# Patient Record
Sex: FEMALE | Race: WHITE | Employment: FULL TIME | URBAN - METROPOLITAN AREA
[De-identification: names, ages, dates, MRNs, and addresses within clinical notes are randomized per-mention and may not be internally consistent; named-entity substitution may affect disease eponyms.]

---

## 2017-02-20 ENCOUNTER — HOSPITAL ENCOUNTER (OUTPATIENT)
Dept: WOMENS IMAGING | Age: 52
Discharge: HOME | End: 2017-02-20
Payer: COMMERCIAL

## 2017-02-20 ENCOUNTER — TELEPHONE (OUTPATIENT)
Dept: PHARMACY | Facility: CLINIC | Age: 52
End: 2017-02-20

## 2017-02-20 DIAGNOSIS — Z12.39 SCREENING FOR BREAST CANCER: ICD-10-CM

## 2017-02-20 PROCEDURE — 77067 SCR MAMMO BI INCL CAD: CPT | Performed by: RADIOLOGY

## 2017-02-20 PROCEDURE — 77063 BREAST TOMOSYNTHESIS BI: CPT | Performed by: RADIOLOGY

## 2017-02-20 PROCEDURE — G0202 SCR MAMMO BI INCL CAD: HCPCS

## 2017-03-13 ENCOUNTER — OFFICE VISIT (OUTPATIENT)
Dept: ORTHOPEDIC SURGERY | Age: 52
End: 2017-03-13
Payer: COMMERCIAL

## 2017-03-13 VITALS
BODY MASS INDEX: 39.34 KG/M2 | HEIGHT: 63 IN | WEIGHT: 222 LBS | HEART RATE: 79 BPM | DIASTOLIC BLOOD PRESSURE: 83 MMHG | SYSTOLIC BLOOD PRESSURE: 142 MMHG

## 2017-03-13 DIAGNOSIS — M25.561 RIGHT KNEE PAIN, UNSPECIFIED CHRONICITY: Primary | ICD-10-CM

## 2017-03-13 PROCEDURE — 99203 OFFICE O/P NEW LOW 30 MIN: CPT | Performed by: ORTHOPAEDIC SURGERY

## 2017-03-20 ENCOUNTER — HOSPITAL ENCOUNTER (OUTPATIENT)
Dept: PREADMISSION TESTING | Age: 52
Discharge: HOME | End: 2017-03-20
Payer: COMMERCIAL

## 2017-03-20 VITALS
SYSTOLIC BLOOD PRESSURE: 124 MMHG | RESPIRATION RATE: 16 BRPM | TEMPERATURE: 96.6 F | DIASTOLIC BLOOD PRESSURE: 81 MMHG | BODY MASS INDEX: 38.62 KG/M2 | HEIGHT: 63 IN | OXYGEN SATURATION: 97 % | HEART RATE: 70 BPM | WEIGHT: 218 LBS

## 2017-03-20 LAB
ABSOLUTE EOS #: 0.2 K/UL (ref 0–0.4)
ABSOLUTE LYMPH #: 3 K/UL (ref 1–4.8)
ABSOLUTE MONO #: 0.8 K/UL (ref 0.1–1.3)
ANION GAP SERPL CALCULATED.3IONS-SCNC: 15 MMOL/L (ref 9–17)
BASOPHILS # BLD: 1 % (ref 0–2)
BASOPHILS ABSOLUTE: 0.1 K/UL (ref 0–0.2)
BUN BLDV-MCNC: 18 MG/DL (ref 6–20)
BUN/CREAT BLD: ABNORMAL (ref 9–20)
CALCIUM SERPL-MCNC: 9.2 MG/DL (ref 8.6–10.4)
CHLORIDE BLD-SCNC: 98 MMOL/L (ref 98–107)
CO2: 25 MMOL/L (ref 20–31)
CREAT SERPL-MCNC: 1.1 MG/DL (ref 0.5–0.9)
DIFFERENTIAL TYPE: ABNORMAL
EOSINOPHILS RELATIVE PERCENT: 1 % (ref 0–4)
GFR AFRICAN AMERICAN: >60 ML/MIN
GFR NON-AFRICAN AMERICAN: 52 ML/MIN
GFR SERPL CREATININE-BSD FRML MDRD: ABNORMAL ML/MIN/{1.73_M2}
GFR SERPL CREATININE-BSD FRML MDRD: ABNORMAL ML/MIN/{1.73_M2}
GLUCOSE BLD-MCNC: 106 MG/DL (ref 70–99)
HCT VFR BLD CALC: 41.9 % (ref 36–46)
HEMOGLOBIN: 13.8 G/DL (ref 12–16)
LYMPHOCYTES # BLD: 25 % (ref 24–44)
MCH RBC QN AUTO: 28 PG (ref 26–34)
MCHC RBC AUTO-ENTMCNC: 32.9 G/DL (ref 31–37)
MCV RBC AUTO: 85.1 FL (ref 80–100)
MONOCYTES # BLD: 6 % (ref 1–7)
PDW BLD-RTO: 14.6 % (ref 11.5–14.9)
PLATELET # BLD: 412 K/UL (ref 150–450)
PLATELET ESTIMATE: ABNORMAL
PMV BLD AUTO: 9 FL (ref 6–12)
POTASSIUM SERPL-SCNC: 5.2 MMOL/L (ref 3.7–5.3)
RBC # BLD: 4.93 M/UL (ref 4–5.2)
RBC # BLD: ABNORMAL 10*6/UL
SEG NEUTROPHILS: 67 % (ref 36–66)
SEGMENTED NEUTROPHILS ABSOLUTE COUNT: 8.2 K/UL (ref 1.3–9.1)
SODIUM BLD-SCNC: 138 MMOL/L (ref 135–144)
WBC # BLD: 12.2 K/UL (ref 3.5–11)
WBC # BLD: ABNORMAL 10*3/UL

## 2017-03-20 PROCEDURE — 80048 BASIC METABOLIC PNL TOTAL CA: CPT

## 2017-03-20 PROCEDURE — 36415 COLL VENOUS BLD VENIPUNCTURE: CPT

## 2017-03-20 PROCEDURE — 85025 COMPLETE CBC W/AUTO DIFF WBC: CPT

## 2017-03-20 PROCEDURE — 93005 ELECTROCARDIOGRAM TRACING: CPT

## 2017-03-20 ASSESSMENT — PAIN DESCRIPTION - LOCATION: LOCATION: KNEE

## 2017-03-20 ASSESSMENT — PAIN DESCRIPTION - ORIENTATION: ORIENTATION: RIGHT

## 2017-03-20 ASSESSMENT — PAIN SCALES - GENERAL: PAINLEVEL_OUTOF10: 4

## 2017-03-20 ASSESSMENT — PAIN DESCRIPTION - PAIN TYPE: TYPE: CHRONIC PAIN

## 2017-04-03 ENCOUNTER — ANESTHESIA EVENT (OUTPATIENT)
Dept: OPERATING ROOM | Age: 52
End: 2017-04-03
Payer: COMMERCIAL

## 2017-04-03 ENCOUNTER — HOSPITAL ENCOUNTER (OUTPATIENT)
Age: 52
Setting detail: OUTPATIENT SURGERY
Discharge: HOME | End: 2017-04-03
Attending: ORTHOPAEDIC SURGERY | Admitting: ORTHOPAEDIC SURGERY
Payer: COMMERCIAL

## 2017-04-03 ENCOUNTER — ANESTHESIA (OUTPATIENT)
Dept: OPERATING ROOM | Age: 52
End: 2017-04-03
Payer: COMMERCIAL

## 2017-04-03 VITALS
HEART RATE: 70 BPM | HEIGHT: 63 IN | SYSTOLIC BLOOD PRESSURE: 110 MMHG | TEMPERATURE: 97.9 F | DIASTOLIC BLOOD PRESSURE: 64 MMHG | RESPIRATION RATE: 16 BRPM | OXYGEN SATURATION: 96 % | WEIGHT: 218 LBS | BODY MASS INDEX: 38.62 KG/M2

## 2017-04-03 VITALS — OXYGEN SATURATION: 99 % | DIASTOLIC BLOOD PRESSURE: 72 MMHG | TEMPERATURE: 95 F | SYSTOLIC BLOOD PRESSURE: 118 MMHG

## 2017-04-03 DIAGNOSIS — M23.41 LOOSE BODY IN KNEE, RIGHT: ICD-10-CM

## 2017-04-03 LAB
-: NORMAL
GLUCOSE BLD-MCNC: 140 MG/DL (ref 65–105)
HCG, PREGNANCY URINE (POC): NEGATIVE

## 2017-04-03 PROCEDURE — 88311 DECALCIFY TISSUE: CPT

## 2017-04-03 PROCEDURE — 3700000001 HC ADD 15 MINUTES (ANESTHESIA): Performed by: ORTHOPAEDIC SURGERY

## 2017-04-03 PROCEDURE — 7100000000 HC PACU RECOVERY - FIRST 15 MIN: Performed by: ORTHOPAEDIC SURGERY

## 2017-04-03 PROCEDURE — 2580000003 HC RX 258: Performed by: ORTHOPAEDIC SURGERY

## 2017-04-03 PROCEDURE — 88304 TISSUE EXAM BY PATHOLOGIST: CPT

## 2017-04-03 PROCEDURE — 6360000002 HC RX W HCPCS: Performed by: ANESTHESIOLOGY

## 2017-04-03 PROCEDURE — 3600000003 HC SURGERY LEVEL 3 BASE: Performed by: ORTHOPAEDIC SURGERY

## 2017-04-03 PROCEDURE — 7100000031 HC ASPR PHASE II RECOVERY - ADDTL 15 MIN: Performed by: ORTHOPAEDIC SURGERY

## 2017-04-03 PROCEDURE — 6370000000 HC RX 637 (ALT 250 FOR IP): Performed by: ANESTHESIOLOGY

## 2017-04-03 PROCEDURE — 3700000000 HC ANESTHESIA ATTENDED CARE: Performed by: ORTHOPAEDIC SURGERY

## 2017-04-03 PROCEDURE — 82947 ASSAY GLUCOSE BLOOD QUANT: CPT

## 2017-04-03 PROCEDURE — 2720000010 HC SURG SUPPLY STERILE: Performed by: ORTHOPAEDIC SURGERY

## 2017-04-03 PROCEDURE — 6370000000 HC RX 637 (ALT 250 FOR IP): Performed by: ORTHOPAEDIC SURGERY

## 2017-04-03 PROCEDURE — 7100000030 HC ASPR PHASE II RECOVERY - FIRST 15 MIN: Performed by: ORTHOPAEDIC SURGERY

## 2017-04-03 PROCEDURE — 2500000003 HC RX 250 WO HCPCS: Performed by: ANESTHESIOLOGY

## 2017-04-03 PROCEDURE — 6360000002 HC RX W HCPCS: Performed by: ORTHOPAEDIC SURGERY

## 2017-04-03 PROCEDURE — 84703 CHORIONIC GONADOTROPIN ASSAY: CPT

## 2017-04-03 PROCEDURE — 3600000013 HC SURGERY LEVEL 3 ADDTL 15MIN: Performed by: ORTHOPAEDIC SURGERY

## 2017-04-03 PROCEDURE — 7100000001 HC PACU RECOVERY - ADDTL 15 MIN: Performed by: ORTHOPAEDIC SURGERY

## 2017-04-03 RX ORDER — ONDANSETRON 2 MG/ML
4 INJECTION INTRAMUSCULAR; INTRAVENOUS
Status: DISCONTINUED | OUTPATIENT
Start: 2017-04-03 | End: 2017-04-03 | Stop reason: HOSPADM

## 2017-04-03 RX ORDER — SODIUM CHLORIDE 9 MG/ML
INJECTION, SOLUTION INTRAVENOUS CONTINUOUS
Status: DISCONTINUED | OUTPATIENT
Start: 2017-04-03 | End: 2017-04-03 | Stop reason: HOSPADM

## 2017-04-03 RX ORDER — PROPOFOL 10 MG/ML
INJECTION, EMULSION INTRAVENOUS PRN
Status: DISCONTINUED | OUTPATIENT
Start: 2017-04-03 | End: 2017-04-03 | Stop reason: SDUPTHER

## 2017-04-03 RX ORDER — FENTANYL CITRATE 50 UG/ML
25 INJECTION, SOLUTION INTRAMUSCULAR; INTRAVENOUS EVERY 5 MIN PRN
Status: DISCONTINUED | OUTPATIENT
Start: 2017-04-03 | End: 2017-04-03 | Stop reason: HOSPADM

## 2017-04-03 RX ORDER — LABETALOL HYDROCHLORIDE 5 MG/ML
5 INJECTION, SOLUTION INTRAVENOUS EVERY 10 MIN PRN
Status: DISCONTINUED | OUTPATIENT
Start: 2017-04-03 | End: 2017-04-03 | Stop reason: HOSPADM

## 2017-04-03 RX ORDER — HYDROCODONE BITARTRATE AND ACETAMINOPHEN 5; 325 MG/1; MG/1
1 TABLET ORAL PRN
Status: COMPLETED | OUTPATIENT
Start: 2017-04-03 | End: 2017-04-03

## 2017-04-03 RX ORDER — DIPHENHYDRAMINE HYDROCHLORIDE 50 MG/ML
12.5 INJECTION INTRAMUSCULAR; INTRAVENOUS
Status: DISCONTINUED | OUTPATIENT
Start: 2017-04-03 | End: 2017-04-03 | Stop reason: HOSPADM

## 2017-04-03 RX ORDER — HYDROCODONE BITARTRATE AND ACETAMINOPHEN 5; 325 MG/1; MG/1
2 TABLET ORAL PRN
Qty: 30 TABLET | Refills: 0 | Status: SHIPPED | OUTPATIENT
Start: 2017-04-03 | End: 2017-04-10

## 2017-04-03 RX ORDER — METOCLOPRAMIDE HYDROCHLORIDE 5 MG/ML
10 INJECTION INTRAMUSCULAR; INTRAVENOUS
Status: DISCONTINUED | OUTPATIENT
Start: 2017-04-03 | End: 2017-04-03 | Stop reason: HOSPADM

## 2017-04-03 RX ORDER — ROPIVACAINE HYDROCHLORIDE 5 MG/ML
INJECTION, SOLUTION EPIDURAL; INFILTRATION; PERINEURAL PRN
Status: DISCONTINUED | OUTPATIENT
Start: 2017-04-03 | End: 2017-04-03 | Stop reason: HOSPADM

## 2017-04-03 RX ORDER — ONDANSETRON 2 MG/ML
INJECTION INTRAMUSCULAR; INTRAVENOUS PRN
Status: DISCONTINUED | OUTPATIENT
Start: 2017-04-03 | End: 2017-04-03 | Stop reason: SDUPTHER

## 2017-04-03 RX ORDER — HYDRALAZINE HYDROCHLORIDE 20 MG/ML
5 INJECTION INTRAMUSCULAR; INTRAVENOUS EVERY 10 MIN PRN
Status: DISCONTINUED | OUTPATIENT
Start: 2017-04-03 | End: 2017-04-03 | Stop reason: HOSPADM

## 2017-04-03 RX ORDER — SCOLOPAMINE TRANSDERMAL SYSTEM 1 MG/1
1 PATCH, EXTENDED RELEASE TRANSDERMAL
Status: DISCONTINUED | OUTPATIENT
Start: 2017-04-03 | End: 2017-04-03 | Stop reason: HOSPADM

## 2017-04-03 RX ORDER — FENTANYL CITRATE 50 UG/ML
50 INJECTION, SOLUTION INTRAMUSCULAR; INTRAVENOUS EVERY 5 MIN PRN
Status: DISCONTINUED | OUTPATIENT
Start: 2017-04-03 | End: 2017-04-03 | Stop reason: HOSPADM

## 2017-04-03 RX ORDER — MIDAZOLAM HYDROCHLORIDE 1 MG/ML
INJECTION INTRAMUSCULAR; INTRAVENOUS PRN
Status: DISCONTINUED | OUTPATIENT
Start: 2017-04-03 | End: 2017-04-03 | Stop reason: SDUPTHER

## 2017-04-03 RX ORDER — DEXAMETHASONE SODIUM PHOSPHATE 4 MG/ML
INJECTION, SOLUTION INTRA-ARTICULAR; INTRALESIONAL; INTRAMUSCULAR; INTRAVENOUS; SOFT TISSUE PRN
Status: DISCONTINUED | OUTPATIENT
Start: 2017-04-03 | End: 2017-04-03 | Stop reason: SDUPTHER

## 2017-04-03 RX ORDER — KETOROLAC TROMETHAMINE 30 MG/ML
INJECTION, SOLUTION INTRAMUSCULAR; INTRAVENOUS PRN
Status: DISCONTINUED | OUTPATIENT
Start: 2017-04-03 | End: 2017-04-03 | Stop reason: SDUPTHER

## 2017-04-03 RX ORDER — MORPHINE SULFATE 2 MG/ML
1 INJECTION, SOLUTION INTRAMUSCULAR; INTRAVENOUS EVERY 5 MIN PRN
Status: DISCONTINUED | OUTPATIENT
Start: 2017-04-03 | End: 2017-04-03 | Stop reason: HOSPADM

## 2017-04-03 RX ORDER — HYDROCODONE BITARTRATE AND ACETAMINOPHEN 5; 325 MG/1; MG/1
2 TABLET ORAL PRN
Status: COMPLETED | OUTPATIENT
Start: 2017-04-03 | End: 2017-04-03

## 2017-04-03 RX ORDER — LIDOCAINE HYDROCHLORIDE 10 MG/ML
INJECTION, SOLUTION INFILTRATION; PERINEURAL PRN
Status: DISCONTINUED | OUTPATIENT
Start: 2017-04-03 | End: 2017-04-03 | Stop reason: SDUPTHER

## 2017-04-03 RX ORDER — MEPERIDINE HYDROCHLORIDE 25 MG/ML
12.5 INJECTION INTRAMUSCULAR; INTRAVENOUS; SUBCUTANEOUS EVERY 5 MIN PRN
Status: DISCONTINUED | OUTPATIENT
Start: 2017-04-03 | End: 2017-04-03 | Stop reason: HOSPADM

## 2017-04-03 RX ADMIN — LIDOCAINE HYDROCHLORIDE 5 ML: 10 INJECTION, SOLUTION INFILTRATION; PERINEURAL at 11:04

## 2017-04-03 RX ADMIN — FENTANYL CITRATE 50 MCG: 50 INJECTION, SOLUTION INTRAMUSCULAR; INTRAVENOUS at 11:16

## 2017-04-03 RX ADMIN — SODIUM CHLORIDE: 9 INJECTION, SOLUTION INTRAVENOUS at 09:32

## 2017-04-03 RX ADMIN — FENTANYL CITRATE 50 MCG: 50 INJECTION, SOLUTION INTRAMUSCULAR; INTRAVENOUS at 11:04

## 2017-04-03 RX ADMIN — MIDAZOLAM HYDROCHLORIDE 2 MG: 1 INJECTION, SOLUTION INTRAMUSCULAR; INTRAVENOUS at 11:04

## 2017-04-03 RX ADMIN — KETOROLAC TROMETHAMINE 30 MG: 30 INJECTION, SOLUTION INTRAMUSCULAR; INTRAVENOUS at 11:26

## 2017-04-03 RX ADMIN — PROPOFOL 200 MG: 10 INJECTION, EMULSION INTRAVENOUS at 11:04

## 2017-04-03 RX ADMIN — HYDROCODONE BITARTRATE AND ACETAMINOPHEN 2 TABLET: 5; 325 TABLET ORAL at 12:22

## 2017-04-03 RX ADMIN — ONDANSETRON 4 MG: 2 INJECTION, SOLUTION INTRAMUSCULAR; INTRAVENOUS at 11:19

## 2017-04-03 RX ADMIN — DEXAMETHASONE SODIUM PHOSPHATE 4 MG: 4 INJECTION, SOLUTION INTRAMUSCULAR; INTRAVENOUS at 11:15

## 2017-04-03 ASSESSMENT — PAIN SCALES - GENERAL
PAINLEVEL_OUTOF10: 0
PAINLEVEL_OUTOF10: 3
PAINLEVEL_OUTOF10: 4
PAINLEVEL_OUTOF10: 4
PAINLEVEL_OUTOF10: 3
PAINLEVEL_OUTOF10: 4

## 2017-04-03 ASSESSMENT — PAIN DESCRIPTION - ORIENTATION
ORIENTATION: RIGHT

## 2017-04-03 ASSESSMENT — PAIN DESCRIPTION - DESCRIPTORS
DESCRIPTORS: ACHING

## 2017-04-03 ASSESSMENT — PAIN DESCRIPTION - PAIN TYPE
TYPE: SURGICAL PAIN

## 2017-04-03 ASSESSMENT — PAIN DESCRIPTION - LOCATION
LOCATION: KNEE

## 2017-04-04 LAB
EKG ATRIAL RATE: 72 BPM
EKG P AXIS: 59 DEGREES
EKG P-R INTERVAL: 118 MS
EKG Q-T INTERVAL: 406 MS
EKG QRS DURATION: 78 MS
EKG QTC CALCULATION (BAZETT): 444 MS
EKG R AXIS: -7 DEGREES
EKG T AXIS: 40 DEGREES
EKG VENTRICULAR RATE: 72 BPM

## 2017-04-05 LAB — SURGICAL PATHOLOGY REPORT: NORMAL

## 2017-04-10 ENCOUNTER — OFFICE VISIT (OUTPATIENT)
Dept: ORTHOPEDIC SURGERY | Age: 52
End: 2017-04-10

## 2017-04-10 DIAGNOSIS — Z98.890 H/O ARTHROSCOPY OF RIGHT KNEE: Primary | ICD-10-CM

## 2017-04-10 DIAGNOSIS — Z48.02 VISIT FOR SUTURE REMOVAL: ICD-10-CM

## 2017-04-10 PROCEDURE — 99024 POSTOP FOLLOW-UP VISIT: CPT | Performed by: ORTHOPAEDIC SURGERY

## 2017-05-19 ENCOUNTER — EMPLOYEE WELLNESS (OUTPATIENT)
Dept: OTHER | Age: 52
End: 2017-05-19

## 2017-05-19 LAB
AVERAGE GLUCOSE: NORMAL
CHOLESTEROL/HDL RATIO: 3.1
CHOLESTEROL: 147 MG/DL
GLUCOSE BLD-MCNC: 134 MG/DL (ref 70–99)
HBA1C MFR BLD: 6.4 %
HDLC SERPL-MCNC: 47 MG/DL
LDL CHOLESTEROL: 70 MG/DL (ref 0–130)
PATIENT FASTING?: YES
TRIGL SERPL-MCNC: 151 MG/DL
VLDLC SERPL CALC-MCNC: ABNORMAL MG/DL (ref 1–30)

## 2017-05-22 LAB
ESTIMATED AVERAGE GLUCOSE: 209 MG/DL
HBA1C MFR BLD: 8.9 % (ref 4–6)

## 2017-08-17 ENCOUNTER — HOSPITAL ENCOUNTER (OUTPATIENT)
Age: 52
Discharge: HOME | End: 2017-08-17
Payer: COMMERCIAL

## 2017-08-17 ENCOUNTER — HOSPITAL ENCOUNTER (OUTPATIENT)
Dept: GENERAL RADIOLOGY | Age: 52
Discharge: HOME | End: 2017-08-17
Payer: COMMERCIAL

## 2017-08-17 DIAGNOSIS — M54.12 BRACHIAL NEURITIS OR RADICULITIS NOS: ICD-10-CM

## 2017-08-17 LAB
ESTIMATED AVERAGE GLUCOSE: 235 MG/DL
HBA1C MFR BLD: 9.8 % (ref 4–6)

## 2017-08-17 PROCEDURE — 36415 COLL VENOUS BLD VENIPUNCTURE: CPT

## 2017-08-17 PROCEDURE — 72040 X-RAY EXAM NECK SPINE 2-3 VW: CPT

## 2017-08-17 PROCEDURE — 83036 HEMOGLOBIN GLYCOSYLATED A1C: CPT

## 2017-09-07 ENCOUNTER — HOSPITAL ENCOUNTER (OUTPATIENT)
Age: 52
Discharge: HOME | End: 2017-09-07
Payer: COMMERCIAL

## 2017-09-07 ENCOUNTER — HOSPITAL ENCOUNTER (OUTPATIENT)
Dept: GENERAL RADIOLOGY | Age: 52
Discharge: HOME | End: 2017-09-07
Payer: COMMERCIAL

## 2017-09-07 DIAGNOSIS — M79.672 LEFT FOOT PAIN: ICD-10-CM

## 2017-09-07 PROCEDURE — 73630 X-RAY EXAM OF FOOT: CPT

## 2017-12-04 ENCOUNTER — TELEPHONE (OUTPATIENT)
Dept: PHARMACY | Facility: CLINIC | Age: 52
End: 2017-12-04

## 2017-12-04 ENCOUNTER — SCHEDULED TELEPHONE ENCOUNTER (OUTPATIENT)
Dept: PHARMACY | Facility: CLINIC | Age: 52
End: 2017-12-04

## 2017-12-04 NOTE — LETTER
[address]  [address], Cathryn Claros [address]  Phone: [phone], option 7  Fax: [phone] [address]           12/04/17     Dear Edilma Lobo,    The [address] [place] [address] Team has attempted to contact you for your scheduled Diabetes Management telephone appointment but was unable to reach you. One of the requirements to participate in the [facility] Diabetes Management Program is to complete this appointment. We would like to work with you and your doctor to:  - Review your medications, including over-the-counter and herbal medications  - Answer questions about your medications and how to get the most benefit from them  - Identify potential drug interactions or side effects and help fix them  - Identify preferred medications that are equally effective, but available at a lower cost to you  - Help you reach the necessary requirements to remain enrolled in the Diabetes Management Program offered by [facility]    Please call [phone] and select option #7 to reschedule this appointment to take advantage of this service.       Sincerely,     [address]  Phone: [phone], option 7

## 2017-12-04 NOTE — TELEPHONE ENCOUNTER
[facility]) Employee Diabetes Program    Two attempts made to reach patient by telephone for pharmacist appointment. Left voice message for patient to return clinician's phone call to [phone]. Will prepare letter and mail to patient.     Amanda Nevarez PharmD, [facility] Specialist  O: [phone]  C: [address] [place], [place] [phone], Option 7    =======================================================    For Pharmacy Admin Tracking Only  PHSO: Yes  Time Spent (min): 15

## 2017-12-04 NOTE — ADDENDUM NOTE
Encounter addended by: Rustam Flood MA on: 12/4/2017  5:15 PM<BR>    Actions taken: Letter status changed

## 2017-12-20 ENCOUNTER — ENROLLMENT (OUTPATIENT)
Dept: PHARMACY | Facility: CLINIC | Age: 52
End: 2017-12-20

## 2017-12-20 ENCOUNTER — SCHEDULED TELEPHONE ENCOUNTER (OUTPATIENT)
Dept: PHARMACY | Facility: CLINIC | Age: 52
End: 2017-12-20

## 2017-12-20 ENCOUNTER — CLINICAL DOCUMENTATION (OUTPATIENT)
Dept: PHARMACY | Facility: CLINIC | Age: 52
End: 2017-12-20

## 2017-12-20 NOTE — LETTER
[facility]   [address] [facility] [address]           12/20/17     Dear Dori Mcallister! You have completed the requirements to participate in the [facility]) Diabetes Management program for 2018. What you will receive? You will receive beginning Jan. 1 up to $600 in waived copays ($300 if entering the program on July 1) for specific medications and pharmacy-related supplies purchased through the [facility]. A list of these items is available on the [employer] Diabetes Management page (Employee Quick Links > Human Resources > Benefits and Well Being > Diabetes Management) and on [employer] under the Diabetes Management tile. In addition, clinical support will be available if your A1c becomes greater than 8 percent. To reduce your health risks, our care coordinators and diabetes educators will assist you in better controlling your condition. What you will need to do? To maintain your benefit throughout the year and be eligible to receive the benefit next year, you must make sure you complete all the following ongoing requirements:  ? Requirements to be completed throughout year:  ? Take diabetes medication as prescribed as well as cholesterol (Statin) or high blood pressure (ACE/ARB), if needed. o 70% adherence is required of diabetes medications  o Provide documentation if cholesterol and/or high blood pressure medications are not needed. ? Lipid panel (once yearly)  ? Urine albumin (once yearly)  ? Pneumonia Vaccination (as indicated)  ? Requirements to be completed between Jan. 1 and June 30:  ? Visit with your physician (First yearly visit)  ? Meet with a [address] [place] [address] pharmacist in person at a hospital location or by phone (This visit may be conducted prior to the start of the requirements period.)  ? First A1c  ?  Requirements to be completed between July 1 and Dec. 31

## 2017-12-20 NOTE — PROGRESS NOTES
Pharmacy Pop Care Documentation:      The application for Ivy Pierson for enrollment into the diabetes management program has been reviewed and accepted on 12/20/17.     [address] Pop Care Documentation:     AVS received for required office visit on: 8/17/17    Urine Albumin Override: Done: 12/27/16

## 2017-12-27 RX ORDER — METFORMIN HYDROCHLORIDE 500 MG/1
1000 TABLET, EXTENDED RELEASE ORAL 2 TIMES DAILY WITH MEALS
COMMUNITY

## 2017-12-27 RX ORDER — GLIMEPIRIDE 4 MG/1
TABLET ORAL
COMMUNITY

## 2018-02-26 ENCOUNTER — HOSPITAL ENCOUNTER (OUTPATIENT)
Dept: MAMMOGRAPHY | Age: 53
Discharge: HOME | End: 2018-02-28
Payer: COMMERCIAL

## 2018-02-26 DIAGNOSIS — Z12.39 BREAST CANCER SCREENING: ICD-10-CM

## 2018-02-26 PROCEDURE — 77067 SCR MAMMO BI INCL CAD: CPT

## 2018-03-20 VITALS — BODY MASS INDEX: 37.38 KG/M2 | WEIGHT: 211 LBS

## 2018-03-24 ENCOUNTER — HOSPITAL ENCOUNTER (OUTPATIENT)
Age: 53
Discharge: HOME | End: 2018-03-24
Payer: COMMERCIAL

## 2018-03-24 LAB
CORTISOL COLLECTION INFO: NORMAL
CORTISOL: 11.2 UG/DL (ref 2.7–18.4)
ESTRADIOL LEVEL: 26 PG/ML (ref 27–314)
FOLLICLE STIMULATING HORMONE: 53.7 U/L (ref 1.7–21.5)
PROGESTERONE LEVEL: <0.05 NG/ML
T3 FREE: 2.96 PG/ML (ref 2.02–4.43)
THYROXINE, FREE: 1.3 NG/DL (ref 0.93–1.7)
TSH SERPL DL<=0.05 MIU/L-ACNC: 2.78 MIU/L (ref 0.3–5)

## 2018-03-24 PROCEDURE — 82677 ASSAY OF ESTRIOL: CPT

## 2018-03-24 PROCEDURE — 82533 TOTAL CORTISOL: CPT

## 2018-03-24 PROCEDURE — 84481 FREE ASSAY (FT-3): CPT

## 2018-03-24 PROCEDURE — 84144 ASSAY OF PROGESTERONE: CPT

## 2018-03-24 PROCEDURE — 84439 ASSAY OF FREE THYROXINE: CPT

## 2018-03-24 PROCEDURE — 36415 COLL VENOUS BLD VENIPUNCTURE: CPT

## 2018-03-24 PROCEDURE — 83001 ASSAY OF GONADOTROPIN (FSH): CPT

## 2018-03-24 PROCEDURE — 84443 ASSAY THYROID STIM HORMONE: CPT

## 2018-03-24 PROCEDURE — 82670 ASSAY OF TOTAL ESTRADIOL: CPT

## 2018-03-28 LAB — ESTRIOL: 0.02 NG/ML

## 2018-05-18 ENCOUNTER — EMPLOYEE WELLNESS (OUTPATIENT)
Dept: OTHER | Age: 53
End: 2018-05-18

## 2018-05-18 LAB
CHOLESTEROL/HDL RATIO: 2.6
CHOLESTEROL: 147 MG/DL
ESTIMATED AVERAGE GLUCOSE: 263 MG/DL
GLUCOSE BLD-MCNC: 183 MG/DL (ref 70–99)
HBA1C MFR BLD: 10.8 % (ref 4–6)
HDLC SERPL-MCNC: 56 MG/DL
LDL CHOLESTEROL: 76 MG/DL (ref 0–130)
PATIENT FASTING?: YES
TRIGL SERPL-MCNC: 76 MG/DL
VLDLC SERPL CALC-MCNC: ABNORMAL MG/DL (ref 1–30)

## 2018-05-24 ENCOUNTER — TELEPHONE (OUTPATIENT)
Dept: PHARMACY | Facility: CLINIC | Age: 53
End: 2018-05-24

## 2018-05-29 VITALS — WEIGHT: 210 LBS | BODY MASS INDEX: 37.2 KG/M2

## 2018-06-20 ENCOUNTER — TELEPHONE (OUTPATIENT)
Dept: PHARMACY | Facility: CLINIC | Age: 53
End: 2018-06-20

## 2018-06-25 ENCOUNTER — CLINICAL DOCUMENTATION (OUTPATIENT)
Dept: PHARMACY | Facility: CLINIC | Age: 53
End: 2018-06-25

## 2018-08-22 ENCOUNTER — CARE COORDINATION (OUTPATIENT)
Dept: CARE COORDINATION | Age: 53
End: 2018-08-22

## 2018-09-24 ENCOUNTER — CARE COORDINATION (OUTPATIENT)
Dept: CARE COORDINATION | Age: 53
End: 2018-09-24

## 2018-09-24 ENCOUNTER — CLINICAL DOCUMENTATION (OUTPATIENT)
Dept: PHARMACY | Facility: CLINIC | Age: 53
End: 2018-09-24

## 2018-09-24 NOTE — CARE COORDINATION
2018    CHOLHDLRATIO 3.1 2017       Lab Results   Component Value Date    WBC 12.2 (H) 2017    HGB 13.8 2017    HCT 41.9 2017    MCV 85.1 2017     2017       Lab Results   Component Value Date    CREATININE 1.10 (H) 2017    BUN 18 2017     2017    K 5.2 2017    CL 98 2017    CO2 25 2017         Anthropometric Measurements:    Height: None on file  Weight: 210 lbs. ()  BMI: N/A  IBW: N/A  %IBW: N/A  AdBW: N/A      Food and Nutrition History:    Diet Recall    Breakfas  Consumed: eggs with sausage, frozen breakfast sandwich, special K with skim milk and fruit    AM Snack  Consumed: none    Lunch  Consumed: none    PM Snack  Consumed: none    Dinner  Time: 8-9 pm  Consumed: protein with veggies, sometimes will have potatoes/pasta    Bedtime Snack  Consumed: none    Beverages: Water    Frequency of meals away from home: N/A    Exercise: None, was doing crossfit classes but stopped after knee surgery    Blood Sugar Checks: 3-4x daily, fastin-125 mg/dL    Summary of Appointment: Spoke to patient over phone regarding employee diabetes program nutrition assessment. Explained role of RD position with the employee diabetes program and the purpose of today's call. RD asked open ended questions to assess patient's goals/outcome of call. Personal goals of patient that were discussed are to lower A1C. Patient stated that she has had difficulty lowering her A1C since being diagnosed. She lost over 100 lbs. On her own and numbers still increased. Patient is on 5 DM medications and states that MD is constantly changing to see what will work. Updated med change in July and patient states that she has seen some improvement in numbers. Discussed patient's current intake and eating habits (listed above in food recall). Patient works long hours at work (12 hour shifts) and states that when she works she does not have time to consume lunch. current nutrition patterns. Provided education regarding value of adherence to controlled CHO intake. Discussed ways to establish application of Plate Method meal planning efforts and discussed concepts of alternatives and choices regarding implementation of consistent CHO diet. Explored ideas for small, incremental goals to initiate behavior change. Goal(s) Patient will apply education to define small goals that will help to implement consistent CHO nutrition therapy. Nutrition Monitoring and Evaluation    Indicator/Goal Criteria   #1 Meal Times #1  Consume three meals daily   #2 Exercise #2  Increase HR 3x weekly for 30 minutes       Follow Up: Will do f/u call in one month. Patient is seeing MD in November for updated labs.       Leroy Randolph RDN, LD  Registered Dietitian  [phone]

## 2018-10-18 ENCOUNTER — PATIENT MESSAGE (OUTPATIENT)
Dept: PHARMACY | Facility: CLINIC | Age: 53
End: 2018-10-18

## 2018-10-23 ENCOUNTER — CARE COORDINATION (OUTPATIENT)
Dept: CARE COORDINATION | Age: 53
End: 2018-10-23

## 2018-10-23 NOTE — CARE COORDINATION
RD outreach regarding employee beneficiary diabetes program. This is the follow-up outreach in order for patient to meet requirements for the year with A1C >8%. LM and request for call back to discuss DM management follow-up. RD left contact information. Will wait for patient return call.

## 2018-11-01 ENCOUNTER — HOSPITAL ENCOUNTER (OUTPATIENT)
Age: 53
Discharge: HOME | End: 2018-11-01
Payer: COMMERCIAL

## 2018-11-01 LAB
ABSOLUTE EOS #: 0.1 K/UL (ref 0–0.4)
ABSOLUTE IMMATURE GRANULOCYTE: NORMAL K/UL (ref 0–0.3)
ABSOLUTE LYMPH #: 2.3 K/UL (ref 1–4.8)
ABSOLUTE MONO #: 0.4 K/UL (ref 0.1–1.3)
BASOPHILS # BLD: 1 % (ref 0–2)
BASOPHILS ABSOLUTE: 0.1 K/UL (ref 0–0.2)
BILIRUBIN URINE: NEGATIVE
CHOLESTEROL/HDL RATIO: 2.4
CHOLESTEROL: 139 MG/DL
COLOR: YELLOW
COMMENT UA: ABNORMAL
CREATININE URINE: 64.9 MG/DL (ref 28–217)
DIFFERENTIAL TYPE: NORMAL
EOSINOPHILS RELATIVE PERCENT: 2 % (ref 0–4)
ESTIMATED AVERAGE GLUCOSE: 260 MG/DL
ESTRADIOL LEVEL: 33 PG/ML (ref 27–314)
FOLLICLE STIMULATING HORMONE: 41.8 U/L (ref 1.7–21.5)
GLUCOSE URINE: ABNORMAL
HBA1C MFR BLD: 10.7 % (ref 4–6)
HCT VFR BLD CALC: 42.2 % (ref 36–46)
HDLC SERPL-MCNC: 59 MG/DL
HEMOGLOBIN: 13.8 G/DL (ref 12–16)
IMMATURE GRANULOCYTES: NORMAL %
KETONES, URINE: NEGATIVE
LDL CHOLESTEROL: 65 MG/DL (ref 0–130)
LEUKOCYTE ESTERASE, URINE: NEGATIVE
LYMPHOCYTES # BLD: 32 % (ref 24–44)
MCH RBC QN AUTO: 28.6 PG (ref 26–34)
MCHC RBC AUTO-ENTMCNC: 32.7 G/DL (ref 31–37)
MCV RBC AUTO: 87.5 FL (ref 80–100)
MICROALBUMIN/CREAT 24H UR: <12 MG/L
MICROALBUMIN/CREAT UR-RTO: NORMAL MCG/MG CREAT
MONOCYTES # BLD: 5 % (ref 1–7)
NITRITE, URINE: NEGATIVE
NRBC AUTOMATED: NORMAL PER 100 WBC
PDW BLD-RTO: 13.8 % (ref 11.5–14.9)
PH UA: 5.5 (ref 5–8)
PLATELET # BLD: 351 K/UL (ref 150–450)
PLATELET ESTIMATE: NORMAL
PMV BLD AUTO: 8.4 FL (ref 6–12)
PROGESTERONE LEVEL: 0.67 NG/ML
PROTEIN UA: NEGATIVE
RBC # BLD: 4.83 M/UL (ref 4–5.2)
RBC # BLD: NORMAL 10*6/UL
SEG NEUTROPHILS: 60 % (ref 36–66)
SEGMENTED NEUTROPHILS ABSOLUTE COUNT: 4.4 K/UL (ref 1.3–9.1)
SPECIFIC GRAVITY UA: 1.04 (ref 1–1.03)
TRIGL SERPL-MCNC: 75 MG/DL
TSH SERPL DL<=0.05 MIU/L-ACNC: 2.37 MIU/L (ref 0.3–5)
TURBIDITY: CLEAR
URINE HGB: NEGATIVE
UROBILINOGEN, URINE: NORMAL
VLDLC SERPL CALC-MCNC: NORMAL MG/DL (ref 1–30)
WBC # BLD: 7.4 K/UL (ref 3.5–11)
WBC # BLD: NORMAL 10*3/UL

## 2018-11-01 PROCEDURE — 36415 COLL VENOUS BLD VENIPUNCTURE: CPT

## 2018-11-01 PROCEDURE — 84443 ASSAY THYROID STIM HORMONE: CPT

## 2018-11-01 PROCEDURE — 80061 LIPID PANEL: CPT

## 2018-11-01 PROCEDURE — 82043 UR ALBUMIN QUANTITATIVE: CPT

## 2018-11-01 PROCEDURE — 81003 URINALYSIS AUTO W/O SCOPE: CPT

## 2018-11-01 PROCEDURE — 85025 COMPLETE CBC W/AUTO DIFF WBC: CPT

## 2018-11-01 PROCEDURE — 84144 ASSAY OF PROGESTERONE: CPT

## 2018-11-01 PROCEDURE — 82670 ASSAY OF TOTAL ESTRADIOL: CPT

## 2018-11-01 PROCEDURE — 82677 ASSAY OF ESTRIOL: CPT

## 2018-11-01 PROCEDURE — 83001 ASSAY OF GONADOTROPIN (FSH): CPT

## 2018-11-01 PROCEDURE — 82570 ASSAY OF URINE CREATININE: CPT

## 2018-11-01 PROCEDURE — 83036 HEMOGLOBIN GLYCOSYLATED A1C: CPT

## 2018-11-03 LAB — ESTRIOL: 0.05 NG/ML

## 2018-11-16 ENCOUNTER — TELEPHONE (OUTPATIENT)
Dept: PHARMACY | Facility: CLINIC | Age: 53
End: 2018-11-16

## 2018-12-10 ENCOUNTER — TELEPHONE (OUTPATIENT)
Dept: PHARMACY | Facility: CLINIC | Age: 53
End: 2018-12-10

## 2018-12-11 ENCOUNTER — CARE COORDINATION (OUTPATIENT)
Dept: CARE COORDINATION | Age: 53
End: 2018-12-11

## 2018-12-26 ENCOUNTER — CARE COORDINATION (OUTPATIENT)
Dept: CARE COORDINATION | Age: 53
End: 2018-12-26

## 2018-12-26 ENCOUNTER — TELEPHONE (OUTPATIENT)
Dept: PHARMACY | Facility: CLINIC | Age: 53
End: 2018-12-26

## 2019-04-04 ENCOUNTER — PATIENT MESSAGE (OUTPATIENT)
Dept: PHARMACY | Facility: CLINIC | Age: 54
End: 2019-04-04

## 2019-05-29 ENCOUNTER — EMPLOYEE WELLNESS (OUTPATIENT)
Dept: OTHER | Age: 54
End: 2019-05-29

## 2019-05-29 LAB
CHOLESTEROL/HDL RATIO: 2.4
CHOLESTEROL: 114 MG/DL
GLUCOSE BLD-MCNC: 70 MG/DL (ref 70–99)
HDLC SERPL-MCNC: 48 MG/DL
LDL CHOLESTEROL: 53 MG/DL (ref 0–130)
PATIENT FASTING?: YES
TRIGL SERPL-MCNC: 67 MG/DL
VLDLC SERPL CALC-MCNC: NORMAL MG/DL (ref 1–30)

## 2019-05-30 LAB
ESTIMATED AVERAGE GLUCOSE: 189 MG/DL
HBA1C MFR BLD: 8.2 % (ref 4–6)

## 2019-06-10 VITALS — WEIGHT: 215 LBS | BODY MASS INDEX: 38.09 KG/M2

## 2019-06-17 ENCOUNTER — CLINICAL DOCUMENTATION (OUTPATIENT)
Dept: PHARMACY | Facility: CLINIC | Age: 54
End: 2019-06-17

## 2019-06-17 NOTE — PROGRESS NOTES
Pharmacy Pop Care Documentation:     AVS received for required office visit on: 3/08/19: Dr. Mavis Crooks

## 2019-06-24 ENCOUNTER — CARE COORDINATION (OUTPATIENT)
Dept: CARE COORDINATION | Age: 54
End: 2019-06-24

## 2019-07-22 ENCOUNTER — CARE COORDINATION (OUTPATIENT)
Dept: CARE COORDINATION | Age: 54
End: 2019-07-22

## 2019-07-22 NOTE — CARE COORDINATION
Contacted Narendra Zepeda for follow up outreach to complete requirements for employee diabetes nutrition education. Left voicemail providing call back number. No additional attempts scheduled to reach pt at this time. Will continue to be available to assist with nutrition education as appropriate.          Yousuf Jamison MS, [address] [place] [address],   [phone]

## 2019-07-30 ENCOUNTER — TELEPHONE (OUTPATIENT)
Dept: PHARMACY | Facility: CLINIC | Age: 54
End: 2019-07-30

## 2019-09-04 ENCOUNTER — HOSPITAL ENCOUNTER (EMERGENCY)
Age: 54
Discharge: HOME | End: 2019-09-05
Attending: EMERGENCY MEDICINE
Payer: COMMERCIAL

## 2019-09-04 ENCOUNTER — APPOINTMENT (OUTPATIENT)
Dept: GENERAL RADIOLOGY | Age: 54
End: 2019-09-04
Payer: COMMERCIAL

## 2019-09-04 VITALS
RESPIRATION RATE: 16 BRPM | HEART RATE: 94 BPM | TEMPERATURE: 97.9 F | OXYGEN SATURATION: 98 % | BODY MASS INDEX: 38.09 KG/M2 | WEIGHT: 215 LBS | SYSTOLIC BLOOD PRESSURE: 143 MMHG | DIASTOLIC BLOOD PRESSURE: 61 MMHG | HEIGHT: 63 IN

## 2019-09-04 DIAGNOSIS — M25.512 ACUTE PAIN OF LEFT SHOULDER: Primary | ICD-10-CM

## 2019-09-04 PROCEDURE — 6370000000 HC RX 637 (ALT 250 FOR IP): Performed by: EMERGENCY MEDICINE

## 2019-09-04 PROCEDURE — 99283 EMERGENCY DEPT VISIT LOW MDM: CPT

## 2019-09-04 PROCEDURE — 73030 X-RAY EXAM OF SHOULDER: CPT

## 2019-09-04 RX ORDER — HYDROCODONE BITARTRATE AND ACETAMINOPHEN 5; 325 MG/1; MG/1
1 TABLET ORAL ONCE
Status: COMPLETED | OUTPATIENT
Start: 2019-09-04 | End: 2019-09-04

## 2019-09-04 RX ADMIN — HYDROCODONE BITARTRATE AND ACETAMINOPHEN 1 TABLET: 5; 325 TABLET ORAL at 23:39

## 2019-09-04 ASSESSMENT — PAIN SCALES - GENERAL
PAINLEVEL_OUTOF10: 7
PAINLEVEL_OUTOF10: 7

## 2019-09-05 PROCEDURE — 6370000000 HC RX 637 (ALT 250 FOR IP): Performed by: EMERGENCY MEDICINE

## 2019-09-05 RX ORDER — NAPROXEN 500 MG/1
500 TABLET ORAL 2 TIMES DAILY WITH MEALS
Qty: 20 TABLET | Refills: 0 | Status: SHIPPED | OUTPATIENT
Start: 2019-09-05 | End: 2021-01-20

## 2019-09-05 RX ORDER — IBUPROFEN 600 MG/1
600 TABLET ORAL ONCE
Status: COMPLETED | OUTPATIENT
Start: 2019-09-05 | End: 2019-09-05

## 2019-09-05 RX ADMIN — IBUPROFEN 600 MG: 600 TABLET ORAL at 00:34

## 2019-09-05 ASSESSMENT — PAIN SCALES - GENERAL
PAINLEVEL_OUTOF10: 7
PAINLEVEL_OUTOF10: 7

## 2019-09-05 NOTE — ED PROVIDER NOTES
injury, tendon injury, fracture, infected joint, compartment syndrome, dislocation, open fracture,      -  #Impression/Plan:  - Clinically patient's presentation is most consistent with rotator cuff strain. Will get x-rays at patient's request.  If unremarkable will discharge home to follow-up with orthopedic surgery  -   ##Diagnosis:  -Left shoulder pain  -  -----------------------  -----------------------  Fidencio Walter MD, [facility]  Emergency Medicine Attending  Questions? Please contact my cell phone anytime.   [phone]  *This charting supersedes any ED resident or staff charting and was written using speech recognition software    PASTMEDICAL HISTORY     Past Medical History:   Diagnosis Date    Anxiety     Arthritis     Diabetes ([facility] [address]     Fibromyalgia     Hernia     abdominal, RT    Kidney stones 2009    stent at that time, has had multiple stones    PONV (postoperative nausea and vomiting)     EXTREME, WANTS SCOPE PATCH     SURGICAL HISTORY       Past Surgical History:   Procedure Laterality Date    ACHILLES TENDON SURGERY Left 2001    BACK SURGERY      L4 L5 S1, 1998    FRACTURE SURGERY Right 08/04/2015    ccrp ring finger    KNEE ARTHROSCOPY Left 1993    KNEE ARTHROSCOPY Right 04/03/2017    OVARY REMOVAL Right 2003    [place] KNEE SCOPE,DIAGNOSTIC Right 4/3/2017    KNEE ARTHROSCOPY performed by Juaquin Savage MD at [address] [place] [address]       Previous Medications    CANAGLIFLOZIN (INVOKANA) 300 MG TABS TABLET    Take 300 mg by mouth every morning (before breakfast)    GLIMEPIRIDE (AMARYL) 4 MG TABLET    Take 4mg by mouth in the morning and take 2mg by mouth in the afternoon (with meals)    IBUPROFEN (ADVIL;MOTRIN) 800 MG TABLET    Take 1 tablet by mouth every 8 hours as needed for Pain    INSULIN GLARGINE (LANTUS SOLOSTAR) 100 UNIT/ML INJECTION PEN    Inject under the skin 38 units in the morning and 38 units in the evening    INSULIN PEN NEEDLE 32G X 4 MM MISC    1 each by Does not apply route 2 times daily    INSULIN REGULAR HUMAN (AFREZZA) 4 & 8 & 12 UNITS POWD    Inhale into the lungs as directed by provider. Currently using per sliding scale  Less than 151: none   151-200: 8 units  201-300: 12 units  Over 300: 16 units    LISINOPRIL (PRINIVIL;ZESTRIL) 5 MG TABLET    Take 5 mg by mouth daily    METFORMIN (GLUCOPHAGE-XR) 500 MG EXTENDED RELEASE TABLET    Take 1,000 mg by mouth 2 times daily (with meals)    MULTIPLE VITAMINS-MINERALS (THERAPEUTIC MULTIVITAMIN-MINERALS) TABLET    Take 1 tablet by mouth daily     ALLERGIES     has No Known Allergies. FAMILY HISTORY     She indicated that her mother is . She indicated that her father is alive. SOCIAL HISTORY       Social History     Tobacco Use    Smoking status: Never Smoker    Smokeless tobacco: Never Used   Substance Use Topics    Alcohol use: No    Drug use: No     PHYSICAL EXAM     INITIAL VITALS: BP (!) 143/61   Pulse 94   Temp 97.9 °F (36.6 °C) (Oral)   Resp 16   Ht 5' 3\" (1.6 m)   Wt 215 lb (97.5 kg)   LMP 2017   SpO2 98%   BMI 38.09 kg/m²    Physical Exam    MEDICAL DECISION MAKING:            Labs Reviewed - No data to display  EMERGENCY DEPARTMENTCOURSE:         Vitals:    Vitals:    19 2316   BP: (!) 143/61   Pulse: 94   Resp: 16   Temp: 97.9 °F (36.6 °C)   TempSrc: Oral   SpO2: 98%   Weight: 215 lb (97.5 kg)   Height: 5' 3\" (1.6 m)       The patient was given the following medications while in the emergency department:  Orders Placed This Encounter   Medications    HYDROcodone-acetaminophen (NORCO) 5-325 MG per tablet 1 tablet    naproxen (NAPROSYN) 500 MG tablet     Sig: Take 1 tablet by mouth 2 times daily (with meals)     Dispense:  20 tablet     Refill:  0    ibuprofen (ADVIL;MOTRIN) tablet 600 mg     CONSULTS:  None    FINAL IMPRESSION      1.  Acute pain of left shoulder          DISPOSITION/PLAN   DISPOSITION Decision To Discharge 2019 12:14:33 AM      PATIENT

## 2019-09-26 NOTE — TELEPHONE ENCOUNTER
Pt saw doctor for DM on 9/20/19 - please drop code to allow patient to receive credit for this visit. A1c and UACR have not resulted yet. Patient has filled all medications recently (Glimepiride, Invokana, Lantus, Metformin, Lisinopril). Will follow up as appropriate.      Thank you,    Rosa Le, PharmD  [address] [place] [address] Pharmacist  [phone] or [phone] (Option 7)

## 2019-09-30 ENCOUNTER — CLINICAL DOCUMENTATION (OUTPATIENT)
Dept: PHARMACY | Facility: CLINIC | Age: 54
End: 2019-09-30

## 2019-10-14 ENCOUNTER — PATIENT MESSAGE (OUTPATIENT)
Dept: PHARMACY | Facility: CLINIC | Age: 54
End: 2019-10-14

## 2019-11-05 ENCOUNTER — TELEPHONE (OUTPATIENT)
Dept: PHARMACY | Facility: CLINIC | Age: 54
End: 2019-11-05

## 2019-11-17 ENCOUNTER — NURSE TRIAGE (OUTPATIENT)
Dept: OTHER | Facility: CLINIC | Age: 54
End: 2019-11-17

## 2019-11-21 ENCOUNTER — HOSPITAL ENCOUNTER (OUTPATIENT)
Dept: MAMMOGRAPHY | Age: 54
Discharge: HOME | End: 2019-11-23
Payer: COMMERCIAL

## 2019-11-21 DIAGNOSIS — Z12.31 BREAST CANCER SCREENING BY MAMMOGRAM: ICD-10-CM

## 2019-11-21 PROCEDURE — 77063 BREAST TOMOSYNTHESIS BI: CPT

## 2019-11-26 ENCOUNTER — TELEPHONE (OUTPATIENT)
Dept: PHARMACY | Facility: CLINIC | Age: 54
End: 2019-11-26

## 2019-12-02 ENCOUNTER — CARE COORDINATION (OUTPATIENT)
Dept: CARE COORDINATION | Age: 54
End: 2019-12-02

## 2019-12-04 ENCOUNTER — HOSPITAL ENCOUNTER (OUTPATIENT)
Age: 54
Discharge: HOME | End: 2019-12-04
Payer: COMMERCIAL

## 2019-12-04 LAB
CREATININE URINE: 77 MG/DL (ref 28–217)
ESTIMATED AVERAGE GLUCOSE: 252 MG/DL
HBA1C MFR BLD: 10.4 % (ref 4–6)
MICROALBUMIN/CREAT 24H UR: <12 MG/L
MICROALBUMIN/CREAT UR-RTO: NORMAL MCG/MG CREAT

## 2019-12-04 PROCEDURE — 82570 ASSAY OF URINE CREATININE: CPT

## 2019-12-04 PROCEDURE — 82043 UR ALBUMIN QUANTITATIVE: CPT

## 2019-12-04 PROCEDURE — 83036 HEMOGLOBIN GLYCOSYLATED A1C: CPT

## 2019-12-04 PROCEDURE — 36415 COLL VENOUS BLD VENIPUNCTURE: CPT

## 2019-12-17 ENCOUNTER — CARE COORDINATION (OUTPATIENT)
Dept: CARE COORDINATION | Age: 54
End: 2019-12-17

## 2019-12-22 ENCOUNTER — APPOINTMENT (OUTPATIENT)
Dept: GENERAL RADIOLOGY | Age: 54
End: 2019-12-22
Payer: COMMERCIAL

## 2019-12-22 ENCOUNTER — APPOINTMENT (OUTPATIENT)
Dept: CT IMAGING | Age: 54
End: 2019-12-22
Payer: COMMERCIAL

## 2019-12-22 ENCOUNTER — NURSE TRIAGE (OUTPATIENT)
Dept: OTHER | Facility: CLINIC | Age: 54
End: 2019-12-22

## 2019-12-22 ENCOUNTER — HOSPITAL ENCOUNTER (EMERGENCY)
Age: 54
Discharge: HOME | End: 2019-12-22
Attending: EMERGENCY MEDICINE
Payer: COMMERCIAL

## 2019-12-22 VITALS
SYSTOLIC BLOOD PRESSURE: 157 MMHG | HEART RATE: 78 BPM | DIASTOLIC BLOOD PRESSURE: 76 MMHG | OXYGEN SATURATION: 96 % | BODY MASS INDEX: 35.79 KG/M2 | WEIGHT: 202 LBS | HEIGHT: 63 IN | RESPIRATION RATE: 18 BRPM | TEMPERATURE: 98.5 F

## 2019-12-22 DIAGNOSIS — S83.91XA SPRAIN OF RIGHT KNEE, UNSPECIFIED LIGAMENT, INITIAL ENCOUNTER: ICD-10-CM

## 2019-12-22 DIAGNOSIS — S16.1XXA ACUTE STRAIN OF NECK MUSCLE, INITIAL ENCOUNTER: ICD-10-CM
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
## 2019-12-22 PROCEDURE — 73562 X-RAY EXAM OF KNEE 3: CPT

## 2019-12-22 PROCEDURE — 99284 EMERGENCY DEPT VISIT MOD MDM: CPT

## 2019-12-22 PROCEDURE — 6370000000 HC RX 637 (ALT 250 FOR IP): Performed by: EMERGENCY MEDICINE

## 2019-12-22 PROCEDURE — 72100 X-RAY EXAM L-S SPINE 2/3 VWS: CPT

## 2019-12-22 PROCEDURE — 70450 CT HEAD/BRAIN W/O DYE: CPT

## 2019-12-22 RX ORDER — CYCLOBENZAPRINE HCL 5 MG
5 TABLET ORAL 3 TIMES DAILY PRN
Qty: 20 TABLET | Refills: 0 | Status: SHIPPED | OUTPATIENT
Start: 2019-12-22 | End: 2022-01-16

## 2019-12-22 RX ORDER — HYDROCODONE BITARTRATE AND ACETAMINOPHEN 5; 325 MG/1; MG/1
1 TABLET ORAL EVERY 6 HOURS PRN
Qty: 10 TABLET | Refills: 0 | Status: SHIPPED | OUTPATIENT
Start: 2019-12-22 | End: 2019-12-25

## 2019-12-22 RX ORDER — CYCLOBENZAPRINE HCL 10 MG
10 TABLET ORAL ONCE
Status: COMPLETED | OUTPATIENT
Start: 2019-12-22 | End: 2019-12-22

## 2019-12-22 RX ORDER — HYDROCODONE BITARTRATE AND ACETAMINOPHEN 5; 325 MG/1; MG/1
2 TABLET ORAL ONCE
Status: COMPLETED | OUTPATIENT
Start: 2019-12-22 | End: 2019-12-22

## 2019-12-22 RX ORDER — IBUPROFEN 600 MG/1
600 TABLET ORAL ONCE
Status: COMPLETED | OUTPATIENT
Start: 2019-12-22 | End: 2019-12-22

## 2019-12-22 RX ORDER — IBUPROFEN 800 MG/1
800 TABLET ORAL EVERY 8 HOURS PRN
Qty: 30 TABLET | Refills: 0 | Status: SHIPPED | OUTPATIENT
Start: 2019-12-22 | End: 2020-04-28 | Stop reason: SDUPTHER

## 2019-12-22 RX ADMIN — IBUPROFEN 600 MG: 600 TABLET, FILM COATED ORAL at 19:20

## 2019-12-22 RX ADMIN — HYDROCODONE BITARTRATE AND ACETAMINOPHEN 2 TABLET: 5; 325 TABLET ORAL at 19:20

## 2019-12-22 RX ADMIN — CYCLOBENZAPRINE HYDROCHLORIDE 10 MG: 10 TABLET, FILM COATED ORAL at 19:20

## 2019-12-22 ASSESSMENT — PAIN DESCRIPTION - PAIN TYPE: TYPE: ACUTE PAIN

## 2019-12-22 ASSESSMENT — PAIN SCALES - GENERAL
PAINLEVEL_OUTOF10: 3
PAINLEVEL_OUTOF10: 4
PAINLEVEL_OUTOF10: 3

## 2019-12-22 ASSESSMENT — PAIN DESCRIPTION - DESCRIPTORS: DESCRIPTORS: ACHING

## 2019-12-22 ASSESSMENT — PAIN DESCRIPTION - LOCATION: LOCATION: HEAD

## 2019-12-30 ENCOUNTER — CARE COORDINATION (OUTPATIENT)
Dept: CARE COORDINATION | Age: 54
End: 2019-12-30

## 2019-12-30 NOTE — CARE COORDINATION
Contacted Zamzam Sanches regarding Employee Diabetes program follow up nutrition discussion. Left a voicemail providing reason for call and RD contact information requesting call back. Will continue to be available to assist as able/appropriate with participant return call.          Damon Kelley MS, [address] [place] [address],   [phone]

## 2020-01-02 ENCOUNTER — CARE COORDINATION (OUTPATIENT)
Dept: CARE COORDINATION | Age: 55
End: 2020-01-02

## 2020-01-20 ENCOUNTER — TELEPHONE (OUTPATIENT)
Dept: PHARMACY | Facility: CLINIC | Age: 55
End: 2020-01-20

## 2020-01-20 NOTE — LETTER
Penny Issa   [address] [place] [address]           01/20/20     Dear Greyson Valentino! You have completed the 2019 requirements for the [address] [place] [address] Jimmy will automatically be re-enrolled into the [facility]) Diabetes Management Program for 2020. One of the requirements to participate in the [address] [place] [address] Diabetes Management Program is to complete a Clinical Pharmacist Telephone appointment yearly. We would like to work with you and your doctor to:  - Review your medications, including over-the-counter and herbal medications  - Answer questions about your medications and how to get the most benefit from them  - Identify potential drug interactions or side effects and help fix them  - Identify preferred medications that are equally effective, but available at a lower cost to you  - Help you reach the necessary requirements to remain enrolled in the Diabetes Management Program offered by [address] [place] [address]     Please call [phone] and select option #7 to schedule this appointment to take advantage of this service. Telephone appointments are available Monday thru Friday from 7:30 AM till 5:30 PM.     This is a courtesy reminder. If you have this appointment already scheduled for your 2020 enrollment in the program, please disregard this message. If you have not scheduled this appointment yet, please contact us at the above number to schedule.      Sincerely,      [address] [place] [address]  Phone: [phone], option 7

## 2020-04-10 ENCOUNTER — TELEPHONE (OUTPATIENT)
Dept: PHARMACY | Facility: CLINIC | Age: 55
End: 2020-04-10

## 2020-04-10 NOTE — TELEPHONE ENCOUNTER
Called patient to schedule pharmacist appointment to discuss medications for Diabetes Management Program.     No answer. Left VM on home/cell TAD: Please call back at [phone] Option #7 to retrieve the above message. Sent [employer]. Tamar Serrano CPhT.   Pharmacy [facility] [address]  Department, toll free: [phone], option 7

## 2020-04-23 ENCOUNTER — TELEPHONE (OUTPATIENT)
Dept: PHARMACY | Facility: CLINIC | Age: 55
End: 2020-04-23

## 2020-04-28 ENCOUNTER — SCHEDULED TELEPHONE ENCOUNTER (OUTPATIENT)
Dept: PHARMACY | Facility: CLINIC | Age: 55
End: 2020-04-28

## 2020-04-28 RX ORDER — ERTUGLIFLOZIN 15 MG/1
1 TABLET, FILM COATED ORAL DAILY
COMMUNITY

## 2020-06-26 ENCOUNTER — TELEPHONE (OUTPATIENT)
Dept: PHARMACY | Facility: CLINIC | Age: 55
End: 2020-06-26

## 2020-06-26 NOTE — TELEPHONE ENCOUNTER
[address] [facility]  =============================================  Prashanth Delatorre is a 48 y.o. female enrolled in the [facility] Diabetes Program.      Identified care gap(s): A1c > 9%    DM Program Prescriptions:  Medication Sig    Ertugliflozin L-PyroglutamicAc (STEGLATRO) 15 MG TABS Take 1 tablet by mouth daily    Insulin Pen Needle 32G X 4 MM MISC 1 each by Does not apply route 2 times daily    Insulin Regular Human (AFREZZA) 4 & 8 & 12 units POWD Inhale into the lungs as directed by provider. Currently using per sliding scale  Less than 151: none   151-200: 8 units  201-300: 12 units  Over 300: 16 units  **not covered by DM program**  - pt has previously declined change to Humalog    glimepiride (AMARYL) 4 MG tablet Take 4mg by mouth in the morning and take 2mg by mouth in the afternoon (with meals)    insulin glargine (LANTUS SOLOSTAR) 100 UNIT/ML injection pen Inject under the skin 38 units in the morning and 38 units in the evening    metFORMIN (GLUCOPHAGE-XR) 500 MG extended release tablet Take 1,000 mg by mouth 2 times daily (with meals)    lisinopril (PRINIVIL;ZESTRIL) 5 MG tablet Take 5 mg by mouth daily     Allergies:  No Known Allergies     Labs:  Lab Results   Component Value Date    LABA1C 10.4 (H) 12/04/2019    LABA1C 8.2 (H) 05/29/2019    LABA1C 10.7 (H) 11/01/2018     CrCl cannot be calculated (Patient's most recent lab result is older than the maximum 120 days allowed. ). eGFR: unable to locate in chart/outside records    Care Team:   - Physician (PCP/Endocrinologist): Declan Jernigan MD  (Last OV: 1/17/20; Next OV: appears 8/14/20)    Diabetes Care:  - Glycemic Goal: <7.0%. Is not at blood glucose goal. Current regimen metformin ER 1000 mg (500 mg x 2) BID, glimepiride 4 mg AM + 2 mg PM, Steglatro 15 mg daily, Lantus 38 units AM +28 units PM, Afrezza per sliding scale (less than 151 none, 151-200 8 units, 201-300 12 units, over 300 16 units.  Followed by

## 2020-06-29 ENCOUNTER — CLINICAL DOCUMENTATION (OUTPATIENT)
Dept: PHARMACY | Facility: CLINIC | Age: 55
End: 2020-06-29

## 2020-06-30 ENCOUNTER — HOSPITAL ENCOUNTER (OUTPATIENT)
Age: 55
Discharge: HOME | End: 2020-07-02
Payer: COMMERCIAL

## 2020-06-30 ENCOUNTER — HOSPITAL ENCOUNTER (OUTPATIENT)
Age: 55
Discharge: HOME | End: 2020-06-30
Payer: COMMERCIAL

## 2020-06-30 ENCOUNTER — HOSPITAL ENCOUNTER (OUTPATIENT)
Dept: GENERAL RADIOLOGY | Age: 55
Discharge: HOME | End: 2020-07-02
Payer: COMMERCIAL

## 2020-06-30 LAB
ESTIMATED AVERAGE GLUCOSE: 243 MG/DL
HBA1C MFR BLD: 10.1 % (ref 4–6)

## 2020-06-30 PROCEDURE — 83036 HEMOGLOBIN GLYCOSYLATED A1C: CPT

## 2020-06-30 PROCEDURE — 73562 X-RAY EXAM OF KNEE 3: CPT

## 2020-06-30 PROCEDURE — 36415 COLL VENOUS BLD VENIPUNCTURE: CPT

## 2020-06-30 NOTE — TELEPHONE ENCOUNTER
Another attempt made to reach patient. No answer, LM. Will also send [employer] message.     For Pharmacy Admin Tracking Only    PHSO: Yes  Time Spent (min): 1940 Charbel Daly PharmBRUCE  [address]

## 2020-07-30 ENCOUNTER — HOSPITAL ENCOUNTER (OUTPATIENT)
Age: 55
Discharge: HOME | End: 2020-07-30
Payer: COMMERCIAL

## 2020-07-30 PROCEDURE — U0003 INFECTIOUS AGENT DETECTION BY NUCLEIC ACID (DNA OR RNA); SEVERE ACUTE RESPIRATORY SYNDROME CORONAVIRUS 2 (SARS-COV-2) (CORONAVIRUS DISEASE [COVID-19]), AMPLIFIED PROBE TECHNIQUE, MAKING USE OF HIGH THROUGHPUT TECHNOLOGIES AS DESCRIBED BY CMS-2020-01-R: HCPCS

## 2020-08-02 LAB — SARS-COV-2, NAA: NOT DETECTED

## 2020-08-14 ENCOUNTER — HOSPITAL ENCOUNTER (OUTPATIENT)
Age: 55
Discharge: HOME | End: 2020-08-14
Payer: COMMERCIAL

## 2020-08-14 LAB
-: ABNORMAL
ABSOLUTE EOS #: 0.13 K/UL (ref 0–0.44)
ABSOLUTE IMMATURE GRANULOCYTE: <0.03 K/UL (ref 0–0.3)
ABSOLUTE LYMPH #: 1.97 K/UL (ref 1.1–3.7)
ABSOLUTE MONO #: 0.57 K/UL (ref 0.1–1.2)
ALBUMIN SERPL-MCNC: 4.3 G/DL (ref 3.5–5.2)
ALBUMIN/GLOBULIN RATIO: 1.5 (ref 1–2.5)
ALP BLD-CCNC: 73 U/L (ref 35–104)
ALT SERPL-CCNC: 13 U/L (ref 5–33)
AMORPHOUS: ABNORMAL
ANION GAP SERPL CALCULATED.3IONS-SCNC: 15 MMOL/L (ref 9–17)
AST SERPL-CCNC: 13 U/L
BACTERIA: ABNORMAL
BASOPHILS # BLD: 1 % (ref 0–2)
BASOPHILS ABSOLUTE: 0.04 K/UL (ref 0–0.2)
BILIRUB SERPL-MCNC: 0.4 MG/DL (ref 0.3–1.2)
BILIRUBIN URINE: NEGATIVE
BUN BLDV-MCNC: 17 MG/DL (ref 6–20)
BUN/CREAT BLD: ABNORMAL (ref 9–20)
CALCIUM SERPL-MCNC: 9.8 MG/DL (ref 8.6–10.4)
CASTS UA: ABNORMAL /LPF (ref 0–2)
CHLORIDE BLD-SCNC: 104 MMOL/L (ref 98–107)
CO2: 24 MMOL/L (ref 20–31)
COLOR: YELLOW
COMMENT UA: ABNORMAL
CREAT SERPL-MCNC: 0.6 MG/DL (ref 0.5–0.9)
CRYSTALS, UA: ABNORMAL /HPF
DIFFERENTIAL TYPE: ABNORMAL
EOSINOPHILS RELATIVE PERCENT: 2 % (ref 1–4)
EPITHELIAL CELLS UA: ABNORMAL /HPF (ref 0–5)
GFR AFRICAN AMERICAN: >60 ML/MIN
GFR NON-AFRICAN AMERICAN: >60 ML/MIN
GFR SERPL CREATININE-BSD FRML MDRD: ABNORMAL ML/MIN/{1.73_M2}
GFR SERPL CREATININE-BSD FRML MDRD: ABNORMAL ML/MIN/{1.73_M2}
GLUCOSE BLD-MCNC: 110 MG/DL (ref 70–99)
GLUCOSE URINE: ABNORMAL
HCT VFR BLD CALC: 46.6 % (ref 36.3–47.1)
HEMOGLOBIN: 14.2 G/DL (ref 11.9–15.1)
IMMATURE GRANULOCYTES: 0 %
KETONES, URINE: NEGATIVE
LEUKOCYTE ESTERASE, URINE: ABNORMAL
LYMPHOCYTES # BLD: 22 % (ref 24–43)
MCH RBC QN AUTO: 28.4 PG (ref 25.2–33.5)
MCHC RBC AUTO-ENTMCNC: 30.5 G/DL (ref 28.4–34.8)
MCV RBC AUTO: 93.2 FL (ref 82.6–102.9)
MONOCYTES # BLD: 6 % (ref 3–12)
MUCUS: ABNORMAL
NITRITE, URINE: NEGATIVE
NRBC AUTOMATED: 0 PER 100 WBC
OTHER OBSERVATIONS UA: ABNORMAL
PDW BLD-RTO: 14.2 % (ref 11.8–14.4)
PH UA: 5 (ref 5–8)
PLATELET # BLD: 392 K/UL (ref 138–453)
PLATELET ESTIMATE: ABNORMAL
PMV BLD AUTO: 11 FL (ref 8.1–13.5)
POTASSIUM SERPL-SCNC: 5.2 MMOL/L (ref 3.7–5.3)
PROTEIN UA: NEGATIVE
RBC # BLD: 5 M/UL (ref 3.95–5.11)
RBC # BLD: ABNORMAL 10*6/UL
RBC UA: ABNORMAL /HPF (ref 0–2)
RENAL EPITHELIAL, UA: ABNORMAL /HPF
SEDIMENTATION RATE, ERYTHROCYTE: 16 MM (ref 0–30)
SEG NEUTROPHILS: 69 % (ref 36–65)
SEGMENTED NEUTROPHILS ABSOLUTE COUNT: 6.14 K/UL (ref 1.5–8.1)
SODIUM BLD-SCNC: 143 MMOL/L (ref 135–144)
SPECIFIC GRAVITY UA: 1.05 (ref 1–1.03)
THYROXINE, FREE: 1.19 NG/DL (ref 0.93–1.7)
TOTAL PROTEIN: 7.1 G/DL (ref 6.4–8.3)
TRICHOMONAS: ABNORMAL
TSH SERPL DL<=0.05 MIU/L-ACNC: 2.77 MIU/L (ref 0.3–5)
TURBIDITY: CLEAR
URINE HGB: NEGATIVE
UROBILINOGEN, URINE: NORMAL
WBC # BLD: 8.9 K/UL (ref 3.5–11.3)
WBC # BLD: ABNORMAL 10*3/UL
WBC UA: ABNORMAL /HPF (ref 0–5)
YEAST: ABNORMAL

## 2020-08-14 PROCEDURE — 85652 RBC SED RATE AUTOMATED: CPT

## 2020-08-14 PROCEDURE — 84439 ASSAY OF FREE THYROXINE: CPT

## 2020-08-14 PROCEDURE — 80053 COMPREHEN METABOLIC PANEL: CPT

## 2020-08-14 PROCEDURE — 36415 COLL VENOUS BLD VENIPUNCTURE: CPT

## 2020-08-14 PROCEDURE — 84443 ASSAY THYROID STIM HORMONE: CPT

## 2020-08-14 PROCEDURE — 81001 URINALYSIS AUTO W/SCOPE: CPT

## 2020-08-14 PROCEDURE — 85025 COMPLETE CBC W/AUTO DIFF WBC: CPT

## 2020-10-06 ENCOUNTER — CLINICAL DOCUMENTATION (OUTPATIENT)
Dept: PHARMACY | Facility: CLINIC | Age: 55
End: 2020-10-06

## 2020-10-14 ENCOUNTER — CARE COORDINATION (OUTPATIENT)
Dept: OTHER | Facility: CLINIC | Age: 55
End: 2020-10-14

## 2020-10-14 NOTE — CARE COORDINATION
Ambulatory Care Coordination Note  10/14/2020  CM Risk Score: 1  Charlson 10 Year Mortality Risk Score: 4%     ACC: Venessa Stanford, RN    Summary Note:  [address] ([facility]) attempted to contact the patient by telephone to discuss [facility] program, assess needs, and complete enrollment if appropriate. Left HIPPA compliant message providing introduction to self and requested a call back. Will continue to outreach to patient. Will send [employer] message in the meantime. Prior to Admission medications    Medication Sig Start Date End Date Taking? Authorizing Provider   Ertugliflozin L-PyroglutamicAc (STEGLATRO) 15 MG TABS Take 1 tablet by mouth daily    Historical Provider, MD   Calcium Carb-Cholecalciferol (CALCIUM-VITAMIN D) 500-200 MG-UNIT per tablet Take 1 tablet by mouth 2 times daily (with meals)    Historical Provider, MD   cyclobenzaprine (FLEXERIL) 5 MG tablet Take 1 tablet by mouth 3 times daily as needed for Muscle spasms 12/22/19   Timmy Saucedo,    naproxen (NAPROSYN) 500 MG tablet Take 1 tablet by mouth 2 times daily (with meals) 9/5/19   Miri Alcocer MD   Insulin Pen Needle 32G X 4 MM MISC 1 each by Does not apply route 2 times daily    Historical Provider, MD   Insulin Regular Human (AFREZZA) 4 & 8 & 12 units POWD Inhale into the lungs as directed by provider.  Currently using per sliding scale  Less than 151: none   151-200: 8 units  201-300: 12 units  Over 300: 16 units    Historical Provider, MD   glimepiride (AMARYL) 4 MG tablet Take 4mg by mouth in the morning and take 2mg by mouth in the afternoon (with meals)    Historical Provider, MD   insulin glargine (LANTUS SOLOSTAR) 100 UNIT/ML injection pen Inject under the skin 38 units in the morning and 38 units in the evening    Historical Provider, MD   metFORMIN (GLUCOPHAGE-XR) 500 MG extended release tablet Take 1,000 mg by mouth 2 times daily (with meals)    Historical Provider, MD   lisinopril (PRINIVIL;ZESTRIL) 5 MG tablet Take 5 mg by mouth daily    Historical Provider, MD   Multiple Vitamins-Minerals (THERAPEUTIC MULTIVITAMIN-MINERALS) tablet Take 1 tablet by mouth daily    Historical Provider, MD   ibuprofen (ADVIL;MOTRIN) 800 MG tablet Take 1 tablet by mouth every 8 hours as needed for Pain 7/30/15   Emily Pimentel MD       No future appointments.

## 2020-10-23 ENCOUNTER — CARE COORDINATION (OUTPATIENT)
Dept: OTHER | Facility: CLINIC | Age: 55
End: 2020-10-23

## 2020-10-23 NOTE — CARE COORDINATION
Ambulatory Care Coordination Note  10/23/2020  CM Risk Score: 1  Charlson 10 Year Mortality Risk Score: 4%     ACC: Nereida Monahan, RN    Summary Note: [facility] ([facility]) attempted to contact the patient again by telephone to discuss [facility] program, assess needs, and complete enrollment if appropriate. HIPAA compliant message left requesting a return phone call at patient's convenience. Will send [employer] message in the meantime. Will continue to outreach patient. Prior to Admission medications    Medication Sig Start Date End Date Taking? Authorizing Provider   Ertugliflozin L-PyroglutamicAc (STEGLATRO) 15 MG TABS Take 1 tablet by mouth daily    Historical Provider, MD   Calcium Carb-Cholecalciferol (CALCIUM-VITAMIN D) 500-200 MG-UNIT per tablet Take 1 tablet by mouth 2 times daily (with meals)    Historical Provider, MD   cyclobenzaprine (FLEXERIL) 5 MG tablet Take 1 tablet by mouth 3 times daily as needed for Muscle spasms 12/22/19   Neda Saucedo,    naproxen (NAPROSYN) 500 MG tablet Take 1 tablet by mouth 2 times daily (with meals) 9/5/19   Tabitha Villagran MD   Insulin Pen Needle 32G X 4 MM MISC 1 each by Does not apply route 2 times daily    Historical Provider, MD   Insulin Regular Human (AFREZZA) 4 & 8 & 12 units POWD Inhale into the lungs as directed by provider.  Currently using per sliding scale  Less than 151: none   151-200: 8 units  201-300: 12 units  Over 300: 16 units    Historical Provider, MD   glimepiride (AMARYL) 4 MG tablet Take 4mg by mouth in the morning and take 2mg by mouth in the afternoon (with meals)    Historical Provider, MD   insulin glargine (LANTUS SOLOSTAR) 100 UNIT/ML injection pen Inject under the skin 38 units in the morning and 38 units in the evening    Historical Provider, MD   metFORMIN (GLUCOPHAGE-XR) 500 MG extended release tablet Take 1,000 mg by mouth 2 times daily (with meals)    Historical Provider, MD   lisinopril (PRINIVIL;ZESTRIL) 5 MG tablet Take 5 mg by mouth daily    Historical Provider, MD   Multiple Vitamins-Minerals (THERAPEUTIC MULTIVITAMIN-MINERALS) tablet Take 1 tablet by mouth daily    Historical Provider, MD   ibuprofen (ADVIL;MOTRIN) 800 MG tablet Take 1 tablet by mouth every 8 hours as needed for Pain 7/30/15   Antonia Rader MD       No future appointments.

## 2020-11-11 ENCOUNTER — CARE COORDINATION (OUTPATIENT)
Dept: OTHER | Facility: CLINIC | Age: 55
End: 2020-11-11

## 2020-11-11 NOTE — CARE COORDINATION
Ambulatory Care Coordination Note  11/11/2020  CM Risk Score: 1  Charlson 10 Year Mortality Risk Score: 4%     ACC: Boaz Claire, RN    Summary Note: [facility] ([facility]) attempted to contact the patient again by telephone for introduction to Associate Care Management and complete enrollment. Left HIPPA compliant message providing introduction to self and requested a call back. .Patient has not responded to several attempts to contact. [facility] will send unable to contact [facility] message/ letter and sign off if no response. Prior to Admission medications    Medication Sig Start Date End Date Taking? Authorizing Provider   Ertugliflozin L-PyroglutamicAc (STEGLATRO) 15 MG TABS Take 1 tablet by mouth daily    Historical Provider, MD   Calcium Carb-Cholecalciferol (CALCIUM-VITAMIN D) 500-200 MG-UNIT per tablet Take 1 tablet by mouth 2 times daily (with meals)    Historical Provider, MD   cyclobenzaprine (FLEXERIL) 5 MG tablet Take 1 tablet by mouth 3 times daily as needed for Muscle spasms 12/22/19   Kristyn Saucedo DO   naproxen (NAPROSYN) 500 MG tablet Take 1 tablet by mouth 2 times daily (with meals) 9/5/19   Ivy Delacruz MD   Insulin Pen Needle 32G X 4 MM MISC 1 each by Does not apply route 2 times daily    Historical Provider, MD   Insulin Regular Human (AFREZZA) 4 & 8 & 12 units POWD Inhale into the lungs as directed by provider.  Currently using per sliding scale  Less than 151: none   151-200: 8 units  201-300: 12 units  Over 300: 16 units    Historical Provider, MD   glimepiride (AMARYL) 4 MG tablet Take 4mg by mouth in the morning and take 2mg by mouth in the afternoon (with meals)    Historical Provider, MD   insulin glargine (LANTUS SOLOSTAR) 100 UNIT/ML injection pen Inject under the skin 38 units in the morning and 38 units in the evening    Historical Provider, MD   metFORMIN (GLUCOPHAGE-XR) 500 MG extended release tablet Take 1,000 mg by mouth 2 times daily (with meals) Historical Provider, MD   lisinopril (PRINIVIL;ZESTRIL) 5 MG tablet Take 5 mg by mouth daily    Historical Provider, MD   Multiple Vitamins-Minerals (THERAPEUTIC MULTIVITAMIN-MINERALS) tablet Take 1 tablet by mouth daily    Historical Provider, MD   ibuprofen (ADVIL;MOTRIN) 800 MG tablet Take 1 tablet by mouth every 8 hours as needed for Pain 7/30/15   Yovana Valdivia MD       No future appointments.

## 2020-11-16 ENCOUNTER — TELEPHONE (OUTPATIENT)
Dept: PHARMACY | Facility: CLINIC | Age: 55
End: 2020-11-16

## 2020-11-16 NOTE — TELEPHONE ENCOUNTER
[facility] Employee Diabetes Program    Surendra Esteves is a 48 y.o. female enrolled in the [facility] Diabetes Program. The goal of this voluntary program is to help employees and covered dependents reach their health maintenance goals in regards to their diabetes diagnosis. According to our records, patient is missing the following requirement(s) that must be completed by December 31, 2020 to avoid discharge from the program:  [id number] with [facility] or Diabetes Educator (if A1c > 8%)    Patients with an A1c of 8% or higher are required to meet in person or via phone with an [address] or Diabetes Educator. ACC attempted final outreach on 11/11/20    London Willoughby RN  Associate Care Manager  Phone [phone]  Michael Cross. [email or web address]      Also note, requirement if A1c is greater than 8%:  - Engage with a [facility] Diabetes Educator at one of our hospital locations or an [facility] by phone two (2) or more times. - Patient may also contact [email or web address] to schedule a time for a phone call. - If you are unable to complete two education sessions with an Ambulatory Care Coordinator in person or over the phone, you may also complete the \"Living Well with Type 2 Diabetes\" online educational program offered through the [employer] abhinav. Please visit your Be Well Within account for more details about downloading the abhinav on your smartphone and starting the program.    To enroll in Living Well with Type 2 Diabetes:  1. Download the new mobile abhinav, Search \"Aduro\" in your Abhinav store to get started. 2. Search for \"Living Well\" in the Discover tab. 3. Follow all additional instructions within the path to continue. Plan:  Attempt made to reach patient by telephone to review above.  Left voice message for patient to return clinician's phone call to [phone], option 7.     Dickie Opitz, ÁngelaD  Ambulatory Care Clinical Pharmacist  [facility] ([facility]  Phone: [phone] option-7

## 2020-11-16 NOTE — PROGRESS NOTES
Pharmacy Pop Care Documentation:     AVS received for required office visit on: 8/14/20: Kriss Conti MD - per [facility] None

## 2020-11-17 ENCOUNTER — CARE COORDINATION (OUTPATIENT)
Dept: OTHER | Facility: CLINIC | Age: 55
End: 2020-11-17

## 2020-11-17 SDOH — ECONOMIC STABILITY: TRANSPORTATION INSECURITY
IN THE PAST 12 MONTHS, HAS LACK OF TRANSPORTATION KEPT YOU FROM MEETINGS, WORK, OR FROM GETTING THINGS NEEDED FOR DAILY LIVING?: NO

## 2020-11-17 SDOH — HEALTH STABILITY: PHYSICAL HEALTH: ON AVERAGE, HOW MANY MINUTES DO YOU ENGAGE IN EXERCISE AT THIS LEVEL?: 20 MIN

## 2020-11-17 SDOH — ECONOMIC STABILITY: TRANSPORTATION INSECURITY
IN THE PAST 12 MONTHS, HAS THE LACK OF TRANSPORTATION KEPT YOU FROM MEDICAL APPOINTMENTS OR FROM GETTING MEDICATIONS?: NO

## 2020-11-17 SDOH — ECONOMIC STABILITY: INCOME INSECURITY: HOW HARD IS IT FOR YOU TO PAY FOR THE VERY BASICS LIKE FOOD, HOUSING, MEDICAL CARE, AND HEATING?: SOMEWHAT HARD

## 2020-11-17 SDOH — ECONOMIC STABILITY: FOOD INSECURITY: WITHIN THE PAST 12 MONTHS, THE FOOD YOU BOUGHT JUST DIDN'T LAST AND YOU DIDN'T HAVE MONEY TO GET MORE.: NEVER TRUE

## 2020-11-17 SDOH — ECONOMIC STABILITY: FOOD INSECURITY: WITHIN THE PAST 12 MONTHS, YOU WORRIED THAT YOUR FOOD WOULD RUN OUT BEFORE YOU GOT MONEY TO BUY MORE.: NEVER TRUE

## 2020-11-17 SDOH — HEALTH STABILITY: MENTAL HEALTH
STRESS IS WHEN SOMEONE FEELS TENSE, NERVOUS, ANXIOUS, OR CAN'T SLEEP AT NIGHT BECAUSE THEIR MIND IS TROUBLED. HOW STRESSED ARE YOU?: TO SOME EXTENT

## 2020-11-17 SDOH — HEALTH STABILITY: PHYSICAL HEALTH: ON AVERAGE, HOW MANY DAYS PER WEEK DO YOU ENGAGE IN MODERATE TO STRENUOUS EXERCISE (LIKE A BRISK WALK)?: 2 DAYS

## 2020-11-17 SDOH — SOCIAL STABILITY: SOCIAL NETWORK: ARE YOU MARRIED, WIDOWED, DIVORCED, SEPARATED, NEVER MARRIED, OR LIVING WITH A PARTNER?: DIVORCED

## 2020-11-17 NOTE — CARE COORDINATION
Ambulatory Care Coordination Note  2020  CM Risk Score: 1  Charlson 10 Year Mortality Risk Score: 4%     ACC: Nereida Monahan, RN    Summary Note: Associate Care Manager [facility]) contacted the patient by telephone to complete assessment and enrollment in the Associate Care Management Program. Verified name and  with patient as identifiers. Provided introduction to self and explanation of the ACM role. DM II-  Pt is at work at time of the call so she is not able to discuss some education at length. Pt's PCP is not a [facility] provider. She pays OOP to see him. She states she just saw him in September and got the order for her labs. She thinks she has had 2 A1c for the year, but she will look through her info at home to verify. Pt admits her BG is not at goal and having a hard time finding the right medication combination to be effective. Pt has high deductible plan and has to pay OOP for almost everything right now. Discussed for her to contact HR to discuss a change of election for next year after reviewing the plans offered at HR service now. Pt states she is following the DM diet most of the time. She describes the meals she is preparing and packing and she is able to describe a DM diet and carb limits. She has lost around 100# in the past 2 years. She states she was doing well on the Jardiance, but then it wasn't covered this year and she couldn't afford it. Discussed options for Pharmaceutical coupons also. She states her BG seemed to worsen the year her father  unexpectedly. She has had a lot of stress in her life in the past year also which she feels has contributed to her elevated BG. She is feeling overwhelmed at times. Discussed at length motivation and gaol setting to realistic goals. She is appreciative of this and will follow through with getting her A1c if needed, review plans and contact HR if needed, and agreeable to follow up.        Interventions:    Counseling provided at today's visit on: Diabetes Zone Sheet   Self monitoring compliance: SBGM  Diet compliance: Diabetic  Provider follow up appointment compliance  Health Maintenance reviewed: DM Eye exam needed  Utilization of appropriate level of care: Right Care, Right Place, Right Time  Increasing physical activity     Medication reconciliation was performed with patient, who verbalizes understanding of administration of home medications. Advised obtaining a 90-day supply of all daily and as-needed medications. Education provided on:  Disease Process, Symptoms of Hypo/Hyperglycemia and how to treat and Blood Glucose and A1c goal ranges  Meal Planning and Diabetic Diet- ADA guidelines  Referal to HR to discuss Health Plan options/ possible changes for 2021    Advance Care Planning:   Does patient have an Advance Directive:  not on file. Plan:  -Continue outreaches to provide telephonic support, education and resources as needed. Discuss / follow up on: Goal progress and Reinforce/ provide Diabetes Zone sheet education Self monitoring compliance Medication compliance Provider follow up appointment compliance Routine/ diagnostic testing compliance Engagement with referred services: HR    Patient verbalized understanding and is agreeable. Ambulatory Care Coordination Assessment    Care Coordination Protocol  Program Enrollment:  Rising Risk  Referral from Primary Care Provider:  No  Week 1 - Initial Assessment     Do you have all of your prescriptions and are they filled?:  Yes  Barriers to medication adherence:  Complexity of regimen, Other  Other barriers to medication adherence:  Financial  Are you able to afford your medications?:  With Assistance  Medication Assistance Program:  Other  Other Assistance Program:  [facility] DM Management Program  How often do you have trouble taking your medications the way you have been told to take them?:  I always take them as prescribed.      Do you have Home O2 Therapy?:  No Ability to seek help/take action for Emergent Urgent situations i.e. fire, crime, inclement weather or health crisis. :  Independent  Ability to ambulate to restroom:  Independent  Ability handle personal hygeine needs (bathing/dressing/grooming): Independent  Ability to manage Medications: Independent  Ability to prepare Food Preparation:  Independent  Ability to maintain home (clean home, laundry): Independent  Ability to drive and/or has transportation:  Independent  Ability to do shopping:  Independent  Ability to manage finances: Independent  Is patient able to live independently?:  Yes     Current Housing:  Private Residence                 Thinking about your patient's physical health needs, are there any symptoms or problems (risk indicators) you are unsure about that require further investigation?:  No identified areas of uncertainly or problems already being investigated   Are the patients physical health problems impacting on their mental well-being?:  Mild impact on mental well-being e.g. \"\"feeling fed-up\"\", \"\"reduced enjoyment\"\"   Are there any problems with your patients lifestyle behaviors (alcohol, drugs, diet, exercise) that are impacting on physical or mental well-being?:  No identified areas of concern   Do you have any other concerns about your patients mental well-being?  How would you rate their severity and impact on the patient?:  No identified areas of concern   How would you rate their home environment in terms of safety and stability (including domestic violence, insecure housing, neighbor harassment)?:  Consistently safe, supportive, stable, no identified problems   How do daily activities impact on the patient's well-being? (include current or anticipated unemployment, work, caregiving, access to transportation or other):  No identified problems or perceived positive benefits   How would you rate their social network (family, work, friends)?:  Adequate participation with social networks   How would you rate their financial resources (including ability to afford all required medical care)?:  Financially secure, some resource challenges   How wells does the patient now understand their health and well-being (symptoms, signs or risk factors) and what they need to do to manage their health?:  Reasonable to good understanding and already engages in managing health or is willing to undertake better management   How well do you think your patient can engage in healthcare discussions? (Barriers include language, deafness, aphasia, alcohol or drug problems, learning difficulties, concentration):  Clear and open communication, no identified barriers   Do other services need to be involved to help this patient?:  Other care/services in place but not sufficient   Are current services involved with this patient well-coordinated? (Include coordination with other services you are now recommendation):  Required care/services in place and adequately coordinated   Suggested Interventions and Community Resources  Diabetes Education:  Completed (Comment: 11/17/20: pt has completed in the past)    Other Services or Interventions:  HR service now/ health plan enrollment   Medication Assistance Program:  Not Started   Pharmacist:  Completed   Registered Dietician:  Declined   Zone Management Tools:  Completed         Set up/Review an Education Plan, Set up/Review Goals              Prior to Admission medications    Medication Sig Start Date End Date Taking?  Authorizing Provider   Ertugliflozin L-PyroglutamicAc (STEGLATRO) 15 MG TABS Take 1 tablet by mouth daily    Historical Provider, MD   Calcium Carb-Cholecalciferol (CALCIUM-VITAMIN D) 500-200 MG-UNIT per tablet Take 1 tablet by mouth 2 times daily (with meals)    Historical Provider, MD   cyclobenzaprine (FLEXERIL) 5 MG tablet Take 1 tablet by mouth 3 times daily as needed for Muscle spasms 12/22/19   Feng Saucedo,    naproxen (NAPROSYN) 500 MG tablet Take 1 tablet by mouth 2 times daily (with meals) 9/5/19   Shahram Napoles MD   Insulin Pen Needle 32G X 4 MM MISC 1 each by Does not apply route 2 times daily    Historical Provider, MD   Insulin Regular Human (AFREZZA) 4 & 8 & 12 units POWD Inhale into the lungs as directed by provider. Currently using per sliding scale  Less than 151: none   151-200: 8 units  201-300: 12 units  Over 300: 16 units    Historical Provider, MD   glimepiride (AMARYL) 4 MG tablet Take 4mg by mouth in the morning and take 2mg by mouth in the afternoon (with meals)    Historical Provider, MD   insulin glargine (LANTUS SOLOSTAR) 100 UNIT/ML injection pen Inject under the skin 38 units in the morning and 38 units in the evening    Historical Provider, MD   metFORMIN (GLUCOPHAGE-XR) 500 MG extended release tablet Take 1,000 mg by mouth 2 times daily (with meals)    Historical Provider, MD   lisinopril (PRINIVIL;ZESTRIL) 5 MG tablet Take 5 mg by mouth daily    Historical Provider, MD   Multiple Vitamins-Minerals (THERAPEUTIC MULTIVITAMIN-MINERALS) tablet Take 1 tablet by mouth daily    Historical Provider, MD   ibuprofen (ADVIL;MOTRIN) 800 MG tablet Take 1 tablet by mouth every 8 hours as needed for Pain 7/30/15   Juanpablo Corral MD       No future appointments. and   Diabetes Assessment    Medic Alert ID:  No  Meal Planning:  Calorie counting, Carb counting   How often do you test your blood sugar?:  Daily, Meals   Do you have barriers with adherence to non-pharmacologic self-management interventions?  (Nutrition/Exercise/Self-Monitoring):  Yes   Have you ever had to go to the ED for symptoms of low blood sugar?:  No       Increase or Decrease trend in Blood Sugars   Do you have hyperglycemia symptoms?:  Yes (Comment: Fatigue)   Frequency of Episodes:  1 Per:  Day   Last Blood Sugar Value:  87   Blood Sugar Monitoring Regimen:  Morning Fasting, 2 Hours Post Meal   Blood Sugar Trends:  Fluctuating

## 2020-11-17 NOTE — TELEPHONE ENCOUNTER
CLINICAL PHARMACY CONSULT: MED RECONCILIATION/REVIEW ADDENDUM    For Pharmacy Admin Tracking Only    PHSO: Yes  Recommended intervention potential cost savings: 1  Time Spent (min): [address] [facility], PharmD  [address]

## 2020-12-17 ENCOUNTER — HOSPITAL ENCOUNTER (OUTPATIENT)
Dept: MAMMOGRAPHY | Age: 55
Discharge: HOME | End: 2020-12-19
Payer: COMMERCIAL

## 2020-12-17 ENCOUNTER — CARE COORDINATION (OUTPATIENT)
Dept: OTHER | Facility: CLINIC | Age: 55
End: 2020-12-17

## 2020-12-17 PROCEDURE — 77063 BREAST TOMOSYNTHESIS BI: CPT

## 2020-12-17 NOTE — CARE COORDINATION
Ambulatory Care Coordination Note  12/17/2020  CM Risk Score: 1  Charlson 10 Year Mortality Risk Score: 4%     ACC: Darell Correa RN    Summary Note: ACM contacted patient by telephone to follow up on progress, reinforce previous education/ provide patient education, and discuss any new issues or concerns. HIPAA compliant message left requesting a return phone call at patients convenience to discuss. Will continue to follow. Care Coordination Interventions    Program Enrollment: Rising Risk  Referral from Primary Care Provider: No  Suggested Interventions and Community Resources  Diabetes Education: Completed (Comment: 11/17/20: pt has completed in the past)  Medication Assistance Program: Not Started  Pharmacist: Completed (Comment: 11/17/20: Completed with DM Managment enrollment)  Registered Dietician: Declined  Zone Management Tools: Completed (Comment: DM II)  Other Services or Interventions: HR service now/ health plan enrollment           Prior to Admission medications    Medication Sig Start Date End Date Taking? Authorizing Provider   Ertugliflozin L-PyroglutamicAc (STEGLATRO) 15 MG TABS Take 1 tablet by mouth daily    Historical Provider, MD   Calcium Carb-Cholecalciferol (CALCIUM-VITAMIN D) 500-200 MG-UNIT per tablet Take 1 tablet by mouth 2 times daily (with meals)    Historical Provider, MD   cyclobenzaprine (FLEXERIL) 5 MG tablet Take 1 tablet by mouth 3 times daily as needed for Muscle spasms 12/22/19   Michael Saucedo DO   naproxen (NAPROSYN) 500 MG tablet Take 1 tablet by mouth 2 times daily (with meals) 9/5/19   Ananya Suresh MD   Insulin Pen Needle 32G X 4 MM MISC 1 each by Does not apply route 2 times daily    Historical Provider, MD   Insulin Regular Human (AFREZZA) 4 & 8 & 12 units POWD Inhale into the lungs as directed by provider.  Currently using per sliding scale  Less than 151: none   151-200: 8 units  201-300: 12 units  Over 300: 16 units    Historical Provider, MD

## 2021-01-13 ENCOUNTER — TELEPHONE (OUTPATIENT)
Dept: PHARMACY | Facility: CLINIC | Age: 56
End: 2021-01-13

## 2021-01-13 NOTE — TELEPHONE ENCOUNTER
Called patient to schedule pharmacist appointment to discuss medications for Diabetes Management Program.     No answer. Left VM on home/cell TAD: Please call back at [phone] Option #7 to retrieve the above message. [employer] Tamar Serrano CPhT.   Pharmacy [address]  Department, toll free: [phone], option 7

## 2021-01-18 NOTE — TELEPHONE ENCOUNTER
Called patient to schedule yearly pharmacist appointment to discuss medications for Diabetes Management Program.     Spoke to patient and appointment scheduled for 1/20/21 at 10:00am.      Katerina Menendez, [address]   Department, toll free: [phone], option 7

## 2021-01-20 ENCOUNTER — SCHEDULED TELEPHONE ENCOUNTER (OUTPATIENT)
Dept: PHARMACY | Facility: CLINIC | Age: 56
End: 2021-01-20

## 2021-01-20 VITALS — DIASTOLIC BLOOD PRESSURE: 82 MMHG | SYSTOLIC BLOOD PRESSURE: 136 MMHG

## 2021-01-20 NOTE — TELEPHONE ENCOUNTER
[facility] CLINICAL PHARMACY REVIEW - BE WELL WITH DIABETES  =================================================================  Nasra Hall is a 55 y.o. female enrolled in the [facility] [employer] Diabetes Program. Patient provided Letty Wall with verbal consent to remain in the program for this year. Medications:  Medication Sig    Ertugliflozin L-PyroglutamicAc (STEGLATRO) 15 MG TABS Take 1 tablet by mouth daily    Calcium Carb-Cholecalciferol (CALCIUM-VITAMIN D) 500-200 MG-UNIT per tablet Take 1 tablet by mouth 2 times daily (with meals)    glimepiride (AMARYL) 4 MG tablet  · Covered by DM program Take 4mg by mouth in the morning and take 2mg by mouth in the afternoon (with meals)    insulin glargine (LANTUS SOLOSTAR) 100 UNIT/ML injection pen  · Covered by DM program Inject under the skin 48 units in the morning and 32 units in the evening    metFORMIN (GLUCOPHAGE-XR) 500 MG extended release tablet  · Covered by DM program Take 1,000 mg by mouth 2 times daily (with meals)    lisinopril (PRINIVIL;ZESTRIL) 5 MG tablet  · Covered by DM program Take 5 mg by mouth daily    Multiple Vitamins-Minerals (THERAPEUTIC MULTIVITAMIN-MINERALS) tablet Take 1 tablet by mouth daily    ibuprofen (ADVIL;MOTRIN) 800 MG tablet Take 1 tablet by mouth every 8 hours as needed for Pain    cyclobenzaprine (FLEXERIL) 5 MG tablet Take 1 tablet by mouth 3 times daily as needed for Muscle spasms     Current Pharmacy: [id number]  Current testing supplies/frequency: currently using Prodigy glucometer at home. Tests about 4 times per day. Patient received a Dexcom CGM in 2020, but states that she never started it. States that she spoke with someone and the total cost of Dexcom was calculated and she could not afford it and never started it. Writer informed patient that Dexcom supplies are covered by the program, up to $600.  She could possibly go over the $600 program benefit if she switched to Dexcom CGM at which point, she would need to pay remaining co-pays out of pocket. Patient states that she will discuss with her PCP at her next OV in March. Pen needles/syringes: generic pen needles. Allergies:  No Known Allergies     Vitals/Labs:  BP Readings from Last 3 Encounters:   11/13/20 136/82   12/22/19 (!) 157/76   09/04/19 (!) 143/61     Lab Results   Component Value Date    LABMICR CANNOT BE CALCULATED 12/04/2019     Lab Results   Component Value Date    LABA1C 10.1 (H) 06/30/2020    LABA1C 10.4 (H) 12/04/2019    LABA1C 8.2 (H) 05/29/2019     Lab Results   Component Value Date    CHOL 114 05/29/2019    TRIG 67 05/29/2019    HDL 48 05/29/2019    LDLCHOLESTEROL 53 05/29/2019     ALT   Date Value Ref Range Status   08/14/2020 13 5 - 33 U/L Final     AST   Date Value Ref Range Status   08/14/2020 13 <32 U/L Final     The ASCVD Risk score (Dotty Ibrahim, et al., 2013) failed to calculate for the following reasons: The valid total cholesterol range is 130 to 320 mg/dL     Lab Results   Component Value Date    CREATININE 0.60 08/14/2020     CrCl cannot be calculated (Patient's most recent lab result is older than the maximum 120 days allowed. ). Immunizations:  Immunization History   Administered Date(s) Administered    DTaP 01/08/2013    Influenza Vaccine, unspecified formulation 01/08/2013    Influenza Virus Vaccine 11/27/2018, 11/05/2019    Pneumococcal Polysaccharide ([id number]) 12/13/2018      Social History:  Social History     Tobacco Use    Smoking status: Never Smoker    Smokeless tobacco: Never Used   Substance Use Topics    Alcohol use: No     ASSESSMENT:  Initial Program Requirements (Y indicates has completed for the year, N indicates needs to be completed by 7/1/2021): No - Office Visit with provider for DM (1st)  No - A1c (1st)     Ongoing Program Requirements (Y indicates has completed for the year, N indicates needs to be completed by 12/31/2021):   No - Office Visit with provider for DM (2nd)  No - ACC/diabetes educator visit (if A1c over 8%) - patient was active with LEXIS [facility] RN in 2020  No - A1c (2nd)  No - Lipid panel  No - Urine microalbumin  Yes - Pneumococcal vaccination: [id number]  No - Influenza vaccination for Fall 2021  Yes - Medication adherence over 70% - n/a; prescribed insulin   No - On statin or contraindication(s) Not identified  Yes - On ACEi/ARB - lisinopril     Formulary Medication Review:  Non-formulary or medications with cost-effective alternatives: Steglatro - not covered by DM program formulary, but co-pay is $0.00 with coupon on file at the pharmacy. Current medications eligible for copay waiver, up to $600, through [employer]:  - Glimepiride, Lantus, lisinopril and metformin  - Prodigy and Dexcom G6     Diabetes Care:   - Glycemic Goal: <7.0%. Is not at blood glucose goal but patient has not been taking Afrezza (inhaled insulin) d/t not covered by insurance. Patient states that she has a severe fear of needles and is not willing to try Humalog injections in addition to Lantus BID injections. Kinsey Scale has been denied by insurance per patient (received a letter last week). Patient plans to follow-up with her PCP regarding next steps. Patient does not test her SMBG as often as she should because she does not like to prick her fingers. Reviewed Dexcom in great detail - unsure who she spoke with last year, but Dexcom is covered by DM program. Advised patient that since the supplies are expensive, she will likely run out of the $600 benefit before the end of the year, but its an option to consider.   - Duplicate MOA: Sulfonylurea and insulins. Advised patient that if she starts rapid acting insulin, we typically stop glimepiride therapy - no added benefit and increased risk of hypoglycemia. - Reduce Pill [place]: could combine Steglatro and metformin to Segluromet 7.5/1000 mg BID.   - Appropriateness of Insulin Therapy: basal insulin appears appropriate. Patient could benefit from rapid acting insulin, but appears resistant to more injections per day. Could consider Toujeo (U-300) in the future if Lantus dose exceeds 80 units.    - Therapy Optimization: consider GLP1 agonist. Discussed with patient - states that she was previously on Victoza and tolerated well. Unsure why it was stopped. Discussed Trulicity injection (once weekly) since she has a fear of needles. Discussed Rybelsus (oral GLP1) option as well. Both are covered by the program. Patient agreeable to starting GLP1 therapy again.   - De-escalation of Therapy: not indicated at this time  - Adherent to ACEi/ARB: appears adherent per claims review    Type 2 DM under poor control as evidenced by most recent A1c in June 2020 = 10.1%. Patient has not had an A1c drawn since then (d/t cost). Patient not currently taking Afrezza d/t being denied by insurance. Not willing to start Humalog injections. Patient states that she tolerated Victoza in the past - will send recommendation to PCP to consider Trulicity or Rybelsus (GLP1). - Current symptoms/problems include none  - Any episodes of hypoglycemia? no    Other Considerations:  - Blood Pressure Goal: BP less than 140/90 mmHg due to history of DM: Is at blood pressure goal.   - Lipids: Patient is not currently prescribed any-intensity statin therapy. Patient did not have a lipid panel drawn in 2020 (last lipid panel on 5/29/19, LDL = 53). Patient will need lipid panel collected this year to determine if statin therapy is indicated. - Smoking status: Never smoked    PLAN:  - Consideration(s) for provider:   · Consider adding Trulicity or Rybelsus (GLP1 agonist) therapy. Will send recommendation via fax to PCP. · Patient due for lipid panel to assess if statin therapy is indicated. LDL historically < 70, but last lipid panel collected in 2019. Will send recommendation to PCP. · Consider stopping glimepiride.  Likely not getting any added A1c reduction benefit and can cause hypoglycemia with insulin therapy. · Could consider combining Steglatro and metformin to [place] [id number], [address] [place] [address]. Will send recommendation to PCP.  - DM program gaps identified:   · Initial requirements: Office Visit with provider for DM (1st) and A1c (1st)   · Ongoing requirements: Office visit with provider for DM (2nd), ACC/diabetes educator visit (if A1c over 8%), A1c (2nd), On statin or contraindication(s) Not identified, Lipid panel, Urine microalbumin and Influenza vaccination for [id number]   - Education to patient: Addressed diet and exercise and Reminder A1c and lipid panel can be completed for free at [facility] screenings   - Follow up: PCP for identified gaps or as scheduled below  - Upcoming appointments:   No future appointments. Hannah Cerda, PharmD, [facility]  Direct: [phone]  Department, toll free [phone], option 7            For Pharmacy Admin Tracking Only    PHSO: Yes  Total # of Interventions Recommended: 6  - Discontinued Medication #: 1 Discontinue Reason(s): Unnecessary Medication  - New Order #: 4 New Medication Order Reason(s): Needs Additional Medication Therapy, Cost Conversion, Patient Preference  - Updated Order #: 1 Updated Order Reason(s):  Other  Recommended intervention potential cost savings: 1  Total Interventions Accepted: 0  Time Spent (min): 60

## 2021-01-20 NOTE — LETTER
Dr. Saleem Franklin MD  Fax: [phone]       Bonifacio Gowers  1965       Your patient is currently enrolled in the Be Well with Diabetes program. The goal of this voluntary program is to help [facility] employees and covered dependents reach their health maintenance goals in regards to their diabetes diagnosis. After your patient's recent visit with a [facility] Clinical Pharmacist, the below were identified as opportunities to assist with their diabetes management:      1. Patient's Afrezza (inhaled insulin) was denied by her insurance and she has not been taking it. Humalog is the covered rapid-acting insulin. Patient states she has a fear of needles and refuses to take Humalog therapy. Consider adding GLP-1 agonist such as Trulicity once weekly or Rybelsus (oral GLP1) to patient's therapy. Patient states she tolerated Victoza in the past and is willing to try again. These medications are covered for $0.00 co-pay with the program. See below for dosing recommendations:   · Trulicity 1.60 mg once weekly for 4 weeks, then increase to 1.5 mg once weekly if tolerating. · Rybelsus 3 mg once daily for 4 weeks, then increase to 7 mg daily. May increase to 14 mg daily after 4 weeks on the 7 mg dose if needed to achieve glycemic goals. 2. Patient overdue for lipid panel to assess if statin therapy is indicated. Recommend checking lipid panel and if LDL > 70, consider initiating moderate-intensity statin therapy. 3. Consider stopping glimepiride since likely not providing any added benefit. Increased risk of hypoglycemia with insulin therapy. 4. Could combine Steglatro and metformin therapy to Segluromet 7.5/1000 mg BID to reduce pill burden. Discussed Dexcom CGM in great detail. Dexcom testing supplies are covered by the program (up to $600 total benefit for medications and supplies for the calendar year). Patient plans to discuss this with you at her next office visit.        Please contact our team with any questions.      Thank you,  [facility]  Telephone [phone] Option #7   Fax [phone]

## 2021-01-26 ENCOUNTER — CARE COORDINATION (OUTPATIENT)
Dept: OTHER | Facility: CLINIC | Age: 56
End: 2021-01-26

## 2021-01-26 NOTE — CARE COORDINATION
1,000 mg by mouth 2 times daily (with meals)    Historical Provider, MD   lisinopril (PRINIVIL;ZESTRIL) 5 MG tablet Take 5 mg by mouth daily    Historical Provider, MD   Multiple Vitamins-Minerals (THERAPEUTIC MULTIVITAMIN-MINERALS) tablet Take 1 tablet by mouth daily    Historical Provider, MD   ibuprofen (ADVIL;MOTRIN) 800 MG tablet Take 1 tablet by mouth every 8 hours as needed for Pain 7/30/15   Annia Roberts MD       No future appointments.

## 2021-02-03 ENCOUNTER — CARE COORDINATION (OUTPATIENT)
Dept: OTHER | Facility: CLINIC | Age: 56
End: 2021-02-03

## 2021-02-03 NOTE — CARE COORDINATION
(GLUCOPHAGE-XR) 500 MG extended release tablet Take 1,000 mg by mouth 2 times daily (with meals)    Historical Provider, MD   lisinopril (PRINIVIL;ZESTRIL) 5 MG tablet Take 5 mg by mouth daily    Historical Provider, MD   Multiple Vitamins-Minerals (THERAPEUTIC MULTIVITAMIN-MINERALS) tablet Take 1 tablet by mouth daily    Historical Provider, MD   ibuprofen (ADVIL;MOTRIN) 800 MG tablet Take 1 tablet by mouth every 8 hours as needed for Pain 7/30/15   Lina Jackson MD       No future appointments.

## 2021-03-02 ENCOUNTER — CARE COORDINATION (OUTPATIENT)
Dept: OTHER | Facility: CLINIC | Age: 56
End: 2021-03-02

## 2021-03-02 RX ORDER — TRAMADOL HYDROCHLORIDE 50 MG/1
50 TABLET ORAL EVERY 8 HOURS PRN
COMMUNITY
Start: 2020-07-14 | End: 2022-01-16

## 2021-03-02 RX ORDER — ESCITALOPRAM OXALATE 20 MG/1
20 TABLET ORAL DAILY
COMMUNITY
Start: 2020-03-09

## 2021-03-02 NOTE — CARE COORDINATION
Ambulatory Care Coordination Note  3/2/2021  CM Risk Score: 1  Charlson 10 Year Mortality Risk Score: 4%     ACC: Fanny Sales, RN    Summary Note: ACM contacted patient by telephone to follow up on progress, reinforce previous education/ provide patient education, and discuss any new issues or concerns. HIPAA compliant message left requesting a return phone call at patients convenience to discuss. Will continue to follow. Care Coordination Interventions    Program Enrollment: Rising Risk  Referral from Primary Care Provider: No  Suggested Interventions and Community Resources  Diabetes Education: Completed (Comment: 11/17/20: pt has completed in the past)  Medication Assistance Program: Declined  Pharmacist: Completed (Comment: 1/26/21:  Completed with enrollment )  Registered Dietician: Declined  Zone Management Tools: Completed (Comment: DM II)  Other Services or Interventions: HR service now/ health plan enrollment         Goals Addressed    None         Prior to Admission medications    Medication Sig Start Date End Date Taking? Authorizing Provider   traMADol (ULTRAM) 50 MG tablet Take 50 mg by mouth every 8 hours as needed.  7/14/20  Yes Historical Provider, MD   escitalopram (LEXAPRO) 20 MG tablet Take 20 mg by mouth daily 3/9/20  Yes Historical Provider, MD   Ertugliflozin L-PyroglutamicAc (STEGLATRO) 15 MG TABS Take 1 tablet by mouth daily    Historical Provider, MD   Calcium Carb-Cholecalciferol (CALCIUM-VITAMIN D) 500-200 MG-UNIT per tablet Take 1 tablet by mouth 2 times daily (with meals)    Historical Provider, MD   cyclobenzaprine (FLEXERIL) 5 MG tablet Take 1 tablet by mouth 3 times daily as needed for Muscle spasms 12/22/19   Jayy Saucedo,    Insulin Pen Needle 32G X 4 MM MISC 1 each by Does not apply route 2 times daily    Historical Provider, MD   glimepiride (AMARYL) 4 MG tablet Take 4mg by mouth in the morning and take 2mg by mouth in the afternoon (with meals)    Historical Provider, MD   insulin glargine (LANTUS SOLOSTAR) 100 UNIT/ML injection pen Inject under the skin 48 units in the morning and 32 units in the evening    Historical Provider, MD   metFORMIN (GLUCOPHAGE-XR) 500 MG extended release tablet Take 1,000 mg by mouth 2 times daily (with meals)    Historical Provider, MD   lisinopril (PRINIVIL;ZESTRIL) 5 MG tablet Take 5 mg by mouth daily    Historical Provider, MD   Multiple Vitamins-Minerals (THERAPEUTIC MULTIVITAMIN-MINERALS) tablet Take 1 tablet by mouth daily    Historical Provider, MD   ibuprofen (ADVIL;MOTRIN) 800 MG tablet Take 1 tablet by mouth every 8 hours as needed for Pain 7/30/15   Allyn Henry MD       No future appointments. and   Diabetes Assessment    Medic Alert ID: No  Meal Planning: Calorie counting, Carb counting   How often do you test your blood sugar?: Daily, Meals   Do you have barriers with adherence to non-pharmacologic self-management interventions?  (Nutrition/Exercise/Self-Monitoring): Yes   Have you ever had to go to the ED for symptoms of low blood sugar?: No

## 2021-03-05 ENCOUNTER — CARE COORDINATION (OUTPATIENT)
Dept: OTHER | Facility: CLINIC | Age: 56
End: 2021-03-05

## 2021-03-05 NOTE — CARE COORDINATION
Ambulatory Care Coordination Note  3/5/2021  CM Risk Score: 1  Charlson 10 Year Mortality Risk Score: 4%     ACC: Marcela Mcallister RN    Summary Note: ACM contacted patient by telephone to follow up on progress, reinforce previous education/ provide patient education, and discuss any new issues or concerns. HIPAA compliant message left requesting a return phone call at patients convenience to discuss. ACM will sign off as pt has not responded to several attempts to contact. Will send [employer] message I the meantime. Care Coordination Interventions    Program Enrollment: Rising Risk  Referral from Primary Care Provider: No  Suggested Interventions and Community Resources  Diabetes Education: Completed (Comment: 11/17/20: pt has completed in the past)  Medication Assistance Program: Declined  Pharmacist: Completed (Comment: 1/26/21:  Completed with enrollment )  Registered Dietician: Declined  Zone Management Tools: Completed (Comment: DM II)  Other Services or Interventions: HR service now/ health plan enrollment           Prior to Admission medications    Medication Sig Start Date End Date Taking? Authorizing Provider   traMADol (ULTRAM) 50 MG tablet Take 50 mg by mouth every 8 hours as needed.  7/14/20   Historical Provider, MD   escitalopram (LEXAPRO) 20 MG tablet Take 20 mg by mouth daily 3/9/20   Historical Provider, MD   Ertugliflozin L-PyroglutamicAc (STEGLATRO) 15 MG TABS Take 1 tablet by mouth daily    Historical Provider, MD   Calcium Carb-Cholecalciferol (CALCIUM-VITAMIN D) 500-200 MG-UNIT per tablet Take 1 tablet by mouth 2 times daily (with meals)    Historical Provider, MD   cyclobenzaprine (FLEXERIL) 5 MG tablet Take 1 tablet by mouth 3 times daily as needed for Muscle spasms 12/22/19   Jayy Saucedo,    Insulin Pen Needle 32G X 4 MM MISC 1 each by Does not apply route 2 times daily    Historical Provider, MD   glimepiride (AMARYL) 4 MG tablet Take 4mg by mouth in the morning and take 2mg by mouth in the afternoon (with meals)    Historical Provider, MD   insulin glargine (LANTUS SOLOSTAR) 100 UNIT/ML injection pen Inject under the skin 48 units in the morning and 32 units in the evening    Historical Provider, MD   metFORMIN (GLUCOPHAGE-XR) 500 MG extended release tablet Take 1,000 mg by mouth 2 times daily (with meals)    Historical Provider, MD   lisinopril (PRINIVIL;ZESTRIL) 5 MG tablet Take 5 mg by mouth daily    Historical Provider, MD   Multiple Vitamins-Minerals (THERAPEUTIC MULTIVITAMIN-MINERALS) tablet Take 1 tablet by mouth daily    Historical Provider, MD   ibuprofen (ADVIL;MOTRIN) 800 MG tablet Take 1 tablet by mouth every 8 hours as needed for Pain 7/30/15   Lelo Guaman MD       No future appointments.

## 2021-05-04 ENCOUNTER — OFFICE VISIT (OUTPATIENT)
Dept: ORTHOPEDIC SURGERY | Age: 56
End: 2021-05-04
Payer: COMMERCIAL

## 2021-05-04 ENCOUNTER — NURSE TRIAGE (OUTPATIENT)
Dept: OTHER | Facility: CLINIC | Age: 56
End: 2021-05-04

## 2021-05-04 VITALS — HEIGHT: 63 IN | WEIGHT: 202 LBS | BODY MASS INDEX: 35.79 KG/M2

## 2021-05-04 DIAGNOSIS — M25.562 ACUTE PAIN OF LEFT KNEE: Primary | ICD-10-CM

## 2021-05-04 DIAGNOSIS — S83.242A OTHER TEAR OF MEDIAL MENISCUS OF LEFT KNEE AS CURRENT INJURY, INITIAL ENCOUNTER: ICD-10-CM

## 2021-05-04 PROCEDURE — 99204 OFFICE O/P NEW MOD 45 MIN: CPT | Performed by: PHYSICIAN ASSISTANT

## 2021-05-04 RX ORDER — METHYLPREDNISOLONE 4 MG/1
4 TABLET ORAL SEE ADMIN INSTRUCTIONS
Qty: 1 KIT | Refills: 0 | Status: SHIPPED | OUTPATIENT
Start: 2021-05-04 | End: 2021-05-10

## 2021-05-04 NOTE — PROGRESS NOTES
[address] [facility], 20 [facility] Rony Johnson, [address] [facility], [address] [facility]           Dept Phone: [phone]           Dept Fax:  [phone] [address] [facility]           Freda Castro          Dept Phone: [phone]           Dept Fax:  [phone]      Chief Compliant:  Chief Complaint   Patient presents with    New Patient     Left knee         History of Present Illness: This is a 55 y.o. female who presents to the clinic today for evaluation of 3-month history of left knee pain. Patient cannot recall any mechanism of injury. She denies any twisting, fall or trauma to the knee prior to onset of pain. Pain is mostly to the medial aspect of the knee is aggravated by any attempted deep bending as well as initially getting up from a chair. She states pain does seem to improve as she ambulates longer distances to certain extent. She states over the last 24 to 48 hours her pain however has significantly increased somewhat so patient had to call off work which she states is very rare for her. Patient has taken ibuprofen and Tylenol with minimal relief of pain. Patient works at the [address] [place] [address] at [employer] was not able to perform her daily duties the due to the pain so she called off today. Patient with history of left knee arthroscopy and what sounds like a partial medial meniscectomy in 1993. Madelyn Silveira Past History:    Current Outpatient Medications:     traMADol (ULTRAM) 50 MG tablet, Take 50 mg by mouth every 8 hours as needed. , Disp: , Rfl:     escitalopram (LEXAPRO) 20 MG tablet, Take 20 mg by mouth daily, Disp: , Rfl:     Ertugliflozin L-PyroglutamicAc (STEGLATRO) 15 MG TABS, Take 1 tablet by mouth daily, Disp: , Rfl:     Calcium Carb-Cholecalciferol (CALCIUM-VITAMIN D) 500-200 MG-UNIT per tablet, Take 1 tablet by mouth 2 times daily (with meals), Disp: , Rfl:     cyclobenzaprine (FLEXERIL) 5 MG tablet, Take 1 tablet by mouth 3 times daily as needed for Muscle spasms, Disp: 20 tablet, Rfl: 0    Insulin Pen Needle 32G X 4 MM MISC, 1 each by Does not apply route 2 times daily, Disp: , Rfl:     glimepiride (AMARYL) 4 MG tablet, Take 4mg by mouth in the morning and take 2mg by mouth in the afternoon (with meals), Disp: , Rfl:     insulin glargine (LANTUS SOLOSTAR) 100 UNIT/ML injection pen, Inject under the skin 48 units in the morning and 32 units in the evening, Disp: , Rfl:     metFORMIN (GLUCOPHAGE-XR) 500 MG extended release tablet, Take 1,000 mg by mouth 2 times daily (with meals), Disp: , Rfl:     lisinopril (PRINIVIL;ZESTRIL) 5 MG tablet, Take 5 mg by mouth daily, Disp: , Rfl:     Multiple Vitamins-Minerals (THERAPEUTIC MULTIVITAMIN-MINERALS) tablet, Take 1 tablet by mouth daily, Disp: , Rfl:     ibuprofen (ADVIL;MOTRIN) 800 MG tablet, Take 1 tablet by mouth every 8 hours as needed for Pain, Disp: 30 tablet, Rfl: 0  No Known Allergies  Social History     Socioeconomic History    Marital status:      Spouse name: Not on file    Number of children: Not on file    Years of education: Not on file    Highest education level: Not on file   Occupational History    Not on file   Social Needs    Financial resource strain: Somewhat hard    Food insecurity     Worry: Never true     Inability: Never true    Transportation needs     Medical: No     Non-medical: No   Tobacco Use    Smoking status: Never Smoker    Smokeless tobacco: Never Used   Substance and Sexual Activity    Alcohol use: No    Drug use: No    Sexual activity: Not on file   Lifestyle    Physical activity     Days per week: 2 days     Minutes per session: 20 min    Stress:  To some extent   Relationships    Social connections     Talks on phone: Not on file     Gets together: Not on file     Attends Judaism service: Not on file     Active member of club or organization: Not on file     Attends meetings of clubs or organizations: Not on file     Relationship status:     Intimate partner violence     Fear of current or ex partner: Not on file     Emotionally abused: Not on file     Physically abused: Not on file     Forced sexual activity: Not on file   Other Topics Concern    Not on file   Social History Narrative    Not on file     Past Medical History:   Diagnosis Date    Anxiety     Arthritis     Diabetes ([address]     Fibromyalgia     Hernia     abdominal, RT    Kidney stones 2009    stent at that time, has had multiple stones    PONV (postoperative nausea and vomiting)     EXTREME, WANTS SCOPE PATCH     Past Surgical History:   Procedure Laterality Date    ACHILLES TENDON SURGERY Left 2001    BACK SURGERY      L4 L5 S1, 1998    FRACTURE SURGERY Right 08/04/2015    ccrp ring finger    KNEE ARTHROSCOPY Left 1993    KNEE ARTHROSCOPY Right 04/03/2017    OVARY REMOVAL Right 2003    [place] KNEE SCOPE,DIAGNOSTIC Right 4/3/2017    KNEE ARTHROSCOPY performed by Carrie De Anda MD at [address] [place] [address] History   Problem Relation Age of Onset    Stroke Mother     COPD Mother     Heart Disease Mother         Review of Systems   Constitutional: Negative for fever, chills, sweats. Eyes: Negative for changes in vision, or pain. HENT: Negative for ear ache, epistaxis, or sore throat. Respiratory/Cardio: Negative for Chest pain, palpitations, SOB, or cough. Gastrointestinal: Negative for abdominal pain, N/V/D. Genitourinary: Negative for dysuria, frequency, urgency, or hematuria. Neurological: Negative for headache, numbness, or weakness. Integumentary: Negative for rash, itching, laceration, or abrasion. Musculoskeletal: Positive for New Patient (Left knee )       Physical Exam:  Constitutional: Patient is oriented to person, place, and time. Patient appears well-developed and well nourished.    HENT: Negative otherwise noted  Head: Normocephalic and Atraumatic  Nose: Normal  Eyes: Conjunctivae and EOM are normal  Neck: Normal range of motion Neck supple. Respiratory/Cardio: Effort normal. No respiratory distress. Musculoskeletal:    Left Knee:     Skin: warm and dry, no rash or erythema  Vasculature: 2+ pedal pulses bilaterally  Neuro: Sensation grossly intact to light touch diffusely  Alignment: Normal  Tenderness: Severe tenderness to the medial joint line no tenderness to the lateral joint line, posterior anterior knee. Effusion: small    ROM: (Degrees)       A P       Extension  7 5       Flexion   105 110       Crepitation  Yes       Muscle strength:         Flexion   5      Extension  5      SLR   5        Extensor lag   y          Special testing:  y    Pain with deep knee flexion     y    Patellar grind       n    Patellar apprehension      n    Patellar glide         n    Lachman       n    Anterior drawer      n    Pivot shift       n    Posterior drawer      n    Dial test       n    Posterolateral drawer      n    Posterior Sag       n    MCL        n    LCL          y    Medial joint line tenderness     n    Lateral joint line tenderness     y    McMurrey's           Neurological: Patient is alert and oriented to person, place, and time. Normal strenght. No sensory deficit. Skin: Skin is warm and dry  Psychiatric: Behavior is normal. Thought content normal.  Nursing note and vitals reviewed. Labs and Imaging:     XR taken today:  No results found. X-rays taken in clinic today and preliminarily reviewed by me:  AP bilateral knees standing lateral view and sunrise view of the left knee demonstrates left knee with mild tricompartmental degenerative changes most significant at the patellofemoral compartment. Small effusion present. No evidence of acute fracture or other acute osseous abnormality. Right knee with mild bicompartmental degenerative changes on AP view without fracture.

## 2021-05-04 NOTE — TELEPHONE ENCOUNTER
Reason for Disposition   SEVERE pain (e.g., excruciating, unable to walk)    Answer Assessment - Initial Assessment Questions  1. LOCATION and RADIATION: \"Where is the pain located? \"       Left knee, inner aspect. 2. QUALITY: \"What does the pain feel like? \"  (e.g., sharp, dull, aching, burning)      Aching when sitting, when bearing weight sharp. 3. SEVERITY: \"How bad is the pain? \" \"What does it keep you from doing? \"   (Scale 1-10; or mild, moderate, severe)    -  MILD (1-3): doesn't interfere with normal activities     -  MODERATE (4-7): interferes with normal activities (e.g., work or school) or awakens from sleep, limping     -  SEVERE (8-10): excruciating pain, unable to do any normal activities, unable to walk      Weight bearing 8/10, non-weight bearing 4/10    4. ONSET: \"When did the pain start? \" \"Does it come and go, or is it there all the time? \"      Pain has been going on for a few months but over the last 24 hours it has gotten worse- pain now constant, used to be intermittent    5. RECURRENT: \"Have you had this pain before? \" If so, ask: \"When, and what happened then? \"      Yes    6. SETTING: \"Has there been any recent work, exercise or other activity that involved that part of the body? \"       Denies    7. AGGRAVATING FACTORS: \"What makes the knee pain worse? \" (e.g., walking, climbing stairs, running)      Bending knee makes it worse- climbing stairs, bearing weight    8. ASSOCIATED SYMPTOMS: \"Is there any swelling or redness of the knee? \"     Swelling right side of knee- swelling about size of palm of hand. Knee was more swollen last night    9. OTHER SYMPTOMS: \"Do you have any other symptoms? \" (e.g., chest pain, difficulty breathing, fever, calf pain)      Denies    10. PREGNANCY: \"Is there any chance you are pregnant? \" \"When was your last menstrual period? \"        n/a    Protocols used: KNEE PAIN-ADULT-OH    Brief description of triage: See above assessment    Triage indicates for patient to Go to PCP office today    Care advice provided, patient verbalizes understanding; denies any other questions or concerns; instructed to call back for any new or worsening symptoms. Attention Provider: Thank you for allowing me to participate in the care of your patient. The patient was connected to triage in response to symptoms provided. Please do not respond through this encounter as the response is not directed to a shared pool.

## 2021-05-04 NOTE — LETTER
[facility]  [address] [facility] [address]  Phone: [phone]  Fax: [id number]        May 4, 2021     Patient: Frank Mckeon   YOB: 1965   Date of Visit: 5/4/2021       To Whom It May Concern: It is my medical opinion that Frank Mckeon should remain out of work until Friday May 7th, 2021 . If you have any questions or concerns, please don't hesitate to call.     Sincerely,        VINOD Daugherty

## 2021-05-12 ENCOUNTER — HOSPITAL ENCOUNTER (OUTPATIENT)
Dept: PHYSICAL THERAPY | Age: 56
Setting detail: THERAPIES SERIES
Discharge: HOME | End: 2021-05-12
Payer: COMMERCIAL

## 2021-05-12 PROCEDURE — 97162 PT EVAL MOD COMPLEX 30 MIN: CPT

## 2021-05-12 PROCEDURE — 97110 THERAPEUTIC EXERCISES: CPT

## 2021-05-12 NOTE — CONSULTS
[facility]   Outpatient Physical Therapy  Physical Therapy Lower Extremity Evaluation    Date:  2021  Patient: Joey Hunt  : 1965  MRN: [id number]  Physician: Colin Herrera PA-C Insurance: [employer]. Visits BMN. Medical Diagnosis:   M25.562 (ICD-10-CM) - Acute pain of left knee   S83.242A (ICD-10-CM) - Other tear of medial meniscus of left knee as current injury, initial encounter     Rehab Codes: M25.562  Onset Date:     Next 's appt: 6/3/21    Subjective:   CC: L knee pain, stiffness and instability, especially upon rising from a seated position  HPI: Pt reports ~1 year hx of L knee pain with insidious onset and no known mechanism of injury, but that it recently got very bad about 3 months ago. Has as a hx of a L knee scope and partial medial menisectomy in  but states that she made a full recovery and did not have any major issues until recently. States that pain is localized primarily to the medial aspect of her L knee and is aggravated by deep bending, pivoting, extended standing/walking, or getting up after a period of prolonged sitting. Has also been experiencing a very deep ache in the back of her L knee. Had very good relief with a Medrol Dose pack but states that relief was short-lived and has recently started getting painful again. Recent x-rays showed substantial OA in her L knee, and has not had an MRI. Was recently referred to PT for conservative mgmt. PMHx: [] Unremarkable [x] Diabetes [] HTN  [] Pacemaker   [] MI/Heart Problems [] Cancer [x] Arthritis  [x] Other: L knee arthroscopy and partial medial menisectomy in .  R knee arthroscopy via Dr. Rick Dutta in 2017, fibromyalgia              [x] Refer to full medical chart  In EPIC     Comorbidities:   [] Obesity [] Dialysis  [] Other:   [] Asthma/COPD [] Dementia [] Other:   [] Stroke [] Sleep apnea [] Other:   [] Vascular disease [] Rheumatic disease [] Other:     Preferred Language:   [x] 3 Trada Independent  [] Deficit [] Independent  [x] Deficit Difficulty   Bending/Reaching [x] Independent  [] Deficit [] Independent  [x] Deficit Minimal ability to bend/squat   Stair climbing [x] Independent  [] Deficit [] Independent  [x] Deficit Step-to negotiation   Pivoting [x] Independent  [] Deficit [] Independent  [x] Deficit Pain   Squatting [x] Independent  [] Deficit [] Independent  [x] Deficit Minimal ability   Other [] Independent  [] Deficit [] Independent  [] Deficit      Patient Goals/Rehab Expectations: Reduce pain and improve mobility      Objective:      Observations: L knee currently kinesiotaped which pt states was done by her chiropractor. Mild edema around L knee. Pt states that she usually wears a [employer] brace at work.     Lumbar Clearing:  [x] Not tested            [] No Deficit              [] Deficit:      Sensation:  [x] No Deficit         [] Deficit:     Fall risk:  [x] No            [] Yes:          Range of Motion  Left Range of Motion  Right Strength  Left Strength  Right   Hip Flexion   3+/5 4+/5   Hip Abduction   3+/5 4+/5   Hip Adduction   4/5 5/5   Hip Extension   3+/5 4+/5   Hip ER       Hip IR       Knee Flexion 100/110  Pain 125 3+/5 4+/5   Knee Extension 0 0 4/5 5/5   Dorsiflexion   5/5 5/5   Plantar flexion   4+/5 4+/5   Inversion       Eversion           KNEE SPECIAL TESTS Left Right   Anterior Drawer + []         - [x]           NT []  + []         - []           NT []    Posterior Drawer + []         - [x]           NT []  + []         - []           NT []    Lachman's Drawer + []         - [x]           NT []  + []         - []           NT []    Varus Stress (0 deg) + []         - [x]           NT []  + []         - []           NT []    Varus Stress (20 deg) + []         - [x]           NT []  + []         - []           NT []    Valgus Stress (0 deg) + []         - [x]           NT []  + []         - []           NT []    Valgus Stress (20 deg) + []         - [x] NT []  + []         - []           NT []    Posterior Sag Sign + []         - [x]           NT []  + []         - []           NT []    Betty's + [x]         - []           NT []  + []         - []           NT []    Bounce Home Test + []         - [x]           NT []  + []         - []           NT []    Joint Line Tenderness + [x]         - []           NT []  + []         - []           NT []    Thessaly Test + []         - []           NT [x]  + []         - []           NT []    Jamie's Sign + []         - []           NT [x]  + []         - []           NT []    Other:  + []         - []           NT []  + []         - []           NT []        Joint Mobility: Patellar mobility WFL but painful    Palpation: Extremely TTP at L medial knee joint line. Hypertonic, sore and achy to palpation at L medial quads, medial hamstrings, and medial gastroc head    Gait: Independent [x]           Modified Independent []            Not independent []  Comments: Independent without an AD but moderately antalgic with reduced time during L stance phase    Assessment:  Problems:    [x] ? Pain:  [x] ? ROM:  [x] ? Strength:  [x] ? Function:  [] Other:    Pt is a 54 y/o F referred to PT with chronic hx of L medial knee pain with no known mechanism of injury. Pt presents today with significant pain, notable limitations in L knee flexion, and pain inhibition with all resistive MMT on her L LE. Gait is moderately antalgic and pt reports notable instability especially with rising from a chair after prolonged sitting and with any kind of pivoting motions. Severe tenderness to palpation at L medial joint line with positive Betty's, suggestive of possible medial meniscus involvement. Skilled PT intervention is required to reduce pain, improve AROM and functional strength/stabilization to restore functional tolerances with ADLs and work activities at her PLOF. STG: (to be met in 6 treatments)  1.  Pt will self report worst pain no Phosphate  mAmin  15186   [x] Therapeutic Activity  64790 [x] Vasopneumatic cold with compression  33076    [x] Gait Training   53249 [] Ultrasound   [id number]   [x] Neuromuscular Re-education  38172 [] Electrical Stimulation Unattended  18930   [x] Manual Therapy  99831 [] Electrical Stimulation Attended  44624   [x] Instruction in HEP  [] Lumbar/Cervical Traction  [id number]   [] Aquatic Therapy   81922 [] Cold/hotpack    [] Massage   70755      [] Dry Needling, 1 or 2 muscles  38590   [] Biofeedback, first 15 minutes   25487  [] Biofeedback, additional 15 minutes   72735 [] Dry Needling, 3 or more muscles  84696       Frequency: 1-2 x/week for 12 visits    Todays Treatment:  Modalities:   Precautions: None  Exercises:  Exercise Reps/ Time Weight/ Level Comments   Heel slides 10 reps  3'' hold at max flexion, 3'' quad set   Bridges 10 reps  Partial range/pain free   SLRs 10 reps  L LE only   Dynamic HS stretch (90-90) 2'' hold x 15           Other:    Specific Instructions for next treatment: Emphasis on restoring full functional knee flexion ROM, followed by progressive stabilization, strengthening and neuro re-ed as tolerated    Treatment Charges: Mins Units   [x] Evaluation       []  Low       [x]  Moderate       []  High 37 1   []  Modalities     [x]  Ther Exercise 8 1   []  Manual Therapy     []  Ther Activities     []  Aquatics     []  Neuromuscular     []  Gait Training     []  Dry Needling           1-2 muscles     []  Dry Needling           3 or more muscles     [] Vasocompression     []  Other       TOTAL TREATMENT TIME: 45    Time in: 5:20pm    Time Out: 6:05pm    Electronically signed by: Varsha Marks PT        Physician Signature:________________________________Date:__________________  By signing above or cosigning this note, I have reviewed this plan of care and certify a need for medically necessary rehabilitation services.      *PLEASE SIGN ABOVE AND FAX BACK ALL PAGES*

## 2021-05-20 ENCOUNTER — HOSPITAL ENCOUNTER (OUTPATIENT)
Dept: PHYSICAL THERAPY | Age: 56
Setting detail: THERAPIES SERIES
Discharge: HOME | End: 2021-05-20
Payer: COMMERCIAL

## 2021-05-20 PROCEDURE — 97016 VASOPNEUMATIC DEVICE THERAPY: CPT

## 2021-05-20 PROCEDURE — 97110 THERAPEUTIC EXERCISES: CPT

## 2021-05-20 NOTE — FLOWSHEET NOTE
[facility] Outpatient Physical Therapy              [address]              Phone: [phone]              Fax: [phone]      Physical Therapy Daily Treatment Note    Date:  2021  Patient Name:  Kemi Olivarez    :  1965  MRN: [id number]   Physician: Shira Tinoco PA-C          Insurance: [employer]. Visits BMN. Medical Diagnosis:   M25.562 (ICD-10-CM) - Acute pain of left knee   S83.242A (ICD-10-CM) - Other tear of medial meniscus of left knee as current injury, initial encounter   Rehab Codes: M25.562  Onset Date:   Next Dr's appt: 6/3/21  Visit# / total visits:      Cancels/No Shows: 0/0    Subjective:    Pain:  [x] Yes  [] No Location: L knee Pain Rating: (0-10 scale) 4-5/10  Pain altered Tx:  [x] No  [] Yes  Action:  Comments: Pt reports that she feels a little bit better since her initial PT evaluation. She reports that she has been completing her HEP. She notes that she is more successful if she completes them prior to work due to lower pain levels. Objective:  Modalities: vasocompression x15 minutes on min pressure and 34 degrees  Precautions: None  Exercises:  Exercise Reps/ Time Weight/ Level Comments   Heel slides 10x2   3'' hold at max flexion, 3'' quad set   Bridges 10x2   Partial range/pain free   SLRs 10 reps   L LE only   Supine hip abd 20x Red    Dynamic HS stretch (90-90) 2'' hold x 15             Clamshells 10x2     Sidelying hip abd  10x2             LAQ 10x3\"     Other:    Specific Instructions for next treatment: Emphasis on restoring full functional knee flexion ROM, followed by progressive stabilization, strengthening and neuro re-ed as tolerated       Treatment Charges: Mins Units   []  Modalities     [x]  Ther Exercise 30 2   []  Manual Therapy     []  Ther Activities     []  Aquatics     [x]  Vasocompression 15 1   []  Other     Total Treatment time 45 3       Assessment: [x] Progressing toward goals.  Initiated

## 2021-05-25 ENCOUNTER — HOSPITAL ENCOUNTER (OUTPATIENT)
Dept: PHYSICAL THERAPY | Age: 56
Setting detail: THERAPIES SERIES
Discharge: HOME | End: 2021-05-25
Payer: COMMERCIAL

## 2021-05-25 PROCEDURE — 97110 THERAPEUTIC EXERCISES: CPT

## 2021-05-25 PROCEDURE — 97016 VASOPNEUMATIC DEVICE THERAPY: CPT

## 2021-05-25 NOTE — FLOWSHEET NOTE
[facility]              [address]              Phone: [phone]              Fax: [phone]      Physical Therapy Daily Treatment Note    Date:  2021  Patient Name:  Eliu Quezada    :  1965  MRN: [id number]   Physician: Greg Tolentino PA-C            Insurance: Medical [place]. Visits [facility]. Medical Diagnosis:   M25.562 (ICD-10-CM) - Acute pain of left knee   S83.242A (ICD-10-CM) - Other tear of medial meniscus of left knee as current injury, initial encounter   Rehab Codes: M25.562  Onset Date:   Next Dr's appt: 6/3/21  Visit# / total visits: 3/12     Cancels/No Shows: 0/0    Subjective:    Pain:  [x] Yes  [] No Location: L knee Pain Rating: (0-10 scale) 4-5/10  Pain altered Tx:  [x] No  [] Yes  Action:  Comments: : Pt states that she has good days and bad days. Still has a lot of medial knee instability rising from a chair after an extended period of sitting, but has also been having more discomfort at the back of her L knee with difficulty straightening it out all the way. Denies any issues after her previous visit.      Objective:  Modalities: vasocompression x10 minutes on min pressure and 34 degrees  Precautions: None  Exercises:  Exercise Reps/ Time Weight/ Level Comments Completed today= x   Heel slides 20 reps With red swiss ball 3'' hold at max flexion, 3'' quad set x   Bridges 10 reps x2    x   SLRs 10 reps   L LE only    Supine hip abd 20x Red     Dynamic HS stretch (90-90) 2'' hold x 20   Towel behind knee x          Clamshells 15 reps x 3   x   Sidelying hip abd  10x2               LAQ 10x3\"             Total gym squats 10 reps x 2  Pain free range x   Standing foot taps to 8'' step 30 reps x 2  Emphasizing L LE WB tolerance and stance stability x   Standing heel raises/toe raises 15 reps x 2   x   Other:    Specific Instructions for next treatment: Emphasis on restoring full functional knee flexion ROM, followed by progressive stabilization, strengthening and neuro re-ed as tolerated       Treatment Charges: Mins Units   []  Modalities     [x]  Ther Exercise 41 3   []  Manual Therapy     []  Ther Activities     []  Aquatics     [x]  Vasocompression 10 1   []  Other     Total Treatment time 51 4       Assessment: [x] Progressing toward goals. [] No change. [] Other:  [x] Skilled PT intervention is required to reduce pain, improve AROM and functional strength/stabilization to restore functional tolerances with ADLs and work activities at her PLOF.    5/25: Continues to demonstrate L medial joint line pain as well as posterior knee discomfort. Posterior tightness alleviated some with hamstring stretching but continued to be present/achy through all therapeutic interventions. Trialed more CKC stability exercises with decent tolerance, although hesitant and slightly unstable with full L LE WB. Will continue to assess and progress pt as appropriate. Vasocompression provided post-session for pain control. STG: (to be met in 6 treatments)  1. Pt will self report worst pain no greater than 3/10 in order to better tolerate ADLs/work activities with minimal dysfunction  2. Pt will improve AROM in L knee from 0-125 deg in order to demonstrate ability to functionally bend/squat without restriction at her prior level  3. Pt will demonstrate ability to ambulate with symmetrical stance time and no antalgia for efficient walking with everyday work activities. LTG: (to be met in 12 treatments)  1. Pt will demonstrate improved L LE strength to 4+/5 or greater in order to demonstrate improved stability/strength necessary for unrestricted ADLs/work activities  2. Pt will decrease KOOS to 15% impaired or less in order to demonstrate improved functional tolerances at PLOF with minimal restriction/dysfunction  3.  Pt will demonstrate independence with a long term HEP for continued progress/maintenance after completion of

## 2021-05-27 ENCOUNTER — HOSPITAL ENCOUNTER (OUTPATIENT)
Dept: PHYSICAL THERAPY | Age: 56
Setting detail: THERAPIES SERIES
Discharge: HOME | End: 2021-05-27
Payer: COMMERCIAL

## 2021-05-27 PROCEDURE — 97016 VASOPNEUMATIC DEVICE THERAPY: CPT

## 2021-05-27 PROCEDURE — 97110 THERAPEUTIC EXERCISES: CPT

## 2021-05-27 NOTE — FLOWSHEET NOTE
509 [facility]              [address]              Phone: [phone]              Fax: [phone]      Physical Therapy Daily Treatment Note    Date:  2021  Patient Name:  Eunice Escobedo    :  1965  MRN: [id number]   Physician: Narinder Choudhary PA-C            Insurance: Medical [place]. Visits BMN. Medical Diagnosis:   M25.562 (ICD-10-CM) - Acute pain of left knee   S83.242A (ICD-10-CM) - Other tear of medial meniscus of left knee as current injury, initial encounter   Rehab Codes: M25.562  Onset Date:   Next Dr's appt: 6/3/21  Visit# / total visits:      Cancels/No Shows: 0/0    Subjective:    Pain:  [x] Yes  [] No Location: L knee Pain Rating: (0-10 scale) 5/10  Pain altered Tx:  [x] No  [] Yes  Action:  Comments: : Pt reports increased pain today and states that she's had several work days in a row which has seemed to have taken a toll on her knee. Feels a constant pressure at the back of her knee and that it seems to want to catch a lot more at the inside of the joint. Pt reports more stiffness after her work day yesterday and a significant amount of swelling. Follows up with Narinder Choudhary on  prior to her next PT appt.     Objective:  Modalities: vasocompression x15 minutes on min pressure and 34 degrees  Precautions: None  Exercises:  Exercise Reps/ Time Weight/ Level Comments Completed today= x   Heel slides 20 reps With red swiss ball 3'' hold at max flexion, 3'' quad set x   Bridges 10 reps x2    x   SLRs 10 reps   L LE only    Supine hip abd 20x Red     Dynamic HS stretch (90-90) 2'' hold x 20   Towel behind knee x   SAQs 3'' hold 15 reps x 2  Red bolster under L knee x          Clamshells 10 reps x 3  Towel roll between thighs to avoid valgus stress on L knee x   Sidelying hip abd  10x2               LAQ 10x3\"             Total gym squats 10 reps x 2  Pain free range    Standing foot taps to 8'' step 30 reps x 2 Emphasizing L LE WB tolerance and stance stability    Standing heel raises/toe raises 15 reps x 2      Other:    Specific Instructions for next treatment: Emphasis on restoring full functional knee flexion ROM, followed by progressive stabilization, strengthening and neuro re-ed as tolerated       Treatment Charges: Mins Units   []  Modalities     [x]  Ther Exercise 27 2   []  Manual Therapy     []  Ther Activities     []  Aquatics     [x]  Vasocompression 15 1   []  Other     Total Treatment time 42 3       Assessment: [x] Progressing toward goals. [] No change. [] Other:  [x] Skilled PT intervention is required to reduce pain, improve AROM and functional strength/stabilization to restore functional tolerances with ADLs and work activities at her PL.    5/27: Elevated pain levels today with high levels of irritability compared to previous visit. Not appropriate for CKC activities today and emphasized gentle mat level stabilization as tolerated. Difficulty noted in R sidelying today likely due to valgus stress on L medial knee with improved tolerance after positioning a towel between the knees. Signs/sx seem to be consistent with L medial meniscus injury and pt follows up with S[facility] in 2 weeks. Vasocompression performed today for pain modulation at end of session and pt requests to hold on making any additional appts beyond 6/9 until seen by ortho clinic. STG: (to be met in 6 treatments)  1. Pt will self report worst pain no greater than 3/10 in order to better tolerate ADLs/work activities with minimal dysfunction  2. Pt will improve AROM in L knee from 0-125 deg in order to demonstrate ability to functionally bend/squat without restriction at her prior level  3. Pt will demonstrate ability to ambulate with symmetrical stance time and no antalgia for efficient walking with everyday work activities. LTG: (to be met in 12 treatments)  1.  Pt will demonstrate improved L LE strength to 4+/5 or greater in order to demonstrate improved stability/strength necessary for unrestricted ADLs/work activities  2. Pt will decrease KOOS to 15% impaired or less in order to demonstrate improved functional tolerances at PLOF with minimal restriction/dysfunction  3. Pt will demonstrate independence with a long term HEP for continued progress/maintenance after completion of PT    Pt. Education:  [x] Yes  [] No  [x] Reviewed Prior HEP/Ed  Method of Education: [x] Verbal  [x] Demo  [] Written  (simplifyMD Access Code: [email or web address])  Comprehension of Education:  [x] Verbalizes understanding. [x] Demonstrates understanding. [] Needs review. [x] Demonstrates/verbalizes HEP/Ed previously given. Plan: [x] Continue current frequency toward long and short term goals.         Time In: 3:03pm            Time Out: 3:45pm    Electronically signed by:  Leonarda Francisco, PT

## 2021-06-09 ENCOUNTER — OFFICE VISIT (OUTPATIENT)
Dept: ORTHOPEDIC SURGERY | Age: 56
End: 2021-06-09
Payer: COMMERCIAL

## 2021-06-09 ENCOUNTER — HOSPITAL ENCOUNTER (OUTPATIENT)
Dept: PHYSICAL THERAPY | Age: 56
Setting detail: THERAPIES SERIES
Discharge: HOME | End: 2021-06-09
Payer: COMMERCIAL

## 2021-06-09 VITALS — BODY MASS INDEX: 35.79 KG/M2 | WEIGHT: 202 LBS | HEIGHT: 63 IN

## 2021-06-09 DIAGNOSIS — M23.92 INTERNAL DERANGEMENT OF LEFT KNEE: Primary | ICD-10-CM

## 2021-06-09 DIAGNOSIS — M25.562 ACUTE PAIN OF LEFT KNEE: ICD-10-CM

## 2021-06-09 PROCEDURE — 99214 OFFICE O/P EST MOD 30 MIN: CPT | Performed by: PHYSICIAN ASSISTANT

## 2021-06-09 RX ORDER — DICLOFENAC SODIUM 75 MG/1
75 TABLET, DELAYED RELEASE ORAL 2 TIMES DAILY WITH MEALS
Qty: 28 TABLET | Refills: 0 | Status: SHIPPED | OUTPATIENT
Start: 2021-06-09 | End: 2021-06-21 | Stop reason: SDUPTHER

## 2021-06-09 NOTE — PROGRESS NOTES
[address] [facility], [address], [address] [place] [address], [address] [facility]           Dept Phone: [phone]           Dept Fax:  [phone] [address] [facility] [place]          Dept Phone: [phone]           Dept Fax:  [phone]      Chief Compliant:  Chief Complaint   Patient presents with    Knee Pain     left knee        History of Present Illness:  Lucina Murry returns today. This is a 55 y.o. female who presents to the clinic today for follow up of left knee pain. Patient is read by me on 5/4/2021 started on Medrol Dosepak and referred to physical therapy at that time. There was a question of possible medial meniscus tear at that time however we opted to proceed conservatively. Patient notes that she felt \"like I was 26\" while taking the prednisone her knee had very little pain she is able to do a lot of her activities without any significant problems however after completion the pain slowly returned and is back to the point it was prior to the medication. Patient has completed 6 visits of PT with virtually no improvement of pain in fact she does believe it might be worse. Pain continues to be most severe over the medial aspect of the knee. Rates pain approximately 4-5 over 10 at rest however by the end of her shift at work he can get to a 9/10. Review of Systems   Constitutional: Negative for fever, chills, sweats, recent illness, or recent injury. Neurological: Negative for headaches, numbness, or weakness. Integumentary: Negative for rash, itching, ecchymosis, abrasions, or laceration. Musculoskeletal: Positive for Knee Pain (left knee)       Physical Exam:  Constitutional: Patient is oriented to person, place, and time. Patient appears well-developed and well nourished.    Musculoskeletal:    Left Knee:     Skin: warm and dry, no rash or erythema  Vasculature: 2+ pedal pulses bilaterally  Neuro: Sensation grossly intact to light touch diffusely  Alignment: Normal  Tenderness: Severe tenderness to the medial joint line no tenderness to the lateral joint line, posterior anterior knee. Effusion: Moderate    ROM: (Degrees)       A P       Extension  -5        Flexion   75 80       Crepitation  Yes       Muscle strength:         Flexion   5      Extension  5      SLR   5        Extensor lag   y          Special testing:  y    Pain with deep knee flexion     y    Patellar grind       n    Patellar apprehension      n    Patellar glide         n    Lachman       n    Anterior drawer      n    Pivot shift       n    Posterior drawer      n    Dial test       n    Posterolateral drawer      n    Posterior Sag       n    MCL        n    LCL          y    Medial joint line tenderness     n    Lateral joint line tenderness     y    McMurrey's           Neurological: Patient is alert and oriented to person, place, and time. Normal strenght. No sensory deficit. Skin: Skin is warm and dry  Psychiatric: Behavior is normal. Thought content normal.  Nursing note and vitals reviewed. Labs and Imaging:     XR taken today:  No results found. Assessment and Plan:  1. Internal derangement of left knee    2. Acute pain of left knee              PLAN:  Georgina Horn is a 55 y.o. old female who presents to the clinic today for evaluation of left knee pain concerning for possible medial meniscus tear of left  knee. Patient is educated on all treatment options including both nonoperative and operative intervention. At this time I believe patient will benefit from further diagnostic evaluation with MRI of the left  knee to evaluate for possible medial meniscus tear. Recommend starting patient on administrative anti-inflammatory daily. Diclofenac 75 mg twice daily to be taken with meals as electronically sent to patient pharmacy.   Recommend avoiding all other NSAIDs while taking this medication. Discussed possibility of intra-articular injection and aspiration today however patient chose to hold off on this at this time. Follow up after MRI is completed            Please note that this chart was generated using voice recognition Dragon dictation software. Although every effort was made to ensure the accuracy of this automated transcription, some errors in transcription may have occurred.

## 2021-06-09 NOTE — DISCHARGE SUMMARY
[x] [facility]) @ [facility]  [address] [address] [address]  Phone [phone]  Fax [phone]    Physical Therapy Discharge Note    Date: 2021      Patient: Esthela Clark  : 1965  MRN: [id number]    Physician: Brooke Coe PA-C     Diagnosis:   A61.212 (ICD-10-CM) - Acute pain of left knee   S83.242A (ICD-10-CM) - Other tear of medial meniscus of left knee as current injury, initial encounter      Onset Date:     Rehab Diagnosis: L knee pain and instability  ICD-10 Codes: M54.5, R53.1  Total visits attended:   Cancels/No shows:   Date of initial visit: 21                   [] Patient recovered from conditions. Treatment goals were met. [] Patient received maximum benefit. No further therapy indicated at this time. [] Patient demonstrated improvement from condition with  ** Of  ** Short term goals met. []Patient demonstrated improvement from condition with **   Of **  Long term goals met. [] Patient to continue exercise/home instructions independently. [] Therapy interrupted due to:    [] Patient has 2 or more no shows/cancels, is discontinued per our policy. [] Patient has completed prescribed number of treatment sessions. [x] Other: Pt followed up with orthopedic SONYA today who is ordering an MRI to determine additional medical mgmt needs. Pt requests discharge from PT at this time      Pain level at evaluation was      7 /10 and at discharge was      Unknown /10 (last visit cancelled)    It Is My Understanding That The:  [] Patient returned to work. [] Patient demonstrated improved level of function. [] Patient returned to previous functional level. [] Patient's current functional status is unknown due to no-shows  [x] Other: See above    Recommendations/Comments: If you have any questions or concerns regarding this patient's care, please contact us.    Thank you for your

## 2021-06-17 ENCOUNTER — HOSPITAL ENCOUNTER (OUTPATIENT)
Dept: MRI IMAGING | Facility: CLINIC | Age: 56
Discharge: HOME | End: 2021-06-19
Payer: COMMERCIAL

## 2021-06-17 DIAGNOSIS — M23.92 INTERNAL DERANGEMENT OF LEFT KNEE: ICD-10-CM

## 2021-06-17 PROCEDURE — 73721 MRI JNT OF LWR EXTRE W/O DYE: CPT

## 2021-06-21 ENCOUNTER — TELEPHONE (OUTPATIENT)
Dept: PHARMACY | Facility: CLINIC | Age: 56
End: 2021-06-21

## 2021-06-21 ENCOUNTER — CLINICAL DOCUMENTATION (OUTPATIENT)
Dept: PHARMACY | Facility: CLINIC | Age: 56
End: 2021-06-21

## 2021-06-21 DIAGNOSIS — M25.562 ACUTE PAIN OF LEFT KNEE: ICD-10-CM

## 2021-06-21 DIAGNOSIS — M23.92 INTERNAL DERANGEMENT OF LEFT KNEE: ICD-10-CM

## 2021-06-21 RX ORDER — DICLOFENAC SODIUM 75 MG/1
75 TABLET, DELAYED RELEASE ORAL 2 TIMES DAILY WITH MEALS
Qty: 28 TABLET | Refills: 0 | Status: SHIPPED | OUTPATIENT
Start: 2021-06-21 | End: 2022-01-16

## 2021-06-21 NOTE — TELEPHONE ENCOUNTER
Pharmacy Pop Care Documentation:   Called patient with reminder for requirements for Diabetes Management Program.     According to our records, patient is missing the following requirement(s) that must be completed by July 1st 2021:     · 1st 2021 A1C        Patient not available at the time of call. Left message on mobile [employer] with the above information. [employer] message sent.       Miky Martinez, [address]   Department, toll free: [phone], option 7

## 2021-06-21 NOTE — PROGRESS NOTES
Pharmacy Pop Care Documentation:     AVS received for required office visit on: 6/17/21: Dr. Tonya Choudhary per Care Everywhere

## 2021-06-21 NOTE — TELEPHONE ENCOUNTER
Patient is requesting medication refill be sent to [facility]. She states she is seeing a little improvement with this medication. Please advise if any concerns. Thank you.

## 2021-06-23 ENCOUNTER — TELEPHONE (OUTPATIENT)
Dept: ORTHOPEDIC SURGERY | Age: 56
End: 2021-06-23

## 2021-06-23 NOTE — TELEPHONE ENCOUNTER
MRI of left knee:    [facility], [address] Ortho Clinical Support Pool    Moderate effusion and evidence of Baker's cyst.  No evidence of meniscal or ligamentous tearing.  Moderate tricompartmental degenerative changes.  Recommend aspiration and injection if patient continues to be painful. Called patient and gave her the results from [facility]. She has an appointment scheduled with Dr. Marga Butts for July.

## 2021-06-28 ENCOUNTER — HOSPITAL ENCOUNTER (OUTPATIENT)
Age: 56
Discharge: HOME | End: 2021-06-28
Payer: COMMERCIAL

## 2021-06-28 LAB
ESTIMATED AVERAGE GLUCOSE: 266 MG/DL
HBA1C MFR BLD: 10.9 % (ref 4–6)

## 2021-06-28 PROCEDURE — 83036 HEMOGLOBIN GLYCOSYLATED A1C: CPT

## 2021-06-28 PROCEDURE — 36415 COLL VENOUS BLD VENIPUNCTURE: CPT

## 2021-07-07 ENCOUNTER — TELEPHONE (OUTPATIENT)
Dept: ORTHOPEDIC SURGERY | Age: 56
End: 2021-07-07

## 2021-07-07 ENCOUNTER — OFFICE VISIT (OUTPATIENT)
Dept: ORTHOPEDIC SURGERY | Age: 56
End: 2021-07-07
Payer: COMMERCIAL

## 2021-07-07 DIAGNOSIS — M25.562 ACUTE PAIN OF LEFT KNEE: ICD-10-CM

## 2021-07-07 DIAGNOSIS — M25.561 RIGHT KNEE PAIN, UNSPECIFIED CHRONICITY: Primary | ICD-10-CM

## 2021-07-07 PROCEDURE — 99213 OFFICE O/P EST LOW 20 MIN: CPT | Performed by: ORTHOPAEDIC SURGERY

## 2021-07-07 PROCEDURE — 20610 DRAIN/INJ JOINT/BURSA W/O US: CPT | Performed by: ORTHOPAEDIC SURGERY

## 2021-07-07 RX ORDER — BUPIVACAINE HYDROCHLORIDE 5 MG/ML
2 INJECTION, SOLUTION PERINEURAL ONCE
Status: COMPLETED | OUTPATIENT
Start: 2021-07-07 | End: 2021-07-07

## 2021-07-07 RX ORDER — TRIAMCINOLONE ACETONIDE 40 MG/ML
40 INJECTION, SUSPENSION INTRA-ARTICULAR; INTRAMUSCULAR ONCE
Status: COMPLETED | OUTPATIENT
Start: 2021-07-07 | End: 2021-07-07

## 2021-07-07 RX ORDER — LIDOCAINE HYDROCHLORIDE 10 MG/ML
2 INJECTION, SOLUTION INFILTRATION; PERINEURAL ONCE
Status: COMPLETED | OUTPATIENT
Start: 2021-07-07 | End: 2021-07-07

## 2021-07-07 RX ORDER — METHYLPREDNISOLONE 4 MG/1
TABLET ORAL
Qty: 1 KIT | Refills: 0 | Status: SHIPPED | OUTPATIENT
Start: 2021-07-07 | End: 2021-07-13

## 2021-07-07 RX ADMIN — BUPIVACAINE HYDROCHLORIDE 10 MG: 5 INJECTION, SOLUTION PERINEURAL at 09:20

## 2021-07-07 RX ADMIN — LIDOCAINE HYDROCHLORIDE 2 ML: 10 INJECTION, SOLUTION INFILTRATION; PERINEURAL at 09:21

## 2021-07-07 RX ADMIN — TRIAMCINOLONE ACETONIDE 40 MG: 40 INJECTION, SUSPENSION INTRA-ARTICULAR; INTRAMUSCULAR at 09:22

## 2021-07-07 NOTE — TELEPHONE ENCOUNTER
Pt called stated she seen Dr Kiran Rodriguez this morning and went to [facility] to get her mediation that was going to be place from Dr Kiran Rodriguez and it was not there, pt would like to know how soon it could be sent over to be filled as she is in a lot of pain- please reach out to pt at [phone],  thank you

## 2021-07-07 NOTE — PROGRESS NOTES
Robert Damon M.D.            [address] [facility] [address] [facility],[address], [address] [facility]           Dept Phone: [phone]           Dept Fax:  [address]           Freda Castro          Dept Phone: [phone]           Dept Fax:  [phone]      Chief Compliant:  Chief Complaint   Patient presents with    Pain     Lt knee MRI        History of Present Illness: This is a 55 y.o. female who presents to the clinic today for evaluation / follow up of knee pain. Please see prior dictations from Audra Mondragon. Patient has a recent MRI which showed that she had a ruptured Baker's cyst.  There is no evidence for meniscal damage however. Patient be given a Medrol Dosepak at that time by Audra Mondragon and she did very well with this. She is now hurting everywhere having come off this. She has a history of fibromyalgia. She is on multiple medications for this. She states the left knee is a little bit better but she still has some swelling and some pain posteriorly is not really get a sharp stabbing pain per se. Review of Systems   Constitutional: Negative for fever, chills, sweats. Eyes: Negative for changes in vision, or pain. HENT: Negative for ear ache, epistaxis, or sore throat. Respiratory/Cardio: Negative for Chest pain, palpitations, SOB, or cough. Gastrointestinal: Negative for abdominal pain, N/V/D. Genitourinary: Negative for dysuria, frequency, urgency, or hematuria. Neurological: Negative for headache, numbness, or weakness. Integumentary: Negative for rash, itching, laceration, or abrasion. Musculoskeletal: Positive for Pain (Lt knee MRI)       Physical Exam:  Constitutional: Patient is oriented to person, place, and time. Patient appears well-developed and well nourished.    HENT: Negative otherwise noted  Head: Normocephalic and Atraumatic  Nose: Normal  Eyes: Conjunctivae and EOM are normal  Neck: Normal range of motion Neck supple. Respiratory/Cardio: Effort normal. No respiratory distress. Musculoskeletal: Physical examination left knee notes a mild effusion. Her Betty's is negative motion is 5-125 degrees. Collaterals are good Betty's is relatively unremarkable little bit of posterior gastroc pain but nothing too severe compared to apparently which she had in the past.  Neurological: Patient is alert and oriented to person, place, and time. Normal strenght. No sensory deficit. Skin: Skin is warm and dry  Psychiatric: Behavior is normal. Thought content normal.  Nursing note and vitals reviewed. Labs and Imaging:     XR taken today:  No results found. Orders Placed This Encounter   Procedures    20610 - DRAIN/INJECT LARGE JOINT BURSA       Assessment and Plan:  1. Right knee pain, unspecified chronicity    2. Acute pain of left knee        Administrations This Visit     bupivacaine (MARCAINE) 0.5 % injection 10 mg     Admin Date  07/07/2021  09:20 Action  Given Dose  10 mg Route  Intra-articular Site  Knee Right Administered By  Madeleine Guy LPN    Ordering Provider: Chavez Chappell MD    NDC: 6742-6820-88    Lot#: [id number]    : HOSPIRA    Patient Supplied?: No          lidocaine 1 % injection 2 mL     Admin Date  07/07/2021  09:21 Action  Given Dose  2 mL Route  Intra-articular Site  Knee Right Administered By  Madeleine Guy LPN    Ordering Provider: Chavez Chappell MD    NDC: 0866-1817-66    Lot#: [id number]    : HIKMA    Patient Supplied?: No          triamcinolone acetonide (KENALOG-40) injection 40 mg     Admin Date  07/07/2021  09:22 Action  Given Dose  40 mg Route  Intra-articular Site  Knee Right Administered By  Madeleine Guy LPN    Ordering Provider: Chavez Chappell MD    ND: 2450-5577-81    Lot#: [id number]    : B[employer] (PRIMARY CARE)    Patient Supplied?: No This is a 55 y.o. female who presents to the clinic today for evaluation / follow up of ruptured Baker's cyst left knee without evidence for meniscal pathology. Past History:    Current Outpatient Medications:     diclofenac (VOLTAREN) 75 MG EC tablet, Take 1 tablet by mouth 2 times daily (with meals) for 14 days, Disp: 28 tablet, Rfl: 0    traMADol (ULTRAM) 50 MG tablet, Take 50 mg by mouth every 8 hours as needed. , Disp: , Rfl:     escitalopram (LEXAPRO) 20 MG tablet, Take 20 mg by mouth daily, Disp: , Rfl:     Ertugliflozin L-PyroglutamicAc (STEGLATRO) 15 MG TABS, Take 1 tablet by mouth daily, Disp: , Rfl:     Calcium Carb-Cholecalciferol (CALCIUM-VITAMIN D) 500-200 MG-UNIT per tablet, Take 1 tablet by mouth 2 times daily (with meals), Disp: , Rfl:     cyclobenzaprine (FLEXERIL) 5 MG tablet, Take 1 tablet by mouth 3 times daily as needed for Muscle spasms, Disp: 20 tablet, Rfl: 0    Insulin Pen Needle 32G X 4 MM MISC, 1 each by Does not apply route 2 times daily, Disp: , Rfl:     glimepiride (AMARYL) 4 MG tablet, Take 4mg by mouth in the morning and take 2mg by mouth in the afternoon (with meals), Disp: , Rfl:     insulin glargine (LANTUS SOLOSTAR) 100 UNIT/ML injection pen, Inject under the skin 48 units in the morning and 32 units in the evening, Disp: , Rfl:     metFORMIN (GLUCOPHAGE-XR) 500 MG extended release tablet, Take 1,000 mg by mouth 2 times daily (with meals), Disp: , Rfl:     lisinopril (PRINIVIL;ZESTRIL) 5 MG tablet, Take 5 mg by mouth daily, Disp: , Rfl:     Multiple Vitamins-Minerals (THERAPEUTIC MULTIVITAMIN-MINERALS) tablet, Take 1 tablet by mouth daily, Disp: , Rfl:     ibuprofen (ADVIL;MOTRIN) 800 MG tablet, Take 1 tablet by mouth every 8 hours as needed for Pain, Disp: 30 tablet, Rfl: 0  No Known Allergies  Social History     Socioeconomic History    Marital status:      Spouse name: Not on file    Number of children: Not on file    Years of education: Not on file    Highest education level: Not on file   Occupational History    Not on file   Tobacco Use    Smoking status: Never Smoker    Smokeless tobacco: Never Used   Vaping Use    Vaping Use: Never used   Substance and Sexual Activity    Alcohol use: No    Drug use: No    Sexual activity: Not on file   Other Topics Concern    Not on file   Social History Narrative    Not on file     Social Determinants of Health     Financial Resource Strain: Medium Risk    Difficulty of Paying Living Expenses: Somewhat hard   Food Insecurity: No Food Insecurity    Worried About Running Out of Food in the Last Year: Never true    Heladio of Food in the Last Year: Never true   Transportation Needs: No Transportation Needs    Lack of Transportation (Medical): No    Lack of Transportation (Non-Medical): No   Physical Activity: Insufficiently Active    Days of Exercise per Week: 2 days    Minutes of Exercise per Session: 20 min   Stress: Stress Concern Present    Feeling of Stress :  To some extent   Social Connections: Unknown    Frequency of Communication with Friends and Family: Not on file    Frequency of Social Gatherings with Friends and Family: Not on file    Attends Caodaism Services: Not on file    Active Member of Clubs or Organizations: Not on file    Attends Club or Organization Meetings: Not on file    Marital Status:    Intimate Partner Violence:     Fear of Current or Ex-Partner:     Emotionally Abused:     Physically Abused:     Sexually Abused:      Past Medical History:   Diagnosis Date    Anxiety     Arthritis     Diabetes ([facility] [address]     Fibromyalgia     Hernia     abdominal, RT    Kidney stones 2009    stent at that time, has had multiple stones    PONV (postoperative nausea and vomiting)     EXTREME, WANTS SCOPE PATCH     Past Surgical History:   Procedure Laterality Date    ACHILLES TENDON SURGERY Left 2001    BACK SURGERY      L4 L5 S1, 1998    FRACTURE SURGERY Right 08/04/2015    ccrp ring finger    KNEE ARTHROSCOPY Left 1993    KNEE ARTHROSCOPY Right 04/03/2017    OVARY REMOVAL Right 2003    VT KNEE SCOPE,DIAGNOSTIC Right 4/3/2017    KNEE ARTHROSCOPY performed by John Roy MD at [place] [address]       Family History   Problem Relation Age of Onset    Stroke Mother     COPD Mother     Heart Disease Mother    Plan  An informed verbal consent for the procedure was obtained and risks including, but not limited to: allergy to medications, injection, bleeding, stiffness of joint, recurrence of symptoms, loss of function, swelling, drainage, irrigation, need for surgery and pseudo-septic inflammation, were explained to the patient. Also, discussed was the potential for further injections, irrigation and debridement and surgery. Alternate means of treatment have also been discussed with the patient. Administrations This Visit     bupivacaine (MARCAINE) 0.5 % injection 10 mg     Admin Date  07/07/2021  09:20 Action  Given Dose  10 mg Route  Intra-articular Site  Knee Right Administered By  Melissa Zheng LPN    Ordering Provider: John Roy MD    NDC: 9784-1580-09    Lot#: [id number]    : Rowan Phipps    Patient Supplied?: No          lidocaine 1 % injection 2 mL     Admin Date  07/07/2021  09:21 Action  Given Dose  2 mL Route  Intra-articular Site  Knee Right Administered By  Melissa Zheng LPN    Ordering Provider: John Roy MD    NDC: 8909-5687-80    Lot#: [id number]    : FABI    Patient Supplied?: No          triamcinolone acetonide (KENALOG-40) injection 40 mg     Admin Date  07/07/2021  09:22 Action  Given Dose  40 mg Route  Intra-articular Site  Knee Right Administered By  Melissa Zheng LPN    Ordering Provider: John Roy MD    NDC: 2509-1292-71    Lot#: [id number]    : B-M S[facility] (PRIMARY CARE)    Patient Supplied?: No            Under sterile conditions the patient's left knee was

## 2021-07-15 ENCOUNTER — HOSPITAL ENCOUNTER (OUTPATIENT)
Age: 56
Discharge: HOME | End: 2021-07-15
Payer: COMMERCIAL

## 2021-07-15 LAB
ANION GAP SERPL CALCULATED.3IONS-SCNC: 12 MMOL/L (ref 9–17)
BUN BLDV-MCNC: 12 MG/DL (ref 6–20)
BUN/CREAT BLD: ABNORMAL (ref 9–20)
C-REACTIVE PROTEIN: 49.4 MG/L (ref 0–5)
CALCIUM SERPL-MCNC: 9.1 MG/DL (ref 8.6–10.4)
CHLORIDE BLD-SCNC: 104 MMOL/L (ref 98–107)
CO2: 21 MMOL/L (ref 20–31)
CREAT SERPL-MCNC: 0.53 MG/DL (ref 0.5–0.9)
GFR AFRICAN AMERICAN: >60 ML/MIN
GFR NON-AFRICAN AMERICAN: >60 ML/MIN
GFR SERPL CREATININE-BSD FRML MDRD: ABNORMAL ML/MIN/{1.73_M2}
GFR SERPL CREATININE-BSD FRML MDRD: ABNORMAL ML/MIN/{1.73_M2}
GLUCOSE BLD-MCNC: 137 MG/DL (ref 70–99)
POTASSIUM SERPL-SCNC: 4.4 MMOL/L (ref 3.7–5.3)
RHEUMATOID FACTOR: <10 IU/ML
SEDIMENTATION RATE, ERYTHROCYTE: 36 MM (ref 0–30)
SODIUM BLD-SCNC: 137 MMOL/L (ref 135–144)
URIC ACID: 2.4 MG/DL (ref 2.4–5.7)

## 2021-07-15 PROCEDURE — 84550 ASSAY OF BLOOD/URIC ACID: CPT

## 2021-07-15 PROCEDURE — 80048 BASIC METABOLIC PNL TOTAL CA: CPT

## 2021-07-15 PROCEDURE — 86225 DNA ANTIBODY NATIVE: CPT

## 2021-07-15 PROCEDURE — 85652 RBC SED RATE AUTOMATED: CPT

## 2021-07-15 PROCEDURE — 36415 COLL VENOUS BLD VENIPUNCTURE: CPT

## 2021-07-15 PROCEDURE — 86140 C-REACTIVE PROTEIN: CPT

## 2021-07-15 PROCEDURE — 86431 RHEUMATOID FACTOR QUANT: CPT

## 2021-07-15 PROCEDURE — 86038 ANTINUCLEAR ANTIBODIES: CPT

## 2021-07-16 LAB
ANTI DNA DOUBLE STRANDED: 6.6 IU/ML
ANTI-NUCLEAR ANTIBODY (ANA): NEGATIVE
ENA ANTIBODIES SCREEN: 0.1 U/ML

## 2021-09-09 ENCOUNTER — TELEPHONE (OUTPATIENT)
Dept: PHARMACY | Facility: CLINIC | Age: 56
End: 2021-09-09

## 2021-09-09 NOTE — TELEPHONE ENCOUNTER
POPULATION HEALTH CLINICAL PHARMACY REVIEW - BE WELL WITH DIABETES: HIGH A1C  =============================================  Phil Justice is a 55 y.o. female enrolled in the [facility] Be Well with Diabetes program.    Identified care gap(s): A1c > 9%    DM Program Prescriptions:  Current Outpatient Medications   Medication Sig Dispense Refill    diclofenac (VOLTAREN) 75 MG EC tablet Take 1 tablet by mouth 2 times daily (with meals) for 14 days 28 tablet 0    traMADol (ULTRAM) 50 MG tablet Take 50 mg by mouth every 8 hours as needed.  escitalopram (LEXAPRO) 20 MG tablet Take 20 mg by mouth daily      Ertugliflozin L-PyroglutamicAc (STEGLATRO) 15 MG TABS Take 1 tablet by mouth daily      Calcium Carb-Cholecalciferol (CALCIUM-VITAMIN D) 500-200 MG-UNIT per tablet Take 1 tablet by mouth 2 times daily (with meals)      cyclobenzaprine (FLEXERIL) 5 MG tablet Take 1 tablet by mouth 3 times daily as needed for Muscle spasms 20 tablet 0    Insulin Pen Needle 32G X 4 MM MISC 1 each by Does not apply route 2 times daily      glimepiride (AMARYL) 4 MG tablet Take 4mg by mouth in the morning and take 2mg by mouth in the afternoon (with meals)      insulin glargine (LANTUS SOLOSTAR) 100 UNIT/ML injection pen Inject under the skin 48 units in the morning and 32 units in the evening      metFORMIN (GLUCOPHAGE-XR) 500 MG extended release tablet Take 1,000 mg by mouth 2 times daily (with meals)      lisinopril (PRINIVIL;ZESTRIL) 5 MG tablet Take 5 mg by mouth daily      Multiple Vitamins-Minerals (THERAPEUTIC MULTIVITAMIN-MINERALS) tablet Take 1 tablet by mouth daily      ibuprofen (ADVIL;MOTRIN) 800 MG tablet Take 1 tablet by mouth every 8 hours as needed for Pain 30 tablet 0     No current facility-administered medications for this visit.         Allergies:  No Known Allergies     Labs:  Lab Results   Component Value Date    LABA1C 10.9 (H) 06/28/2021    LABA1C 10.1 (H) 06/30/2020    LABA1C 10. 4 (H) 12/04/2019     Estimated Creatinine Clearance: 129 mL/min (based on SCr of 0.53 mg/dL). Care Team:   - Physician Outside provider- see Rudy Doty  - ACC/RD: Has worked with one intermittently in the past, but not currently active  - Upcoming appointments:   No future appointments. Refill History:   Per Rupali, refills look to be on time or just slightly late. Overall looks fine    Diabetes Care:  - Glycemic Goal: <7.0%. Is not at blood glucose goal and has not been <10% for at least 2-3 years. - It looks like her provider added Humalog u200. This medication is 105$ for a 90ds. Based on the dose she would be fine and the copay would only be 10$ for a 3 month supply  - Per previous notes, pt was upset that her Billy Nilwood is not covered, and was switched to Humalog instead. - Missing Requirements for 2021- 2nd A1c, Microalbumin, Lipid Panel, DM education      Plan:  Attempted to reach patient to discuss above and see how her DM management has been going. Will attempt once more.      Charlie Earl, PharmD, [facility]  Department, toll free: [phone]

## 2021-09-09 NOTE — LETTER
[address] [place] [address] REFILL REQUEST  Prescriber:  MD Anali Gore [address] / [place] [id number]  Phone: [phone]       Fax: [phone]     Patient:  Adan Sultana    1965  [address] [facility]  [phone] (home)      Rationale:   Patient was recently prescribed Humalog U200. Please consider changing to Humalog U100 with the same instructions. This will save her about 40$ per month. Patient would like the following 90 day prescription(s):    Medication: Humalog kwikpen U100               Sig: inject up to 20 units TID as directed by sliding scale. Max of 60 units per day         #: 45ml    R: 1       Prescriber Response:    Prescription(s) approved (please [place] one):  YES  NO    Other response: ________________________________________      ______________________________________   __________________  Authorized By       Date     Pharmacy: [address] [facility]                          Phone: [phone]    Briana Guzmán, [address] [facility]  Fax: [phone]    THE [facility] [place], [address] [place] [address]      The information transmitted is intended only for the person or entity to which it is addressed and may contain confidential and/or privileged material. Any review, retransmission, dissemination or other use of, or taking of any action in reliance upon, this information by persons or entities other than the intended recipient is prohibited. This document contains information covered under the Privacy Act, 5 (a), and/or the [employer] and Accountability Act ([address]) and its various implementing regulations and must be protected in accordance with those provisions. If you received this in error, please contact the sender and delete the material from any computer.        Prescription Request  Prescriber:  MD Anali Gore [address] / [place] [address]  Phone: [phone]       Fax: [phone]     Patient:  Adan Sultana 1965  [address] [facility] [address]  [phone] (home)      Rationale:   Patient was interested in starting Yuki 2 to assist with her diabetes care. She can get free of charge. Please send order to pharmacy listed below if you agree. Patient would like the following 90 day prescription(s):    Medication: Freestyle Joe 2 [place]   Sig: Use to check blood sugar, change sensor every 14 days   #: 1  R: 0  Medication: Freestyle Joe 2 Sensors   Sig: Use to check blood sugar, change sensor every 14 days   #: 6  R: 3       Prescriber Response:    Prescription(s) approved (please [place] one):  YES  NO    Other response: ________________________________________    ______________________________________   __________________  Authorized By       Date     Pharmacy: [address] [facility]                          Phone: [phone]    Briana Guzmán, [address] [facility]  Fax: [phone]    [address]   The information transmitted is intended only for the person or entity to which it is addressed and may contain confidential and/or privileged material. Any review, retransmission, dissemination or other use of, or taking of any action in reliance upon, this information by persons or entities other than the intended recipient is prohibited. This document contains information covered under the Privacy Act, 5 (a), and/or the [employer] and Accountability Act ([address]) and its various implementing regulations and must be protected in accordance with those provisions. If you received this in error, please contact the sender and delete the material from any computer.

## 2021-09-10 ENCOUNTER — CLINICAL DOCUMENTATION (OUTPATIENT)
Dept: PHARMACY | Facility: CLINIC | Age: 56
End: 2021-09-10

## 2021-09-10 NOTE — PROGRESS NOTES
Pharmacy Pop Care Documentation:     AVS received for required office visit on: 7/25/21: Consuelo Wilson per Care Everywhere

## 2021-09-15 LAB
CHOLESTEROL/HDL RATIO: 2.5
CHOLESTEROL: 146 MG/DL
ESTIMATED AVERAGE GLUCOSE: 237 MG/DL
GLUCOSE BLD-MCNC: 105 MG/DL (ref 70–99)
HBA1C MFR BLD: 9.9 % (ref 4–6)
HDLC SERPL-MCNC: 58 MG/DL
LDL CHOLESTEROL: 71 MG/DL (ref 0–130)
PATIENT FASTING?: YES
TRIGL SERPL-MCNC: 87 MG/DL
VLDLC SERPL CALC-MCNC: ABNORMAL MG/DL (ref 1–30)

## 2021-09-27 NOTE — TELEPHONE ENCOUNTER
POPULATION HEALTH CLINICAL PHARMACY REVIEW - BE WELL WITH DIABETES: HIGH A1C  =============================================  Lacey Barnett is a 55 y.o. female enrolled in the [facility] [place] Be Well with Diabetes program.    Identified care gap(s): A1c > 9%    DM Program Prescriptions:  Current Outpatient Medications   Medication Sig Dispense Refill    diclofenac (VOLTAREN) 75 MG EC tablet Take 1 tablet by mouth 2 times daily (with meals) for 14 days 28 tablet 0    traMADol (ULTRAM) 50 MG tablet Take 50 mg by mouth every 8 hours as needed.  escitalopram (LEXAPRO) 20 MG tablet Take 20 mg by mouth daily      Ertugliflozin L-PyroglutamicAc (STEGLATRO) 15 MG TABS Take 1 tablet by mouth daily      Calcium Carb-Cholecalciferol (CALCIUM-VITAMIN D) 500-200 MG-UNIT per tablet Take 1 tablet by mouth 2 times daily (with meals)      cyclobenzaprine (FLEXERIL) 5 MG tablet Take 1 tablet by mouth 3 times daily as needed for Muscle spasms 20 tablet 0    Insulin Pen Needle 32G X 4 MM MISC 1 each by Does not apply route 2 times daily      glimepiride (AMARYL) 4 MG tablet Take 4mg by mouth in the morning and take 2mg by mouth in the afternoon (with meals)      insulin glargine (LANTUS SOLOSTAR) 100 UNIT/ML injection pen Inject under the skin 48 units in the morning and 32 units in the evening      metFORMIN (GLUCOPHAGE-XR) 500 MG extended release tablet Take 1,000 mg by mouth 2 times daily (with meals)      lisinopril (PRINIVIL;ZESTRIL) 5 MG tablet Take 5 mg by mouth daily      Multiple Vitamins-Minerals (THERAPEUTIC MULTIVITAMIN-MINERALS) tablet Take 1 tablet by mouth daily      ibuprofen (ADVIL;MOTRIN) 800 MG tablet Take 1 tablet by mouth every 8 hours as needed for Pain 30 tablet 0     No current facility-administered medications for this visit.         Allergies:  No Known Allergies     Labs:  Lab Results   Component Value Date    LABA1C 9.9 (H) 09/15/2021    LABA1C 10.9 (H) 06/28/2021    LABA1C 10.1 (H) 06/30/2020     Estimated Creatinine Clearance: 129 mL/min (based on SCr of 0.53 mg/dL). Refill History:   Per Rupali, refills look to be on time or just slightly late. Overall looks fine     Diabetes Care:  - Glycemic Goal: <7.0%. Is not at blood glucose goal and has not been <10% for at least 2-3 years. - It looks like her provider added Humalog u200. This medication is 105$ for a 90ds. Based on the dose she would be fine and the copay would only be 10$ for a 3 month supply  - Per previous notes, pt was upset that her Gregory Half is not covered, and was switched to Humalog instead. - Missing Requirements for 2021- Microalbumin, DM education     Plan:  Attempted to reach patient, unsuccessful. LVM asking for return call to [phone] option 3 and sent Hinge message. Will sign off for now     RUMA Gonzalez, PharmD, [address] [facility]  Department, toll free: [phone]    For Pharmacy Admin Tracking Only     Time Spent (min): 15

## 2021-09-27 NOTE — TELEPHONE ENCOUNTER
[facility] CLINICAL PHARMACY REVIEW - BE WELL WITH DIABETES  =============================================  Judith Terrell is a 55 y.o. female enrolled in the [facility] Well with Diabetes program.  Patient returned call and discussed the following:    · Patient would like to switch her prescription to Humalog u100 due to the copay. She just recently got the U200 filled. She admitted to being off for some time, but realized she needed to get back to taking it. She said she will call her doctor about it and have sent to [facility]. I will also send in a fax. · During conversation, patient admitted to having a severe needle phobia which has hindered her treatment in the past. She says she has gotten over it and has been able to inject insulin and check her blood sugars regularly. We discussed CGM and she decided that she would really like to try freestyle joe. I will send request to her pcp and also request a PA  · Alba Leggett is not an option unless a PA is obtained. · She reports that she is making progress, but still has a way to go. She is hopeful that the CGM will allow her to make better decisions with her sliding scale    Plan:  - Will fax prescription requests for Joe 2 to pcp. - Will fax request for humalog u100 to pcp  - Will followup with provider and patient as necessary    RUMA Fair, PharmD, [address] [facility], toll free: [phone]

## 2021-10-01 NOTE — TELEPHONE ENCOUNTER
Spoke with MA at office. Fax not received. She put request in the system manually for me. Will check for prescriptions on Monday. Requested Joe 2 and also a new prescription for humalog u100. Called patient to update and had to leave a message. Let her know the status and that I would followup Monday.     Liang Garcia, PharmD, [facility] Department, toll free: [phone]

## 2021-10-07 NOTE — TELEPHONE ENCOUNTER
Call and lvm with update for patient. Joe 2 reader and sensor filled at [facility]. They also received and filled a prescription for Humalog U100. She just got u200. Informed her that she can put in the fridge and store until exp on box. The 90ds was 10$ and she has plenty of money remaining on her DM program card to cover. Will sign off.     Pita Troy, PharmD, [address] [facility], toll free: [phone]    For Pharmacy Admin Tracking Only     CPA in place:  No   Recommendation Provided To: Provider: 2 via Note to Provider   Intervention Detail: Dose Adjustment: 1, reason: Therapy Optimization and New Rx: 1, reason: Needs Additional Therapy   Gap Closed?: Yes    Intervention Accepted By: Provider: 2   Time Spent (min): 45

## 2021-12-21 ENCOUNTER — HOSPITAL ENCOUNTER (OUTPATIENT)
Dept: MAMMOGRAPHY | Age: 56
Discharge: HOME | End: 2021-12-23
Payer: COMMERCIAL

## 2021-12-21 ENCOUNTER — HOSPITAL ENCOUNTER (OUTPATIENT)
Age: 56
Discharge: HOME | End: 2021-12-21
Payer: COMMERCIAL

## 2021-12-21 DIAGNOSIS — Z12.31 VISIT FOR SCREENING MAMMOGRAM: ICD-10-CM

## 2021-12-21 LAB
CREATININE URINE: 29.1 MG/DL (ref 28–217)
MICROALBUMIN/CREAT 24H UR: <12 MG/L
MICROALBUMIN/CREAT UR-RTO: NORMAL MCG/MG CREAT

## 2021-12-21 PROCEDURE — 82043 UR ALBUMIN QUANTITATIVE: CPT

## 2021-12-21 PROCEDURE — 77063 BREAST TOMOSYNTHESIS BI: CPT

## 2021-12-21 PROCEDURE — 82570 ASSAY OF URINE CREATININE: CPT

## 2022-01-04 ENCOUNTER — TELEPHONE (OUTPATIENT)
Dept: PHARMACY | Facility: CLINIC | Age: 57
End: 2022-01-04

## 2022-01-04 NOTE — TELEPHONE ENCOUNTER
2022 Annual Pharmacist Visit    Called patient to schedule 2022 yearly pharmacist appointment to discuss medications for Diabetes Management Program.    No answer. Left VM on 1/4/22: Please call back at [phone] Option #3.      Jade Dominguez, [address] [employer]   Department, toll free: [phone] Option #3

## 2022-01-11 NOTE — TELEPHONE ENCOUNTER
Incoming call received from the patient. Scheduled PharmD appointment on 1/20/22 at 9:30 am. Will sign off.      Geraldo Mishra PharmD, [employer] // Department, toll free [phone], option [phone] [address] [place] [address] in place:  No   Recommendation Provided To: Patient/Caregiver: 1 via Telephone   Intervention Detail: Scheduled Appointment   Gap Closed?: Yes    Intervention Accepted By: Patient/Caregiver: 1   Time Spent (min): 10

## 2022-01-11 NOTE — TELEPHONE ENCOUNTER
Second attempt made to contact patient to schedule 2022 yearly pharmacist appointment to discuss medications for Diabetes Management Program.      No answer. Left VM on 1/11/2: Please call back at [phone] Option #3. Vigoda message sent to patient.     Cinthia Bernheim, [address]   Department, toll free: [phone] Option #3

## 2022-01-16 ENCOUNTER — APPOINTMENT (OUTPATIENT)
Dept: GENERAL RADIOLOGY | Age: 57
DRG: 638 | End: 2022-01-16
Payer: COMMERCIAL

## 2022-01-16 ENCOUNTER — HOSPITAL ENCOUNTER (INPATIENT)
Age: 57
LOS: 3 days | Discharge: HOME | DRG: 638 | End: 2022-01-19
Attending: EMERGENCY MEDICINE | Admitting: FAMILY MEDICINE
Payer: COMMERCIAL

## 2022-01-16 DIAGNOSIS — E08.10 DIABETIC KETOACIDOSIS WITHOUT COMA ASSOCIATED WITH DIABETES MELLITUS DUE TO UNDERLYING CONDITION (HCC): Primary | ICD-10-CM

## 2022-01-16 PROBLEM — E87.29 HIGH ANION GAP METABOLIC ACIDOSIS: Status: ACTIVE | Noted: 2022-01-16

## 2022-01-16 PROBLEM — E11.10 DKA, TYPE 2, NOT AT GOAL (HCC): Status: ACTIVE | Noted: 2022-01-16

## 2022-01-16 PROBLEM — E11.10 DIABETIC KETOACIDOSIS WITHOUT COMA ASSOCIATED WITH TYPE 2 DIABETES MELLITUS (HCC): Status: ACTIVE | Noted: 2022-01-16

## 2022-01-16 PROBLEM — N17.9 AKI (ACUTE KIDNEY INJURY) (HCC): Status: ACTIVE | Noted: 2022-01-16

## 2022-01-16 LAB
ABSOLUTE EOS #: 0 K/UL (ref 0–0.4)
ABSOLUTE IMMATURE GRANULOCYTE: ABNORMAL K/UL (ref 0–0.3)
ABSOLUTE LYMPH #: 0.49 K/UL (ref 1–4.8)
ABSOLUTE MONO #: 1.97 K/UL (ref 0.1–0.8)
ALBUMIN SERPL-MCNC: 4.7 G/DL (ref 3.5–5.2)
ALBUMIN/GLOBULIN RATIO: 1.4 (ref 1–2.5)
ALP BLD-CCNC: 119 U/L (ref 35–104)
ALT SERPL-CCNC: 21 U/L (ref 5–33)
ANION GAP SERPL CALCULATED.3IONS-SCNC: 23 MMOL/L (ref 9–17)
ANION GAP SERPL CALCULATED.3IONS-SCNC: ABNORMAL MMOL/L (ref 9–17)
AST SERPL-CCNC: 22 U/L
BASOPHILS # BLD: 0 % (ref 0–2)
BASOPHILS ABSOLUTE: 0 K/UL (ref 0–0.2)
BETA-HYDROXYBUTYRATE: 9.7 MMOL/L (ref 0.02–0.27)
BILIRUB SERPL-MCNC: 0.33 MG/DL (ref 0.3–1.2)
BILIRUBIN DIRECT: 0.11 MG/DL
BILIRUBIN, INDIRECT: 0.22 MG/DL (ref 0–1)
BUN BLDV-MCNC: 29 MG/DL (ref 6–20)
BUN BLDV-MCNC: 36 MG/DL (ref 6–20)
BUN/CREAT BLD: ABNORMAL (ref 9–20)
BUN/CREAT BLD: ABNORMAL (ref 9–20)
CALCIUM SERPL-MCNC: 8.2 MG/DL (ref 8.6–10.4)
CALCIUM SERPL-MCNC: 8.8 MG/DL (ref 8.6–10.4)
CHLORIDE BLD-SCNC: 101 MMOL/L (ref 98–107)
CHLORIDE BLD-SCNC: 89 MMOL/L (ref 98–107)
CO2: 6 MMOL/L (ref 20–31)
CO2: <6 MMOL/L (ref 20–31)
CREAT SERPL-MCNC: 0.94 MG/DL (ref 0.5–0.9)
CREAT SERPL-MCNC: 1.55 MG/DL (ref 0.5–0.9)
D-DIMER QUANTITATIVE: 0.52 MG/L FEU
DIFFERENTIAL TYPE: ABNORMAL
EOSINOPHILS RELATIVE PERCENT: 0 % (ref 1–4)
GFR AFRICAN AMERICAN: 42 ML/MIN
GFR AFRICAN AMERICAN: >60 ML/MIN
GFR NON-AFRICAN AMERICAN: 35 ML/MIN
GFR NON-AFRICAN AMERICAN: >60 ML/MIN
GFR SERPL CREATININE-BSD FRML MDRD: ABNORMAL ML/MIN/{1.73_M2}
GLOBULIN: ABNORMAL G/DL (ref 1.5–3.8)
GLUCOSE BLD-MCNC: 117 MG/DL (ref 70–99)
GLUCOSE BLD-MCNC: 122 MG/DL (ref 65–105)
GLUCOSE BLD-MCNC: 127 MG/DL (ref 65–105)
GLUCOSE BLD-MCNC: 176 MG/DL (ref 65–105)
GLUCOSE BLD-MCNC: 179 MG/DL (ref 65–105)
GLUCOSE BLD-MCNC: 256 MG/DL (ref 65–105)
GLUCOSE BLD-MCNC: 344 MG/DL (ref 65–105)
GLUCOSE BLD-MCNC: 356 MG/DL (ref 70–99)
HCT VFR BLD CALC: 48.1 % (ref 36–46)
HEMOGLOBIN: 15.3 G/DL (ref 12–16)
IMMATURE GRANULOCYTES: ABNORMAL %
LIPASE: 46 U/L (ref 13–60)
LYMPHOCYTES # BLD: 2 % (ref 24–44)
MAGNESIUM: 2.1 MG/DL (ref 1.6–2.6)
MAGNESIUM: 2.6 MG/DL (ref 1.6–2.6)
MCH RBC QN AUTO: 28.6 PG (ref 26–34)
MCHC RBC AUTO-ENTMCNC: 31.8 G/DL (ref 31–37)
MCV RBC AUTO: 90.1 FL (ref 80–100)
METAMYELOCYTES ABSOLUTE COUNT: 0.25 K/UL
METAMYELOCYTES: 1 %
MONOCYTES # BLD: 8 % (ref 1–7)
MORPHOLOGY: ABNORMAL
NRBC AUTOMATED: ABNORMAL PER 100 WBC
PDW BLD-RTO: 14.3 % (ref 12.5–15.4)
PHOSPHORUS: 2.2 MG/DL (ref 2.6–4.5)
PLATELET # BLD: 611 K/UL (ref 140–450)
PLATELET ESTIMATE: ABNORMAL
PMV BLD AUTO: 8.6 FL (ref 6–12)
POTASSIUM SERPL-SCNC: 4.7 MMOL/L (ref 3.7–5.3)
POTASSIUM SERPL-SCNC: 5.2 MMOL/L (ref 3.7–5.3)
RBC # BLD: 5.34 M/UL (ref 4–5.2)
RBC # BLD: ABNORMAL 10*6/UL
SARS-COV-2, RAPID: NOT DETECTED
SEG NEUTROPHILS: 89 % (ref 36–66)
SEGMENTED NEUTROPHILS ABSOLUTE COUNT: 21.89 K/UL (ref 1.8–7.7)
SODIUM BLD-SCNC: 125 MMOL/L (ref 135–144)
SODIUM BLD-SCNC: 130 MMOL/L (ref 135–144)
SPECIMEN DESCRIPTION: NORMAL
TOTAL PROTEIN: 8.1 G/DL (ref 6.4–8.3)
TROPONIN INTERP: ABNORMAL
TROPONIN T: ABNORMAL NG/ML
TROPONIN, HIGH SENSITIVITY: 22 NG/L (ref 0–14)
WBC # BLD: 24.6 K/UL (ref 3.5–11)
WBC # BLD: ABNORMAL 10*3/UL

## 2022-01-16 PROCEDURE — 83036 HEMOGLOBIN GLYCOSYLATED A1C: CPT

## 2022-01-16 PROCEDURE — 80048 BASIC METABOLIC PNL TOTAL CA: CPT

## 2022-01-16 PROCEDURE — 2580000003 HC RX 258: Performed by: EMERGENCY MEDICINE

## 2022-01-16 PROCEDURE — 99283 EMERGENCY DEPT VISIT LOW MDM: CPT

## 2022-01-16 PROCEDURE — 83690 ASSAY OF LIPASE: CPT

## 2022-01-16 PROCEDURE — 82947 ASSAY GLUCOSE BLOOD QUANT: CPT

## 2022-01-16 PROCEDURE — 6370000000 HC RX 637 (ALT 250 FOR IP): Performed by: EMERGENCY MEDICINE

## 2022-01-16 PROCEDURE — 80076 HEPATIC FUNCTION PANEL: CPT

## 2022-01-16 PROCEDURE — 93005 ELECTROCARDIOGRAM TRACING: CPT | Performed by: EMERGENCY MEDICINE

## 2022-01-16 PROCEDURE — 96374 THER/PROPH/DIAG INJ IV PUSH: CPT

## 2022-01-16 PROCEDURE — 85025 COMPLETE CBC W/AUTO DIFF WBC: CPT

## 2022-01-16 PROCEDURE — 2000000000 HC ICU R&B

## 2022-01-16 PROCEDURE — 6360000002 HC RX W HCPCS: Performed by: FAMILY MEDICINE

## 2022-01-16 PROCEDURE — 84100 ASSAY OF PHOSPHORUS: CPT

## 2022-01-16 PROCEDURE — 83735 ASSAY OF MAGNESIUM: CPT

## 2022-01-16 PROCEDURE — 36415 COLL VENOUS BLD VENIPUNCTURE: CPT

## 2022-01-16 PROCEDURE — 85379 FIBRIN DEGRADATION QUANT: CPT

## 2022-01-16 PROCEDURE — 84484 ASSAY OF TROPONIN QUANT: CPT

## 2022-01-16 PROCEDURE — 2580000003 HC RX 258: Performed by: FAMILY MEDICINE

## 2022-01-16 PROCEDURE — 71045 X-RAY EXAM CHEST 1 VIEW: CPT

## 2022-01-16 PROCEDURE — 6370000000 HC RX 637 (ALT 250 FOR IP): Performed by: FAMILY MEDICINE

## 2022-01-16 PROCEDURE — 82010 KETONE BODYS QUAN: CPT

## 2022-01-16 PROCEDURE — 87635 SARS-COV-2 COVID-19 AMP PRB: CPT

## 2022-01-16 PROCEDURE — 6360000002 HC RX W HCPCS: Performed by: NURSE PRACTITIONER

## 2022-01-16 RX ORDER — DEXTROSE AND SODIUM CHLORIDE 5; .45 G/100ML; G/100ML
INJECTION, SOLUTION INTRAVENOUS CONTINUOUS PRN
Status: DISCONTINUED | OUTPATIENT
Start: 2022-01-16 | End: 2022-01-19 | Stop reason: HOSPADM

## 2022-01-16 RX ORDER — POTASSIUM CHLORIDE 7.45 MG/ML
10 INJECTION INTRAVENOUS PRN
Status: DISCONTINUED | OUTPATIENT
Start: 2022-01-16 | End: 2022-01-19

## 2022-01-16 RX ORDER — PREDNISONE 10 MG/1
TABLET ORAL
COMMUNITY
Start: 2021-11-10

## 2022-01-16 RX ORDER — DEXTROSE MONOHYDRATE 25 G/50ML
12.5 INJECTION, SOLUTION INTRAVENOUS PRN
Status: DISCONTINUED | OUTPATIENT
Start: 2022-01-16 | End: 2022-01-19 | Stop reason: HOSPADM

## 2022-01-16 RX ORDER — DEXTROSE MONOHYDRATE 50 MG/ML
100 INJECTION, SOLUTION INTRAVENOUS PRN
Status: DISCONTINUED | OUTPATIENT
Start: 2022-01-16 | End: 2022-01-19 | Stop reason: HOSPADM

## 2022-01-16 RX ORDER — MAGNESIUM SULFATE 1 G/100ML
1000 INJECTION INTRAVENOUS PRN
Status: DISCONTINUED | OUTPATIENT
Start: 2022-01-16 | End: 2022-01-19 | Stop reason: HOSPADM

## 2022-01-16 RX ORDER — INSULIN LISPRO 100 [IU]/ML
INJECTION, SOLUTION INTRAVENOUS; SUBCUTANEOUS
COMMUNITY
Start: 2021-10-06 | End: 2022-01-16

## 2022-01-16 RX ORDER — NICOTINE POLACRILEX 4 MG
15 LOZENGE BUCCAL PRN
Status: DISCONTINUED | OUTPATIENT
Start: 2022-01-16 | End: 2022-01-19 | Stop reason: HOSPADM

## 2022-01-16 RX ORDER — 0.9 % SODIUM CHLORIDE 0.9 %
1000 INTRAVENOUS SOLUTION INTRAVENOUS ONCE
Status: COMPLETED | OUTPATIENT
Start: 2022-01-16 | End: 2022-01-16

## 2022-01-16 RX ORDER — SODIUM CHLORIDE 9 MG/ML
INJECTION, SOLUTION INTRAVENOUS CONTINUOUS
Status: DISCONTINUED | OUTPATIENT
Start: 2022-01-16 | End: 2022-01-19

## 2022-01-16 RX ORDER — ONDANSETRON 2 MG/ML
4 INJECTION INTRAMUSCULAR; INTRAVENOUS EVERY 6 HOURS PRN
Status: DISCONTINUED | OUTPATIENT
Start: 2022-01-16 | End: 2022-01-19 | Stop reason: HOSPADM

## 2022-01-16 RX ORDER — GABAPENTIN 300 MG/1
300 CAPSULE ORAL 3 TIMES DAILY
COMMUNITY
End: 2022-01-16

## 2022-01-16 RX ORDER — INSULIN LISPRO 200 [IU]/ML
INJECTION, SOLUTION SUBCUTANEOUS
COMMUNITY
Start: 2021-10-01 | End: 2022-01-16

## 2022-01-16 RX ORDER — 0.9 % SODIUM CHLORIDE 0.9 %
15 INTRAVENOUS SOLUTION INTRAVENOUS ONCE
Status: COMPLETED | OUTPATIENT
Start: 2022-01-16 | End: 2022-01-16

## 2022-01-16 RX ORDER — POLYETHYLENE GLYCOL 3350 17 G/17G
17 POWDER, FOR SOLUTION ORAL DAILY PRN
Status: DISCONTINUED | OUTPATIENT
Start: 2022-01-16 | End: 2022-01-19 | Stop reason: HOSPADM

## 2022-01-16 RX ADMIN — DEXTROSE AND SODIUM CHLORIDE: 5; 450 INJECTION, SOLUTION INTRAVENOUS at 20:27

## 2022-01-16 RX ADMIN — ENOXAPARIN SODIUM 40 MG: 100 INJECTION SUBCUTANEOUS at 21:41

## 2022-01-16 RX ADMIN — SODIUM CHLORIDE 1000 ML: 9 INJECTION, SOLUTION INTRAVENOUS at 17:00

## 2022-01-16 RX ADMIN — SODIUM CHLORIDE 1307 ML: 9 INJECTION, SOLUTION INTRAVENOUS at 20:40

## 2022-01-16 RX ADMIN — SODIUM CHLORIDE 3.57 UNITS/HR: 900 INJECTION INTRAVENOUS at 20:28

## 2022-01-16 RX ADMIN — SODIUM CHLORIDE 1000 ML: 9 INJECTION, SOLUTION INTRAVENOUS at 20:32

## 2022-01-16 RX ADMIN — ONDANSETRON 4 MG: 2 INJECTION INTRAMUSCULAR; INTRAVENOUS at 21:42

## 2022-01-16 RX ADMIN — SODIUM CHLORIDE 0.1 UNITS/KG/HR: 9 INJECTION, SOLUTION INTRAVENOUS at 18:10

## 2022-01-16 ASSESSMENT — ENCOUNTER SYMPTOMS
VOMITING: 1
BACK PAIN: 1
NAUSEA: 1
SHORTNESS OF BREATH: 1

## 2022-01-16 ASSESSMENT — PAIN SCALES - GENERAL
PAINLEVEL_OUTOF10: 0
PAINLEVEL_OUTOF10: 5

## 2022-01-16 NOTE — ED PROVIDER NOTES
[id number] [facility] ED  [address] [facility] [address]  Phone: [phone]  Fax: [phone]        Pt Name: Georgiana Long  MRN: [id number]  [place] 1965  Date of evaluation: 1/16/22      CHIEF COMPLAINT     Chief Complaint   Patient presents with    Hyperglycemia     blood sugar at bedside 344, blurred vision, \"heavy breathing\"     Abdominal Pain     left side, started this morning     Emesis     started this morning with abdominal pain          HISTORY OF PRESENT ILLNESS  (Location/Symptom, Timing/Onset, Context/Setting, Quality, Duration, Modifying Factors, Severity.)    Georgiana Long is a 65 y.o. female who presents with shortness of breath. Patient states that her mother recently passed away she is an insulin-dependent diabetic blood sugars have been in the 300s the patient states that she has not been eating or drinking as well since her mother passed away 2 days ago and with this she has developed labored breathing left-sided back pain nausea vomiting some blurry vision the patient denies any overt chest pain no overt abdominal pain no fever no chills nothing she does makes her symptoms better or worse      REVIEW OF SYSTEMS    (2-9 systems for level 4, 10 or more for level 5)     Review of Systems   Eyes: Positive for visual disturbance. Respiratory: Positive for shortness of breath. Gastrointestinal: Positive for nausea and vomiting. Musculoskeletal: Positive for back pain. All other systems reviewed and are negative. PAST MEDICAL HISTORY    has a past medical history of Anxiety, Arthritis, Diabetes ([address] Fibromyalgia, Hernia, Kidney stones, and PONV (postoperative nausea and vomiting). SURGICAL HISTORY      has a past surgical history that includes Ovary removal (Right, 2003); Achilles tendon surgery (Left, 2001);  Tonsillectomy; Knee arthroscopy (Left, 1993); fracture surgery (Right, 08/04/2015); back surgery; Knee arthroscopy (Right, 2017); and pr knee scope,diagnostic (Right, 4/3/2017). CURRENTMEDICATIONS       Previous Medications    CALCIUM CARB-CHOLECALCIFEROL (CALCIUM-VITAMIN D) 500-200 MG-UNIT PER TABLET    Take 1 tablet by mouth 2 times daily (with meals)    ERTUGLIFLOZIN L-PYROGLUTAMICAC (STEGLATRO) 15 MG TABS    Take 1 tablet by mouth daily    ESCITALOPRAM (LEXAPRO) 20 MG TABLET    Take 20 mg by mouth daily    GLIMEPIRIDE (AMARYL) 4 MG TABLET    Take 4mg by mouth in the morning and take 2mg by mouth in the afternoon (with meals)    INSULIN GLARGINE (LANTUS SOLOSTAR) 100 UNIT/ML INJECTION PEN    Inject under the skin 48 units in the morning and 32 units in the evening    INSULIN PEN NEEDLE 32G X 4 MM MISC    1 each by Does not apply route 2 times daily    LISINOPRIL (PRINIVIL;ZESTRIL) 5 MG TABLET    Take 5 mg by mouth daily    METFORMIN (GLUCOPHAGE-XR) 500 MG EXTENDED RELEASE TABLET    Take 1,000 mg by mouth 2 times daily (with meals)    MULTIPLE VITAMINS-MINERALS (THERAPEUTIC MULTIVITAMIN-MINERALS) TABLET    Take 1 tablet by mouth daily    PREDNISONE (DELTASONE) 10 MG TABLET    TAKE 2 TABLETS BY MOUTH ONCE A DAY IN THE MORNING FOR 3 DAYS THEN TAKE 1 TABLET BY MOUTH ONE TIME A DAY IN THE MORNING THEREAFTER       ALLERGIES     has No Known Allergies. FAMILY HISTORY     She indicated that her mother is . She indicated that her father is alive. family history includes COPD in her mother; Heart Disease in her mother; Stroke in her mother. SOCIAL HISTORY      reports that she has never smoked. She has never used smokeless tobacco. She reports that she does not drink alcohol and does not use drugs. PHYSICAL EXAM    (up to 7 for level 4, 8 or more for level 5)   INITIAL VITALS:  height is 5' 3\" (1.6 m) and weight is 87.1 kg (192 lb). Her oral temperature is 98.2 °F (36.8 °C). Her blood pressure is 143/81 (abnormal) and her pulse is 113. Her respiration is 16 and oxygen saturation is 100%.       Physical Exam  Vitals and nursing note reviewed. Constitutional:       Comments: Moderate distress secondary to HPI   HENT:      Head: Normocephalic and atraumatic. Eyes:      Conjunctiva/sclera: Conjunctivae normal.   Cardiovascular:      Rate and Rhythm: Regular rhythm. Tachycardia present. Pulmonary:      Effort: Pulmonary effort is normal. No respiratory distress. Breath sounds: Normal breath sounds. No stridor. No wheezing, rhonchi or rales. Abdominal:      General: Bowel sounds are normal. There is no distension. Palpations: Abdomen is soft. There is no mass. Tenderness: There is no abdominal tenderness. There is no right CVA tenderness, left CVA tenderness, guarding or rebound. Musculoskeletal:         General: No swelling or tenderness. Normal range of motion. Cervical back: Normal range of motion and neck supple. No rigidity or tenderness. Comments: No calf swelling or tenderness appreciated patient reports some mid left lateral back pain   Lymphadenopathy:      Cervical: No cervical adenopathy. Skin:     General: Skin is warm and dry. Findings: No rash. Neurological:      General: No focal deficit present. Mental Status: She is alert. DIFFERENTIAL DIAGNOSIS/ MDM:     We will establish an IV I did do a fingerstick blood sugar that showed the patient's blood sugar to be in the 300s so we will give the patient IV fluids I will check labs EKG imaging UA    DIAGNOSTIC RESULTS     EKG: All EKG's are interpreted by the Emergency Department Physician who either signs or Co-signs this chart in the absence of a cardiologist.      Interpreted by Jesus Smith MD     Rhythm: Sinus tachycardia  Rate: 113  Axis: -5  Ectopy: none  Conduction: normal  ST Segments: no acute change  T Waves: no acute change  Q Waves: none    Clinical Impression: Sinus tachycardia with no acute changes/normal EKG. No acute infarction/ischemia noted.       RADIOLOGY:        Interpretation per the Radiologist below, if available at the time of this note:    XR CHEST PORTABLE (Final result)  Result time 01/16/22 18:10:13  Final result by Lita Apley, MD (01/16/22 18:10:13)                Impression:    No acute cardiopulmonary abnormality. Narrative:    EXAMINATION:   ONE XRAY VIEW OF THE CHEST     1/16/2022 5:44 pm     COMPARISON:   None. HISTORY:   ORDERING SYSTEM PROVIDED HISTORY: dka   TECHNOLOGIST PROVIDED HISTORY:   dka   Reason for Exam: emesis, high blood sugar     FINDINGS:   The lungs are clear.  The cardiac and mediastinal contours are normal.  There   is no pleural effusion or pneumothorax. No acute osseous abnormality is   identified.                    LABS:  Results for orders placed or performed during the hospital encounter of 01/16/22   CBC Auto Differential   Result Value Ref Range    WBC 24.6 (H) 3.5 - 11.0 k/uL    RBC 5.34 (H) 4.0 - 5.2 m/uL    Hemoglobin 15.3 12.0 - 16.0 g/dL    Hematocrit 48.1 (H) 36 - 46 %    MCV 90.1 80 - 100 fL    MCH 28.6 26 - 34 pg    MCHC 31.8 31 - 37 g/dL    RDW 14.3 12.5 - 15.4 %    Platelets 692 (H) 634 - 450 k/uL    MPV 8.6 6.0 - 12.0 fL    NRBC Automated NOT REPORTED per 100 WBC    Differential Type NOT REPORTED     Seg Neutrophils PENDING %    Lymphocytes PENDING %    Monocytes PENDING %    Eosinophils % PENDING %    Basophils PENDING %    Immature Granulocytes NOT REPORTED 0 %    Segs Absolute PENDING k/uL    Absolute Lymph # PENDING k/uL    Absolute Mono # PENDING k/uL    Absolute Eos # PENDING k/uL    Basophils Absolute PENDING 0.0 - 0.2 k/uL    Absolute Immature Granulocyte NOT REPORTED 0.00 - 0.30 k/uL    WBC Morphology NOT REPORTED     RBC Morphology NOT REPORTED     Platelet Estimate NOT REPORTED    Basic Metabolic Panel   Result Value Ref Range    Glucose 356 (H) 70 - 99 mg/dL    BUN 36 (H) 6 - 20 mg/dL    CREATININE 1.55 (H) 0.50 - 0.90 mg/dL    Bun/Cre Ratio NOT REPORTED 9 - 20    Calcium 8.8 8.6 - 10.4 mg/dL    Sodium 125 (L) 135 - 144 mmol/L    Potassium 5.2 3.7 - 5.3 mmol/L    Chloride 89 (L) 98 - 107 mmol/L    CO2 <6 (LL) 20 - 31 mmol/L    Anion Gap  9 - 17 mmol/L     Unable to calculate anion gap due to CO2 less than 6. GFR Non- 35 (L) >60 mL/min    GFR  42 (L) >60 mL/min    GFR Comment          GFR Staging NOT REPORTED    Hepatic Function Panel   Result Value Ref Range    Albumin 4.7 3.5 - 5.2 g/dL    Alkaline Phosphatase 119 (H) 35 - 104 U/L    ALT 21 5 - 33 U/L    AST 22 <32 U/L    Total Bilirubin 0.33 0.3 - 1.2 mg/dL    Bilirubin, Direct 0.11 <0.31 mg/dL    Bilirubin, Indirect 0.22 0.00 - 1.00 mg/dL    Total Protein 8.1 6.4 - 8.3 g/dL    Globulin NOT REPORTED 1.5 - 3.8 g/dL    Albumin/Globulin Ratio 1.4 1.0 - 2.5   Lipase   Result Value Ref Range    Lipase 46 13 - 60 U/L   Troponin   Result Value Ref Range    Troponin, High Sensitivity 22 (H) 0 - 14 ng/L    Troponin T NOT REPORTED <0.03 ng/mL    Troponin Interp NOT REPORTED    D-Dimer, Quantitative   Result Value Ref Range    D-Dimer, Quant 0.52 mg/L FEU   Magnesium   Result Value Ref Range    Magnesium 2.6 1.6 - 2.6 mg/dL   POC Glucose Fingerstick   Result Value Ref Range    POC Glucose 344 (H) 65 - 105 mg/dL       Patient is noted to be in diabetic ketoacidosis age-related D-dimer is within normal limits    EMERGENCY DEPARTMENT COURSE:   Vitals:    Vitals:    01/16/22 1630   BP: (!) 143/81   Pulse: 113   Resp: 16   Temp: 98.2 °F (36.8 °C)   TempSrc: Oral   SpO2: 100%   Weight: 87.1 kg (192 lb)   Height: 5' 3\" (1.6 m)     -------------------------  BP: (!) 143/81, Temp: 98.2 °F (36.8 °C), Pulse: 113, Resp: 16      RE-EVALUATION:  Patient presents with diabetic ketoacidosis is given IV fluids started on insulin drip as her potassium is satisfactory at 5.2 a venous blood gas was obtained and the pH is greater than 7 at this point I do feel the patient is stable to be admitted to the floor patient is agreeable to this admission  Critical Care:  The high probability of sudden, clinically significant deterioration in the patient's condition required the highest level of my preparedness to intervene urgently. ? The services I provided to this patient were to treat and/or prevent clinically significant deterioration. Services included the following: chart data review, reviewing nursing notes and/or old charts, documentation time, consultant collaboration regarding findings and treatment options, medication orders and management, direct patient care, vital sign assessments and ordering, interpreting and reviewing diagnostic studies/lab tests. ?  Aggregate critical care time includes only time during which I was engaged in work directly related to the patient's care, as described above, whether at the bedside or elsewhere in the Emergency Department. It did not include time spent performing other reported procedures or the services of residents, students, nurses, nurse practitioners or physician assistants. ?  Critical Care: 40 minutes    CONSULTS:  My hospitalist was kind enough to admit the patient to his service    PROCEDURES:  None    FINAL IMPRESSION      1. Diabetic ketoacidosis without coma associated with diabetes mellitus due to underlying condition [facility])          DISPOSITION/PLAN   DISPOSITION Decision To Admit 01/16/2022 05:37:17 PM      CONDITION ON DISPOSITION:   Stable    PATIENT REFERRED TO:  No follow-up provider specified. DISCHARGE MEDICATIONS:  New Prescriptions    No medications on file       (Please note that portions of this note were completed with a voicerecognition program.  Efforts were made to edit the dictations but occasionally words are mis-transcribed.)    Anatoly Best MD,, MD, F.A.C.E.P.   Attending Emergency Medicine Physician       Anatoly Best MD  01/19/22 0145

## 2022-01-17 LAB
ACTION: NORMAL
ALLEN TEST: ABNORMAL
ALLEN TEST: ABNORMAL
ANION GAP SERPL CALCULATED.3IONS-SCNC: 17 MMOL/L (ref 9–17)
ANION GAP SERPL CALCULATED.3IONS-SCNC: 17 MMOL/L (ref 9–17)
ANION GAP SERPL CALCULATED.3IONS-SCNC: 20 MMOL/L (ref 9–17)
ANION GAP SERPL CALCULATED.3IONS-SCNC: 22 MMOL/L (ref 9–17)
ANION GAP SERPL CALCULATED.3IONS-SCNC: 22 MMOL/L (ref 9–17)
BUN BLDV-MCNC: 11 MG/DL (ref 6–20)
BUN BLDV-MCNC: 13 MG/DL (ref 6–20)
BUN BLDV-MCNC: 15 MG/DL (ref 6–20)
BUN BLDV-MCNC: 19 MG/DL (ref 6–20)
BUN BLDV-MCNC: 24 MG/DL (ref 6–20)
BUN/CREAT BLD: ABNORMAL (ref 9–20)
CALCIUM SERPL-MCNC: 8.3 MG/DL (ref 8.6–10.4)
CALCIUM SERPL-MCNC: 8.4 MG/DL (ref 8.6–10.4)
CALCIUM SERPL-MCNC: 8.5 MG/DL (ref 8.6–10.4)
CALCIUM SERPL-MCNC: 8.5 MG/DL (ref 8.6–10.4)
CALCIUM SERPL-MCNC: 8.6 MG/DL (ref 8.6–10.4)
CHLORIDE BLD-SCNC: 102 MMOL/L (ref 98–107)
CHLORIDE BLD-SCNC: 103 MMOL/L (ref 98–107)
CHLORIDE BLD-SCNC: 104 MMOL/L (ref 98–107)
CHLORIDE BLD-SCNC: 104 MMOL/L (ref 98–107)
CHLORIDE BLD-SCNC: 106 MMOL/L (ref 98–107)
CO2: 13 MMOL/L (ref 20–31)
CO2: 6 MMOL/L (ref 20–31)
CO2: 8 MMOL/L (ref 20–31)
CO2: 9 MMOL/L (ref 20–31)
CO2: 9 MMOL/L (ref 20–31)
CREAT SERPL-MCNC: 0.76 MG/DL (ref 0.5–0.9)
CREAT SERPL-MCNC: 0.76 MG/DL (ref 0.5–0.9)
CREAT SERPL-MCNC: 0.78 MG/DL (ref 0.5–0.9)
CREAT SERPL-MCNC: 0.8 MG/DL (ref 0.5–0.9)
CREAT SERPL-MCNC: 0.85 MG/DL (ref 0.5–0.9)
DATE AND TIME: NORMAL
ESTIMATED AVERAGE GLUCOSE: 246 MG/DL
FIO2: ABNORMAL
FIO2: ABNORMAL
GFR AFRICAN AMERICAN: >60 ML/MIN
GFR NON-AFRICAN AMERICAN: >60 ML/MIN
GFR SERPL CREATININE-BSD FRML MDRD: ABNORMAL ML/MIN/{1.73_M2}
GLUCOSE BLD-MCNC: 138 MG/DL (ref 65–105)
GLUCOSE BLD-MCNC: 140 MG/DL (ref 65–105)
GLUCOSE BLD-MCNC: 143 MG/DL (ref 65–105)
GLUCOSE BLD-MCNC: 144 MG/DL (ref 65–105)
GLUCOSE BLD-MCNC: 147 MG/DL (ref 65–105)
GLUCOSE BLD-MCNC: 148 MG/DL (ref 65–105)
GLUCOSE BLD-MCNC: 150 MG/DL (ref 70–99)
GLUCOSE BLD-MCNC: 156 MG/DL (ref 65–105)
GLUCOSE BLD-MCNC: 159 MG/DL (ref 65–105)
GLUCOSE BLD-MCNC: 160 MG/DL (ref 65–105)
GLUCOSE BLD-MCNC: 160 MG/DL (ref 65–105)
GLUCOSE BLD-MCNC: 163 MG/DL (ref 70–99)
GLUCOSE BLD-MCNC: 168 MG/DL (ref 65–105)
GLUCOSE BLD-MCNC: 174 MG/DL (ref 65–105)
GLUCOSE BLD-MCNC: 175 MG/DL (ref 65–105)
GLUCOSE BLD-MCNC: 176 MG/DL (ref 65–105)
GLUCOSE BLD-MCNC: 184 MG/DL (ref 65–105)
GLUCOSE BLD-MCNC: 185 MG/DL (ref 65–105)
GLUCOSE BLD-MCNC: 186 MG/DL (ref 70–99)
GLUCOSE BLD-MCNC: 187 MG/DL (ref 65–105)
GLUCOSE BLD-MCNC: 191 MG/DL (ref 65–105)
GLUCOSE BLD-MCNC: 192 MG/DL (ref 65–105)
GLUCOSE BLD-MCNC: 194 MG/DL (ref 65–105)
GLUCOSE BLD-MCNC: 203 MG/DL (ref 70–99)
GLUCOSE BLD-MCNC: 206 MG/DL (ref 65–105)
GLUCOSE BLD-MCNC: 217 MG/DL (ref 70–99)
GLUCOSE BLD-MCNC: 225 MG/DL (ref 65–105)
HBA1C MFR BLD: 10.2 % (ref 4–6)
HCO3 VENOUS: 4.8 MMOL/L (ref 22–29)
HCT VFR BLD CALC: 43 % (ref 36–46)
HEMOGLOBIN: 14.1 G/DL (ref 12–16)
LACTIC ACID: 0.7 MMOL/L (ref 0.5–2.2)
MAGNESIUM: 2.1 MG/DL (ref 1.6–2.6)
MAGNESIUM: 2.2 MG/DL (ref 1.6–2.6)
MAGNESIUM: 2.2 MG/DL (ref 1.6–2.6)
MCH RBC QN AUTO: 28.5 PG (ref 26–34)
MCHC RBC AUTO-ENTMCNC: 32.9 G/DL (ref 31–37)
MCV RBC AUTO: 86.9 FL (ref 80–100)
MODE: ABNORMAL
MODE: ABNORMAL
NEGATIVE BASE EXCESS, ART: 17 (ref 0–2)
NEGATIVE BASE EXCESS, VEN: 22 (ref 0–2)
NOTIFY: NORMAL
NRBC AUTOMATED: ABNORMAL PER 100 WBC
O2 DEVICE/FLOW/%: ABNORMAL
O2 DEVICE/FLOW/%: ABNORMAL
O2 SAT, VEN: 53 % (ref 60–85)
PATIENT TEMP: ABNORMAL
PATIENT TEMP: ABNORMAL
PCO2, VEN: 15.1 MM HG (ref 41–51)
PDW BLD-RTO: 14.5 % (ref 12.5–15.4)
PH VENOUS: 7.11 (ref 7.32–7.43)
PHOSPHORUS: 1.6 MG/DL (ref 2.6–4.5)
PHOSPHORUS: 3 MG/DL (ref 2.6–4.5)
PHOSPHORUS: 3.1 MG/DL (ref 2.6–4.5)
PHOSPHORUS: 4.4 MG/DL (ref 2.6–4.5)
PHOSPHORUS: 4.6 MG/DL (ref 2.6–4.5)
PLATELET # BLD: 370 K/UL (ref 140–450)
PMV BLD AUTO: 8.2 FL (ref 6–12)
PO2, VEN: 36.4 MM HG (ref 30–50)
POC HCO3: 8.2 MMOL/L (ref 21–28)
POC O2 SATURATION: 94 % (ref 94–98)
POC PCO2 TEMP: ABNORMAL MM HG
POC PCO2 TEMP: ABNORMAL MM HG
POC PCO2: 19.5 MM HG (ref 35–48)
POC PH TEMP: ABNORMAL
POC PH TEMP: ABNORMAL
POC PH: 7.23 (ref 7.35–7.45)
POC PO2 TEMP: ABNORMAL MM HG
POC PO2 TEMP: ABNORMAL MM HG
POC PO2: 81 MM HG (ref 83–108)
POSITIVE BASE EXCESS, ART: ABNORMAL (ref 0–3)
POSITIVE BASE EXCESS, VEN: ABNORMAL (ref 0–3)
POTASSIUM SERPL-SCNC: 3.6 MMOL/L (ref 3.7–5.3)
POTASSIUM SERPL-SCNC: 3.8 MMOL/L (ref 3.7–5.3)
POTASSIUM SERPL-SCNC: 3.8 MMOL/L (ref 3.7–5.3)
POTASSIUM SERPL-SCNC: 4.2 MMOL/L (ref 3.7–5.3)
POTASSIUM SERPL-SCNC: 4.9 MMOL/L (ref 3.7–5.3)
RBC # BLD: 4.95 M/UL (ref 4–5.2)
READ BACK: YES
SAMPLE SITE: ABNORMAL
SAMPLE SITE: ABNORMAL
SODIUM BLD-SCNC: 130 MMOL/L (ref 135–144)
SODIUM BLD-SCNC: 130 MMOL/L (ref 135–144)
SODIUM BLD-SCNC: 133 MMOL/L (ref 135–144)
SODIUM BLD-SCNC: 133 MMOL/L (ref 135–144)
SODIUM BLD-SCNC: 136 MMOL/L (ref 135–144)
TCO2 (CALC), ART: ABNORMAL MMOL/L (ref 22–29)
TOTAL CO2, VENOUS: ABNORMAL MMOL/L (ref 23–30)
WBC # BLD: 13.9 K/UL (ref 3.5–11)

## 2022-01-17 PROCEDURE — 2500000003 HC RX 250 WO HCPCS: Performed by: FAMILY MEDICINE

## 2022-01-17 PROCEDURE — 82803 BLOOD GASES ANY COMBINATION: CPT

## 2022-01-17 PROCEDURE — 6360000002 HC RX W HCPCS: Performed by: NURSE PRACTITIONER

## 2022-01-17 PROCEDURE — 36415 COLL VENOUS BLD VENIPUNCTURE: CPT

## 2022-01-17 PROCEDURE — 84100 ASSAY OF PHOSPHORUS: CPT

## 2022-01-17 PROCEDURE — 82947 ASSAY GLUCOSE BLOOD QUANT: CPT

## 2022-01-17 PROCEDURE — 83605 ASSAY OF LACTIC ACID: CPT

## 2022-01-17 PROCEDURE — 99223 1ST HOSP IP/OBS HIGH 75: CPT | Performed by: FAMILY MEDICINE

## 2022-01-17 PROCEDURE — 85027 COMPLETE CBC AUTOMATED: CPT

## 2022-01-17 PROCEDURE — 83735 ASSAY OF MAGNESIUM: CPT

## 2022-01-17 PROCEDURE — 2580000003 HC RX 258: Performed by: FAMILY MEDICINE

## 2022-01-17 PROCEDURE — 80048 BASIC METABOLIC PNL TOTAL CA: CPT

## 2022-01-17 PROCEDURE — 2000000000 HC ICU R&B

## 2022-01-17 PROCEDURE — 36600 WITHDRAWAL OF ARTERIAL BLOOD: CPT

## 2022-01-17 PROCEDURE — 6370000000 HC RX 637 (ALT 250 FOR IP): Performed by: FAMILY MEDICINE

## 2022-01-17 PROCEDURE — 6360000002 HC RX W HCPCS: Performed by: FAMILY MEDICINE

## 2022-01-17 RX ORDER — 0.9 % SODIUM CHLORIDE 0.9 %
1000 INTRAVENOUS SOLUTION INTRAVENOUS ONCE
Status: COMPLETED | OUTPATIENT
Start: 2022-01-17 | End: 2022-01-17

## 2022-01-17 RX ORDER — PROMETHAZINE HYDROCHLORIDE 25 MG/ML
12.5 INJECTION, SOLUTION INTRAMUSCULAR; INTRAVENOUS EVERY 6 HOURS PRN
Status: DISCONTINUED | OUTPATIENT
Start: 2022-01-17 | End: 2022-01-19 | Stop reason: HOSPADM

## 2022-01-17 RX ORDER — ACETAMINOPHEN 325 MG/1
650 TABLET ORAL EVERY 4 HOURS PRN
Status: DISCONTINUED | OUTPATIENT
Start: 2022-01-17 | End: 2022-01-19 | Stop reason: HOSPADM

## 2022-01-17 RX ADMIN — PROMETHAZINE HYDROCHLORIDE 12.5 MG: 25 INJECTION INTRAMUSCULAR; INTRAVENOUS at 16:15

## 2022-01-17 RX ADMIN — ENOXAPARIN SODIUM 40 MG: 100 INJECTION SUBCUTANEOUS at 10:07

## 2022-01-17 RX ADMIN — POTASSIUM CHLORIDE 10 MEQ: 7.45 INJECTION INTRAVENOUS at 13:25

## 2022-01-17 RX ADMIN — ACETAMINOPHEN 650 MG: 325 TABLET ORAL at 16:15

## 2022-01-17 RX ADMIN — DEXTROSE AND SODIUM CHLORIDE: 5; 450 INJECTION, SOLUTION INTRAVENOUS at 06:48

## 2022-01-17 RX ADMIN — POTASSIUM CHLORIDE 10 MEQ: 7.45 INJECTION INTRAVENOUS at 15:57

## 2022-01-17 RX ADMIN — SODIUM CHLORIDE 1000 ML: 9 INJECTION, SOLUTION INTRAVENOUS at 10:06

## 2022-01-17 RX ADMIN — ONDANSETRON 4 MG: 2 INJECTION INTRAMUSCULAR; INTRAVENOUS at 03:19

## 2022-01-17 RX ADMIN — SODIUM PHOSPHATE, MONOBASIC, MONOHYDRATE AND SODIUM PHOSPHATE, DIBASIC, ANHYDROUS 15 MMOL: 276; 142 INJECTION, SOLUTION INTRAVENOUS at 00:45

## 2022-01-17 RX ADMIN — POTASSIUM CHLORIDE 20 MEQ: 7.45 INJECTION INTRAVENOUS at 01:02

## 2022-01-17 RX ADMIN — ONDANSETRON 4 MG: 2 INJECTION INTRAMUSCULAR; INTRAVENOUS at 15:57

## 2022-01-17 ASSESSMENT — ENCOUNTER SYMPTOMS
BLOOD IN STOOL: 0
VOMITING: 0
COUGH: 0
ABDOMINAL PAIN: 0
SHORTNESS OF BREATH: 1
RHINORRHEA: 0
CHEST TIGHTNESS: 0
DIARRHEA: 0
NAUSEA: 0
CONSTIPATION: 0
WHEEZING: 0

## 2022-01-17 ASSESSMENT — PAIN SCALES - GENERAL
PAINLEVEL_OUTOF10: 0
PAINLEVEL_OUTOF10: 8
PAINLEVEL_OUTOF10: 0
PAINLEVEL_OUTOF10: 0
PAINLEVEL_OUTOF10: 8
PAINLEVEL_OUTOF10: 0

## 2022-01-17 NOTE — H&P
[facility]  Office: [address], DO, Keith Delaware, DO, Freddie Brow, DO, Ernestina Harada Blood, DO, Governor Mac MD, Niurka Huffman MD, Bg Casper MD, Kirsten Desai MD, Shelby Kunz MD, Judd Anderson MD, Solo Du MD, Tayler Bhatti, DO, Aylin Roberts, DO, Araseli Sutton MD,  Paul Smallwood DO, Ananda Begum MD, Tova Kline MD, Betsy Gonzales MD, Zaira Bell MD, Caleb Maloney MD, Marianna Douglas MD, Yeyo García MD, Marie De Anda, [facility], [facility], CNP, Lily Seo, CNP, Dick Breen, CNS, Sid Buckner, CNP, Syed Solitraio, CNP, Lynn Saavedra, CNP, Dave Barber, CNP, Rohit Ortega, CNP, Cecilia Sheridan PA-C, Rakesh Marrufo, [facility], Monica Talbert, [facility], Ousmane Ray, CNP, Regina Chahal, CNP, Jomar Walter, CNP, Shona Moreland, CNP, Ruben Wiggins, CNP, Kirstie Cheung 22      HISTORY AND PHYSICAL EXAMINATION            Date:   1/17/2022  Patient name:  Yenifer Holcomb  Date of admission:  1/16/2022  4:34 PM  MRN:   [id number]  Account:  [de-identified]  YOB: 1965  PCP:    Adilson Ugalde MD  Room:   437/004-  Code Status:    Full Code    Chief Complaint:     Chief Complaint   Patient presents with    Hyperglycemia     blood sugar at bedside 344, blurred vision, \"heavy breathing\"     Abdominal Pain     left side, started this morning     Emesis     started this morning with abdominal pain        History Obtained From:     patient, electronic medical record    History of Present Illness: The patient is a 65 y.o. Non- / non  female who presents with Hyperglycemia (blood sugar at bedside 344, blurred vision, \"heavy breathing\" ), Abdominal Pain (left side, started this morning ), and Emesis (started this morning with abdominal pain )   and she is admitted to the hospital for the management of  Diabetic ketoacidosis without coma associated with type 2 diabetes mellitus ([facility]   Emergency room with generalized fatigue, difficulty breathing, nausea, vomiting. She has known history of type 2 diabetes mellitus on metformin, Amaryl, Lantus at home. Patient reported that she was not eating much for last week after sudden loss of her mother. Patient was feeling depressed. She denied any fever, cough, expectoration. Patient was found to be breathing heavy on presentation with heart rate of 98. She was noted to have hyperglycemia with blood sugar of 344. Lab evaluation showed serum sodium 130, BUN 29, creatinine 0.94, bicarb < 6, leukocytosis 24.6, beta hydroxybutyrate 9.7. Patient was admitted for diabetic ketoacidosis. Past Medical History:     Past Medical History:   Diagnosis Date    Anxiety     Arthritis     Diabetes ([facility]     Fibromyalgia     Hernia     abdominal, RT    Kidney stones 2009    stent at that time, has had multiple stones    PONV (postoperative nausea and vomiting)     EXTREME, WANTS SCOPE PATCH        Past Surgical History:     Past Surgical History:   Procedure Laterality Date    ACHILLES TENDON SURGERY Left 2001    BACK SURGERY      L4 L5 S1, 1998    FRACTURE SURGERY Right 08/04/2015    ccrp ring finger    KNEE ARTHROSCOPY Left 1993    KNEE ARTHROSCOPY Right 04/03/2017    OVARY REMOVAL Right 2003    [place] KNEE SCOPE,DIAGNOSTIC Right 4/3/2017    KNEE ARTHROSCOPY performed by Ivan Shone, MD at [place] [address]          Medications Prior to Admission:     Prior to Admission medications    Medication Sig Start Date End Date Taking?  Authorizing Provider   predniSONE (DELTASONE) 10 MG tablet TAKE 2 TABLETS BY MOUTH ONCE A DAY IN THE MORNING FOR 3 DAYS THEN TAKE 1 TABLET BY MOUTH ONE TIME A DAY IN THE MORNING THEREAFTER 11/10/21  Yes Historical Provider, MD   escitalopram (LEXAPRO) 20 MG tablet Take 20 mg by mouth daily 3/9/20  Yes Historical Provider, MD   Ertugliflozin L-PyroglutamicAc (STEGLATRO) 15 MG TABS Take 1 tablet by mouth daily   Yes Historical Provider, MD   Calcium Carb-Cholecalciferol (CALCIUM-VITAMIN D) 500-200 MG-UNIT per tablet Take 1 tablet by mouth 2 times daily (with meals)   Yes Historical Provider, MD   Insulin Pen Needle 32G X 4 MM MISC 1 each by Does not apply route 2 times daily   Yes Historical Provider, MD   glimepiride (AMARYL) 4 MG tablet Take 4mg by mouth in the morning and take 2mg by mouth in the afternoon (with meals)   Yes Historical Provider, MD   insulin glargine (LANTUS SOLOSTAR) 100 UNIT/ML injection pen Inject under the skin 48 units in the morning and 32 units in the evening   Yes Historical Provider, MD   metFORMIN (GLUCOPHAGE-XR) 500 MG extended release tablet Take 1,000 mg by mouth 2 times daily (with meals)   Yes Historical Provider, MD   lisinopril (PRINIVIL;ZESTRIL) 5 MG tablet Take 5 mg by mouth daily   Yes Historical Provider, MD   Multiple Vitamins-Minerals (THERAPEUTIC MULTIVITAMIN-MINERALS) tablet Take 1 tablet by mouth daily   Yes Historical Provider, MD        Allergies:     Patient has no known allergies. Social History:     Tobacco:    reports that she has never smoked. She has never used smokeless tobacco.  Alcohol:      reports no history of alcohol use. Drug Use:  reports no history of drug use. Family History:     Family History   Problem Relation Age of Onset    Stroke Mother     COPD Mother     Heart Disease Mother        Review of Systems:     Positive and Negative as described in HPI. Review of Systems   Constitutional: Positive for activity change, appetite change and fatigue. Negative for fever and unexpected weight change. HENT: Negative for congestion, rhinorrhea and sneezing. Eyes: Negative for visual disturbance. Respiratory: Positive for shortness of breath. Negative for cough, chest tightness and wheezing. Cardiovascular: Negative for chest pain and palpitations. Gastrointestinal: Negative for abdominal pain, blood in stool, constipation, diarrhea, nausea and vomiting. Genitourinary: Negative for dysuria, enuresis, frequency and hematuria. Musculoskeletal: Negative for arthralgias and myalgias. Skin: Negative for rash. Neurological: Negative for dizziness, weakness, light-headedness and headaches. Hematological: Does not bruise/bleed easily. Psychiatric/Behavioral: Negative for dysphoric mood and sleep disturbance. Physical Exam:   /66   Pulse 100   Temp 97.7 °F (36.5 °C) (Oral)   Resp 19   Ht 5' 2.99\" (1.6 m)   Wt 192 lb 7.4 oz (87.3 kg)   LMP 2017   SpO2 97%   BMI 34.10 kg/m²   Temp (24hrs), Av °F (36.7 °C), Min:97.5 °F (36.4 °C), Max:98.6 °F (37 °C)    Recent Labs     22  1203 22  1314 22  1406 22  1452   POCGLU 185* 156* 147* 140*       Intake/Output Summary (Last 24 hours) at 2022 1528  Last data filed at 2022 1455  Gross per 24 hour   Intake --   Output 2700 ml   Net -2700 ml     Physical Exam  Vitals and nursing note reviewed. Constitutional:       General: She is not in acute distress. Appearance: She is ill-appearing. She is not diaphoretic. HENT:      Head: Normocephalic and atraumatic. Nose:      Right Sinus: No maxillary sinus tenderness or frontal sinus tenderness. Left Sinus: No maxillary sinus tenderness or frontal sinus tenderness. Mouth/Throat:      Pharynx: No oropharyngeal exudate. Eyes:      General: No scleral icterus. Conjunctiva/sclera: Conjunctivae normal.      Pupils: Pupils are equal, round, and reactive to light. Neck:      Thyroid: No thyromegaly. Vascular: No JVD. Cardiovascular:      Rate and Rhythm: Regular rhythm. Tachycardia present. Pulses:           Dorsalis pedis pulses are 2+ on the right side and 2+ on the left side. Heart sounds: Normal heart sounds. No murmur heard. Pulmonary:      Effort: Pulmonary effort is normal.      Breath sounds: Normal breath sounds. No wheezing or rales.    Abdominal:      Palpations: Abdomen is soft. There is no mass. Tenderness: There is no abdominal tenderness. Musculoskeletal:      Cervical back: Full passive range of motion without pain and neck supple. Lymphadenopathy:      Head:      Right side of head: No submandibular adenopathy. Left side of head: No submandibular adenopathy. Cervical: No cervical adenopathy. Skin:     General: Skin is warm. Neurological:      Mental Status: She is alert and oriented to person, place, and time. Motor: No tremor. Psychiatric:         Behavior: Behavior is cooperative.          Investigations:      Laboratory Testing:  Recent Results (from the past 24 hour(s))   POC Glucose Fingerstick    Collection Time: 01/16/22  4:41 PM   Result Value Ref Range    POC Glucose 344 (H) 65 - 105 mg/dL   CBC Auto Differential    Collection Time: 01/16/22  4:56 PM   Result Value Ref Range    WBC 24.6 (H) 3.5 - 11.0 k/uL    RBC 5.34 (H) 4.0 - 5.2 m/uL    Hemoglobin 15.3 12.0 - 16.0 g/dL    Hematocrit 48.1 (H) 36 - 46 %    MCV 90.1 80 - 100 fL    MCH 28.6 26 - 34 pg    MCHC 31.8 31 - 37 g/dL    RDW 14.3 12.5 - 15.4 %    Platelets 036 (H) 203 - 450 k/uL    MPV 8.6 6.0 - 12.0 fL    NRBC Automated NOT REPORTED per 100 WBC    Differential Type NOT REPORTED     Immature Granulocytes NOT REPORTED 0 %    Absolute Immature Granulocyte NOT REPORTED 0.00 - 0.30 k/uL    WBC Morphology NOT REPORTED     RBC Morphology NOT REPORTED     Platelet Estimate NOT REPORTED     Seg Neutrophils 89 (H) 36 - 66 %    Lymphocytes 2 (L) 24 - 44 %    Monocytes 8 (H) 1 - 7 %    Eosinophils % 0 (L) 1 - 4 %    Basophils 0 0 - 2 %    Metamyelocytes 1 (H) 0 %    Segs Absolute 21.89 (H) 1.8 - 7.7 k/uL    Absolute Lymph # 0.49 (L) 1.0 - 4.8 k/uL    Absolute Mono # 1.97 (H) 0.1 - 0.8 k/uL    Absolute Eos # 0.00 0.0 - 0.4 k/uL    Basophils Absolute 0.00 0.0 - 0.2 k/uL    Metamyelocytes Absolute 0.25 (H) 0 k/uL    Morphology HYPOCHROMIA PRESENT    Basic Metabolic Panel    Collection Time: 01/16/22  4:56 PM   Result Value Ref Range    Glucose 356 (H) 70 - 99 mg/dL    BUN 36 (H) 6 - 20 mg/dL    CREATININE 1.55 (H) 0.50 - 0.90 mg/dL    Bun/Cre Ratio NOT REPORTED 9 - 20    Calcium 8.8 8.6 - 10.4 mg/dL    Sodium 125 (L) 135 - 144 mmol/L    Potassium 5.2 3.7 - 5.3 mmol/L    Chloride 89 (L) 98 - 107 mmol/L    CO2 <6 (LL) 20 - 31 mmol/L    Anion Gap  9 - 17 mmol/L     Unable to calculate anion gap due to CO2 less than 6.     GFR Non-African American 35 (L) >60 mL/min    GFR  42 (L) >60 mL/min    GFR Comment          GFR Staging NOT REPORTED    Hepatic Function Panel    Collection Time: 01/16/22  4:56 PM   Result Value Ref Range    Albumin 4.7 3.5 - 5.2 g/dL    Alkaline Phosphatase 119 (H) 35 - 104 U/L    ALT 21 5 - 33 U/L    AST 22 <32 U/L    Total Bilirubin 0.33 0.3 - 1.2 mg/dL    Bilirubin, Direct 0.11 <0.31 mg/dL    Bilirubin, Indirect 0.22 0.00 - 1.00 mg/dL    Total Protein 8.1 6.4 - 8.3 g/dL    Globulin NOT REPORTED 1.5 - 3.8 g/dL    Albumin/Globulin Ratio 1.4 1.0 - 2.5   Lipase    Collection Time: 01/16/22  4:56 PM   Result Value Ref Range    Lipase 46 13 - 60 U/L   Troponin    Collection Time: 01/16/22  4:56 PM   Result Value Ref Range    Troponin, High Sensitivity 22 (H) 0 - 14 ng/L    Troponin T NOT REPORTED <0.03 ng/mL    Troponin Interp NOT REPORTED    D-Dimer, Quantitative    Collection Time: 01/16/22  4:56 PM   Result Value Ref Range    D-Dimer, Quant 0.52 mg/L FEU   Beta-Hydroxybutyrate    Collection Time: 01/16/22  4:56 PM   Result Value Ref Range    Beta-Hydroxybutyrate 9.70 (H) 0.02 - 0.27 mmol/L   Magnesium    Collection Time: 01/16/22  4:56 PM   Result Value Ref Range    Magnesium 2.6 1.6 - 2.6 mg/dL   Hemoglobin A1C    Collection Time: 01/16/22  4:56 PM   Result Value Ref Range    Hemoglobin A1C 10.2 (H) 4.0 - 6.0 %    Estimated Avg Glucose 246 mg/dL   COVID-19, Rapid    Collection Time: 01/16/22  5:50 PM    Specimen: Nasopharyngeal Swab   Result Value Ref Range Specimen Description . NASOPHARYNGEAL SWAB     SARS-CoV-2, Rapid Not Detected Not Detected   POC Glucose Fingerstick    Collection Time: 01/16/22  7:09 PM   Result Value Ref Range    POC Glucose 256 (H) 65 - 105 mg/dL   Venous Blood Gas, POC    Collection Time: 01/16/22  7:46 PM   Result Value Ref Range    pH, Mario Alberto 7.110 (LL) 7.320 - 7.430    pCO2, Mario Alberto 15.1 (L) 41.0 - 51.0 mm Hg    pO2, Mario Alberto 36.4 30.0 - 50.0 mm Hg    HCO3, Venous 4.8 (L) 22.0 - 29.0 mmol/L    Total CO2, Venous NOT REPORTED 23.0 - 30.0 mmol/L    Negative Base Excess, Mario Alberto 22 (H) 0.0 - 2.0    Positive Base Excess, Mario Alberto NOT REPORTED 0.0 - 3.0    O2 Sat, Mario Alberto 53 (L) 60.0 - 85.0 %    O2 Device/Flow/% NOT REPORTED     Fermin Test NOT REPORTED     Sample Site NOT REPORTED     Mode NOT REPORTED     FIO2 NOT REPORTED     Pt Temp NOT REPORTED     POC pH Temp NOT REPORTED     POC pCO2 Temp NOT REPORTED mm Hg    POC pO2 Temp NOT REPORTED mm Hg   POC Glucose Fingerstick    Collection Time: 01/16/22  8:09 PM   Result Value Ref Range    POC Glucose 179 (H) 65 - 105 mg/dL   POC Glucose Fingerstick    Collection Time: 01/16/22  9:28 PM   Result Value Ref Range    POC Glucose 176 (H) 65 - 105 mg/dL   POC Glucose Fingerstick    Collection Time: 01/16/22 10:26 PM   Result Value Ref Range    POC Glucose 127 (H) 65 - 105 mg/dL   Basic Metabolic Panel    Collection Time: 01/16/22 10:37 PM   Result Value Ref Range    Glucose 117 (H) 70 - 99 mg/dL    BUN 29 (H) 6 - 20 mg/dL    CREATININE 0.94 (H) 0.50 - 0.90 mg/dL    Bun/Cre Ratio NOT REPORTED 9 - 20    Calcium 8.2 (L) 8.6 - 10.4 mg/dL    Sodium 130 (L) 135 - 144 mmol/L    Potassium 4.7 3.7 - 5.3 mmol/L    Chloride 101 98 - 107 mmol/L    CO2 6 (LL) 20 - 31 mmol/L    Anion Gap 23 (H) 9 - 17 mmol/L    GFR Non-African American >60 >60 mL/min    GFR African American >60 >60 mL/min    GFR Comment          GFR Staging NOT REPORTED    Magnesium    Collection Time: 01/16/22 10:37 PM   Result Value Ref Range    Magnesium 2.1 1.6 - 2.6 mg/dL   Phosphorus    Collection Time: 01/16/22 10:37 PM   Result Value Ref Range    Phosphorus 2.2 (L) 2.6 - 4.5 mg/dL   POC Glucose Fingerstick    Collection Time: 01/16/22 11:23 PM   Result Value Ref Range    POC Glucose 122 (H) 65 - 105 mg/dL   POC Glucose Fingerstick    Collection Time: 01/17/22  1:06 AM   Result Value Ref Range    POC Glucose 138 (H) 65 - 105 mg/dL   POC Glucose Fingerstick    Collection Time: 01/17/22  1:44 AM   Result Value Ref Range    POC Glucose 159 (H) 65 - 105 mg/dL   Basic Metabolic Panel    Collection Time: 01/17/22  2:36 AM   Result Value Ref Range    Glucose 163 (H) 70 - 99 mg/dL    BUN 24 (H) 6 - 20 mg/dL    CREATININE 0.80 0.50 - 0.90 mg/dL    Bun/Cre Ratio NOT REPORTED 9 - 20    Calcium 8.3 (L) 8.6 - 10.4 mg/dL    Sodium 130 (L) 135 - 144 mmol/L    Potassium 4.9 3.7 - 5.3 mmol/L    Chloride 102 98 - 107 mmol/L    CO2 6 (LL) 20 - 31 mmol/L    Anion Gap 22 (H) 9 - 17 mmol/L    GFR Non-African American >60 >60 mL/min    GFR African American >60 >60 mL/min    GFR Comment          GFR Staging NOT REPORTED    Magnesium    Collection Time: 01/17/22  2:36 AM   Result Value Ref Range    Magnesium 2.1 1.6 - 2.6 mg/dL   Phosphorus    Collection Time: 01/17/22  2:36 AM   Result Value Ref Range    Phosphorus 4.4 2.6 - 4.5 mg/dL   POC Glucose Fingerstick    Collection Time: 01/17/22  3:06 AM   Result Value Ref Range    POC Glucose 160 (H) 65 - 105 mg/dL   POC Glucose Fingerstick    Collection Time: 01/17/22  3:53 AM   Result Value Ref Range    POC Glucose 175 (H) 65 - 105 mg/dL   POC Glucose Fingerstick    Collection Time: 01/17/22  4:57 AM   Result Value Ref Range    POC Glucose 187 (H) 65 - 105 mg/dL   POC Glucose Fingerstick    Collection Time: 01/17/22  5:52 AM   Result Value Ref Range    POC Glucose 192 (H) 65 - 105 mg/dL   POC Glucose Fingerstick    Collection Time: 01/17/22  6:50 AM   Result Value Ref Range    POC Glucose 176 (H) 65 - 105 mg/dL   Basic Metabolic Panel    Collection Time: 01/17/22  7:13 AM   Result Value Ref Range    Glucose 203 (H) 70 - 99 mg/dL    BUN 19 6 - 20 mg/dL    CREATININE 0.76 0.50 - 0.90 mg/dL    Bun/Cre Ratio NOT REPORTED 9 - 20    Calcium 8.5 (L) 8.6 - 10.4 mg/dL    Sodium 133 (L) 135 - 144 mmol/L    Potassium 4.2 3.7 - 5.3 mmol/L    Chloride 103 98 - 107 mmol/L    CO2 8 (LL) 20 - 31 mmol/L    Anion Gap 22 (H) 9 - 17 mmol/L    GFR Non-African American >60 >60 mL/min    GFR African American >60 >60 mL/min    GFR Comment          GFR Staging NOT REPORTED    Magnesium    Collection Time: 01/17/22  7:13 AM   Result Value Ref Range    Magnesium 2.1 1.6 - 2.6 mg/dL   Phosphorus    Collection Time: 01/17/22  7:13 AM   Result Value Ref Range    Phosphorus 4.6 (H) 2.6 - 4.5 mg/dL   POC Glucose Fingerstick    Collection Time: 01/17/22  7:54 AM   Result Value Ref Range    POC Glucose 184 (H) 65 - 105 mg/dL   POC Glucose Fingerstick    Collection Time: 01/17/22  8:58 AM   Result Value Ref Range    POC Glucose 174 (H) 65 - 105 mg/dL   POC Glucose Fingerstick    Collection Time: 01/17/22  9:55 AM   Result Value Ref Range    POC Glucose 191 (H) 65 - 105 mg/dL   Arterial Blood Gas, POC    Collection Time: 01/17/22 10:21 AM   Result Value Ref Range    POC pH 7.231 (L) 7.350 - 7.450    POC pCO2 19.5 (L) 35.0 - 48.0 mm Hg    POC PO2 81.0 (L) 83.0 - 108.0 mm Hg    POC HCO3 8.2 (LL) 21.0 - 28.0 mmol/L    TCO2 (calc), Art NOT REPORTED 22.0 - 29.0 mmol/L    Negative Base Excess, Art 17 (H) 0.0 - 2.0    Positive Base Excess, Art NOT REPORTED 0.0 - 3.0    POC O2 SAT 94 94.0 - 98.0 %    O2 Device/Flow/% NOT REPORTED     Fermin Test NOT REPORTED     Sample Site NOT REPORTED     Mode NOT REPORTED     FIO2 NOT REPORTED     Pt Temp NOT REPORTED     POC pH Temp NOT REPORTED     POC pCO2 Temp NOT REPORTED mm Hg    POC pO2 Temp NOT REPORTED mm Hg   Notification Panel, POC    Collection Time: 01/17/22 10:21 AM   Result Value Ref Range    Action Notifyphysician     NOTIFY Amandeep     READ BACK Yes Date/Time 01/17/202210:24:00    POC Glucose Fingerstick    Collection Time: 01/17/22 10:56 AM   Result Value Ref Range    POC Glucose 168 (H) 65 - 105 mg/dL   POC Glucose Fingerstick    Collection Time: 01/17/22 12:03 PM   Result Value Ref Range    POC Glucose 185 (H) 65 - 105 mg/dL   Basic Metabolic Panel    Collection Time: 01/17/22 12:28 PM   Result Value Ref Range    Glucose 186 (H) 70 - 99 mg/dL    BUN 15 6 - 20 mg/dL    CREATININE 0.85 0.50 - 0.90 mg/dL    Bun/Cre Ratio NOT REPORTED 9 - 20    Calcium 8.4 (L) 8.6 - 10.4 mg/dL    Sodium 133 (L) 135 - 144 mmol/L    Potassium 3.8 3.7 - 5.3 mmol/L    Chloride 104 98 - 107 mmol/L    CO2 9 (LL) 20 - 31 mmol/L    Anion Gap 20 (H) 9 - 17 mmol/L    GFR Non-African American >60 >60 mL/min    GFR African American >60 >60 mL/min    GFR Comment          GFR Staging NOT REPORTED    Magnesium    Collection Time: 01/17/22 12:28 PM   Result Value Ref Range    Magnesium 2.1 1.6 - 2.6 mg/dL   Phosphorus    Collection Time: 01/17/22 12:28 PM   Result Value Ref Range    Phosphorus 3.0 2.6 - 4.5 mg/dL   CBC    Collection Time: 01/17/22 12:28 PM   Result Value Ref Range    WBC 13.9 (H) 3.5 - 11.0 k/uL    RBC 4.95 4.0 - 5.2 m/uL    Hemoglobin 14.1 12.0 - 16.0 g/dL    Hematocrit 43.0 36 - 46 %    MCV 86.9 80 - 100 fL    MCH 28.5 26 - 34 pg    MCHC 32.9 31 - 37 g/dL    RDW 14.5 12.5 - 15.4 %    Platelets 397 662 - 950 k/uL    MPV 8.2 6.0 - 12.0 fL    NRBC Automated NOT REPORTED per 100 WBC   Lactic Acid    Collection Time: 01/17/22 12:28 PM   Result Value Ref Range    Lactic Acid 0.7 0.5 - 2.2 mmol/L   POC Glucose Fingerstick    Collection Time: 01/17/22  1:14 PM   Result Value Ref Range    POC Glucose 156 (H) 65 - 105 mg/dL   POC Glucose Fingerstick    Collection Time: 01/17/22  2:06 PM   Result Value Ref Range    POC Glucose 147 (H) 65 - 105 mg/dL   POC Glucose Fingerstick    Collection Time: 01/17/22  2:52 PM   Result Value Ref Range    POC Glucose 140 (H) 65 - 105 mg/dL Imaging/Diagonstics:    XR CHEST PORTABLE    Result Date: 1/16/2022  No acute cardiopulmonary abnormality. Assessment :      Primary Problem  Diabetic ketoacidosis without coma associated with type 2 diabetes mellitus [facility])    Active Hospital Problems    Diagnosis Date Noted    Diabetic ketoacidosis without coma associated with type 2 diabetes mellitus ([facility] [E11.10] 01/16/2022    High anion gap metabolic acidosis [L69.9] 01/16/2022    ROQUE (acute kidney injury) ([facility] [N17.9] 01/16/2022    DKA, type 2, not at goal [facility]) [E11.10] 01/16/2022       Plan:     Patient status Admit as inpatient in the  Medical ICU    1. Diabetic ketoacidosis without coma -IV fluids bolus and infusion, IV insulin infusion, monitor electrolytes and kidney function every 4 hours. Hold oral hypoglycemic medications. 2. High anion gap metabolic acidosis-continue IV fluid resuscitation and IV insulin infusion. 3. ROQUE prerenal-IV fluid. 4. Fibromyalgia -     Consultations:   None    Patient is admitted as inpatient status because of co-morbidities listed above, severity of signs and symptoms as outlined, requirement for current medical therapies and most importantly because of direct risk to patient if care not provided in a hospital setting.     Yovani Ricketts MD  1/17/2022    Copy sent to Dr. Griselda Chaves MD    (Please note that portions of this note were completed with a voice recognition program. Efforts were made to edit the dictations but occasionally words are mis-transcribed.)

## 2022-01-17 NOTE — CARE COORDINATION
Case Management Initial Discharge Plan  Christin Mohamud,             Met with:patient to discuss discharge plans. Information verified: address, contacts, phone number, , insurance Yes  Insurance Provider: Miguel Perales cannot find cards    Emergency Contact/Next of Kin name & number: romel Ricketts daughter [phone], Rayshawn Height [phone]  Who are involved in patient's support system? daughter    PCP: Madhavi Antunez MD  Date of last visit: past year      Discharge Planning    Living Arrangements:  Spouse/Significant Other,Children     Patient able to perform ADL's:Independent    Current Services (outpatient & in home) none  DME equipment: glucometer  DME provider:     Is patient receiving oral anticoagulation therapy? No    Potential Assistance Needed:  N/A    Patient agreeable to home care: No  [place] of choice provided:  no    Prior SNF/Rehab Placement and Facility: no  Agreeable to SNF/Rehab: No  [place] of choice provided: no     Evaluation: no    Expected Discharge date:  22    Patient expects to be discharged to: If home: is the family and/or caregiver wiling & able to provide support at home? n/a  Who will be providing this support? independent    Follow Up Appointment: Best Day/ Time: Monday PM    Transportation provider: self  Transportation arrangements needed for discharge: No    Readmission Risk              Risk of Unplanned Readmission:  15         Does patient have a readmission risk score greater than 14?: Yes  If yes, follow-up appointment must be made within 7 days of discharge.      Goals of Care: manage blood glucose      Educated patient on transitional options, provided freedom of choice and are agreeable with plan      Discharge Plan: home, follow for diabetic education - independent          Electronically signed by Pippa Garcia RN on 22 at 10:46 AM EST

## 2022-01-17 NOTE — FLOWSHEET NOTE
[address] [facility] [address]  PROGRESS NOTE    Shift date: 22  Shift day: Monday   Shift # 1    Room # 054/808-27   Name: Vanita Catalan            Age: 65 y.o. Gender: female            Referral: Referral from Multidisciplinary Team    Admit Date & Time: 2022  4:34 PM    PATIENT/EVENT DESCRIPTION:  Vanita Catalan is a 65 y.o. female with whom writer visited briefly. Writer learned from the multidisciplinary team that Patient's Mother  earlier this week. SPIRITUAL ASSESSMENT/INTERVENTION:  Writer attempted to visit Patient a couple times this morning. Patient appeared to be sleeping. Writer returned this afternoon. Patient opened her eyes. She smiled and greeted writer. She reported that she was \"cold. \" She was open to writer bringing her blankets. Patient received the blankets. Patient acknowledged writer's condolences over the loss of her mother. She shared that her Mother's birthday was yesterday. She began crying. Patient's son arrived. He received writer's condolences and acknowledged that it has been a difficult time. Patient and Son acknowledged writer's condolences and expressed thanks. Writer told Pt that she brought a grief packet for her. Writer told Pt and Son that she is available for support. Writer offered empathy and care. Writer will return with a sympathy card for Pt and Family. 22 1541   Encounter Summary   Services provided to: Patient;Patient and family together   Referral/Consult From: Nurse   Support System Children;Family members   Continue Visiting   (22)   Complexity of Encounter Moderate   Length of Encounter 15 minutes   Spiritual Assessment Completed Yes   Grief and Life Adjustment   Type Grief and loss   Assessment Approachable;Grieving   Intervention Active listening;Sustaining presence/ Ministry of presence;Grief care;Provided reading materials/devotional materials   Outcome Grieving;Expressed gratitude; Tearful SPIRITUAL CARE FOLLOW-UP PLAN:  Chaplains will remain available to offer spiritual and emotional support as needed. 01/17/22 1541   Encounter Summary   Services provided to: Patient;Patient and family together   Referral/Consult From: Nurse   Support System Children;Family members   Continue Visiting   (1/17/22)   Complexity of Encounter Moderate   Length of Encounter 15 minutes   Spiritual Assessment Completed Yes   Grief and Life Adjustment   Type Grief and loss   Assessment Approachable;Grieving   Intervention Active listening;Sustaining presence/ Ministry of presence;Grief care;Provided reading materials/devotional materials   Outcome Grieving;Expressed gratitude; Tearful       Electronically signed by Nasrin Sheppard on 1/17/2022 at 3:42 PM.  [address]  [phone]

## 2022-01-17 NOTE — PLAN OF CARE
Problem: Fluid Volume - Imbalance:  Goal: Will remain free of signs and symptoms of dehydration  Description: Will remain free of signs and symptoms of dehydration  Outcome: Ongoing  Goal: Absence of imbalanced fluid volume signs and symptoms  Description: Absence of imbalanced fluid volume signs and symptoms  Outcome: Ongoing     Problem: Serum Glucose Level - Abnormal:  Goal: Ability to maintain appropriate glucose levels will improve  Description: Ability to maintain appropriate glucose levels will improve  Outcome: Ongoing     Problem: Injury - Acid Base Imbalance:  Goal: Acid-base balance  Description: Acid-base balance  Outcome: Ongoing     Problem: Skin Integrity:  Goal: Will show no infection signs and symptoms  Description: Will show no infection signs and symptoms  Outcome: Ongoing  Goal: Absence of new skin breakdown  Description: Absence of new skin breakdown  Outcome: Ongoing     Problem: Falls - Risk of:  Goal: Will remain free from falls  Description: Will remain free from falls  Outcome: Ongoing  Goal: Absence of physical injury  Description: Absence of physical injury  Outcome: Ongoing

## 2022-01-18 PROBLEM — F43.21 GRIEF: Status: ACTIVE | Noted: 2022-01-18

## 2022-01-18 LAB
ANION GAP SERPL CALCULATED.3IONS-SCNC: 12 MMOL/L (ref 9–17)
ANION GAP SERPL CALCULATED.3IONS-SCNC: 13 MMOL/L (ref 9–17)
ANION GAP SERPL CALCULATED.3IONS-SCNC: 14 MMOL/L (ref 9–17)
ANION GAP SERPL CALCULATED.3IONS-SCNC: 15 MMOL/L (ref 9–17)
ANION GAP SERPL CALCULATED.3IONS-SCNC: 16 MMOL/L (ref 9–17)
ANION GAP SERPL CALCULATED.3IONS-SCNC: 16 MMOL/L (ref 9–17)
BUN BLDV-MCNC: 10 MG/DL (ref 6–20)
BUN BLDV-MCNC: 10 MG/DL (ref 6–20)
BUN BLDV-MCNC: 7 MG/DL (ref 6–20)
BUN BLDV-MCNC: 8 MG/DL (ref 6–20)
BUN BLDV-MCNC: 8 MG/DL (ref 6–20)
BUN BLDV-MCNC: 9 MG/DL (ref 6–20)
BUN/CREAT BLD: ABNORMAL (ref 9–20)
CALCIUM SERPL-MCNC: 8.2 MG/DL (ref 8.6–10.4)
CALCIUM SERPL-MCNC: 8.3 MG/DL (ref 8.6–10.4)
CALCIUM SERPL-MCNC: 8.4 MG/DL (ref 8.6–10.4)
CALCIUM SERPL-MCNC: 8.4 MG/DL (ref 8.6–10.4)
CALCIUM SERPL-MCNC: 8.5 MG/DL (ref 8.6–10.4)
CALCIUM SERPL-MCNC: 8.6 MG/DL (ref 8.6–10.4)
CHLORIDE BLD-SCNC: 102 MMOL/L (ref 98–107)
CHLORIDE BLD-SCNC: 105 MMOL/L (ref 98–107)
CHLORIDE BLD-SCNC: 106 MMOL/L (ref 98–107)
CHLORIDE BLD-SCNC: 107 MMOL/L (ref 98–107)
CHLORIDE BLD-SCNC: 108 MMOL/L (ref 98–107)
CHLORIDE BLD-SCNC: 109 MMOL/L (ref 98–107)
CO2: 13 MMOL/L (ref 20–31)
CO2: 14 MMOL/L (ref 20–31)
CO2: 15 MMOL/L (ref 20–31)
CO2: 16 MMOL/L (ref 20–31)
CO2: 16 MMOL/L (ref 20–31)
CO2: 18 MMOL/L (ref 20–31)
CREAT SERPL-MCNC: 0.51 MG/DL (ref 0.5–0.9)
CREAT SERPL-MCNC: 0.54 MG/DL (ref 0.5–0.9)
CREAT SERPL-MCNC: 0.61 MG/DL (ref 0.5–0.9)
CREAT SERPL-MCNC: 0.64 MG/DL (ref 0.5–0.9)
CREAT SERPL-MCNC: 0.65 MG/DL (ref 0.5–0.9)
CREAT SERPL-MCNC: 0.75 MG/DL (ref 0.5–0.9)
EKG ATRIAL RATE: 113 BPM
EKG P AXIS: 73 DEGREES
EKG P-R INTERVAL: 128 MS
EKG Q-T INTERVAL: 328 MS
EKG QRS DURATION: 84 MS
EKG QTC CALCULATION (BAZETT): 449 MS
EKG R AXIS: -5 DEGREES
EKG T AXIS: 43 DEGREES
EKG VENTRICULAR RATE: 113 BPM
GFR AFRICAN AMERICAN: >60 ML/MIN
GFR NON-AFRICAN AMERICAN: >60 ML/MIN
GFR SERPL CREATININE-BSD FRML MDRD: ABNORMAL ML/MIN/{1.73_M2}
GLUCOSE BLD-MCNC: 127 MG/DL (ref 65–105)
GLUCOSE BLD-MCNC: 148 MG/DL (ref 65–105)
GLUCOSE BLD-MCNC: 152 MG/DL (ref 65–105)
GLUCOSE BLD-MCNC: 153 MG/DL (ref 65–105)
GLUCOSE BLD-MCNC: 157 MG/DL (ref 65–105)
GLUCOSE BLD-MCNC: 160 MG/DL (ref 65–105)
GLUCOSE BLD-MCNC: 160 MG/DL (ref 65–105)
GLUCOSE BLD-MCNC: 161 MG/DL (ref 70–99)
GLUCOSE BLD-MCNC: 162 MG/DL (ref 65–105)
GLUCOSE BLD-MCNC: 165 MG/DL (ref 65–105)
GLUCOSE BLD-MCNC: 168 MG/DL (ref 70–99)
GLUCOSE BLD-MCNC: 169 MG/DL (ref 70–99)
GLUCOSE BLD-MCNC: 187 MG/DL (ref 70–99)
GLUCOSE BLD-MCNC: 197 MG/DL (ref 65–105)
GLUCOSE BLD-MCNC: 214 MG/DL (ref 65–105)
GLUCOSE BLD-MCNC: 222 MG/DL (ref 65–105)
GLUCOSE BLD-MCNC: 222 MG/DL (ref 65–105)
GLUCOSE BLD-MCNC: 227 MG/DL (ref 65–105)
GLUCOSE BLD-MCNC: 235 MG/DL (ref 70–99)
GLUCOSE BLD-MCNC: 247 MG/DL (ref 70–99)
HCT VFR BLD CALC: 40.4 % (ref 36–46)
HEMOGLOBIN: 13.2 G/DL (ref 12–16)
MAGNESIUM: 2 MG/DL (ref 1.6–2.6)
MAGNESIUM: 2.1 MG/DL (ref 1.6–2.6)
MAGNESIUM: 2.2 MG/DL (ref 1.6–2.6)
MCH RBC QN AUTO: 28.8 PG (ref 26–34)
MCHC RBC AUTO-ENTMCNC: 32.7 G/DL (ref 31–37)
MCV RBC AUTO: 88.2 FL (ref 80–100)
NRBC AUTOMATED: NORMAL PER 100 WBC
PDW BLD-RTO: 14.6 % (ref 12.5–15.4)
PHOSPHORUS: 1.5 MG/DL (ref 2.6–4.5)
PHOSPHORUS: 1.8 MG/DL (ref 2.6–4.5)
PHOSPHORUS: 1.9 MG/DL (ref 2.6–4.5)
PHOSPHORUS: 2 MG/DL (ref 2.6–4.5)
PHOSPHORUS: 2.2 MG/DL (ref 2.6–4.5)
PLATELET # BLD: 273 K/UL (ref 140–450)
PMV BLD AUTO: 9.7 FL (ref 8–14)
POTASSIUM SERPL-SCNC: 3.5 MMOL/L (ref 3.7–5.3)
POTASSIUM SERPL-SCNC: 3.5 MMOL/L (ref 3.7–5.3)
POTASSIUM SERPL-SCNC: 3.8 MMOL/L (ref 3.7–5.3)
POTASSIUM SERPL-SCNC: 3.9 MMOL/L (ref 3.7–5.3)
RBC # BLD: 4.58 M/UL (ref 4–5.2)
SODIUM BLD-SCNC: 134 MMOL/L (ref 135–144)
SODIUM BLD-SCNC: 134 MMOL/L (ref 135–144)
SODIUM BLD-SCNC: 136 MMOL/L (ref 135–144)
SODIUM BLD-SCNC: 136 MMOL/L (ref 135–144)
SODIUM BLD-SCNC: 137 MMOL/L (ref 135–144)
SODIUM BLD-SCNC: 138 MMOL/L (ref 135–144)
WBC # BLD: 9.2 K/UL (ref 3.5–11)

## 2022-01-18 PROCEDURE — 6370000000 HC RX 637 (ALT 250 FOR IP): Performed by: FAMILY MEDICINE

## 2022-01-18 PROCEDURE — 2500000003 HC RX 250 WO HCPCS: Performed by: FAMILY MEDICINE

## 2022-01-18 PROCEDURE — 80048 BASIC METABOLIC PNL TOTAL CA: CPT

## 2022-01-18 PROCEDURE — 2580000003 HC RX 258: Performed by: FAMILY MEDICINE

## 2022-01-18 PROCEDURE — 84100 ASSAY OF PHOSPHORUS: CPT

## 2022-01-18 PROCEDURE — 36415 COLL VENOUS BLD VENIPUNCTURE: CPT

## 2022-01-18 PROCEDURE — 83735 ASSAY OF MAGNESIUM: CPT

## 2022-01-18 PROCEDURE — 85027 COMPLETE CBC AUTOMATED: CPT

## 2022-01-18 PROCEDURE — 6360000002 HC RX W HCPCS: Performed by: FAMILY MEDICINE

## 2022-01-18 PROCEDURE — 6370000000 HC RX 637 (ALT 250 FOR IP): Performed by: INTERNAL MEDICINE

## 2022-01-18 PROCEDURE — 82947 ASSAY GLUCOSE BLOOD QUANT: CPT

## 2022-01-18 PROCEDURE — 2000000000 HC ICU R&B

## 2022-01-18 PROCEDURE — 6360000002 HC RX W HCPCS: Performed by: NURSE PRACTITIONER

## 2022-01-18 PROCEDURE — 99232 SBSQ HOSP IP/OBS MODERATE 35: CPT | Performed by: INTERNAL MEDICINE

## 2022-01-18 RX ORDER — INSULIN GLARGINE 100 [IU]/ML
56 INJECTION, SOLUTION SUBCUTANEOUS DAILY
Status: DISCONTINUED | OUTPATIENT
Start: 2022-01-19 | End: 2022-01-19 | Stop reason: HOSPADM

## 2022-01-18 RX ORDER — INSULIN GLARGINE 100 [IU]/ML
24 INJECTION, SOLUTION SUBCUTANEOUS NIGHTLY
Status: DISCONTINUED | OUTPATIENT
Start: 2022-01-18 | End: 2022-01-19 | Stop reason: HOSPADM

## 2022-01-18 RX ORDER — INSULIN GLARGINE 100 [IU]/ML
10 INJECTION, SOLUTION SUBCUTANEOUS ONCE
Status: COMPLETED | OUTPATIENT
Start: 2022-01-18 | End: 2022-01-18

## 2022-01-18 RX ADMIN — ACETAMINOPHEN 650 MG: 325 TABLET ORAL at 08:43

## 2022-01-18 RX ADMIN — POTASSIUM CHLORIDE 10 MEQ: 7.45 INJECTION INTRAVENOUS at 06:33

## 2022-01-18 RX ADMIN — ACETAMINOPHEN 650 MG: 325 TABLET ORAL at 02:56

## 2022-01-18 RX ADMIN — SODIUM PHOSPHATE, MONOBASIC, MONOHYDRATE AND SODIUM PHOSPHATE, DIBASIC, ANHYDROUS 15 MMOL: 276; 142 INJECTION, SOLUTION INTRAVENOUS at 04:50

## 2022-01-18 RX ADMIN — POTASSIUM CHLORIDE 10 MEQ: 7.45 INJECTION INTRAVENOUS at 04:05

## 2022-01-18 RX ADMIN — POTASSIUM CHLORIDE 10 MEQ: 7.45 INJECTION INTRAVENOUS at 02:49

## 2022-01-18 RX ADMIN — POTASSIUM CHLORIDE 10 MEQ: 7.45 INJECTION INTRAVENOUS at 07:55

## 2022-01-18 RX ADMIN — INSULIN LISPRO 2 UNITS: 100 INJECTION, SOLUTION INTRAVENOUS; SUBCUTANEOUS at 17:22

## 2022-01-18 RX ADMIN — INSULIN GLARGINE 10 UNITS: 100 INJECTION, SOLUTION SUBCUTANEOUS at 17:22

## 2022-01-18 RX ADMIN — POTASSIUM CHLORIDE 10 MEQ: 7.45 INJECTION INTRAVENOUS at 01:00

## 2022-01-18 RX ADMIN — POTASSIUM CHLORIDE 10 MEQ: 7.45 INJECTION INTRAVENOUS at 09:08

## 2022-01-18 RX ADMIN — DEXTROSE AND SODIUM CHLORIDE: 5; 450 INJECTION, SOLUTION INTRAVENOUS at 01:21

## 2022-01-18 RX ADMIN — Medication 3 UNITS/HR: at 09:16

## 2022-01-18 RX ADMIN — INSULIN GLARGINE 24 UNITS: 100 INJECTION, SOLUTION SUBCUTANEOUS at 22:07

## 2022-01-18 RX ADMIN — ONDANSETRON 4 MG: 2 INJECTION INTRAMUSCULAR; INTRAVENOUS at 16:56

## 2022-01-18 RX ADMIN — INSULIN LISPRO 4 UNITS: 100 INJECTION, SOLUTION INTRAVENOUS; SUBCUTANEOUS at 22:07

## 2022-01-18 RX ADMIN — ENOXAPARIN SODIUM 40 MG: 100 INJECTION SUBCUTANEOUS at 07:55

## 2022-01-18 RX ADMIN — POTASSIUM CHLORIDE 10 MEQ: 7.45 INJECTION INTRAVENOUS at 05:09

## 2022-01-18 ASSESSMENT — PAIN SCALES - GENERAL
PAINLEVEL_OUTOF10: 5
PAINLEVEL_OUTOF10: 0
PAINLEVEL_OUTOF10: 0
PAINLEVEL_OUTOF10: 5
PAINLEVEL_OUTOF10: 0
PAINLEVEL_OUTOF10: 0
PAINLEVEL_OUTOF10: 3
PAINLEVEL_OUTOF10: 0

## 2022-01-18 NOTE — PLAN OF CARE
Problem: Fluid Volume - Imbalance:  Goal: Will remain free of signs and symptoms of dehydration  Description: Will remain free of signs and symptoms of dehydration  Outcome: Ongoing  Goal: Absence of imbalanced fluid volume signs and symptoms  Description: Absence of imbalanced fluid volume signs and symptoms  Outcome: Ongoing     Problem: Serum Glucose Level - Abnormal:  Goal: Ability to maintain appropriate glucose levels will improve  Description: Ability to maintain appropriate glucose levels will improve  Outcome: Ongoing     Problem: Injury - Acid Base Imbalance:  Goal: Acid-base balance  Description: Acid-base balance  Outcome: Ongoing     Problem: Skin Integrity:  Goal: Will show no infection signs and symptoms  Description: Will show no infection signs and symptoms  Outcome: Ongoing  Goal: Absence of new skin breakdown  Description: Absence of new skin breakdown  Outcome: Ongoing

## 2022-01-18 NOTE — PLAN OF CARE
Problem: Discharge Planning:  Goal: Discharged to appropriate level of care  Description: Discharged to appropriate level of care  1/17/2022 2008 by Kath Cruz RN  Outcome: Ongoing       Problem: Fluid Volume - Imbalance:  Goal: Will remain free of signs and symptoms of dehydration  Description: Will remain free of signs and symptoms of dehydration  1/17/2022 2008 by Kath Cruz RN  Outcome: Ongoing    Goal: Absence of imbalanced fluid volume signs and symptoms  Description: Absence of imbalanced fluid volume signs and symptoms  1/17/2022 2008 by Kath Cruz RN  Outcome: Ongoing       Problem: Serum Glucose Level - Abnormal:  Goal: Ability to maintain appropriate glucose levels will improve  Description: Ability to maintain appropriate glucose levels will improve  1/17/2022 2008 by Kath Cruz RN  Outcome: Ongoing       Problem: Injury - Acid Base Imbalance:  Goal: Acid-base balance  Description: Acid-base balance  1/17/2022 2008 by aKth Cruz RN  Outcome: Ongoing       Problem: Falls - Risk of:  Goal: Will remain free from falls  Description: Will remain free from falls  1/17/2022 2008 by Kath Cruz RN  Outcome: Ongoing    Goal: Absence of physical injury  Description: Absence of physical injury  1/17/2022 2008 by Kath Cruz RN  Outcome: Ongoing       Problem: Pain:  Goal: Pain level will decrease  Description: Pain level will decrease  1/17/2022 2008 by Kath Cruz RN  Outcome: Ongoing    Goal: Control of acute pain  Description: Control of acute pain  1/17/2022 2008 by Kath Cruz RN  Outcome: Ongoing    Goal: Control of chronic pain  Description: Control of chronic pain  1/17/2022 2008 by Kath Cruz RN  Outcome: Ongoing

## 2022-01-18 NOTE — PLAN OF CARE
Patient's acidosis has been slowly improving, insulin drip discontinued this afternoon and placed on home dose of Lantus as well as insulin/scale over 4 hours. If next set of labs remained stable to improving can transfer to stepdown status this evening.

## 2022-01-18 NOTE — PROGRESS NOTES
[facility]  Office: [address], DO, Savanah Juarez, DO, Kay Roth, DO, Coby Talat Hwang, DO, Kareem Razo MD, Hang Villegas MD, Naya Gonzalez MD, Vladimir Miller MD, Arsen Lundberg MD, Deirdre Sue MD, Oziel Billings MD, Carissa Riddle, DO, Kajal Rodriguez, DO, Tania Paulino MD,  Radha De Jesus, DO, Sherri Carroll MD, Mely Baker MD, Argenis Antonio MD, Ramon Quinonez MD, Lauren Anderson MD, Shellie Friedman MD, Boni Weinberg MD, Vickie James [facility], [facility], CNP, Jessica Christian, CNP, Gab Dupree, CNS, Esvin Batista, CNP, Young Sampson, CNP, Tanya Kaur, CNP, Carrie Loving, CNP, Brandon Sears, CNP, Aleksandr Love PA-C, Radha Baltazar, [facility], Natalie Willams, [facility], Binu Nevarez, CNP, Sonali Cuevas, CNP, Олег Mcdowell, CNP, Claudio Louie, CNP, Sky Adam, CNP, Tamra Monreal [address]    Progress Note    1/18/2022    10:31 AM    Name:   Sneha Otero  MRN:     [id number]     Acct:      [de-identified]   Room:   [address] [place] [address] Day:  2  Admit Date:  1/16/2022  4:34 PM    PCP:   Padmini Barger MD  Code Status:  Full Code    Subjective:     C/C:   Chief Complaint   Patient presents with    Hyperglycemia     blood sugar at bedside 344, blurred vision, \"heavy breathing\"     Abdominal Pain     left side, started this morning     Emesis     started this morning with abdominal pain      Interval History Status: improved. Patient overall feels better today with decreased abdominal pain, nausea and vomiting. Blood sugars are improved with insulin drip. Denies any chest pain, fevers or chills, shortness of breath or other complaints. Brief History: This is a 27-year-old female with a history of type 2 diabetes treated with metformin, Amaryl and Lantus that presents with uncontrolled blood sugars with signs of DKA.   Apparently since the death of her mother last week she has not been eating or drinking has felt quite depressed. She had worsening blood sugar control over the last several days and presented here with abdominal pain, nausea and vomiting was found to have DKA. She has been admitted for IV fluid resuscitation and insulin drip with improvement in her DKA. Review of Systems:     Constitutional:  negative for chills, fevers, sweats  Respiratory:  negative for cough, dyspnea on exertion, shortness of breath, wheezing  Cardiovascular:  negative for chest pain, chest pressure/discomfort, lower extremity edema, palpitations  Gastrointestinal:  negative for abdominal pain, constipation, diarrhea, nausea, vomiting  Neurological:  negative for dizziness, headache    Medications: Allergies:  No Known Allergies    Current Meds:   Scheduled Meds:    enoxaparin  40 mg SubCUTAneous Daily     Continuous Infusions:    insulin 3 Units/hr (01/18/22 0916)    dextrose      sodium chloride      dextrose 5 % and 0.45 % NaCl 150 mL/hr at 01/18/22 0121     PRN Meds: promethazine, acetaminophen, glucose, dextrose, glucagon (rDNA), dextrose, dextrose, potassium chloride, magnesium sulfate, sodium phosphate IVPB **OR** sodium phosphate IVPB **OR** sodium phosphate IVPB, polyethylene glycol, dextrose 5 % and 0.45 % NaCl, ondansetron    Data:     Past Medical History:   has a past medical history of Anxiety, Arthritis, Diabetes ([address] Fibromyalgia, Hernia, Kidney stones, and PONV (postoperative nausea and vomiting). Social History:   reports that she has never smoked. She has never used smokeless tobacco. She reports that she does not drink alcohol and does not use drugs.      Family History:   Family History   Problem Relation Age of Onset    Stroke Mother     COPD Mother     Heart Disease Mother     Other Father         sepsis/SBO       Vitals:  /79   Pulse 77   Temp 97.3 °F (36.3 °C) (Temporal)   Resp 18   Ht 5' 2.99\" (1.6 m)   Wt 192 lb (87.1 kg)   LMP 03/18/2017   SpO2 97%   BMI 34.02 kg/m² Temp (24hrs), Av.1 °F (36.7 °C), Min:97.3 °F (36.3 °C), Max:99.1 °F (37.3 °C)    Recent Labs     22  0637 22  0708 22  0809 22  0956   POCGLU 214* 197* 160* 152*       I/O (24Hr): Intake/Output Summary (Last 24 hours) at 2022 1031  Last data filed at 2022 0634  Gross per 24 hour   Intake 4962.7 ml   Output 2600 ml   Net 2362.7 ml       Labs:  Hematology:  Recent Labs     22  1656 22  1228 22  0643   WBC 24.6* 13.9* 9.2   RBC 5.34* 4.95 4.58   HGB 15.3 14.1 13.2   HCT 48.1* 43.0 40.4   MCV 90.1 86.9 88.2   MCH 28.6 28.5 28.8   MCHC 31.8 32.9 32.7   RDW 14.3 14.5 14.6   * 370 273   MPV 8.6 8.2 9.7   DDIMER 0.52  --   --      Chemistry:  Recent Labs     22  1656 22  2237 22  2237 22  0235 22  0643   *   < > 136 136 134*   K 5.2   < > 3.6* 3.9 3.8   CL 89*   < > 106 107 105   CO2 <6*   < > 13* 13* 14*   GLUCOSE 356*   < > 150* 187* 235*   BUN 36*   < > 11 9 10   CREATININE 1.55*   < > 0.76 0.75 0.65   MG 2.6   < > 2.2 2.2 2.0   ANIONGAP Unable to calculate anion gap due to CO2 less than 6.   < > 17 16 15   LABGLOM 35*   < > >60 >60 >60   GFRAA 42*   < > >60 >60 >60   CALCIUM 8.8   < > 8.5* 8.3* 8.2*   PHOS  --    < > 3.1 2.0* 2.2*   TROPHS 22*  --   --   --   --     < > = values in this interval not displayed.      Recent Labs     22  1656 22  1909 22  0519 22  0600 22  0637 22  0708 22  0809 22  0956   PROT 8.1  --   --   --   --   --   --   --    LABALBU 4.7  --   --   --   --   --   --   --    LABA1C 10.2*  --   --   --   --   --   --   --    AST 22  --   --   --   --   --   --   --    ALT 21  --   --   --   --   --   --   --    ALKPHOS 119*  --   --   --   --   --   --   --    BILITOT 0.33  --   --   --   --   --   --   --    BILIDIR 0.11  --   --   --   --   --   --   --    LIPASE 46  --   --   --   --   --   --   --    POCGLU  --    < > 222* 222* 214* 197* 160* 152* < > = values in this interval not displayed. ABG:  Lab Results   Component Value Date    POCPH 7.231 01/17/2022    POCPCO2 19.5 01/17/2022    POCPO2 81.0 01/17/2022    POCHCO3 8.2 01/17/2022    NBEA 17 01/17/2022    PBEA NOT REPORTED 01/17/2022    WPB4GKJ NOT REPORTED 01/17/2022    EAVK0SXR 94 01/17/2022    FIO2 NOT REPORTED 01/17/2022     No results found for: SPECIAL  No results found for: CULTURE    Radiology:  XR CHEST PORTABLE    Result Date: 1/16/2022  No acute cardiopulmonary abnormality. Physical Examination:       General appearance:  alert, cooperative and no distress  Mental Status:  oriented to person, place and time and normal affect  Lungs:  clear to auscultation bilaterally, normal effort  Heart:  regular rate and rhythm, no murmur  Abdomen:  soft, nontender, nondistended, normal bowel sounds, no masses, hepatomegaly, splenomegaly  Extremities:  no edema, redness, tenderness in the calves  Skin:  no gross lesions, rashes, induration    Assessment:     Hospital Problems           Last Modified POA    * (Principal) Diabetic ketoacidosis without coma associated with type 2 diabetes mellitus ([facility] [address] 1/17/2022 Yes    High anion gap metabolic acidosis 5/52/8181 Yes    ROQUE (acute kidney injury) ([facility] [address] 1/16/2022 Yes    DKA, type 2, not at goal [facility]) 1/16/2022 Yes    Grief 1/18/2022 Yes          Plan:     1. Continue insulin drip  2. Trend labs, if acidosis continues to improve can likely resume home insulin/oral medications and wean insulin drip off this afternoon  3. GI and DVT prophylaxis  4. Avoid nephrotoxic agents, ROQUE resolved. Trend labs  5. Correct electrolytes as needed, scales ordered  6. Activity as tolerated, PT and OT  7.  Hopeful discharge in next 24 hours    Maia Cat DO  1/18/2022  10:31 AM

## 2022-01-18 NOTE — CARE COORDINATION
[facility] ICU Quality Flow/Interdisciplinary Rounds Progress Note    Quality Flow Rounds held on January 18, 2022 at [address] Attending:  Bedside Nurse, , Nursing Unit Leadership, Respiratory Therapy, Physical Therapy, Occupational Therapy and Physician    Anticipated Discharge Date:  Expected Discharge Date: 01/18/22    Anticipated Discharge Disposition: home     Readmission Risk              Risk of Unplanned Readmission:  14           Discussed patient goal for the day, patient clinical progression, and barriers to discharge.   The following Goal(s) of the Day/Commitment(s) have been identified:  Manage blood glucose - wean from insulin gtt; monitor/replace electrolytes as ordered      Martinez Rossi RN  January 18, 2022

## 2022-01-19 VITALS
OXYGEN SATURATION: 97 % | HEIGHT: 63 IN | RESPIRATION RATE: 16 BRPM | DIASTOLIC BLOOD PRESSURE: 65 MMHG | BODY MASS INDEX: 33.28 KG/M2 | SYSTOLIC BLOOD PRESSURE: 122 MMHG | TEMPERATURE: 97.9 F | HEART RATE: 73 BPM | WEIGHT: 187.83 LBS

## 2022-01-19 LAB
ANION GAP SERPL CALCULATED.3IONS-SCNC: 12 MMOL/L (ref 9–17)
ANION GAP SERPL CALCULATED.3IONS-SCNC: 13 MMOL/L (ref 9–17)
BUN BLDV-MCNC: 9 MG/DL (ref 6–20)
BUN BLDV-MCNC: 9 MG/DL (ref 6–20)
BUN/CREAT BLD: ABNORMAL (ref 9–20)
BUN/CREAT BLD: ABNORMAL (ref 9–20)
CALCIUM SERPL-MCNC: 8.7 MG/DL (ref 8.6–10.4)
CALCIUM SERPL-MCNC: 8.8 MG/DL (ref 8.6–10.4)
CHLORIDE BLD-SCNC: 107 MMOL/L (ref 98–107)
CHLORIDE BLD-SCNC: 108 MMOL/L (ref 98–107)
CO2: 17 MMOL/L (ref 20–31)
CO2: 20 MMOL/L (ref 20–31)
CREAT SERPL-MCNC: 0.51 MG/DL (ref 0.5–0.9)
CREAT SERPL-MCNC: 0.55 MG/DL (ref 0.5–0.9)
GFR AFRICAN AMERICAN: >60 ML/MIN
GFR AFRICAN AMERICAN: >60 ML/MIN
GFR NON-AFRICAN AMERICAN: >60 ML/MIN
GFR NON-AFRICAN AMERICAN: >60 ML/MIN
GFR SERPL CREATININE-BSD FRML MDRD: ABNORMAL ML/MIN/{1.73_M2}
GLUCOSE BLD-MCNC: 161 MG/DL (ref 70–99)
GLUCOSE BLD-MCNC: 168 MG/DL (ref 65–105)
GLUCOSE BLD-MCNC: 169 MG/DL (ref 65–105)
GLUCOSE BLD-MCNC: 170 MG/DL (ref 65–105)
GLUCOSE BLD-MCNC: 181 MG/DL (ref 70–99)
GLUCOSE BLD-MCNC: 183 MG/DL (ref 65–105)
GLUCOSE BLD-MCNC: 322 MG/DL (ref 65–105)
GLUCOSE BLD-MCNC: 89 MG/DL (ref 65–105)
HCT VFR BLD CALC: 38.8 % (ref 36–46)
HEMOGLOBIN: 13 G/DL (ref 12–16)
MAGNESIUM: 2.1 MG/DL (ref 1.6–2.6)
MAGNESIUM: 2.1 MG/DL (ref 1.6–2.6)
MCH RBC QN AUTO: 28.7 PG (ref 26–34)
MCHC RBC AUTO-ENTMCNC: 33.4 G/DL (ref 31–37)
MCV RBC AUTO: 85.9 FL (ref 80–100)
NRBC AUTOMATED: NORMAL PER 100 WBC
PDW BLD-RTO: 14 % (ref 12.5–15.4)
PHOSPHORUS: 1.9 MG/DL (ref 2.6–4.5)
PHOSPHORUS: 2.5 MG/DL (ref 2.6–4.5)
PLATELET # BLD: 281 K/UL (ref 140–450)
PMV BLD AUTO: 7.8 FL (ref 6–12)
POTASSIUM SERPL-SCNC: 3.3 MMOL/L (ref 3.7–5.3)
POTASSIUM SERPL-SCNC: 3.4 MMOL/L (ref 3.7–5.3)
RBC # BLD: 4.52 M/UL (ref 4–5.2)
SODIUM BLD-SCNC: 138 MMOL/L (ref 135–144)
SODIUM BLD-SCNC: 139 MMOL/L (ref 135–144)
WBC # BLD: 6.1 K/UL (ref 3.5–11)

## 2022-01-19 PROCEDURE — 84100 ASSAY OF PHOSPHORUS: CPT

## 2022-01-19 PROCEDURE — 85027 COMPLETE CBC AUTOMATED: CPT

## 2022-01-19 PROCEDURE — 6370000000 HC RX 637 (ALT 250 FOR IP): Performed by: INTERNAL MEDICINE

## 2022-01-19 PROCEDURE — 6360000002 HC RX W HCPCS: Performed by: FAMILY MEDICINE

## 2022-01-19 PROCEDURE — 36415 COLL VENOUS BLD VENIPUNCTURE: CPT

## 2022-01-19 PROCEDURE — 83735 ASSAY OF MAGNESIUM: CPT

## 2022-01-19 PROCEDURE — 80048 BASIC METABOLIC PNL TOTAL CA: CPT

## 2022-01-19 PROCEDURE — 99232 SBSQ HOSP IP/OBS MODERATE 35: CPT | Performed by: INTERNAL MEDICINE

## 2022-01-19 PROCEDURE — 82947 ASSAY GLUCOSE BLOOD QUANT: CPT

## 2022-01-19 RX ORDER — POTASSIUM CHLORIDE 7.45 MG/ML
10 INJECTION INTRAVENOUS PRN
Status: DISCONTINUED | OUTPATIENT
Start: 2022-01-19 | End: 2022-01-19 | Stop reason: HOSPADM

## 2022-01-19 RX ORDER — INSULIN LISPRO 200 [IU]/ML
INJECTION, SOLUTION SUBCUTANEOUS
Qty: 2 PEN | Refills: 1 | Status: SHIPPED | OUTPATIENT
Start: 2022-01-19

## 2022-01-19 RX ORDER — POTASSIUM CHLORIDE 20 MEQ/1
40 TABLET, EXTENDED RELEASE ORAL PRN
Status: DISCONTINUED | OUTPATIENT
Start: 2022-01-19 | End: 2022-01-19 | Stop reason: HOSPADM

## 2022-01-19 RX ADMIN — INSULIN LISPRO 8 UNITS: 100 INJECTION, SOLUTION INTRAVENOUS; SUBCUTANEOUS at 11:37

## 2022-01-19 RX ADMIN — INSULIN GLARGINE 56 UNITS: 100 INJECTION, SOLUTION SUBCUTANEOUS at 08:20

## 2022-01-19 RX ADMIN — INSULIN LISPRO 2 UNITS: 100 INJECTION, SOLUTION INTRAVENOUS; SUBCUTANEOUS at 08:20

## 2022-01-19 RX ADMIN — POTASSIUM CHLORIDE 40 MEQ: 1500 TABLET, EXTENDED RELEASE ORAL at 08:21

## 2022-01-19 RX ADMIN — ENOXAPARIN SODIUM 40 MG: 100 INJECTION SUBCUTANEOUS at 08:21

## 2022-01-19 RX ADMIN — INSULIN LISPRO 2 UNITS: 100 INJECTION, SOLUTION INTRAVENOUS; SUBCUTANEOUS at 01:56

## 2022-01-19 ASSESSMENT — PAIN SCALES - GENERAL: PAINLEVEL_OUTOF10: 0

## 2022-01-19 NOTE — TELEPHONE ENCOUNTER
POPULATION HEALTH CLINICAL PHARMACY REVIEW - BE WELL WITH DIABETES  =================================================================  Roxi Ackerman is a 65 y.o. female enrolled in the [facility] Be Well with Diabetes program.    Two attempts made to reach patient by telephone for pharmacist appointment. Left voice message for patient to return clinician's phone call to dept. Will prepare MyChart message and send to patient.     Dominique Foss, PharmD, [address] Pharmacist  Department: [phone]    For Pharmacy Admin Tracking Only     Time Spent (min): 5

## 2022-01-19 NOTE — PLAN OF CARE
Problem: Discharge Planning:  Goal: Discharged to appropriate level of care  Description: Discharged to appropriate level of care  1/19/2022 0451 by Humera Schwarz RN  Outcome: Ongoing    Problem: Fluid Volume - Imbalance:  Goal: Will remain free of signs and symptoms of dehydration  Description: Will remain free of signs and symptoms of dehydration  1/19/2022 0451 by Humera Schwarz RN  Outcome: Ongoing     Problem: Fluid Volume - Imbalance:  Goal: Absence of imbalanced fluid volume signs and symptoms  Description: Absence of imbalanced fluid volume signs and symptoms  1/19/2022 0451 by Humera Schwarz RN  Outcome: Ongoing     Problem: Serum Glucose Level - Abnormal:  Goal: Ability to maintain appropriate glucose levels will improve  Description: Ability to maintain appropriate glucose levels will improve  1/19/2022 0451 by Humera Schwarz RN  Outcome: Ongoing     Problem: Injury - Acid Base Imbalance:  Goal: Acid-base balance  Description: Acid-base balance  1/19/2022 0451 by Humera Schwarz RN  Outcome: Ongoing     Problem: Skin Integrity:  Goal: Will show no infection signs and symptoms  Description: Will show no infection signs and symptoms  1/19/2022 0451 by Humera Schwarz RN  Outcome: Ongoing     Problem: Skin Integrity:  Goal: Absence of new skin breakdown  Description: Absence of new skin breakdown  1/19/2022 0451 by Humera Schwarz RN  Outcome: Ongoing     Problem: Falls - Risk of:  Goal: Will remain free from falls  Description: Will remain free from falls  1/19/2022 0451 by Humera Schwarz RN  Outcome: Ongoing     Problem: Falls - Risk of:  Goal: Absence of physical injury  Description: Absence of physical injury  1/19/2022 0451 by Humera Schwarz RN  Outcome: Ongoing     Problem: Pain:  Goal: Pain level will decrease  Description: Pain level will decrease  1/19/2022 0451 by Humera Schwarz RN  Outcome: Ongoing     Problem: Pain:  Goal: Control of acute pain  Description: Control of acute pain  1/19/2022 0451 by Rika Stanford RN  Outcome: Ongoing     Problem: Pain:  Goal: Control of chronic pain  Description: Control of chronic pain  1/19/2022 0451 by Rika Stanford RN  Outcome: Ongoing

## 2022-01-19 NOTE — FLOWSHEET NOTE
Pt discharged via wheelchair with all belongings and discharge paperwork. Follow up appointments and discharge instructions reviewed with pt. All question answered to satisfaction.

## 2022-01-19 NOTE — PROGRESS NOTES
[facility]  Office: [address], DO, Keith Block, DO, Freddie Cr, DO, Madison Harada Blood, DO, Governor Mac MD, Niurka Huffman MD, Bg Casper MD, Kirsten Desai MD, Shelby Kunz MD, Judd Anderson MD, Solo Du MD, Tayler Bhatti, DO, Aylin Roberts, DO, Araseli Sutton MD,  Paul Smallwood, DO, Ananda Begum MD, Tova Kline MD, Betsy Gonzales MD, Zaira Bell MD, Caleb Maloney MD, Marianna Douglas MD, Yeyo García MD, Marie De Anda, [facility], [facility], CNP, Lily Seo, CNP, Dick Breen, CNS, Sid Buckner, CNP, Syed Solitario, CNP, Lynn Saavedra, CNP, Dave Barber, CNP, Rohit Ortega, CNP, Cecilia Sheridan, PA-C, Rakesh Marrufo, [facility] [place], Monica Talbert, [facility] [place], Ousmane Ray, CNP, Regina Chahal, CNP, Jomar Walter, CNP, Shona Moreland, CNP, Ruben Wiggins, CNP, [address]    Progress Note    1/19/2022    7:07 AM    Name:   Yenifer Holcomb  MRN:     [id number]     [place]:      [de-identified]   Room:   [address] [place] [address] Day:  3  Admit Date:  1/16/2022  4:34 PM    PCP:   Adilson Ugalde MD  Code Status:  Full Code    Subjective:     C/C:   Chief Complaint   Patient presents with    Hyperglycemia     blood sugar at bedside 344, blurred vision, \"heavy breathing\"     Abdominal Pain     left side, started this morning     Emesis     started this morning with abdominal pain      Interval History Status: improved. Patient is resting comfortably, blood sugars remain well to well controlled off insulin drip. Denies any chest pain, shortness of breath, nausea or vomiting, fevers or chills or acute complaints. Brief History: This is a 78-year-old female with a history of type 2 diabetes treated with metformin, Amaryl and Lantus that presents with uncontrolled blood sugars with signs of DKA.   Apparently since the death of her mother last week she has not been eating or drinking has felt quite depressed. She had worsening blood sugar control over the last several days and presented here with abdominal pain, nausea and vomiting was found to have DKA. She has been admitted for IV fluid resuscitation and insulin drip with improvement in her DKA. Review of Systems:     Constitutional:  negative for chills, fevers, sweats  Respiratory:  negative for cough, dyspnea on exertion, shortness of breath, wheezing  Cardiovascular:  negative for chest pain, chest pressure/discomfort, lower extremity edema, palpitations  Gastrointestinal:  negative for abdominal pain, constipation, diarrhea, nausea, vomiting  Neurological:  negative for dizziness, headache    Medications: Allergies:  No Known Allergies    Current Meds:   Scheduled Meds:    insulin lispro  0-12 Units SubCUTAneous TID WC    insulin lispro  0-6 Units SubCUTAneous Nightly    insulin glargine  24 Units SubCUTAneous Nightly    insulin glargine  56 Units SubCUTAneous Daily    enoxaparin  40 mg SubCUTAneous Daily     Continuous Infusions:    dextrose      sodium chloride      dextrose 5 % and 0.45 % NaCl Stopped (01/18/22 1750)     PRN Meds: potassium chloride **OR** potassium alternative oral replacement **OR** potassium chloride, promethazine, acetaminophen, glucose, dextrose, glucagon (rDNA), dextrose, dextrose, magnesium sulfate, sodium phosphate IVPB **OR** sodium phosphate IVPB **OR** sodium phosphate IVPB, polyethylene glycol, dextrose 5 % and 0.45 % NaCl, ondansetron    Data:     Past Medical History:   has a past medical history of Anxiety, Arthritis, Diabetes ([address] Fibromyalgia, Hernia, Kidney stones, and PONV (postoperative nausea and vomiting). Social History:   reports that she has never smoked. She has never used smokeless tobacco. She reports that she does not drink alcohol and does not use drugs.      Family History:   Family History   Problem Relation Age of Onset   April Lawson Stroke Mother     COPD Mother     Heart Disease Mother  Other Father         sepsis/SBO       Vitals:  BP (!) 115/56   Pulse 85   Temp 98.2 °F (36.8 °C) (Oral)   Resp 16   Ht 5' 2.99\" (1.6 m)   Wt 187 lb 13.3 oz (85.2 kg)   LMP 2017   SpO2 96%   BMI 33.28 kg/m²   Temp (24hrs), Av.1 °F (36.7 °C), Min:97.3 °F (36.3 °C), Max:98.8 °F (37.1 °C)    Recent Labs     22  0124 22  0345 22  0438 22  0541   POCGLU 170* 89 183* 168*       I/O (24Hr): Intake/Output Summary (Last 24 hours) at 2022 0707  Last data filed at 2022 1814  Gross per 24 hour   Intake 61.41 ml   Output --   Net 61.41 ml       Labs:  Hematology:  Recent Labs     22  1656 22  1656 22  1228 22  0643 22  0633   WBC 24.6*   < > 13.9* 9.2 6.1   RBC 5.34*   < > 4.95 4.58 4.52   HGB 15.3   < > 14.1 13.2 13.0   HCT 48.1*   < > 43.0 40.4 38.8   MCV 90.1   < > 86.9 88.2 85.9   MCH 28.6   < > 28.5 28.8 28.7   MCHC 31.8   < > 32.9 32.7 33.4   RDW 14.3   < > 14.5 14.6 14.0   *   < > 370 273 281   MPV 8.6   < > 8.2 9.7 7.8   DDIMER 0.52  --   --   --   --     < > = values in this interval not displayed. Chemistry:  Recent Labs     22  1656 22  2237 22  2233 22  0232 22  0633   *   < > 134* 138 139   K 5.2   < > 3.8 3.3* 3.4*   CL 89*   < > 102 108* 107   CO2 <6*   < > 16* 17* 20   GLUCOSE 356*   < > 247* 161* 181*   BUN 36*   < > 10 9 9   CREATININE 1.55*   < > 0.54 0.51 0.55   MG 2.6   < > 2.1 2.1 2.1   ANIONGAP Unable to calculate anion gap due to CO2 less than 6.   < > 16 13 12   LABGLOM 35*   < > >60 >60 >60   GFRAA 42*   < > >60 >60 >60   CALCIUM 8.8   < > 8.5* 8.8 8.7   PHOS  --    < > 1.9* 1.9* 2.5*   TROPHS 22*  --   --   --   --     < > = values in this interval not displayed.      Recent Labs     22  1656 22  1909 22  1626 22  2100 22  0124 22  0345 22  0438 22  0541   PROT 8.1  --   --   --   --   --   --   --    LABALBU 4.7  --   -- prophylaxis  6.  Discharge home    Manus Artie, DO  1/19/2022  7:07 AM

## 2022-01-19 NOTE — FLOWSHEET NOTE
Discharge instructions reviewed with pt, pt voiced understanding denies any questions/concerns. Awaiting ride.

## 2022-01-19 NOTE — DISCHARGE SUMMARY
[facility]  Office: [address], , Karyn Wallace, DO, Angelica Carter DO, Joaquin Hwang, DO, Eloina Goff MD, Elton Jaime MD, Chaya Chaudhari MD, Pal Ortiz MD, Exie Ahumada, MD, Yoshi Robledo MD, Moises Terrell MD, Alfred Hendrickson, DO, Rayna Pickett DO, Salinas Landry MD,  Brianna Mccord DO, Gonzalo Quispe MD, Elba Steve MD, Jodi Johnson MD, Teresita Cummings MD, Bouchra Soares MD, Thu Rubio MD, Beatris Cannon MD, Joey H[facility]ley [facility], [facility], [facility], Mason Nunez, [facility], [facility], CNS, Landen Landry, CNP, Teddy [facility], CNP, Leobardo Maharaj, CNP, Jac Hauser, CNP, Sammy Ugarte, CNP, Santino Evans PA-C, Cyrus Mccarthy, [facility], Benton Gongora, [facility], Truman Valentin, CNP, Waylon Hair, CNP, Pippa Pizarro, CNP, Marisol Kilgore, CNP, Negrita Mckenna, CNP, Tamra Dalal [address]27    Discharge Summary     Patient ID: Felecia Aleman  :  1965   MRN: [id number]     ACCOUNT:  [de-identified]   Patient's PCP: Danielal Thapa MD  Admit Date: 2022   Discharge Date: 2022     Length of Stay: 3  Code Status:  Full Code  Admitting Physician: Chaya Chaudhari MD  Discharge Physician: Kwadwo Alvarado DO     Active Discharge Diagnoses:     Hospital Problem Lists:  Principal Problem:    Diabetic ketoacidosis without coma associated with type 2 diabetes mellitus ([facility]  Active Problems:    High anion gap metabolic acidosis    ROQUE (acute kidney injury) ([facility]    DKA, type 2, not at goal [facility])    Grief  Resolved Problems:    * No resolved hospital problems.  *      Admission Condition:  serious     Discharged Condition: stable    Hospital Stay:     Hospital Course:  Felecia Aleman is a 65 y.o. female who was admitted for the management of  Diabetic ketoacidosis without coma associated with type 2 diabetes mellitus ([facility] , presented to ER with Hyperglycemia (blood sugar at bedside 344, blurred vision, \"heavy breathing\" ), Abdominal Pain (left side, started this morning ), and Emesis (started this morning with abdominal pain )    This is a 41-year-old female who presents with a complaint of abdominal pain, nausea and vomiting and elevated blood sugars with blurred vision. She was found to have DKA as admitted and placed on insulin drip and DKA protocol labs. She apparently recently lost her mother and has not been eating or drinking well and not taking her medications. Subsequently she developed DKA. After 24 hours on the insulin drip her labs markedly improved and her sugars were controlled. She was transition back to her usual Lantus dose with sliding scale insulin. Sugars remain controlled with continued resolution of her acidosis. We will do the 19th she is otherwise back to baseline and stable. Significant therapeutic interventions: As above    Significant Diagnostic Studies:   Labs / Micro:  CBC:   Lab Results   Component Value Date    WBC 6.1 01/19/2022    RBC 4.52 01/19/2022    HGB 13.0 01/19/2022    HCT 38.8 01/19/2022    MCV 85.9 01/19/2022    MCH 28.7 01/19/2022    MCHC 33.4 01/19/2022    RDW 14.0 01/19/2022     01/19/2022     BMP:    Lab Results   Component Value Date    GLUCOSE 181 01/19/2022     01/19/2022    K 3.4 01/19/2022     01/19/2022    CO2 20 01/19/2022    ANIONGAP 12 01/19/2022    BUN 9 01/19/2022    CREATININE 0.55 01/19/2022    BUNCRER NOT REPORTED 01/19/2022    CALCIUM 8.7 01/19/2022    LABGLOM >60 01/19/2022    GFRAA >60 01/19/2022    GFR      01/19/2022    GFR NOT REPORTED 01/19/2022        Radiology:  XR CHEST PORTABLE    Result Date: 1/16/2022  No acute cardiopulmonary abnormality. Consultations:    Consults:     Final Specialist Recommendations/Findings:   None      The patient was seen and examined on day of discharge and this discharge summary is in conjunction with any daily progress note from day of discharge.     Discharge plan: Disposition: Home    Physician Follow Up:   PCP 1 week  No follow-up provider specified. Requiring Further Evaluation/Follow Up POST HOSPITALIZATION/Incidental Findings: None    Diet: diabetic diet    Activity: As tolerated    Instructions to Patient: Take medication as prescribed    Discharge Medications:      Medication List      CONTINUE taking these medications    calcium-vitamin D 500-200 MG-UNIT per tablet     escitalopram 20 MG tablet  Commonly known as: LEXAPRO     glimepiride 4 MG tablet  Commonly known as: AMARYL     HumaLOG KwikPen 200 UNIT/ML Sopn pen  Generic drug: insulin lispro  Administer as directed by physician with sliding scale which was provided up to 60 units daily     Insulin Pen Needle 32G X 4 MM Misc     Lantus SoloStar 100 UNIT/ML injection pen  Generic drug: insulin glargine     lisinopril 5 MG tablet  Commonly known as: PRINIVIL;ZESTRIL     metFORMIN 500 MG extended release tablet  Commonly known as: GLUCOPHAGE-XR     predniSONE 10 MG tablet  Commonly known as: DELTASONE     Steglatro 15 MG Tabs  Generic drug: Ertugliflozin L-PyroglutamicAc     therapeutic multivitamin-minerals tablet           Where to Get Your Medications      [facility], [address] [place] [address]    Phone: [phone]   · HumaLOG KwikPen 200 UNIT/ML Sopn pen         No discharge procedures on file. Time Spent on discharge is  28 minutes in patient examination, evaluation, counseling as well as medication reconciliation, prescriptions for required medications, discharge plan and follow up. Electronically signed by   Renato Burgos DO  1/19/2022  9:28 AM      Thank you Dr. Phong Omalley MD for the opportunity to be involved in this patient's care.

## 2022-01-20 ENCOUNTER — SCHEDULED TELEPHONE ENCOUNTER (OUTPATIENT)
Dept: PHARMACY | Facility: CLINIC | Age: 57
End: 2022-01-20

## 2022-01-20 ENCOUNTER — CARE COORDINATION (OUTPATIENT)
Dept: OTHER | Facility: CLINIC | Age: 57
End: 2022-01-20

## 2022-01-20 NOTE — CARE COORDINATION
Care Transitions Outreach Attempt    Call within 2 business days of discharge: Yes   Attempted to reach patient for transitions of care follow up. Unable to reach patient. Patient: Aleyda Lorenzo Patient : 1965 MRN: [id number]    Last Discharge [facility]       Complaint Diagnosis Description Type Department Provider    22 Hyperglycemia; Abdominal Pain; Emesis Diabetic ketoacidosis without coma associated with diabetes mellitus due to underlying condition [facility]) ED to Hosp-Admission (Discharged) (ADMITTED) KHANG Ochoa, DO; Sandra Cavazos . .. ACM attempted to reach patient for follow up call regarding CT, DM. HIPAA compliant message left requesting a return phone call at patients convenience. Will continue to follow. Was this an external facility discharge? No Discharge Facility: [facility]    Noted following upcoming appointments from discharge chart review:   [facility] follow up appointment(s): No future appointments. Non-[facility] follow up appointment(s): ?    Jb Heredia. Brenden Chery, [address] Coordinator  Associate Care Management  [address] [place] [address]  Phone: [phone]  [email or web address]

## 2022-01-21 ENCOUNTER — CARE COORDINATION (OUTPATIENT)
Dept: OTHER | Facility: CLINIC | Age: 57
End: 2022-01-21

## 2022-01-21 NOTE — CARE COORDINATION
Care Transitions Outreach Attempt    Call within 2 business days of discharge: Yes   Attempted to reach patient for transitions of care follow up. Unable to reach patient. Patient: Jasper Iverson Patient : 1965 MRN: [id number]    Last Discharge [facility]       Complaint Diagnosis Description Type Department Provider    22 Hyperglycemia; Abdominal Pain; Emesis Diabetic ketoacidosis without coma associated with diabetes mellitus due to underlying condition [facility]) ED to Hosp-Admission (Discharged) (ADMITTED) KHANG BETANCUR MS Madison Colindres, DO; Israel Dickinson . .. ACM attempted to reach patient for follow up call regarding CT. HIPAA compliant message left requesting a return phone call at patients convenience. Will continue to follow. Was this an external facility discharge? No     Noted following upcoming appointments from discharge chart review:   [facility] follow up appointment(s): No future appointments. Non-[facility] follow up appointment(s): ?    Lesley Mak. Phil Stern, [address] Coordinator  Associate Care Management  [address] [place] [address]  Phone: [phone]  [email or web address]

## 2022-01-28 ENCOUNTER — CARE COORDINATION (OUTPATIENT)
Dept: OTHER | Facility: CLINIC | Age: 57
End: 2022-01-28

## 2022-02-01 ENCOUNTER — HOSPITAL ENCOUNTER (INPATIENT)
Age: 57
LOS: 3 days | Discharge: HOME | DRG: 638 | End: 2022-02-04
Attending: EMERGENCY MEDICINE | Admitting: INTERNAL MEDICINE
Payer: COMMERCIAL

## 2022-02-01 ENCOUNTER — APPOINTMENT (OUTPATIENT)
Dept: GENERAL RADIOLOGY | Age: 57
DRG: 638 | End: 2022-02-01
Payer: COMMERCIAL

## 2022-02-01 DIAGNOSIS — E08.10 DIABETIC KETOACIDOSIS WITHOUT COMA ASSOCIATED WITH DIABETES MELLITUS DUE TO UNDERLYING CONDITION (HCC): Primary | ICD-10-CM

## 2022-02-01 DIAGNOSIS — E11.10 DKA, TYPE 2, NOT AT GOAL (HCC): ICD-10-CM

## 2022-02-01 PROBLEM — E10.10 DKA, TYPE 1, NOT AT GOAL (HCC): Status: ACTIVE | Noted: 2022-02-01

## 2022-02-01 LAB
-: ABNORMAL
ABSOLUTE EOS #: 0 K/UL (ref 0–0.4)
ABSOLUTE IMMATURE GRANULOCYTE: ABNORMAL K/UL (ref 0–0.3)
ABSOLUTE LYMPH #: 0.9 K/UL (ref 1–4.8)
ABSOLUTE MONO #: 0.8 K/UL (ref 0.1–1.2)
ALBUMIN SERPL-MCNC: 4.5 G/DL (ref 3.5–5.2)
ALBUMIN/GLOBULIN RATIO: 1.4 (ref 1–2.5)
ALP BLD-CCNC: 119 U/L (ref 35–104)
ALT SERPL-CCNC: 12 U/L (ref 5–33)
AMORPHOUS: ABNORMAL
ANION GAP SERPL CALCULATED.3IONS-SCNC: 14 MMOL/L (ref 9–17)
ANION GAP SERPL CALCULATED.3IONS-SCNC: 15 MMOL/L (ref 9–17)
ANION GAP SERPL CALCULATED.3IONS-SCNC: 16 MMOL/L (ref 9–17)
ANION GAP SERPL CALCULATED.3IONS-SCNC: 29 MMOL/L (ref 9–17)
AST SERPL-CCNC: 8 U/L
BACTERIA: ABNORMAL
BASOPHILS # BLD: 1 % (ref 0–2)
BASOPHILS ABSOLUTE: 0.1 K/UL (ref 0–0.2)
BETA-HYDROXYBUTYRATE: 7.65 MMOL/L (ref 0.02–0.27)
BILIRUB SERPL-MCNC: 0.22 MG/DL (ref 0.3–1.2)
BILIRUBIN DIRECT: 0.09 MG/DL
BILIRUBIN URINE: ABNORMAL
BILIRUBIN, INDIRECT: 0.13 MG/DL (ref 0–1)
BUN BLDV-MCNC: 11 MG/DL (ref 6–20)
BUN BLDV-MCNC: 14 MG/DL (ref 6–20)
BUN BLDV-MCNC: 19 MG/DL (ref 6–20)
BUN BLDV-MCNC: 9 MG/DL (ref 6–20)
BUN/CREAT BLD: ABNORMAL (ref 9–20)
CALCIUM SERPL-MCNC: 8.1 MG/DL (ref 8.6–10.4)
CALCIUM SERPL-MCNC: 8.1 MG/DL (ref 8.6–10.4)
CALCIUM SERPL-MCNC: 8.3 MG/DL (ref 8.6–10.4)
CALCIUM SERPL-MCNC: 9.6 MG/DL (ref 8.6–10.4)
CASTS UA: ABNORMAL /LPF
CASTS UA: ABNORMAL /LPF
CHLORIDE BLD-SCNC: 105 MMOL/L (ref 98–107)
CHLORIDE BLD-SCNC: 106 MMOL/L (ref 98–107)
CHLORIDE BLD-SCNC: 106 MMOL/L (ref 98–107)
CHLORIDE BLD-SCNC: 98 MMOL/L (ref 98–107)
CO2: 10 MMOL/L (ref 20–31)
CO2: 12 MMOL/L (ref 20–31)
CO2: 12 MMOL/L (ref 20–31)
CO2: 6 MMOL/L (ref 20–31)
COLOR: YELLOW
COMMENT UA: ABNORMAL
CREAT SERPL-MCNC: 0.67 MG/DL (ref 0.5–0.9)
CREAT SERPL-MCNC: 0.68 MG/DL (ref 0.5–0.9)
CREAT SERPL-MCNC: 0.69 MG/DL (ref 0.5–0.9)
CREAT SERPL-MCNC: 1.2 MG/DL (ref 0.5–0.9)
CRYSTALS, UA: ABNORMAL /HPF
DIFFERENTIAL TYPE: ABNORMAL
EOSINOPHILS RELATIVE PERCENT: 0 % (ref 1–4)
EPITHELIAL CELLS UA: ABNORMAL /HPF (ref 0–5)
GFR AFRICAN AMERICAN: 56 ML/MIN
GFR AFRICAN AMERICAN: >60 ML/MIN
GFR NON-AFRICAN AMERICAN: 46 ML/MIN
GFR NON-AFRICAN AMERICAN: >60 ML/MIN
GFR SERPL CREATININE-BSD FRML MDRD: ABNORMAL ML/MIN/{1.73_M2}
GLOBULIN: ABNORMAL G/DL (ref 1.5–3.8)
GLUCOSE BLD-MCNC: 125 MG/DL (ref 65–105)
GLUCOSE BLD-MCNC: 140 MG/DL (ref 65–105)
GLUCOSE BLD-MCNC: 145 MG/DL (ref 65–105)
GLUCOSE BLD-MCNC: 149 MG/DL (ref 65–105)
GLUCOSE BLD-MCNC: 153 MG/DL (ref 65–105)
GLUCOSE BLD-MCNC: 154 MG/DL (ref 65–105)
GLUCOSE BLD-MCNC: 167 MG/DL (ref 70–99)
GLUCOSE BLD-MCNC: 169 MG/DL (ref 65–105)
GLUCOSE BLD-MCNC: 170 MG/DL (ref 65–105)
GLUCOSE BLD-MCNC: 177 MG/DL (ref 70–99)
GLUCOSE BLD-MCNC: 179 MG/DL (ref 65–105)
GLUCOSE BLD-MCNC: 190 MG/DL (ref 65–105)
GLUCOSE BLD-MCNC: 192 MG/DL (ref 65–105)
GLUCOSE BLD-MCNC: 205 MG/DL (ref 65–105)
GLUCOSE BLD-MCNC: 209 MG/DL (ref 70–99)
GLUCOSE BLD-MCNC: 324 MG/DL (ref 65–105)
GLUCOSE BLD-MCNC: 353 MG/DL (ref 65–105)
GLUCOSE BLD-MCNC: 373 MG/DL (ref 70–99)
GLUCOSE URINE: ABNORMAL
HCT VFR BLD CALC: 47.1 % (ref 36–46)
HEMOGLOBIN: 15.2 G/DL (ref 12–16)
IMMATURE GRANULOCYTES: ABNORMAL %
KETONES, URINE: ABNORMAL
LEUKOCYTE ESTERASE, URINE: NEGATIVE
LYMPHOCYTES # BLD: 6 % (ref 24–44)
MAGNESIUM: 2 MG/DL (ref 1.6–2.6)
MAGNESIUM: 2 MG/DL (ref 1.6–2.6)
MAGNESIUM: 2.1 MG/DL (ref 1.6–2.6)
MAGNESIUM: 2.5 MG/DL (ref 1.6–2.6)
MCH RBC QN AUTO: 28.9 PG (ref 26–34)
MCHC RBC AUTO-ENTMCNC: 32.2 G/DL (ref 31–37)
MCV RBC AUTO: 89.7 FL (ref 80–100)
MONOCYTES # BLD: 6 % (ref 2–11)
MUCUS: ABNORMAL
NITRITE, URINE: NEGATIVE
NRBC AUTOMATED: ABNORMAL PER 100 WBC
OTHER OBSERVATIONS UA: ABNORMAL
PDW BLD-RTO: 15.3 % (ref 12.5–15.4)
PH UA: 5.5 (ref 5–8)
PHOSPHORUS: 2.3 MG/DL (ref 2.6–4.5)
PHOSPHORUS: 2.4 MG/DL (ref 2.6–4.5)
PHOSPHORUS: 2.9 MG/DL (ref 2.6–4.5)
PLATELET # BLD: 540 K/UL (ref 140–450)
PLATELET ESTIMATE: ABNORMAL
PMV BLD AUTO: 8.6 FL (ref 6–12)
POTASSIUM SERPL-SCNC: 4 MMOL/L (ref 3.7–5.3)
POTASSIUM SERPL-SCNC: 4.2 MMOL/L (ref 3.7–5.3)
POTASSIUM SERPL-SCNC: 4.5 MMOL/L (ref 3.7–5.3)
POTASSIUM SERPL-SCNC: 4.5 MMOL/L (ref 3.7–5.3)
PROTEIN UA: ABNORMAL
RBC # BLD: 5.25 M/UL (ref 4–5.2)
RBC # BLD: ABNORMAL 10*6/UL
RBC UA: ABNORMAL /HPF (ref 0–2)
RENAL EPITHELIAL, UA: ABNORMAL /HPF
SARS-COV-2, RAPID: NOT DETECTED
SEG NEUTROPHILS: 87 % (ref 36–66)
SEGMENTED NEUTROPHILS ABSOLUTE COUNT: 12.2 K/UL (ref 1.8–7.7)
SODIUM BLD-SCNC: 131 MMOL/L (ref 135–144)
SODIUM BLD-SCNC: 132 MMOL/L (ref 135–144)
SODIUM BLD-SCNC: 133 MMOL/L (ref 135–144)
SODIUM BLD-SCNC: 133 MMOL/L (ref 135–144)
SPECIFIC GRAVITY UA: 1.02 (ref 1–1.03)
SPECIMEN DESCRIPTION: NORMAL
TOTAL PROTEIN: 7.7 G/DL (ref 6.4–8.3)
TRICHOMONAS: ABNORMAL
TROPONIN INTERP: NORMAL
TROPONIN T: NORMAL NG/ML
TROPONIN, HIGH SENSITIVITY: 12 NG/L (ref 0–14)
TURBIDITY: CLEAR
URINE HGB: ABNORMAL
UROBILINOGEN, URINE: NORMAL
WBC # BLD: 14 K/UL (ref 3.5–11)
WBC # BLD: ABNORMAL 10*3/UL
WBC UA: ABNORMAL /HPF (ref 0–5)
YEAST: ABNORMAL

## 2022-02-01 PROCEDURE — 2500000003 HC RX 250 WO HCPCS: Performed by: INTERNAL MEDICINE

## 2022-02-01 PROCEDURE — 80076 HEPATIC FUNCTION PANEL: CPT

## 2022-02-01 PROCEDURE — 99284 EMERGENCY DEPT VISIT MOD MDM: CPT

## 2022-02-01 PROCEDURE — 93005 ELECTROCARDIOGRAM TRACING: CPT | Performed by: EMERGENCY MEDICINE

## 2022-02-01 PROCEDURE — 82947 ASSAY GLUCOSE BLOOD QUANT: CPT

## 2022-02-01 PROCEDURE — 6370000000 HC RX 637 (ALT 250 FOR IP): Performed by: INTERNAL MEDICINE

## 2022-02-01 PROCEDURE — 81001 URINALYSIS AUTO W/SCOPE: CPT

## 2022-02-01 PROCEDURE — 87635 SARS-COV-2 COVID-19 AMP PRB: CPT

## 2022-02-01 PROCEDURE — 96374 THER/PROPH/DIAG INJ IV PUSH: CPT

## 2022-02-01 PROCEDURE — 71045 X-RAY EXAM CHEST 1 VIEW: CPT

## 2022-02-01 PROCEDURE — 6360000002 HC RX W HCPCS: Performed by: INTERNAL MEDICINE

## 2022-02-01 PROCEDURE — 2580000003 HC RX 258: Performed by: EMERGENCY MEDICINE

## 2022-02-01 PROCEDURE — 83735 ASSAY OF MAGNESIUM: CPT

## 2022-02-01 PROCEDURE — 2580000003 HC RX 258: Performed by: INTERNAL MEDICINE

## 2022-02-01 PROCEDURE — 6370000000 HC RX 637 (ALT 250 FOR IP): Performed by: EMERGENCY MEDICINE

## 2022-02-01 PROCEDURE — 82010 KETONE BODYS QUAN: CPT

## 2022-02-01 PROCEDURE — 2000000000 HC ICU R&B

## 2022-02-01 PROCEDURE — 99223 1ST HOSP IP/OBS HIGH 75: CPT | Performed by: INTERNAL MEDICINE

## 2022-02-01 PROCEDURE — 84100 ASSAY OF PHOSPHORUS: CPT

## 2022-02-01 PROCEDURE — 36415 COLL VENOUS BLD VENIPUNCTURE: CPT

## 2022-02-01 PROCEDURE — 80048 BASIC METABOLIC PNL TOTAL CA: CPT

## 2022-02-01 PROCEDURE — 85025 COMPLETE CBC W/AUTO DIFF WBC: CPT

## 2022-02-01 PROCEDURE — 84484 ASSAY OF TROPONIN QUANT: CPT

## 2022-02-01 RX ORDER — DEXTROSE MONOHYDRATE 25 G/50ML
12.5 INJECTION, SOLUTION INTRAVENOUS PRN
Status: DISCONTINUED | OUTPATIENT
Start: 2022-02-01 | End: 2022-02-01 | Stop reason: RX

## 2022-02-01 RX ORDER — DEXTROSE MONOHYDRATE 50 MG/ML
100 INJECTION, SOLUTION INTRAVENOUS PRN
Status: DISCONTINUED | OUTPATIENT
Start: 2022-02-01 | End: 2022-02-04 | Stop reason: HOSPADM

## 2022-02-01 RX ORDER — MAGNESIUM SULFATE 1 G/100ML
1000 INJECTION INTRAVENOUS PRN
Status: DISCONTINUED | OUTPATIENT
Start: 2022-02-01 | End: 2022-02-04 | Stop reason: HOSPADM

## 2022-02-01 RX ORDER — CALCIUM CARBONATE-CHOLECALCIFEROL TAB 250 MG-125 UNIT 250-125 MG-UNIT
1 TAB ORAL 2 TIMES DAILY WITH MEALS
Status: DISCONTINUED | OUTPATIENT
Start: 2022-02-01 | End: 2022-02-04 | Stop reason: HOSPADM

## 2022-02-01 RX ORDER — NICOTINE POLACRILEX 4 MG
15 LOZENGE BUCCAL PRN
Status: DISCONTINUED | OUTPATIENT
Start: 2022-02-01 | End: 2022-02-04 | Stop reason: HOSPADM

## 2022-02-01 RX ORDER — POLYETHYLENE GLYCOL 3350 17 G/17G
17 POWDER, FOR SOLUTION ORAL DAILY PRN
Status: DISCONTINUED | OUTPATIENT
Start: 2022-02-01 | End: 2022-02-04 | Stop reason: HOSPADM

## 2022-02-01 RX ORDER — 0.9 % SODIUM CHLORIDE 0.9 %
1000 INTRAVENOUS SOLUTION INTRAVENOUS ONCE
Status: COMPLETED | OUTPATIENT
Start: 2022-02-01 | End: 2022-02-01

## 2022-02-01 RX ORDER — DEXTROSE MONOHYDRATE 100 MG/ML
125 INJECTION, SOLUTION INTRAVENOUS PRN
Status: DISCONTINUED | OUTPATIENT
Start: 2022-02-01 | End: 2022-02-04 | Stop reason: HOSPADM

## 2022-02-01 RX ORDER — LISINOPRIL 5 MG/1
5 TABLET ORAL DAILY
Status: DISCONTINUED | OUTPATIENT
Start: 2022-02-01 | End: 2022-02-04 | Stop reason: HOSPADM

## 2022-02-01 RX ORDER — ESCITALOPRAM OXALATE 10 MG/1
20 TABLET ORAL DAILY
Status: DISCONTINUED | OUTPATIENT
Start: 2022-02-01 | End: 2022-02-04 | Stop reason: HOSPADM

## 2022-02-01 RX ORDER — M-VIT,TX,IRON,MINS/CALC/FOLIC 27MG-0.4MG
1 TABLET ORAL DAILY
Status: DISCONTINUED | OUTPATIENT
Start: 2022-02-01 | End: 2022-02-04 | Stop reason: HOSPADM

## 2022-02-01 RX ORDER — DEXTROSE MONOHYDRATE 100 MG/ML
250 INJECTION, SOLUTION INTRAVENOUS PRN
Status: DISCONTINUED | OUTPATIENT
Start: 2022-02-01 | End: 2022-02-04 | Stop reason: HOSPADM

## 2022-02-01 RX ORDER — ONDANSETRON 2 MG/ML
4 INJECTION INTRAMUSCULAR; INTRAVENOUS EVERY 6 HOURS PRN
Status: DISCONTINUED | OUTPATIENT
Start: 2022-02-01 | End: 2022-02-04 | Stop reason: HOSPADM

## 2022-02-01 RX ORDER — SODIUM CHLORIDE 450 MG/100ML
INJECTION, SOLUTION INTRAVENOUS CONTINUOUS
Status: DISCONTINUED | OUTPATIENT
Start: 2022-02-01 | End: 2022-02-03

## 2022-02-01 RX ORDER — ACETAMINOPHEN 325 MG/1
650 TABLET ORAL EVERY 4 HOURS PRN
Status: DISCONTINUED | OUTPATIENT
Start: 2022-02-01 | End: 2022-02-04 | Stop reason: HOSPADM

## 2022-02-01 RX ORDER — DEXTROSE AND SODIUM CHLORIDE 5; .45 G/100ML; G/100ML
INJECTION, SOLUTION INTRAVENOUS CONTINUOUS PRN
Status: DISCONTINUED | OUTPATIENT
Start: 2022-02-01 | End: 2022-02-04 | Stop reason: HOSPADM

## 2022-02-01 RX ORDER — POTASSIUM CHLORIDE 7.45 MG/ML
10 INJECTION INTRAVENOUS PRN
Status: DISCONTINUED | OUTPATIENT
Start: 2022-02-01 | End: 2022-02-03

## 2022-02-01 RX ORDER — 0.9 % SODIUM CHLORIDE 0.9 %
15 INTRAVENOUS SOLUTION INTRAVENOUS ONCE
Status: COMPLETED | OUTPATIENT
Start: 2022-02-01 | End: 2022-02-01

## 2022-02-01 RX ADMIN — ACETAMINOPHEN 650 MG: 325 TABLET ORAL at 13:22

## 2022-02-01 RX ADMIN — SODIUM CHLORIDE 1253 ML: 9 INJECTION, SOLUTION INTRAVENOUS at 12:49

## 2022-02-01 RX ADMIN — ACETAMINOPHEN 650 MG: 325 TABLET ORAL at 19:20

## 2022-02-01 RX ADMIN — ONDANSETRON 4 MG: 2 INJECTION INTRAMUSCULAR; INTRAVENOUS at 13:22

## 2022-02-01 RX ADMIN — SODIUM PHOSPHATE, MONOBASIC, MONOHYDRATE 10 MMOL: 276; 142 INJECTION, SOLUTION INTRAVENOUS at 16:20

## 2022-02-01 RX ADMIN — SODIUM CHLORIDE 7.92 UNITS/HR: 9 INJECTION, SOLUTION INTRAVENOUS at 11:22

## 2022-02-01 RX ADMIN — Medication 250 MG: at 16:19

## 2022-02-01 RX ADMIN — DEXTROSE AND SODIUM CHLORIDE: 5; 450 INJECTION, SOLUTION INTRAVENOUS at 12:49

## 2022-02-01 RX ADMIN — ENOXAPARIN SODIUM 40 MG: 100 INJECTION SUBCUTANEOUS at 14:07

## 2022-02-01 RX ADMIN — SODIUM CHLORIDE 1000 ML: 9 INJECTION, SOLUTION INTRAVENOUS at 10:30

## 2022-02-01 RX ADMIN — SODIUM PHOSPHATE, MONOBASIC, MONOHYDRATE 10 MMOL: 276; 142 INJECTION, SOLUTION INTRAVENOUS at 23:57

## 2022-02-01 RX ADMIN — ONDANSETRON 4 MG: 2 INJECTION INTRAMUSCULAR; INTRAVENOUS at 19:20

## 2022-02-01 RX ADMIN — POTASSIUM CHLORIDE 10 MEQ: 7.46 INJECTION, SOLUTION INTRAVENOUS at 17:01

## 2022-02-01 RX ADMIN — DEXTROSE AND SODIUM CHLORIDE: 5; 450 INJECTION, SOLUTION INTRAVENOUS at 21:53

## 2022-02-01 RX ADMIN — POTASSIUM CHLORIDE 10 MEQ: 7.46 INJECTION, SOLUTION INTRAVENOUS at 16:18

## 2022-02-01 RX ADMIN — ESCITALOPRAM OXALATE 20 MG: 10 TABLET ORAL at 14:06

## 2022-02-01 RX ADMIN — Medication 1 TABLET: at 14:06

## 2022-02-01 ASSESSMENT — PAIN SCALES - GENERAL
PAINLEVEL_OUTOF10: 3
PAINLEVEL_OUTOF10: 7
PAINLEVEL_OUTOF10: 0
PAINLEVEL_OUTOF10: 3

## 2022-02-01 ASSESSMENT — PAIN DESCRIPTION - PAIN TYPE: TYPE: ACUTE PAIN

## 2022-02-01 ASSESSMENT — PAIN DESCRIPTION - FREQUENCY: FREQUENCY: CONTINUOUS

## 2022-02-01 ASSESSMENT — PAIN - FUNCTIONAL ASSESSMENT: PAIN_FUNCTIONAL_ASSESSMENT: PREVENTS OR INTERFERES SOME ACTIVE ACTIVITIES AND ADLS

## 2022-02-01 ASSESSMENT — PAIN DESCRIPTION - ORIENTATION: ORIENTATION: MID

## 2022-02-01 ASSESSMENT — PAIN DESCRIPTION - LOCATION: LOCATION: HEAD

## 2022-02-01 ASSESSMENT — ENCOUNTER SYMPTOMS
VOMITING: 1
NAUSEA: 1

## 2022-02-01 ASSESSMENT — PAIN DESCRIPTION - ONSET: ONSET: ON-GOING

## 2022-02-01 ASSESSMENT — PAIN DESCRIPTION - PROGRESSION: CLINICAL_PROGRESSION: NOT CHANGED

## 2022-02-01 ASSESSMENT — PAIN DESCRIPTION - DESCRIPTORS: DESCRIPTORS: HEADACHE

## 2022-02-01 NOTE — FLOWSHEET NOTE
[address] - [address] [facility]  PROGRESS NOTE    Shift date: 2/1/22  Shift day: Tuesday   Shift # 1    Room # 551/165-50   Name: Georgiana Long            Age: 65 y.o. Gender: female          Protestant: Mando Hollins [address] of Orthodoxy: Na Chidial[address]    Referral: Daughter requested a  visit. Admit Date & Time: 2/1/2022  9:55 AM    PATIENT/EVENT DESCRIPTION:  Georgiana Long is a 65 y.o. female whom writer met during her previous admission. Today, daughter, Michelle Morales, asked if writer could visit Patient and provide support regarding Pt's grief of the loss of her parents. SPIRITUAL ASSESSMENT/INTERVENTION:  Ms. Boni Radford appeared to be sleeping. Her daughters were at bedside. Daughters, Michelle Morales and Kristina Gibson, introduced themselves to writer. They noted that Patient was sleeping. Patient opened her eyes and smiled at Chalice Ba. She told writer she was \"doing okay at this time. \" Writer offered to visit Patient at a later time. Pt was agreeable. Later, writer visited briefly with Patient's daughter who discussed more of her concerns about Pt due to her grief and other stressors. Daughter voiced hopes that Pt will get the support she needs, and affirmed her role as Patient's advocate. Writer will attempt to visit Patient later this week. SPIRITUAL CARE FOLLOW-UP PLAN:  Chaplains will remain available to offer spiritual and emotional support as needed. 02/01/22 1606   Encounter Summary   Services provided to: Patient and family together   Referral/Consult From: Family   Support System Family members; Children   Continue Visiting   (2/1/22)   Complexity of Encounter Moderate   Length of Encounter 15 minutes   Spiritual Assessment Completed Yes   Routine   Type Follow up   Assessment Calm; Approachable   Intervention Sustaining presence/ Ministry of presence; Explored feelings, thoughts, concerns   Outcome Expressed gratitude;Coping       Electronically signed by Jarad De Anda, on 2/1/2022 at 4:09 PM.  [facility]  [phone]

## 2022-02-01 NOTE — PROGRESS NOTES
Pt arrived to floor via stretcher from ED and ambulated to bed. Telemetry activated. Patient oriented to room and use of call light. Call light and personal items within reach. Admission and assessment initiated. POC and education initiated and reviewed with patient. Telemetry-     342      . Denied further needs or questions at this time. Will continue to monitor.

## 2022-02-01 NOTE — CARE COORDINATION
Case Management Initial Discharge Plan  Elizabeth Carvalho             Met with:patient to discuss discharge plans. Information verified: address, contacts, phone number, , insurance Yes  Insurance Provider: Sanjay Knox    Emergency Contact/Next of Kin name & number: daughter [place] [phone]   Who are involved in patient's support system? family    PCP: Feng Lewis MD  Date of last visit: 1/10/2022 per Epic      Discharge Planning    Living Arrangements:        Patient able to perform ADL's:Independent    Current Services (outpatient & in home) none  DME equipment: glucometer  DME provider:     Is patient receiving oral anticoagulation therapy? No    Potential Assistance Needed:       Patient agreeable to home care: No  [place] of choice provided:  no    Prior SNF/Rehab Placement and Facility: no  Agreeable to SNF/Rehab: No  [place] of choice provided: no     Evaluation: no    Expected Discharge date:       Patient expects to be discharged to: If home: is the family and/or caregiver wiling & able to provide support at home? n/a  Who will be providing this support? indepedent    Follow Up Appointment: Best Day/ Time:      Transportation provider: self  Transportation arrangements needed for discharge: No    Readmission Risk              Risk of Unplanned Readmission:  0         Does patient have a readmission risk score greater than 14?: No  If yes, follow-up appointment must be made within 7 days of discharge. Goals of Care: blood glucose management      Educated patient on transitional options, provided freedom of choice and are agreeable with plan      Discharge Plan: home, independent - missed appointment with Diabetes Care Management on  per Epic.            Electronically signed by Jojo Antunez RN on 22 at 1:46 PM EST

## 2022-02-01 NOTE — ED NOTES
Patient arrived to ER with complaints of elevated blood glucose levels. States she had frequent urination previous night and this morning had increasing blurred vision with n/v and checked her blood sugar with ranges >400. Patient self administered all regular medication prescribed at 0700 hours this morning. States breathing rate has also increased with no additional symptoms at this time.       Tracy Benavides RN  02/01/22 1569

## 2022-02-01 NOTE — PROGRESS NOTES
Occupational [address]  Occupational Therapy Not Seen Note    DATE: 2022    NAME: Nica Benjamin  MRN: [id number]   : 1965      Patient not seen this date for Occupational Therapy due to: Other:  RN reported Pt is not appropriate at this time d/t emesis. OT will re-check Pt for participation in [address] [place] [address] / Treatment at later date / time as appropriate.         Electronically signed by GALE Dumont OTR/L on 2022 at 1:32 PM

## 2022-02-01 NOTE — PLAN OF CARE
Problem: Pain:  Goal: Pain level will decrease  Outcome: Ongoing   Pain scale preformed per protocol and pt treated for pain as documented. Education given.      Problem: Serum Glucose Level - Abnormal:  Goal: Ability to maintain appropriate glucose levels will improve  Outcome: Ongoing   DKA protocol

## 2022-02-01 NOTE — H&P
returns at this time with recurrent DKA despite compliance with diet and medications. She states that she is overall felt well until she developed polyuria and polydipsia over the last 48 hours and subsequently developed abdominal pain with nausea with worsening glucose control. She denies a cough, dysuria, fevers or chills or other associated symptoms. Past Medical History:     Past Medical History:   Diagnosis Date    Anxiety     Arthritis     Diabetes ([address]     Fibromyalgia     Hernia     abdominal, RT    Kidney stones 2009    stent at that time, has had multiple stones    PONV (postoperative nausea and vomiting)     EXTREME, WANTS SCOPE PATCH        Past Surgical History:     Past Surgical History:   Procedure Laterality Date    ACHILLES TENDON SURGERY Left 2001    BACK SURGERY      L4 L5 S1, 1998    FRACTURE SURGERY Right 08/04/2015    ccrp ring finger    KNEE ARTHROSCOPY Left 1993    KNEE ARTHROSCOPY Right 04/03/2017    OVARY REMOVAL Right 2003    IN KNEE SCOPE,DIAGNOSTIC Right 4/3/2017    KNEE ARTHROSCOPY performed by Sadie Muhammad MD at [address] [place] [address]          Medications Prior to Admission:     Prior to Admission medications    Medication Sig Start Date End Date Taking?  Authorizing Provider   insulin lispro (HUMALOG KWIKPEN) 200 UNIT/ML SOPN pen Administer as directed by physician with sliding scale which was provided up to 60 units daily 1/19/22   Sher Brennan DO   predniSONE (DELTASONE) 10 MG tablet TAKE 2 TABLETS BY MOUTH ONCE A DAY IN THE MORNING FOR 3 DAYS THEN TAKE 1 TABLET BY MOUTH ONE TIME A DAY IN THE MORNING THEREAFTER 11/10/21   Historical Provider, MD   escitalopram (LEXAPRO) 20 MG tablet Take 20 mg by mouth daily 3/9/20   Historical Provider, MD   Ertugliflozin L-PyroglutamicAc (STEGLATRO) 15 MG TABS Take 1 tablet by mouth daily    Historical Provider, MD   Calcium Carb-Cholecalciferol (CALCIUM-VITAMIN D) 500-200 MG-UNIT per tablet Take 1 tablet by mouth 2 times daily (with meals)    Historical Provider, MD   Insulin Pen Needle 32G X 4 MM MISC 1 each by Does not apply route 2 times daily    Historical Provider, MD   glimepiride (AMARYL) 4 MG tablet Take 4mg by mouth in the morning and take 2mg by mouth in the afternoon (with meals)    Historical Provider, MD   insulin glargine (LANTUS SOLOSTAR) 100 UNIT/ML injection pen Inject under the skin 48 units in the morning and 32 units in the evening    Historical Provider, MD   metFORMIN (GLUCOPHAGE-XR) 500 MG extended release tablet Take 1,000 mg by mouth 2 times daily (with meals)    Historical Provider, MD   lisinopril (PRINIVIL;ZESTRIL) 5 MG tablet Take 5 mg by mouth daily    Historical Provider, MD   Multiple Vitamins-Minerals (THERAPEUTIC MULTIVITAMIN-MINERALS) tablet Take 1 tablet by mouth daily    Historical Provider, MD        Allergies:     Patient has no known allergies. Social History:     Tobacco:    reports that she has never smoked. She has never used smokeless tobacco.  Alcohol:      reports no history of alcohol use. Drug Use:  reports no history of drug use. Family History:     Family History   Problem Relation Age of Onset    Stroke Mother     COPD Mother     Heart Disease Mother     Other Father         sepsis/SBO       Review of Systems:     Positive and Negative as described in HPI.     CONSTITUTIONAL:  negative for fevers, chills, sweats, fatigue, weight loss  HEENT:  negative for vision, hearing changes, runny nose, throat pain  RESPIRATORY:  negative for shortness of breath, cough, congestion, wheezing  CARDIOVASCULAR:  negative for chest pain, palpitations  GASTROINTESTINAL:  negative for vomiting, diarrhea, constipation, change in bowel habits, abdominal pain   GENITOURINARY:  negative for difficulty of urination, burning with urination, frequency   INTEGUMENT:  negative for rash, skin lesions, easy bruising   HEMATOLOGIC/LYMPHATIC:  negative for swelling/edema   ALLERGIC/IMMUNOLOGIC: negative for urticaria , itching  ENDOCRINE: Positive increase in drinking, increase in urination, negative for hot or cold intolerance  MUSCULOSKELETAL:  negative joint pains, muscle aches, swelling of joints  NEUROLOGICAL:  negative for headaches, dizziness, lightheadedness, numbness, pain, tingling extremities  BEHAVIOR/PSYCH:  negative for depression, anxiety    Physical Exam:   BP (!) 104/57   Pulse 81   Temp 98.4 °F (36.9 °C) (Oral)   Resp 16   Ht 5' 3\" (1.6 m)   Wt 178 lb 2.1 oz (80.8 kg)   LMP 2017   SpO2 99%   BMI 31.55 kg/m²   Temp (24hrs), Av.8 °F (36.6 °C), Min:97.5 °F (36.4 °C), Max:98.4 °F (36.9 °C)    Recent Labs     22  1502 22  1608 22  1702 22  1757   POCGLU 190* 205* 192* 169*       Intake/Output Summary (Last 24 hours) at 2022 7269  Last data filed at 2022 1804  Gross per 24 hour   Intake 1629.1 ml   Output 1050 ml   Net 579.1 ml       General Appearance:  alert, well appearing, and in no acute distress  Mental status: oriented to person, place, and time  Head:  normocephalic, atraumatic  Eye: no icterus, redness, pupils equal and reactive, extraocular eye movements intact, conjunctiva clear  Ear: normal external ear, no discharge, hearing intact  Nose:  no drainage noted  Mouth: mucous membranes moist  Neck: supple, no carotid bruits, thyroid not palpable  Lungs: Bilateral equal air entry, clear to ausculation, no wheezing, rales or rhonchi, normal effort  Cardiovascular: normal rate, regular rhythm, no murmur, gallop, rub  Abdomen: Soft, nontender, nondistended, normal bowel sounds, no hepatomegaly or splenomegaly  Neurologic: There are no new focal motor or sensory deficits, normal muscle tone and bulk, no abnormal sensation, normal speech, cranial nerves II through XII grossly intact  Skin: No gross lesions, rashes, bruising or bleeding on exposed skin area  Extremities:  peripheral pulses palpable, no pedal edema or calf pain with REPORTED <0.03 ng/mL    Troponin Interp NOT REPORTED    Magnesium    Collection Time: 02/01/22 10:15 AM   Result Value Ref Range    Magnesium 2.5 1.6 - 2.6 mg/dL   Hepatic Function Panel    Collection Time: 02/01/22 10:15 AM   Result Value Ref Range    Albumin 4.5 3.5 - 5.2 g/dL    Alkaline Phosphatase 119 (H) 35 - 104 U/L    ALT 12 5 - 33 U/L    AST 8 <32 U/L    Total Bilirubin 0.22 (L) 0.3 - 1.2 mg/dL    Bilirubin, Direct 0.09 <0.31 mg/dL    Bilirubin, Indirect 0.13 0.00 - 1.00 mg/dL    Total Protein 7.7 6.4 - 8.3 g/dL    Globulin NOT REPORTED 1.5 - 3.8 g/dL    Albumin/Globulin Ratio 1.4 1.0 - 2.5   Beta-Hydroxybutyrate    Collection Time: 02/01/22 10:15 AM   Result Value Ref Range    Beta-Hydroxybutyrate 7.65 (H) 0.02 - 0.27 mmol/L   Urinalysis Reflex to Culture    Collection Time: 02/01/22 10:35 AM   Result Value Ref Range    Color, UA Yellow Yellow    Turbidity UA Clear Clear    Glucose, Ur 3+ (A) NEGATIVE    Bilirubin Urine NEGATIVE  Verified by ictotest. (A) NEGATIVE    Ketones, Urine LARGE (A) NEGATIVE    Specific Gravity, UA 1.020 1.005 - 1.030    Urine Hgb SMALL (A) NEGATIVE    pH, UA 5.5 5.0 - 8.0    Protein, UA 1+ (A) NEGATIVE    Urobilinogen, Urine Normal Normal    Nitrite, Urine NEGATIVE NEGATIVE    Leukocyte Esterase, Urine NEGATIVE NEGATIVE    Urinalysis Comments NOT REPORTED    Microscopic Urinalysis    Collection Time: 02/01/22 10:35 AM   Result Value Ref Range    -          WBC, UA 2 TO 5 0 - 5 /HPF    RBC, UA 0 TO 2 0 - 2 /HPF    Casts UA 5 TO 10 /LPF    Casts UA HYALINE /LPF    Crystals, UA NOT REPORTED None /HPF    Epithelial Cells UA 5 TO 10 0 - 5 /HPF    Renal Epithelial, UA NOT REPORTED 0 /HPF    Bacteria, UA FEW (A) None    Mucus, UA 1+ (A) None    Trichomonas, UA NOT REPORTED None    Amorphous, UA NOT REPORTED None    Other Observations UA (A) NOT REQ. Utilizing a urinalysis as the only screening method to exclude a potential uropathogen can be unreliable in many patient populations. Rapid screening tests are less sensitive than culture and if UTI is a clinical possibility, culture should be considered despite a negative urinalysis. Yeast, UA NOT REPORTED None   POC Glucose Fingerstick    Collection Time: 02/01/22 11:09 AM   Result Value Ref Range    POC Glucose 324 (H) 65 - 105 mg/dL   COVID-19, Rapid    Collection Time: 02/01/22 11:50 AM    Specimen: Nasopharyngeal Swab   Result Value Ref Range    Specimen Description . NASOPHARYNGEAL SWAB     SARS-CoV-2, Rapid Not Detected Not Detected   POC Glucose Fingerstick    Collection Time: 02/01/22 12:03 PM   Result Value Ref Range    POC Glucose 125 (H) 65 - 105 mg/dL   POC Glucose Fingerstick    Collection Time: 02/01/22  1:00 PM   Result Value Ref Range    POC Glucose 140 (H) 65 - 105 mg/dL   POC Glucose Fingerstick    Collection Time: 02/01/22  2:06 PM   Result Value Ref Range    POC Glucose 149 (H) 65 - 105 mg/dL   POC Glucose Fingerstick    Collection Time: 02/01/22  3:02 PM   Result Value Ref Range    POC Glucose 190 (H) 65 - 105 mg/dL   Basic Metabolic Panel    Collection Time: 02/01/22  3:29 PM   Result Value Ref Range    Glucose 209 (H) 70 - 99 mg/dL    BUN 14 6 - 20 mg/dL    CREATININE 0.68 0.50 - 0.90 mg/dL    Bun/Cre Ratio NOT REPORTED 9 - 20    Calcium 8.1 (L) 8.6 - 10.4 mg/dL    Sodium 132 (L) 135 - 144 mmol/L    Potassium 4.5 3.7 - 5.3 mmol/L    Chloride 106 98 - 107 mmol/L    CO2 10 (L) 20 - 31 mmol/L    Anion Gap 16 9 - 17 mmol/L    GFR Non-African American >60 >60 mL/min    GFR African American >60 >60 mL/min    GFR Comment          GFR Staging NOT REPORTED    Magnesium    Collection Time: 02/01/22  3:29 PM   Result Value Ref Range    Magnesium 2.1 1.6 - 2.6 mg/dL   Phosphorus    Collection Time: 02/01/22  3:29 PM   Result Value Ref Range    Phosphorus 2.4 (L) 2.6 - 4.5 mg/dL   POC Glucose Fingerstick    Collection Time: 02/01/22  4:08 PM   Result Value Ref Range    POC Glucose 205 (H) 65 - 105 mg/dL   POC Glucose Fingerstick Collection Time: 02/01/22  5:02 PM   Result Value Ref Range    POC Glucose 192 (H) 65 - 105 mg/dL   POC Glucose Fingerstick    Collection Time: 02/01/22  5:57 PM   Result Value Ref Range    POC Glucose 169 (H) 65 - 105 mg/dL       Imaging/Diagnostics:  XR CHEST PORTABLE    Result Date: 2/1/2022  The lungs are clear . There is no effusion or pneumothorax . The cardiomediastinal silhouette is within normal limits . No acute bony findings . IMPRESSION: No acute pulmonary process. Assessment :      Hospital Problems           Last Modified POA    * (Principal) DKA, type 2, not at goal [facility]) 2/1/2022 Yes    ROQUE (acute kidney injury) ([facility] [address] 2/1/2022 Yes    Diabetic ketoacidosis without coma associated with diabetes mellitus due to underlying condition ([facility] [address] 2/1/2022 Yes                Plan:     Patient status inpatient in the Medical ICU    1. Inpatient admission  2. Insulin drip  3. DKA protocol labs  4. IV hydration  5. Correct electrolytes as needed per scale  6. GI and DVT prophylaxis  7. N.p.o. except for ice chips  8. Activity as tolerated  9. See orders for details    Consultations:   IP CONSULT TO DIABETES EDUCATOR  IP CONSULT TO DIETITIAN     Patient is admitted as inpatient status because of co-morbidities listed above, severity of signs and symptoms as outlined, requirement for current medical therapies and most importantly because of direct risk to patient if care not provided in a hospital setting. Expected length of stay > 48 hours.     Jamaal Garcia DO  2/1/2022  6:52 PM    Copy sent to Dr. Zulema Lynn MD

## 2022-02-01 NOTE — ED PROVIDER NOTES
[id number] [facility] ED  [address] [facility] [address]  Phone: [phone]  Fax: [phone]        Pt Name: Thom Garcia  MRN: [id number]  [place] 1965  Date of evaluation: 2/1/22      CHIEF COMPLAINT     Chief Complaint   Patient presents with    Hyperglycemia         HISTORY OF PRESENT ILLNESS  (Location/Symptom, Timing/Onset, Context/Setting, Quality, Duration, Modifying Factors, Severity.)    Thom Garcia is a 65 y.o. female who presents with possible diabetic ketoacidosis. The patient recently was admitted for diabetic ketoacidosis she is a diabetic she states she has been taking her diabetic medications she has been watching her diet since her last admission and starting yesterday she started noticing she was having some increased urination started getting some blurred vision nausea which is similar to when she previously had her diabetic ketoacidosis the patient dates she did take insulin this morning she did not take her Lantus this morning has not had any of her oral medications she did have 1 bout of nausea and vomiting this morning that was the first time she vomited with this episode nausea started yesterday the increased urination starting yesterday feels as though she is breathing faster feels as though her heart rate is faster denies any unusual swelling of the arms or legs      REVIEW OF SYSTEMS    (2-9 systems for level 4, 10 or more for level 5)     Review of Systems   Eyes: Positive for visual disturbance. Cardiovascular: Positive for palpitations. Gastrointestinal: Positive for nausea and vomiting. All other systems reviewed and are negative. PAST MEDICAL HISTORY    has a past medical history of Anxiety, Arthritis, Diabetes ([address] Fibromyalgia, Hernia, Kidney stones, and PONV (postoperative nausea and vomiting). SURGICAL HISTORY      has a past surgical history that includes Ovary removal (Right, 2003);  Achilles tendon surgery (Left, ); Tonsillectomy; Knee arthroscopy (Left, ); fracture surgery (Right, 2015); back surgery; Knee arthroscopy (Right, 2017); and pr knee scope,diagnostic (Right, 4/3/2017). CURRENTMEDICATIONS       Previous Medications    CALCIUM CARB-CHOLECALCIFEROL (CALCIUM-VITAMIN D) 500-200 MG-UNIT PER TABLET    Take 1 tablet by mouth 2 times daily (with meals)    ERTUGLIFLOZIN L-PYROGLUTAMICAC (STEGLATRO) 15 MG TABS    Take 1 tablet by mouth daily    ESCITALOPRAM (LEXAPRO) 20 MG TABLET    Take 20 mg by mouth daily    GLIMEPIRIDE (AMARYL) 4 MG TABLET    Take 4mg by mouth in the morning and take 2mg by mouth in the afternoon (with meals)    INSULIN GLARGINE (LANTUS SOLOSTAR) 100 UNIT/ML INJECTION PEN    Inject under the skin 48 units in the morning and 32 units in the evening    INSULIN LISPRO (HUMALOG KWIKPEN) 200 UNIT/ML SOPN PEN    Administer as directed by physician with sliding scale which was provided up to 60 units daily    INSULIN PEN NEEDLE 32G X 4 MM MISC    1 each by Does not apply route 2 times daily    LISINOPRIL (PRINIVIL;ZESTRIL) 5 MG TABLET    Take 5 mg by mouth daily    METFORMIN (GLUCOPHAGE-XR) 500 MG EXTENDED RELEASE TABLET    Take 1,000 mg by mouth 2 times daily (with meals)    MULTIPLE VITAMINS-MINERALS (THERAPEUTIC MULTIVITAMIN-MINERALS) TABLET    Take 1 tablet by mouth daily    PREDNISONE (DELTASONE) 10 MG TABLET    TAKE 2 TABLETS BY MOUTH ONCE A DAY IN THE MORNING FOR 3 DAYS THEN TAKE 1 TABLET BY MOUTH ONE TIME A DAY IN THE MORNING THEREAFTER       ALLERGIES     has No Known Allergies. FAMILY HISTORY     She indicated that her mother is . She indicated that her father is . family history includes COPD in her mother; Heart Disease in her mother; Other in her father; Stroke in her mother. SOCIAL HISTORY      reports that she has never smoked. She has never used smokeless tobacco. She reports that she does not drink alcohol and does not use drugs.     PHYSICAL EXAM    (up to 7 for level 4, 8 or more for level 5)   INITIAL VITALS:  height is 5' 3\" (1.6 m) and weight is 83.5 kg (184 lb). Her oral temperature is 97.5 °F (36.4 °C). Her blood pressure is 126/79 and her pulse is 123. Her respiration is 26 and oxygen saturation is 100%. Physical Exam  Vitals and nursing note reviewed. Constitutional:       Comments: Moderate distress secondary to HPI   HENT:      Head: Normocephalic and atraumatic. Eyes:      Conjunctiva/sclera: Conjunctivae normal.   Cardiovascular:      Rate and Rhythm: Regular rhythm. Tachycardia present. Pulses: Normal pulses. Heart sounds: Normal heart sounds. Pulmonary:      Effort: Pulmonary effort is normal. No respiratory distress. Breath sounds: Normal breath sounds. No stridor. No wheezing, rhonchi or rales. Abdominal:      General: Bowel sounds are normal. There is no distension. Palpations: Abdomen is soft. There is no mass. Tenderness: There is no abdominal tenderness. There is no right CVA tenderness, left CVA tenderness, guarding or rebound. Musculoskeletal:         General: No swelling or tenderness. Normal range of motion. Cervical back: Normal range of motion and neck supple. No rigidity or tenderness. Comments: No calf swelling or tenderness appreciated   Lymphadenopathy:      Cervical: No cervical adenopathy. Skin:     General: Skin is warm and dry. Findings: No rash. Neurological:      General: No focal deficit present. Mental Status: She is alert.       Comments: GCS of 15 with no focal deficits appreciated         DIFFERENTIAL DIAGNOSIS/ MDM:     I will establish an IV give the patient IV fluids we will check labs EKG UA chest x-ray    DIAGNOSTIC RESULTS     EKG: All EKG's are interpreted by the Emergency Department Physician who either signs or Co-signs this chart in the absence of a cardiologist.      Interpreted by Ramses Ramires MD     Rhythm: Sinus tachycardia  Rate: 102  Axis: -6  Ectopy: none  Conduction: normal  ST Segments: no acute change  T Waves: no acute change  Q Waves: none    Clinical Impression: Sinus tachycardia with no acute changes/normal EKG. No acute infarction/ischemia noted. RADIOLOGY:        Interpretation per the Radiologist below, if available at the time of this note:    XR CHEST PORTABLE    Result Date: 2/1/2022  EXAMINATION: [address] 2/1/2022 7:35 am COMPARISON: 01/16/2022 HISTORY: ORDERING SYSTEM PROVIDED HISTORY: hyperglcyemia / history of dka TECHNOLOGIST PROVIDED HISTORY: hyperglcyemia / history of dka Reason for Exam: hyperglcyemia / history of dka     The lungs are clear . There is no effusion or pneumothorax . The cardiomediastinal silhouette is within normal limits . No acute bony findings . IMPRESSION: No acute pulmonary process. XR CHEST PORTABLE    Result Date: 1/16/2022  EXAMINATION: ONE XRAY VIEW OF THE CHEST 1/16/2022 5:44 pm COMPARISON: None. HISTORY: ORDERING SYSTEM PROVIDED HISTORY: dka TECHNOLOGIST PROVIDED HISTORY: dka Reason for Exam: emesis, high blood sugar FINDINGS: The lungs are clear. The cardiac and mediastinal contours are normal.  There is no pleural effusion or pneumothorax. No acute osseous abnormality is identified. No acute cardiopulmonary abnormality.      LABS:  Results for orders placed or performed during the hospital encounter of 02/01/22   CBC Auto Differential   Result Value Ref Range    WBC 14.0 (H) 3.5 - 11.0 k/uL    RBC 5.25 (H) 4.0 - 5.2 m/uL    Hemoglobin 15.2 12.0 - 16.0 g/dL    Hematocrit 47.1 (H) 36 - 46 %    MCV 89.7 80 - 100 fL    MCH 28.9 26 - 34 pg    MCHC 32.2 31 - 37 g/dL    RDW 15.3 12.5 - 15.4 %    Platelets 592 (H) 478 - 450 k/uL    MPV 8.6 6.0 - 12.0 fL    NRBC Automated NOT REPORTED per 100 WBC    Differential Type NOT REPORTED     Seg Neutrophils 87 (H) 36 - 66 %    Lymphocytes 6 (L) 24 - 44 %    Monocytes 6 2 - 11 %    Eosinophils % 0 (L) 1 - 4 %    Basophils 1 0 - 2 %    Immature Granulocytes NOT REPORTED 0 %    Segs Absolute 12.20 (H) 1.8 - 7.7 k/uL    Absolute Lymph # 0.90 (L) 1.0 - 4.8 k/uL    Absolute Mono # 0.80 0.1 - 1.2 k/uL    Absolute Eos # 0.00 0.0 - 0.4 k/uL    Basophils Absolute 0.10 0.0 - 0.2 k/uL    Absolute Immature Granulocyte NOT REPORTED 0.00 - 0.30 k/uL    WBC Morphology NOT REPORTED     RBC Morphology NOT REPORTED     Platelet Estimate NOT REPORTED    Basic Metabolic Panel   Result Value Ref Range    Glucose 373 (H) 70 - 99 mg/dL    BUN 19 6 - 20 mg/dL    CREATININE 1.20 (H) 0.50 - 0.90 mg/dL    Bun/Cre Ratio NOT REPORTED 9 - 20    Calcium 9.6 8.6 - 10.4 mg/dL    Sodium 133 (L) 135 - 144 mmol/L    Potassium 4.5 3.7 - 5.3 mmol/L    Chloride 98 98 - 107 mmol/L    CO2 6 (LL) 20 - 31 mmol/L    Anion Gap 29 (H) 9 - 17 mmol/L    GFR Non-African American 46 (L) >60 mL/min    GFR  56 (L) >60 mL/min    GFR Comment          GFR Staging NOT REPORTED    Troponin   Result Value Ref Range    Troponin, High Sensitivity 12 0 - 14 ng/L    Troponin T NOT REPORTED <0.03 ng/mL    Troponin Interp NOT REPORTED    Urinalysis Reflex to Culture   Result Value Ref Range    Color, UA Yellow Yellow    Turbidity UA Clear Clear    Glucose, Ur 3+ (A) NEGATIVE    Bilirubin Urine NEGATIVE  Verified by ictotest. (A) NEGATIVE    Ketones, Urine LARGE (A) NEGATIVE    Specific Gravity, UA 1.020 1.005 - 1.030    Urine Hgb SMALL (A) NEGATIVE    pH, UA 5.5 5.0 - 8.0    Protein, UA 1+ (A) NEGATIVE    Urobilinogen, Urine Normal Normal    Nitrite, Urine NEGATIVE NEGATIVE    Leukocyte Esterase, Urine NEGATIVE NEGATIVE    Urinalysis Comments NOT REPORTED    Magnesium   Result Value Ref Range    Magnesium 2.5 1.6 - 2.6 mg/dL   Hepatic Function Panel   Result Value Ref Range    Albumin 4.5 3.5 - 5.2 g/dL    Alkaline Phosphatase 119 (H) 35 - 104 U/L    ALT 12 5 - 33 U/L    AST 8 <32 U/L    Total Bilirubin 0.22 (L) 0.3 - 1.2 mg/dL    Bilirubin, Direct 0.09 <0.31 mg/dL    Bilirubin, Indirect 0.13 0.00 - 1.00 mg/dL    Total Protein 7.7 6.4 - 8.3 g/dL    Globulin NOT REPORTED 1.5 - 3.8 g/dL    Albumin/Globulin Ratio 1.4 1.0 - 2.5   Beta-Hydroxybutyrate   Result Value Ref Range    Beta-Hydroxybutyrate 7.65 (H) 0.02 - 0.27 mmol/L   Microscopic Urinalysis   Result Value Ref Range    -          WBC, UA 2 TO 5 0 - 5 /HPF    RBC, UA 0 TO 2 0 - 2 /HPF    Casts UA 5 TO 10 /LPF    Casts UA HYALINE /LPF    Crystals, UA NOT REPORTED None /HPF    Epithelial Cells UA 5 TO 10 0 - 5 /HPF    Renal Epithelial, UA NOT REPORTED 0 /HPF    Bacteria, UA FEW (A) None    Mucus, UA 1+ (A) None    Trichomonas, UA NOT REPORTED None    Amorphous, UA NOT REPORTED None    Other Observations UA (A) NOT REQ. Utilizing a urinalysis as the only screening method to exclude a potential uropathogen can be unreliable in many patient populations. Rapid screening tests are less sensitive than culture and if UTI is a clinical possibility, culture should be considered despite a negative urinalysis. Yeast, UA NOT REPORTED None   POC Glucose Fingerstick   Result Value Ref Range    POC Glucose 353 (H) 65 - 105 mg/dL           EMERGENCY DEPARTMENT COURSE:   Vitals:    Vitals:    02/01/22 1001   BP: 126/79   Pulse: 123   Resp: 26   Temp: 97.5 °F (36.4 °C)   TempSrc: Oral   SpO2: 100%   Weight: 83.5 kg (184 lb)   Height: 5' 3\" (1.6 m)     -------------------------  BP: 126/79, Temp: 97.5 °F (36.4 °C), Pulse: 123, Resp: 26      RE-EVALUATION:  Patient is noted to be in diabetic ketoacidosis her potassium level is 4.5 so we will start the patient on an insulin drip and I will contact my hospitalist for admission  CRITICAL CARE: There was a high probability of clinically significant/life threatening deterioration in this patient's condition which required my urgent intervention. Total critical care time was 35 minutes. This excludes any time for separately reportable procedures.      CONSULTS:  My hospitalist is kind of to admit the patient to his service    PROCEDURES:  None    FINAL IMPRESSION      1. Diabetic ketoacidosis without coma associated with diabetes mellitus due to underlying condition ([facility] [address]          DISPOSITION/PLAN   DISPOSITION        CONDITION ON DISPOSITION:   Stable    PATIENT REFERRED TO:  No follow-up provider specified. DISCHARGE MEDICATIONS:  New Prescriptions    No medications on file       (Please note that portions of this note were completed with a voicerecognition program.  Efforts were made to edit the dictations but occasionally words are mis-transcribed.)    Eugenia Zaidi MD,, MD, F.A.C.E.P.   Attending Emergency Medicine Physician       Eugenia Zaidi MD  02/01/22 9015

## 2022-02-01 NOTE — PROGRESS NOTES
Physical Therapy         Physical Therapy Cancel Note      DATE: 2022    NAME: Hoda Monae  MRN: [id number]   : 1965      Patient not seen this date for Physical Therapy due to: Other: Nursing deferred Eval.  Will recheck tomorrow.       Electronically signed by Marilee Leal PT on 2022 at 1:31 PM

## 2022-02-02 LAB
ANION GAP SERPL CALCULATED.3IONS-SCNC: 12 MMOL/L (ref 9–17)
ANION GAP SERPL CALCULATED.3IONS-SCNC: 13 MMOL/L (ref 9–17)
ANION GAP SERPL CALCULATED.3IONS-SCNC: 14 MMOL/L (ref 9–17)
ANION GAP SERPL CALCULATED.3IONS-SCNC: 15 MMOL/L (ref 9–17)
ANION GAP SERPL CALCULATED.3IONS-SCNC: 8 MMOL/L (ref 9–17)
BUN BLDV-MCNC: 10 MG/DL (ref 6–20)
BUN BLDV-MCNC: 6 MG/DL (ref 6–20)
BUN BLDV-MCNC: 6 MG/DL (ref 6–20)
BUN BLDV-MCNC: 7 MG/DL (ref 6–20)
BUN BLDV-MCNC: 8 MG/DL (ref 6–20)
BUN/CREAT BLD: ABNORMAL (ref 9–20)
CALCIUM SERPL-MCNC: 8.1 MG/DL (ref 8.6–10.4)
CALCIUM SERPL-MCNC: 8.2 MG/DL (ref 8.6–10.4)
CALCIUM SERPL-MCNC: 8.3 MG/DL (ref 8.6–10.4)
CALCIUM SERPL-MCNC: 8.5 MG/DL (ref 8.6–10.4)
CALCIUM SERPL-MCNC: 8.6 MG/DL (ref 8.6–10.4)
CHLORIDE BLD-SCNC: 103 MMOL/L (ref 98–107)
CHLORIDE BLD-SCNC: 105 MMOL/L (ref 98–107)
CHLORIDE BLD-SCNC: 105 MMOL/L (ref 98–107)
CHLORIDE BLD-SCNC: 108 MMOL/L (ref 98–107)
CHLORIDE BLD-SCNC: 111 MMOL/L (ref 98–107)
CO2: 12 MMOL/L (ref 20–31)
CO2: 13 MMOL/L (ref 20–31)
CO2: 13 MMOL/L (ref 20–31)
CO2: 17 MMOL/L (ref 20–31)
CO2: 17 MMOL/L (ref 20–31)
CREAT SERPL-MCNC: 0.55 MG/DL (ref 0.5–0.9)
CREAT SERPL-MCNC: 0.59 MG/DL (ref 0.5–0.9)
CREAT SERPL-MCNC: 0.6 MG/DL (ref 0.5–0.9)
CREAT SERPL-MCNC: 0.64 MG/DL (ref 0.5–0.9)
CREAT SERPL-MCNC: 0.69 MG/DL (ref 0.5–0.9)
ESTIMATED AVERAGE GLUCOSE: 263 MG/DL
GFR AFRICAN AMERICAN: >60 ML/MIN
GFR NON-AFRICAN AMERICAN: >60 ML/MIN
GFR SERPL CREATININE-BSD FRML MDRD: ABNORMAL ML/MIN/{1.73_M2}
GLUCOSE BLD-MCNC: 134 MG/DL (ref 65–105)
GLUCOSE BLD-MCNC: 136 MG/DL (ref 65–105)
GLUCOSE BLD-MCNC: 138 MG/DL (ref 65–105)
GLUCOSE BLD-MCNC: 139 MG/DL (ref 65–105)
GLUCOSE BLD-MCNC: 143 MG/DL (ref 65–105)
GLUCOSE BLD-MCNC: 154 MG/DL (ref 70–99)
GLUCOSE BLD-MCNC: 155 MG/DL (ref 70–99)
GLUCOSE BLD-MCNC: 156 MG/DL (ref 65–105)
GLUCOSE BLD-MCNC: 163 MG/DL (ref 65–105)
GLUCOSE BLD-MCNC: 165 MG/DL (ref 65–105)
GLUCOSE BLD-MCNC: 175 MG/DL (ref 65–105)
GLUCOSE BLD-MCNC: 182 MG/DL (ref 65–105)
GLUCOSE BLD-MCNC: 197 MG/DL (ref 65–105)
GLUCOSE BLD-MCNC: 200 MG/DL (ref 65–105)
GLUCOSE BLD-MCNC: 211 MG/DL (ref 65–105)
GLUCOSE BLD-MCNC: 218 MG/DL (ref 65–105)
GLUCOSE BLD-MCNC: 231 MG/DL (ref 65–105)
GLUCOSE BLD-MCNC: 233 MG/DL (ref 70–99)
GLUCOSE BLD-MCNC: 254 MG/DL (ref 70–99)
GLUCOSE BLD-MCNC: 312 MG/DL (ref 65–105)
GLUCOSE BLD-MCNC: 349 MG/DL (ref 70–99)
HBA1C MFR BLD: 10.8 % (ref 4–6)
HCT VFR BLD CALC: 38.6 % (ref 36–46)
HEMOGLOBIN: 12.8 G/DL (ref 12–16)
MAGNESIUM: 1.9 MG/DL (ref 1.6–2.6)
MCH RBC QN AUTO: 28.9 PG (ref 26–34)
MCHC RBC AUTO-ENTMCNC: 33.2 G/DL (ref 31–37)
MCV RBC AUTO: 87 FL (ref 80–100)
NRBC AUTOMATED: NORMAL PER 100 WBC
PDW BLD-RTO: 14.9 % (ref 12.5–15.4)
PHOSPHORUS: 2 MG/DL (ref 2.6–4.5)
PHOSPHORUS: 2.4 MG/DL (ref 2.6–4.5)
PHOSPHORUS: 2.5 MG/DL (ref 2.6–4.5)
PHOSPHORUS: 3 MG/DL (ref 2.6–4.5)
PHOSPHORUS: 3.1 MG/DL (ref 2.6–4.5)
PLATELET # BLD: 329 K/UL (ref 140–450)
PMV BLD AUTO: 8.2 FL (ref 6–12)
POTASSIUM SERPL-SCNC: 3.3 MMOL/L (ref 3.7–5.3)
POTASSIUM SERPL-SCNC: 3.8 MMOL/L (ref 3.7–5.3)
POTASSIUM SERPL-SCNC: 3.9 MMOL/L (ref 3.7–5.3)
POTASSIUM SERPL-SCNC: 4.5 MMOL/L (ref 3.7–5.3)
POTASSIUM SERPL-SCNC: 4.7 MMOL/L (ref 3.7–5.3)
RBC # BLD: 4.44 M/UL (ref 4–5.2)
SODIUM BLD-SCNC: 131 MMOL/L (ref 135–144)
SODIUM BLD-SCNC: 131 MMOL/L (ref 135–144)
SODIUM BLD-SCNC: 134 MMOL/L (ref 135–144)
SODIUM BLD-SCNC: 134 MMOL/L (ref 135–144)
SODIUM BLD-SCNC: 136 MMOL/L (ref 135–144)
WBC # BLD: 7.7 K/UL (ref 3.5–11)

## 2022-02-02 PROCEDURE — 85027 COMPLETE CBC AUTOMATED: CPT

## 2022-02-02 PROCEDURE — 6360000002 HC RX W HCPCS: Performed by: INTERNAL MEDICINE

## 2022-02-02 PROCEDURE — 97162 PT EVAL MOD COMPLEX 30 MIN: CPT

## 2022-02-02 PROCEDURE — 82947 ASSAY GLUCOSE BLOOD QUANT: CPT

## 2022-02-02 PROCEDURE — 83735 ASSAY OF MAGNESIUM: CPT

## 2022-02-02 PROCEDURE — 80048 BASIC METABOLIC PNL TOTAL CA: CPT

## 2022-02-02 PROCEDURE — 84100 ASSAY OF PHOSPHORUS: CPT

## 2022-02-02 PROCEDURE — 97116 GAIT TRAINING THERAPY: CPT

## 2022-02-02 PROCEDURE — 6360000002 HC RX W HCPCS: Performed by: NURSE PRACTITIONER

## 2022-02-02 PROCEDURE — 2500000003 HC RX 250 WO HCPCS: Performed by: INTERNAL MEDICINE

## 2022-02-02 PROCEDURE — 2580000003 HC RX 258: Performed by: INTERNAL MEDICINE

## 2022-02-02 PROCEDURE — 83036 HEMOGLOBIN GLYCOSYLATED A1C: CPT

## 2022-02-02 PROCEDURE — 6370000000 HC RX 637 (ALT 250 FOR IP): Performed by: INTERNAL MEDICINE

## 2022-02-02 PROCEDURE — 99232 SBSQ HOSP IP/OBS MODERATE 35: CPT | Performed by: INTERNAL MEDICINE

## 2022-02-02 PROCEDURE — 2060000000 HC ICU INTERMEDIATE R&B

## 2022-02-02 PROCEDURE — 36415 COLL VENOUS BLD VENIPUNCTURE: CPT

## 2022-02-02 PROCEDURE — 97166 OT EVAL MOD COMPLEX 45 MIN: CPT

## 2022-02-02 PROCEDURE — 97535 SELF CARE MNGMENT TRAINING: CPT

## 2022-02-02 RX ORDER — INSULIN GLARGINE 100 [IU]/ML
45 INJECTION, SOLUTION SUBCUTANEOUS 2 TIMES DAILY
Status: DISCONTINUED | OUTPATIENT
Start: 2022-02-02 | End: 2022-02-04

## 2022-02-02 RX ORDER — LORAZEPAM 2 MG/ML
0.5 INJECTION INTRAMUSCULAR ONCE
Status: COMPLETED | OUTPATIENT
Start: 2022-02-02 | End: 2022-02-02

## 2022-02-02 RX ORDER — INSULIN GLARGINE 100 [IU]/ML
14 INJECTION, SOLUTION SUBCUTANEOUS ONCE
Status: COMPLETED | OUTPATIENT
Start: 2022-02-02 | End: 2022-02-02

## 2022-02-02 RX ORDER — GLIPIZIDE 5 MG/1
10 TABLET ORAL
Status: DISCONTINUED | OUTPATIENT
Start: 2022-02-03 | End: 2022-02-04 | Stop reason: HOSPADM

## 2022-02-02 RX ORDER — METFORMIN HYDROCHLORIDE 500 MG/1
1000 TABLET, EXTENDED RELEASE ORAL 2 TIMES DAILY WITH MEALS
Status: DISCONTINUED | OUTPATIENT
Start: 2022-02-02 | End: 2022-02-04 | Stop reason: HOSPADM

## 2022-02-02 RX ADMIN — ACETAMINOPHEN 650 MG: 325 TABLET ORAL at 08:11

## 2022-02-02 RX ADMIN — POTASSIUM CHLORIDE 10 MEQ: 7.46 INJECTION, SOLUTION INTRAVENOUS at 13:14

## 2022-02-02 RX ADMIN — ESCITALOPRAM OXALATE 20 MG: 10 TABLET ORAL at 08:02

## 2022-02-02 RX ADMIN — LORAZEPAM 0.5 MG: 2 INJECTION INTRAMUSCULAR; INTRAVENOUS at 05:43

## 2022-02-02 RX ADMIN — INSULIN LISPRO 12 UNITS: 100 INJECTION, SOLUTION INTRAVENOUS; SUBCUTANEOUS at 19:58

## 2022-02-02 RX ADMIN — DEXTROSE AND SODIUM CHLORIDE: 5; 450 INJECTION, SOLUTION INTRAVENOUS at 04:02

## 2022-02-02 RX ADMIN — INSULIN GLARGINE 14 UNITS: 100 INJECTION, SOLUTION SUBCUTANEOUS at 12:23

## 2022-02-02 RX ADMIN — ONDANSETRON 4 MG: 2 INJECTION INTRAMUSCULAR; INTRAVENOUS at 01:14

## 2022-02-02 RX ADMIN — SODIUM PHOSPHATE, MONOBASIC, MONOHYDRATE 10 MMOL: 276; 142 INJECTION, SOLUTION INTRAVENOUS at 11:55

## 2022-02-02 RX ADMIN — POTASSIUM CHLORIDE 10 MEQ: 7.46 INJECTION, SOLUTION INTRAVENOUS at 14:35

## 2022-02-02 RX ADMIN — INSULIN GLARGINE 45 UNITS: 100 INJECTION, SOLUTION SUBCUTANEOUS at 21:35

## 2022-02-02 RX ADMIN — ACETAMINOPHEN 650 MG: 325 TABLET ORAL at 01:14

## 2022-02-02 RX ADMIN — ACETAMINOPHEN 650 MG: 325 TABLET ORAL at 23:21

## 2022-02-02 RX ADMIN — Medication 1 TABLET: at 08:02

## 2022-02-02 RX ADMIN — ACETAMINOPHEN 650 MG: 325 TABLET ORAL at 16:16

## 2022-02-02 RX ADMIN — INSULIN LISPRO 4 UNITS: 100 INJECTION, SOLUTION INTRAVENOUS; SUBCUTANEOUS at 16:16

## 2022-02-02 RX ADMIN — POTASSIUM CHLORIDE 10 MEQ: 7.46 INJECTION, SOLUTION INTRAVENOUS at 11:55

## 2022-02-02 RX ADMIN — Medication 250 MG: at 08:03

## 2022-02-02 RX ADMIN — Medication 250 MG: at 16:16

## 2022-02-02 RX ADMIN — LISINOPRIL 5 MG: 5 TABLET ORAL at 08:03

## 2022-02-02 RX ADMIN — ONDANSETRON 4 MG: 2 INJECTION INTRAMUSCULAR; INTRAVENOUS at 13:14

## 2022-02-02 RX ADMIN — POTASSIUM CHLORIDE 10 MEQ: 7.46 INJECTION, SOLUTION INTRAVENOUS at 10:25

## 2022-02-02 RX ADMIN — ENOXAPARIN SODIUM 40 MG: 100 INJECTION SUBCUTANEOUS at 08:03

## 2022-02-02 RX ADMIN — ONDANSETRON 4 MG: 2 INJECTION INTRAMUSCULAR; INTRAVENOUS at 19:57

## 2022-02-02 RX ADMIN — METFORMIN HYDROCHLORIDE 1000 MG: 500 TABLET, EXTENDED RELEASE ORAL at 21:35

## 2022-02-02 ASSESSMENT — PAIN DESCRIPTION - ORIENTATION: ORIENTATION: MID

## 2022-02-02 ASSESSMENT — PAIN DESCRIPTION - DESCRIPTORS
DESCRIPTORS: ACHING;DISCOMFORT
DESCRIPTORS: DISCOMFORT

## 2022-02-02 ASSESSMENT — PAIN DESCRIPTION - PAIN TYPE
TYPE: OTHER (COMMENT)
TYPE: ACUTE PAIN
TYPE: ACUTE PAIN
TYPE: OTHER (COMMENT)

## 2022-02-02 ASSESSMENT — PAIN SCALES - GENERAL
PAINLEVEL_OUTOF10: 5
PAINLEVEL_OUTOF10: 0
PAINLEVEL_OUTOF10: 5
PAINLEVEL_OUTOF10: 5
PAINLEVEL_OUTOF10: 3
PAINLEVEL_OUTOF10: 0
PAINLEVEL_OUTOF10: 5

## 2022-02-02 ASSESSMENT — PAIN - FUNCTIONAL ASSESSMENT
PAIN_FUNCTIONAL_ASSESSMENT: ACTIVITIES ARE NOT PREVENTED

## 2022-02-02 ASSESSMENT — PAIN DESCRIPTION - PROGRESSION: CLINICAL_PROGRESSION: GRADUALLY WORSENING

## 2022-02-02 ASSESSMENT — PAIN DESCRIPTION - LOCATION
LOCATION: ABDOMEN
LOCATION: HEAD

## 2022-02-02 ASSESSMENT — PAIN DESCRIPTION - FREQUENCY: FREQUENCY: INTERMITTENT

## 2022-02-02 ASSESSMENT — PAIN DESCRIPTION - ONSET: ONSET: ON-GOING

## 2022-02-02 NOTE — CARE COORDINATION
[facility] ICU Quality Flow/Interdisciplinary Rounds Progress Note    Quality Flow Rounds held on February 2, 2022 at [address] Attending:  Bedside Nurse, , Nursing Unit Leadership, Respiratory Therapy, Physical Therapy and Occupational Therapy    Anticipated Discharge Date:  Expected Discharge Date: 02/04/22    Anticipated Discharge Disposition: home    Readmission Risk              Risk of Unplanned Readmission:  18           Discussed patient goal for the day, patient clinical progression, and barriers to discharge.   The following Goal(s) of the Day/Commitment(s) have been identified:  Activity Progression  as vicky and insulin gtt, SW follows - will need PCP and diabetic education appt at d/c      [facility], RN  February 2, 2022

## 2022-02-02 NOTE — PROGRESS NOTES
Occupational Therapy   Occupational Therapy Initial Assessment    Date: 2022   Patient Name: Jaleel Burt  MRN: [id number]     : 1965    Date of Service: 2022    Chief Complaint   Patient presents with    Hyperglycemia     Discharge Recommendations:   (Home with Family / Friend Assistance)     Assessment   Performance deficits / Impairments: Decreased functional mobility ; Decreased endurance;Decreased ADL status; Decreased balance;Decreased strength;Decreased safe awareness;Decreased high-level IADLs  Assessment: Pt previously functioned independently without assistance. Currently, she is demonstrating deficits / impairments with Balance, Functional Mobility, ADLs, IADLs, Strength, and Activity Tolerance. Pt will benefit from ongoing OT services to improve functional participation and independence in order for her to return to prior level of performance. Prognosis: Good  Decision Making: Medium Complexity  OT Education: OT Role;Plan of Care;ADL Adaptive Strategies;Transfer Training;Energy Conservation  Barriers to Learning: Good return by Pt. REQUIRES OT FOLLOW UP: Yes (Continue OT in Acute care setting)         Patient Diagnosis(es): The encounter diagnosis was Diabetic ketoacidosis without coma associated with diabetes mellitus due to underlying condition ([address] has a past medical history of Anxiety, Arthritis, Diabetes ([address] Fibromyalgia, Hernia, Kidney stones, and PONV (postoperative nausea and vomiting). has a past surgical history that includes Ovary removal (Right, ); Achilles tendon surgery (Left, ); Tonsillectomy; Knee arthroscopy (Left, ); fracture surgery (Right, 2015); back surgery; Knee arthroscopy (Right, 2017); and pr knee scope,diagnostic (Right, 4/3/2017).      Restrictions  Restrictions/Precautions  Restrictions/Precautions: General Precautions  Required Braces or Orthoses?: No  Position Activity Restriction  Other position/activity restrictions: Up with Assist, Ice Chips only    Subjective   General  Patient assessed for rehabilitation services?: Yes  Family / Caregiver Present: No  Diagnosis: DKA (type II)  Subjective  Subjective: RN approved Pt to be seen for OT / PT Eval.  Pt was agreeable and cooperative throughout. Patient Currently in Pain: Other (comment) (Pt reports 0/10 pain at rest; has pain only with movement / activity)  Pain Assessment  Functional Pain Assessment: Activities are not prevented  Vital Signs  Patient Currently in Pain: Other (comment) (Pt reports 0/10 pain at rest; has pain only with movement / activity)     Social/Functional History  Social/Functional History  Lives With: Family (Lives with a friend and 3 yo)  Type of Home: House  Home Layout: Two level,Laundry in basement,Bed/Bath upstairs (Flight of stairs to basement with left rail; flight of stairs with right rail to second story.  Bed/bath is typically upstairs but could stay on couch on first floor if needed but would need to climb stairs for restroom)  Home Access: Stairs to enter with rails  Entrance Stairs - Number of Steps: 2-3 steps depending on the entrance  Entrance Stairs - Rails: Both (Bilateral rails at back entrance and right rail in front)  Bathroom Shower/Tub: Tub/Shower unit  Bathroom Toilet: Standard  Bathroom Equipment: Shower chair,Hand-held shower,Grab bars in shower,Grab bars around toilet  Home Equipment: [employer] walker (Pt reports equipment is from her mother and she hasn't required use.)  Receives Help From:  (Pt's friend works out of the home.)  ADL Assistance: Independent  Homemaking Assistance: Independent  Homemaking Responsibilities: Yes  Meal Prep Responsibility: Primary  Laundry Responsibility: Primary  Cleaning Responsibility: Primary  Shopping Responsibility: Primary  Dependent Care Responsibility: Primary  Health Care Management: Primary  Ambulation Assistance: Independent  Transfer Assistance: Independent  Active : Yes  Mode of Transportation: [facility]  Occupation:  (Off work on a leave from caring for her mother)  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 Activity: Standing balance during toilet hygiene / underwear management and standing at the sink for grooming (hand hygiene). Comment: Without DME, Pt completed standing balance / tolerance with CGA. No LOB. Functional Mobility  Activity: To/from bathroom  Assist Level: Contact guard assistance  Functional Mobility Comments: Functional Mobility with Hand-held (CGA) Assist for in-room distances / obstacle negotiation. Required Min VCs for balance and integration of safe negotiation. Toilet Transfers  Toilet - Technique: Ambulating  Equipment Used: Grab bars  Toilet Transfer: Minimal assistance  Toilet Transfers Comments: Completed with standard toilet with Grab Bar (Right). Min VCs for accurate technique and correct use of DME. ADL  Feeding: NPO  Grooming: Contact guard assistance;Verbal cueing; Increased time to complete;Stand by assistance (SBA for grooming while seated. CGA for grooming in standing at the sink.)  UE Dressing: Increased time to complete;Supervision  LE Dressing: Contact guard assistance; Increased time to complete;Verbal cueing  Toileting: Contact guard assistance; Increased time to complete  Additional Comments: Pt required CGA to maintain standing balance during all LB ADLs. She reported dizziness (intermittently) with movement, required increased time for task completion and Min VCs for compensatory movements / diaphragmatic breathing to reduce dizziness.      Tone RUERIN  RUERIN Tone: Normotonic  Tone LUE  LUE Tone: Normotonic  Coordination  Movements Are Fluid And Coordinated: Yes     Bed mobility  Supine to Sit: Stand by assistance  Scooting: Stand by assistance  Comment: HOB flattened and Handrail Right. Min VCs for technique, strategies to reduce dizziness. Transfers  Sit to stand: Contact guard assistance  Stand to sit: Contact guard assistance  Transfer Comments: Transfers / Transitional Movements with Hand-held Assist (CGA) for maintenance of balance. Pt required Min VCs for accurate technique. Cognition  Overall Cognitive Status: WFL     Sensation  Overall Sensation Status: WFL (Pt denies any numbness or tingling)      LUE AROM (degrees)  LUE AROM : WFL  Left Hand AROM (degrees)  Left Hand AROM: WFL  RUE AROM (degrees)  RUE AROM : WFL  Right Hand AROM (degrees)  Right Hand AROM: WFL     Plan   Plan  Times per week: 5-6x/week  Current Treatment Recommendations: Strengthening,Patient/Caregiver Education & Training,Home Management Training,Equipment Evaluation, Education, & procurement,Balance Training,Functional Mobility Training,Endurance Training,Cognitive/Perceptual Training,Safety Education & Training,Self-Care / ADL    AM-PAC Score  -[facility] Inpatient Daily Activity Raw Score: 19 (02/02/22 1331)  AM-PAC Inpatient ADL T-Scale Score : 40.22 (02/02/22 1331)  ADL Inpatient CMS 0-100% Score: 42.8 (02/02/22 1331)  ADL Inpatient CMS G-Code Modifier : CK (02/02/22 1331)    Goals  Short term goals  Time Frame for Short term goals: Within 14 treatment sessions  Short term goal 1: Pt will participate in UB ADLs with Mod I with Good Integration of EC / Ax pacing. Short term goal 2: Pt will participate in LB ADLs with Mod I while maintaining Good Balance. Short term goal 3: Pt will participate in Bathroom Transfers / Mobility with Mod I with Correct Use of AD / DME. Short term goal 4: Pt will demo Item Retrieval / Transport of daily items with Mod I without dizziness.   Short term goal 5: Pt will verbalize / demo Mod I and Good Participation with progressive HEP.        Therapy Time   Individual Concurrent Group Co-treatment   Time In 0709         Time Out 1149         Minutes 23         Timed Code Treatment Minutes: 8 Minutes (ADL)       GALE Ponce, OTR/L

## 2022-02-02 NOTE — PROGRESS NOTES
Physical Therapy    Facility/Department: [facility] SURG ICU  Initial Assessment    NAME: Gerson Barroso  : 1965  MRN: [id number]    Date of Service: 2022  Chief Complaint   Patient presents with    Hyperglycemia       Discharge Recommendations:  Continue to assess pending progress (Likely no further therapy recommended at discharge)   PT Equipment Recommendations  Equipment Needed: No    Assessment   Body structures, Functions, Activity limitations: Decreased functional mobility ; Decreased strength;Decreased endurance;Decreased balance  Assessment: Pt with mildly impaired mobility requiring CGA to ambulate this date secondary to feelings of weakness and dizziness with associated mild unsteadiness. Writer would expect pt's overall balance and mobility to improve to baseline with improvement in overall medical status without likely need for any further therapy at discharge. Prognosis: Good  Decision Making: Medium Complexity  PT Education: Goals;PT Role;Plan of Care;Transfer Training;Functional Mobility Training;Gait Training  REQUIRES PT FOLLOW UP: Yes  Activity Tolerance  Activity Tolerance: Patient limited by fatigue  Activity Tolerance: c/o abdominal pain, dizziness       Patient Diagnosis(es): The encounter diagnosis was Diabetic ketoacidosis without coma associated with diabetes mellitus due to underlying condition ([address] has a past medical history of Anxiety, Arthritis, Diabetes ([address] Fibromyalgia, Hernia, Kidney stones, and PONV (postoperative nausea and vomiting). has a past surgical history that includes Ovary removal (Right, ); Achilles tendon surgery (Left, ); Tonsillectomy; Knee arthroscopy (Left, ); fracture surgery (Right, 2015); back surgery; Knee arthroscopy (Right, 2017); and pr knee scope,diagnostic (Right, 4/3/2017).     Restrictions  Restrictions/Precautions  Restrictions/Precautions: Fall Risk  Required Braces or Orthoses?: No  Position Activity Restriction  Other position/activity restrictions: Up with Assist  Vision/Hearing  Vision: Within Functional Limits  Hearing: Within functional limits     Subjective  General  Patient assessed for rehabilitation services?: Yes  Response To Previous Treatment: Not applicable  Family / Caregiver Present: No  Follows Commands: Within Functional Limits  Subjective  Subjective: Pt supine in bed upon arrival- RN agreeable to therapy. Pt reports abdominal pain today. Pain Screening  Patient Currently in Pain: Yes  Pain Assessment  Pain Assessment: 0-10  Pain Level: 5  Pain Type: Acute pain  Pain Location: Abdomen  Pain Descriptors: Discomfort  Functional Pain Assessment: Activities are not prevented  Non-Pharmaceutical Pain Intervention(s): Ambulation/Increased Activity; Distraction;Repositioned  Response to Pain Intervention: Patient Satisfied  Vital Signs  Patient Currently in Pain: Yes       Orientation  Orientation  Overall Orientation Status: Within Functional Limits  Social/Functional History  Social/Functional History  Lives With: Family (Lives with a friend and 3 yo)  Type of Home: House  Home Layout: Two level,Laundry in basement,Bed/Bath upstairs (Flight of stairs to basement with left rail; flight of stairs with right rail to second story.  Bed/bath is typically upstairs but could stay on couch on first floor if needed but would need to climb stairs for restroom)  Home Access: Stairs to enter with rails  Entrance Stairs - Number of Steps: 2-3 steps depending on the entrance  Entrance Stairs - Rails: Both (Bilateral rails at back entrance and right rail in front)  Bathroom Shower/Tub: Tub/Shower unit  Bathroom Toilet: Standard  Bathroom Equipment: Shower chair,Hand-held shower,Grab bars in shower,Grab bars around toilet  Home Equipment: [email or web address] walker (Pt reports equipment is from her mother and she hasn't required use.)  Receives Help From:  (Pt's friend works out of the home.)  ADL Assistance: Independent  Homemaking Assistance: Independent  Homemaking Responsibilities: Yes  Meal Prep Responsibility: Primary  Laundry Responsibility: Primary  Cleaning Responsibility: Primary  Shopping Responsibility: Primary  Dependent Care Responsibility: Primary  Health Care Management: Primary  Ambulation Assistance: Independent  Transfer Assistance: Independent  Active : Yes  Mode of Transportation: [facility]  Occupation:  (Off work on a leave from caring for her mother)  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[employer], bin  Cognition   Cognition  Overall Cognitive Status: WFL    Objective          AROM RLE (degrees)  RLE AROM: WFL  AROM LLE (degrees)  LLE AROM : WFL  AROM RUE (degrees)  RUE AROM : WFL  AROM LUE (degrees)  LUE AROM : WFL  Strength RLE  Comment: grossly 4/5  Strength LLE  Comment: grossly 4/5  Strength RUE  Comment: Co evaluation with OT-see OT evaluation for full UE assessment  Strength LUE  Comment: Co evaluation with OT-see OT evaluation for full UE assessment     Sensation  Overall Sensation Status: WFL (Pt denies any numbness or tingling)  Bed mobility  Supine to Sit: Stand by assistance (HOB flat with use of hand rail)  Sit to Supine:  (retired to chair at end of session)  Scooting: Stand by assistance  Transfers  Sit to Stand: Contact guard assistance  Stand to sit: Contact guard assistance  Comment: Mild unsteady with complaints of dizziness  Ambulation  Ambulation?: Yes  More Ambulation?: No  Ambulation 1  Surface: level tile  Device: No Device  Assistance: Contact guard assistance  Quality of Gait: decreased step length, cautious gait, mild unsteady from feelings of weakness- likely related to acute illness/NPO status  Gait Deviations: Decreased step length;Shuffles  Distance: 10ft, seated rest break, 15ft  Stairs/Curb  Stairs?: No     Balance  Posture: Fair  Sitting - Static: Good  Sitting - Dynamic: Good  Standing - Static: Fair  Standing - Dynamic: Fair  Comments: standing balance assessed without device        Plan   Plan  Times per week: 5x  Times per day: Daily  Current Treatment Recommendations: Strengthening,Transfer Training,Balance Training,Functional Mobility Training,Endurance Training,Gait Training,Stair training,Safety Education & Training,Home Exercise Program,Patient/Caregiver Education & Training  Safety Devices  Type of devices: Call light within reach,Gait belt,Left in chair,Nurse notified  Restraints  Initially in place: No      AM-PAC Score  AM-PAC Inpatient Mobility Raw Score : 16 (02/02/22 1416)  AM-PAC Inpatient T-Scale Score : 40.78 (02/02/22 1416)  Mobility Inpatient CMS 0-100% Score: 54.16 (02/02/22 1416)  Mobility Inpatient CMS G-Code Modifier : CK (02/02/22 1416)          Goals  Short term goals  Time Frame for Short term goals: 14 visits  Short term goal 1: Pt to ambulate 300ft independently without device  Short term goal 2: Pt to sit <> stand transfer independently  Short term goal 3: Pt to demonstrate independent bed mobility  Short term goal 4: Pt to ascend/descend flight of stairs with one hand rail modified independently  Short term goal 5: Pt to demonstrate good standing balance to decrease risk of falls       Therapy Time   Individual Concurrent Group Co-treatment   Time In 1125         Time Out 1149         Minutes 24         Timed Code Treatment Minutes: [address],

## 2022-02-02 NOTE — CARE COORDINATION
SW consulted to assess current stressors impacting diabetes management. Patient and family had reported recent loss of patient's mother. Since loss patient has had two episodes of DKA. SW met with patient who confirmed she had been providing care for her mother when she passed away unexpectedly. Patient reports she had been preoccupied during that time leading to missed meals and medication. Since mother passed she reports increase in depression symptoms. Denies SI, but reports loss of interest and sadness. Symptoms have made it difficult to manage health needs. SW discussed importance of support with grief/depression in order to ensure health is prioritized. Patient reports she was provided some packets initially regarding grief but nothing specific. Patient would like information on grief counseling/support in the area. SW provided group and individual counseling information.

## 2022-02-02 NOTE — PROGRESS NOTES
[facility]  Office: [address], DO, Rebecca Chong, DO, Gisselle Baker, DO, Desiree Connor Blood, DO, Erma Erwin MD, Myrna Ramos MD, Hernandez Iverson MD, Lora Lemus MD, Curtis Butts MD, Carl Gonzalez MD, Urmila Miller MD, Charlette Horner, DO, Ileana Gould, DO, Sofy Grey MD,  Leta Hurd, DO, Fercho Brown MD, Zaria Taylor MD, Sandi John MD, Clifton Lobo MD, Tabitha Argueta MD, Aretha Alas, [facility], [facility], CNP, Rosa Candelario, CNP, Eugenia Zhou, CNS, Tej Rausch, CNP, Renetta Diallo, CNP, Robert Clark, CNP, Jeremy Gómez, CNP, Gladis Hanley, CNP, Arnoldo Naylor PA-C, Juli Delatorre, [facility], Jaya Garcia, [facility], Richard Dunn, CNP, Celestino Urena, CNP, Anton Jewell, CNP, Tati Asher, CNP, Christine Perkins, CNP, Tamra Abdi 6722    Progress Note    2/2/2022    1:51 PM    Name:   Francesco Abdalla  MRN:     [id number]     [place]:      [de-identified]   Room:   [address] [place] [address] Day:  1  Admit Date:  2/1/2022  9:55 AM    PCP:   Phong Omalley MD  Code Status:  Full Code    Subjective:     C/C:   Chief Complaint   Patient presents with    Hyperglycemia     Interval History Status: improved. Patient's blood sugar is markedly improved and she has decreased nausea and abdominal pain with correction of the acidosis. She denies any chest pain, nausea or vomiting, fevers or chills or other acute complaints. Brief History: This is a 81-year-old female with a 10-year history of type 2 diabetes had she reports is well controlled in the past.  In the last month she has been going through grief with the loss of her mother had episode of DKA shortly after her mom's passing. She presents this time with some of polyuria, polydipsia and increased blood sugars and found to have recurrence of DKA.   She reports that she has been taking her medications and has been eating her routine diet without change in her normal program.  However, family has reported to staff that the patient has not been eating, drinking or taking her medications appropriately. Review of Systems:     Constitutional:  negative for chills, fevers, sweats  Respiratory:  negative for cough, dyspnea on exertion, shortness of breath, wheezing  Cardiovascular:  negative for chest pain, chest pressure/discomfort, lower extremity edema, palpitations  Gastrointestinal:  negative for abdominal pain, constipation, diarrhea, nausea, vomiting  Neurological:  negative for dizziness, headache    Medications: Allergies:  No Known Allergies    Current Meds:   Scheduled Meds:    insulin glargine  14 Units SubCUTAneous Once    insulin lispro  0-12 Units SubCUTAneous Q4H    Calcium Carb-Cholecalciferol  1 tablet Oral BID WC    Ertugliflozin L-PyroglutamicAc  15 mg Oral Daily    escitalopram  20 mg Oral Daily    lisinopril  5 mg Oral Daily    therapeutic multivitamin-minerals  1 tablet Oral Daily    enoxaparin  40 mg SubCUTAneous Daily     Continuous Infusions:    dextrose      [Held by provider] insulin 0.76 Units/hr (02/02/22 1015)    sodium chloride Stopped (02/01/22 1252)    dextrose 5 % and 0.45 % NaCl 150 mL/hr at 02/02/22 0402    dextrose      Or    dextrose       PRN Meds: glucose, glucagon (rDNA), dextrose, dextrose 5 % and 0.45 % NaCl, potassium chloride, magnesium sulfate, sodium phosphate IVPB **OR** sodium phosphate IVPB **OR** sodium phosphate IVPB, polyethylene glycol, dextrose **OR** dextrose, acetaminophen, ondansetron    Data:     Past Medical History:   has a past medical history of Anxiety, Arthritis, Diabetes ([address] Fibromyalgia, Hernia, Kidney stones, and PONV (postoperative nausea and vomiting). Social History:   reports that she has never smoked. She has never used smokeless tobacco. She reports that she does not drink alcohol and does not use drugs.      Family History:   Family History   Problem Relation Age of Onset    Stroke Mother     COPD Mother     Heart Disease Mother     Other Father         sepsis/SBO       Vitals:  /83   Pulse 69   Temp 97.5 °F (36.4 °C)   Resp 14   Ht 5' 3\" (1.6 m)   Wt 178 lb 2.1 oz (80.8 kg)   LMP 2017   SpO2 95%   BMI 31.55 kg/m²   Temp (24hrs), Av.1 °F (36.7 °C), Min:97.5 °F (36.4 °C), Max:98.4 °F (36.9 °C)    Recent Labs     22  0902 22  1011 22  1104 22  1219   POCGLU 139* 136* 143* 134*       I/O (24Hr): Intake/Output Summary (Last 24 hours) at 2022 1351  Last data filed at 2022 0401  Gross per 24 hour   Intake 3762.35 ml   Output 2250 ml   Net 1512.35 ml       Labs:  Hematology:  Recent Labs     22  1015 22  0330   WBC 14.0* 7.7   RBC 5.25* 4.44   HGB 15.2 12.8   HCT 47.1* 38.6   MCV 89.7 87.0   MCH 28.9 28.9   MCHC 32.2 33.2   RDW 15.3 14.9   * 329   MPV 8.6 8.2     Chemistry:  Recent Labs     22  1015 22  1529 22  0330 22  0758 22  1146   *   < > 134* 136 134*   K 4.5   < > 3.8 3.3* 3.9   CL 98   < > 108* 111* 105   CO2 6*   < > 13* 17* 17*   GLUCOSE 373*   < > 233* 155* 154*   BUN 19   < > 8 7 6   CREATININE 1.20*   < > 0.69 0.59 0.64   MG 2.5   < > 1.9 1.9 1.9   ANIONGAP 29*   < > 13 8* 12   LABGLOM 46*   < > >60 >60 >60   GFRAA 56*   < > >60 >60 >60   CALCIUM 9.6   < > 8.2* 8.1* 8.6   PHOS  --    < > 3.1 2.4* 2.5*   TROPHS 12  --   --   --   --     < > = values in this interval not displayed.      Recent Labs     22  1015 22  1109 22  0330 22  0401 22  0702 22  0759 22  0902 22  1011 22  1104 22  1219   PROT 7.7  --   --   --   --   --   --   --   --   --    LABALBU 4.5  --   --   --   --   --   --   --   --   --    LABA1C  --   --  10.8*  --   --   --   --   --   --   --    AST 8  --   --   --   --   --   --   --   --   --    ALT 12  --   --   --   --   --   --   --   --   --    ALKPHOS 119*  --   --   -- --   --   --   --   --   --    BILITOT 0.22*  --   --   --   --   --   --   --   --   --    BILIDIR 0.09  --   --   --   --   --   --   --   --   --    POCGLU  --    < >  --    < > 163* 138* 139* 136* 143* 134*    < > = values in this interval not displayed. ABG:  Lab Results   Component Value Date    POCPH 7.231 01/17/2022    POCPCO2 19.5 01/17/2022    POCPO2 81.0 01/17/2022    POCHCO3 8.2 01/17/2022    NBEA 17 01/17/2022    PBEA NOT REPORTED 01/17/2022    DJJ6OMG NOT REPORTED 01/17/2022    IOGP5TVX 94 01/17/2022    FIO2 NOT REPORTED 01/17/2022     No results found for: SPECIAL  No results found for: CULTURE    Radiology:  XR CHEST PORTABLE    Result Date: 2/1/2022  The lungs are clear . There is no effusion or pneumothorax . The cardiomediastinal silhouette is within normal limits . No acute bony findings . IMPRESSION: No acute pulmonary process. Physical Examination:       General appearance:  alert, cooperative and no distress  Mental Status:  oriented to person, place and time and normal affect  Lungs:  clear to auscultation bilaterally, normal effort  Heart:  regular rate and rhythm, no murmur  Abdomen:  soft, nontender, nondistended, normal bowel sounds, no masses, hepatomegaly, splenomegaly  Extremities:  no edema, redness, tenderness in the calves  Skin:  no gross lesions, rashes, induration    Assessment:     Hospital Problems           Last Modified POA    * (Principal) DKA, type 2, not at goal [facility]) 2/1/2022 Yes    ROQUE (acute kidney injury) ([facility] 2/1/2022 Yes    Diabetic ketoacidosis without coma associated with diabetes mellitus due to underlying condition ([facility] 2/1/2022 Yes                Plan:     1. Discontinue insulin drip as acidosis and sugar has improved, anion gap is closed  2. Resume home insulin products  3. Monitor renal function, avoid nephrotoxic agents  4. Continue to correct electrolytes per DKA protocol, has had hypokalemia and hypophosphatemia  5.  GI and DVT prophylaxis  6. Activity as tolerated  7. Discharge planning 24 to 48 hours pending progress.   8. We will likely benefit from outpatient grief counseling    Onofre Marrufo DO  2/2/2022  1:51 PM

## 2022-02-02 NOTE — PLAN OF CARE
Problem: Pain:  Goal: Control of acute pain  Description: Control of acute pain  2/2/2022 0026 by Felice Chavarria RN  Outcome: Ongoing   No signs/symptoms of pain noted, pain rating <3 on scale 0-10, pain controlled with medication/repositioning   Problem: Serum Glucose Level - Abnormal:  Goal: Ability to maintain appropriate glucose levels will improve  Description: Ability to maintain appropriate glucose levels will improve  2/2/2022 0026 by Felice Chavarria RN  Outcome: Ongoing   Blood sugars trending down to WNL, continue to monitor and titrate insulin gtt

## 2022-02-03 LAB
ANION GAP SERPL CALCULATED.3IONS-SCNC: 10 MMOL/L (ref 9–17)
ANION GAP SERPL CALCULATED.3IONS-SCNC: 12 MMOL/L (ref 9–17)
ANION GAP SERPL CALCULATED.3IONS-SCNC: 13 MMOL/L (ref 9–17)
ANION GAP SERPL CALCULATED.3IONS-SCNC: 9 MMOL/L (ref 9–17)
BUN BLDV-MCNC: 11 MG/DL (ref 6–20)
BUN BLDV-MCNC: 8 MG/DL (ref 6–20)
BUN BLDV-MCNC: 8 MG/DL (ref 6–20)
BUN BLDV-MCNC: 9 MG/DL (ref 6–20)
BUN/CREAT BLD: ABNORMAL (ref 9–20)
CALCIUM SERPL-MCNC: 8.3 MG/DL (ref 8.6–10.4)
CALCIUM SERPL-MCNC: 8.5 MG/DL (ref 8.6–10.4)
CALCIUM SERPL-MCNC: 8.6 MG/DL (ref 8.6–10.4)
CALCIUM SERPL-MCNC: 9 MG/DL (ref 8.6–10.4)
CHLORIDE BLD-SCNC: 103 MMOL/L (ref 98–107)
CHLORIDE BLD-SCNC: 103 MMOL/L (ref 98–107)
CHLORIDE BLD-SCNC: 105 MMOL/L (ref 98–107)
CHLORIDE BLD-SCNC: 105 MMOL/L (ref 98–107)
CO2: 16 MMOL/L (ref 20–31)
CO2: 17 MMOL/L (ref 20–31)
CO2: 19 MMOL/L (ref 20–31)
CO2: 19 MMOL/L (ref 20–31)
CREAT SERPL-MCNC: 0.46 MG/DL (ref 0.5–0.9)
CREAT SERPL-MCNC: 0.48 MG/DL (ref 0.5–0.9)
CREAT SERPL-MCNC: 0.5 MG/DL (ref 0.5–0.9)
CREAT SERPL-MCNC: 0.52 MG/DL (ref 0.5–0.9)
GFR AFRICAN AMERICAN: >60 ML/MIN
GFR NON-AFRICAN AMERICAN: >60 ML/MIN
GFR SERPL CREATININE-BSD FRML MDRD: ABNORMAL ML/MIN/{1.73_M2}
GLUCOSE BLD-MCNC: 132 MG/DL (ref 65–105)
GLUCOSE BLD-MCNC: 145 MG/DL (ref 65–105)
GLUCOSE BLD-MCNC: 148 MG/DL (ref 65–105)
GLUCOSE BLD-MCNC: 152 MG/DL (ref 70–99)
GLUCOSE BLD-MCNC: 155 MG/DL (ref 65–105)
GLUCOSE BLD-MCNC: 166 MG/DL (ref 70–99)
GLUCOSE BLD-MCNC: 169 MG/DL (ref 65–105)
GLUCOSE BLD-MCNC: 176 MG/DL (ref 65–105)
GLUCOSE BLD-MCNC: 187 MG/DL (ref 70–99)
GLUCOSE BLD-MCNC: 191 MG/DL (ref 70–99)
MAGNESIUM: 1.9 MG/DL (ref 1.6–2.6)
MAGNESIUM: 2 MG/DL (ref 1.6–2.6)
MAGNESIUM: 2 MG/DL (ref 1.6–2.6)
PHOSPHORUS: 1.8 MG/DL (ref 2.6–4.5)
PHOSPHORUS: 3.2 MG/DL (ref 2.6–4.5)
PHOSPHORUS: 3.6 MG/DL (ref 2.6–4.5)
POTASSIUM SERPL-SCNC: 3.5 MMOL/L (ref 3.7–5.3)
POTASSIUM SERPL-SCNC: 3.8 MMOL/L (ref 3.7–5.3)
POTASSIUM SERPL-SCNC: 4 MMOL/L (ref 3.7–5.3)
POTASSIUM SERPL-SCNC: 4.2 MMOL/L (ref 3.7–5.3)
SODIUM BLD-SCNC: 132 MMOL/L (ref 135–144)
SODIUM BLD-SCNC: 133 MMOL/L (ref 135–144)

## 2022-02-03 PROCEDURE — 97110 THERAPEUTIC EXERCISES: CPT

## 2022-02-03 PROCEDURE — 6360000002 HC RX W HCPCS: Performed by: INTERNAL MEDICINE

## 2022-02-03 PROCEDURE — 6370000000 HC RX 637 (ALT 250 FOR IP): Performed by: NURSE PRACTITIONER

## 2022-02-03 PROCEDURE — 36415 COLL VENOUS BLD VENIPUNCTURE: CPT

## 2022-02-03 PROCEDURE — 6370000000 HC RX 637 (ALT 250 FOR IP): Performed by: INTERNAL MEDICINE

## 2022-02-03 PROCEDURE — 97116 GAIT TRAINING THERAPY: CPT

## 2022-02-03 PROCEDURE — 97535 SELF CARE MNGMENT TRAINING: CPT

## 2022-02-03 PROCEDURE — 2580000003 HC RX 258: Performed by: INTERNAL MEDICINE

## 2022-02-03 PROCEDURE — 80048 BASIC METABOLIC PNL TOTAL CA: CPT

## 2022-02-03 PROCEDURE — 2060000000 HC ICU INTERMEDIATE R&B

## 2022-02-03 PROCEDURE — 99232 SBSQ HOSP IP/OBS MODERATE 35: CPT | Performed by: INTERNAL MEDICINE

## 2022-02-03 PROCEDURE — 84100 ASSAY OF PHOSPHORUS: CPT

## 2022-02-03 PROCEDURE — 82947 ASSAY GLUCOSE BLOOD QUANT: CPT

## 2022-02-03 PROCEDURE — 83735 ASSAY OF MAGNESIUM: CPT

## 2022-02-03 PROCEDURE — 2500000003 HC RX 250 WO HCPCS: Performed by: INTERNAL MEDICINE

## 2022-02-03 RX ORDER — LOPERAMIDE HYDROCHLORIDE 2 MG/1
2 CAPSULE ORAL 4 TIMES DAILY PRN
Status: DISCONTINUED | OUTPATIENT
Start: 2022-02-03 | End: 2022-02-04 | Stop reason: HOSPADM

## 2022-02-03 RX ORDER — POTASSIUM CHLORIDE 7.45 MG/ML
10 INJECTION INTRAVENOUS PRN
Status: DISCONTINUED | OUTPATIENT
Start: 2022-02-03 | End: 2022-02-04 | Stop reason: HOSPADM

## 2022-02-03 RX ORDER — INSULIN GLARGINE 100 [IU]/ML
45 INJECTION, SOLUTION SUBCUTANEOUS 2 TIMES DAILY
Qty: 5 PEN | Refills: 3 | Status: SHIPPED | OUTPATIENT
Start: 2022-02-03

## 2022-02-03 RX ORDER — POTASSIUM CHLORIDE 20 MEQ/1
40 TABLET, EXTENDED RELEASE ORAL PRN
Status: DISCONTINUED | OUTPATIENT
Start: 2022-02-03 | End: 2022-02-04 | Stop reason: HOSPADM

## 2022-02-03 RX ADMIN — POTASSIUM BICARBONATE 40 MEQ: 782 TABLET, EFFERVESCENT ORAL at 06:56

## 2022-02-03 RX ADMIN — INSULIN GLARGINE 45 UNITS: 100 INJECTION, SOLUTION SUBCUTANEOUS at 20:11

## 2022-02-03 RX ADMIN — ACETAMINOPHEN 650 MG: 325 TABLET ORAL at 04:57

## 2022-02-03 RX ADMIN — METFORMIN HYDROCHLORIDE 1000 MG: 500 TABLET, EXTENDED RELEASE ORAL at 16:00

## 2022-02-03 RX ADMIN — POTASSIUM CHLORIDE 40 MEQ: 1500 TABLET, EXTENDED RELEASE ORAL at 00:34

## 2022-02-03 RX ADMIN — INSULIN LISPRO 2 UNITS: 100 INJECTION, SOLUTION INTRAVENOUS; SUBCUTANEOUS at 20:11

## 2022-02-03 RX ADMIN — INSULIN GLARGINE 45 UNITS: 100 INJECTION, SOLUTION SUBCUTANEOUS at 08:27

## 2022-02-03 RX ADMIN — LOPERAMIDE HYDROCHLORIDE 2 MG: 2 CAPSULE ORAL at 20:49

## 2022-02-03 RX ADMIN — METFORMIN HYDROCHLORIDE 1000 MG: 500 TABLET, EXTENDED RELEASE ORAL at 08:27

## 2022-02-03 RX ADMIN — Medication 250 MG: at 16:00

## 2022-02-03 RX ADMIN — ACETAMINOPHEN 650 MG: 325 TABLET ORAL at 16:02

## 2022-02-03 RX ADMIN — LISINOPRIL 5 MG: 5 TABLET ORAL at 08:28

## 2022-02-03 RX ADMIN — ESCITALOPRAM OXALATE 20 MG: 10 TABLET ORAL at 08:27

## 2022-02-03 RX ADMIN — Medication 1 TABLET: at 08:27

## 2022-02-03 RX ADMIN — INSULIN LISPRO 3 UNITS: 100 INJECTION, SOLUTION INTRAVENOUS; SUBCUTANEOUS at 03:46

## 2022-02-03 RX ADMIN — INSULIN LISPRO 3 UNITS: 100 INJECTION, SOLUTION INTRAVENOUS; SUBCUTANEOUS at 15:27

## 2022-02-03 RX ADMIN — GLIPIZIDE 10 MG: 5 TABLET ORAL at 06:56

## 2022-02-03 RX ADMIN — INSULIN LISPRO 3 UNITS: 100 INJECTION, SOLUTION INTRAVENOUS; SUBCUTANEOUS at 00:35

## 2022-02-03 RX ADMIN — ONDANSETRON 4 MG: 2 INJECTION INTRAMUSCULAR; INTRAVENOUS at 07:30

## 2022-02-03 RX ADMIN — Medication 250 MG: at 08:28

## 2022-02-03 RX ADMIN — INSULIN LISPRO 3 UNITS: 100 INJECTION, SOLUTION INTRAVENOUS; SUBCUTANEOUS at 11:49

## 2022-02-03 RX ADMIN — SODIUM PHOSPHATE, MONOBASIC, MONOHYDRATE 15 MMOL: 276; 142 INJECTION, SOLUTION INTRAVENOUS at 00:44

## 2022-02-03 RX ADMIN — ENOXAPARIN SODIUM 40 MG: 100 INJECTION SUBCUTANEOUS at 08:28

## 2022-02-03 ASSESSMENT — PAIN SCALES - GENERAL
PAINLEVEL_OUTOF10: 5
PAINLEVEL_OUTOF10: 2
PAINLEVEL_OUTOF10: 3
PAINLEVEL_OUTOF10: 1
PAINLEVEL_OUTOF10: 0

## 2022-02-03 ASSESSMENT — PAIN DESCRIPTION - PROGRESSION
CLINICAL_PROGRESSION: GRADUALLY WORSENING

## 2022-02-03 ASSESSMENT — PAIN - FUNCTIONAL ASSESSMENT: PAIN_FUNCTIONAL_ASSESSMENT: ACTIVITIES ARE NOT PREVENTED

## 2022-02-03 ASSESSMENT — PAIN DESCRIPTION - DESCRIPTORS: DESCRIPTORS: ACHING;DISCOMFORT

## 2022-02-03 ASSESSMENT — PAIN DESCRIPTION - LOCATION: LOCATION: HEAD

## 2022-02-03 ASSESSMENT — PAIN DESCRIPTION - ORIENTATION: ORIENTATION: MID

## 2022-02-03 ASSESSMENT — PAIN DESCRIPTION - FREQUENCY: FREQUENCY: INTERMITTENT

## 2022-02-03 ASSESSMENT — PAIN DESCRIPTION - PAIN TYPE: TYPE: ACUTE PAIN

## 2022-02-03 ASSESSMENT — PAIN DESCRIPTION - ONSET: ONSET: GRADUAL

## 2022-02-03 NOTE — PROGRESS NOTES
Occupational Therapy  Facility/Department: [facility] ICU  Daily Treatment Note  NAME: Vanita Catalan  : 1965  MRN: [id number]    Date of Service: 2/3/2022    Discharge Recommendations:  No therapy recommended at discharge. Assessment   Performance deficits / Impairments: Decreased functional mobility ; Decreased endurance;Decreased ADL status; Decreased balance;Decreased strength;Decreased safe awareness;Decreased high-level IADLs  Prognosis: Good  Decision Making: Medium Complexity  OT Education: OT Role;Plan of Care;ADL Adaptive Strategies;Transfer Training;Energy Conservation;Precautions (Activity Promotion, Safety with Transfers, Techniques to Manage Dizziness, Breathing Techniques, ADL Techniques; good return)  REQUIRES OT FOLLOW UP: Yes  Activity Tolerance  Activity Tolerance: Patient limited by fatigue (and mild dizziness)  Safety Devices  Safety Devices in place: Yes  Type of devices: Nurse notified;Call light within reach; Left in bed  Restraints  Initially in place: No         Patient Diagnosis(es): The primary encounter diagnosis was Diabetic ketoacidosis without coma associated with diabetes mellitus due to underlying condition ([facility] A diagnosis of DKA, type 2, not at goal [facility]) was also pertinent to this visit. has a past medical history of Anxiety, Arthritis, Diabetes ([facility] Fibromyalgia, Hernia, Kidney stones, and PONV (postoperative nausea and vomiting). has a past surgical history that includes Ovary removal (Right, ); Achilles tendon surgery (Left, ); Tonsillectomy; Knee arthroscopy (Left, ); fracture surgery (Right, 2015); back surgery; Knee arthroscopy (Right, 2017); and pr knee scope,diagnostic (Right, 4/3/2017). Restrictions  Restrictions/Precautions  Restrictions/Precautions: Fall Risk  Required Braces or Orthoses?: No  Position Activity Restriction  Other position/activity restrictions:  Up with assistance     Subjective General  Patient assessed for rehabilitation services?: Yes  Response to previous treatment: Patient reporting fatigue but able to participate  Family / Caregiver Present: No  General Comment  Comments: RN ok'd for therapy this PM. Pt agreeable to participate in session and pleasant/cooperative throughout. Vital Signs  Patient Currently in Pain: Denies     Orientation  Orientation  Overall Orientation Status: Within Functional Limits     Objective    ADL  Grooming: Increased time to complete;Contact guard assistance;Verbal cueing (standing sink side to perform oral care, facial hygiene, comb hair and perform hand hygiene 2x)  Toileting: Increased time to complete;Contact guard assistance  Additional Comments: Pt with complaint of dizziness intermittently throughout activity and noted to fatigue quickly. Min VCs for safety awareness and breathig techniques. Increased time/effort. Mildly unsteady throughout and reaching for walls/furniture for support.         Balance  Sitting Balance: Stand by assistance (pt with complaint of dizziness upon initially reaching EOB and educated in techniques to manage dizziness with positional changes with good return; pt seated EOB ~8 minutes total)  Standing Balance: Contact guard assistance  Standing Balance  Time: ~10 minutes total  Activity: functional mobility to/from bathroom, sink side grooming tasks, toileting tasks  Comment: CGA throughout; pt required 1x standing rest break with BUE support on counter top and 1x seated rest break due to fatigue and mild dizziness    Functional Mobility  Functional - Mobility Device: No device  Activity: To/from bathroom  Assist Level: Contact guard assistance  Functional Mobility Comments: Mildly unsteady however no true LOB; pt reaching intermittently for walls/furniture for support; min VCs for safety awareness and techniques to manage dizziness; increased time/effort with slow pace and noted fatigue following minimal exertion    Toilet Transfers  Toilet - Technique: Ambulating  Equipment Used: Grab bars  Toilet Transfer: Stand by assistance     Bed mobility  Supine to Sit: Supervision  Sit to Supine: Supervision  Scooting: Supervision  Comment: HOB raised ~30* and use of bed rail     Transfers  Sit to stand: Stand by assistance  Stand to sit: Stand by assistance  Transfer Comments: Min VCs for safety awareness throughout               Cognition  Overall Cognitive Status: [facility]         Plan   Plan  Times per week: 5-6x/week  Current Treatment Recommendations: Strengthening,Patient/Caregiver Education & Training,Home Management Training,Equipment Evaluation, Education, & procurement,Balance Training,Functional Mobility Training,Endurance Training,Cognitive/Perceptual Training,Safety Education & Training,Self-Care / ADL      AM-PAC Score   AM-PAC Inpatient Daily Activity Raw Score: 19 (02/03/22 1533)  AM-PAC Inpatient ADL T-Scale Score : 40.22 (02/03/22 1533)  ADL Inpatient CMS 0-100% Score: 42.8 (02/03/22 1533)  ADL Inpatient CMS G-Code Modifier : CK (02/03/22 1533)    Goals  Short term goals  Time Frame for Short term goals: Within 14 treatment sessions  Short term goal 1: Pt will participate in UB ADLs with Mod I with Good Integration of EC / Ax pacing. Short term goal 2: Pt will participate in LB ADLs with Mod I while maintaining Good Balance. Short term goal 3: Pt will participate in Bathroom Transfers / Mobility with Mod I with Correct Use of AD / DME. Short term goal 4: Pt will demo Item Retrieval / Transport of daily items with Mod I without dizziness. Short term goal 5: Pt will verbalize / demo Mod I and Good Participation with progressive HEP.        Therapy Time   Individual Concurrent Group Co-treatment   Time In 4549         Time Out 1509         Minutes 24         Timed Code Treatment Minutes: 24 Minutes       Tiff Fair OTR/L

## 2022-02-03 NOTE — PLAN OF CARE
Problem: Pain:  Description: Pain management should include both nonpharmacologic and pharmacologic interventions.   Goal: Control of chronic pain  Description: Control of chronic pain  Outcome: Ongoing     Problem: Discharge Planning:  Goal: Discharged to appropriate level of care  Description: Discharged to appropriate level of care  Outcome: Ongoing     Problem: Fluid Volume - Imbalance:  Goal: Will remain free of signs and symptoms of dehydration  Description: Will remain free of signs and symptoms of dehydration  Outcome: Ongoing     Problem: Serum Glucose Level - Abnormal:  Goal: Ability to maintain appropriate glucose levels will improve  Description: Ability to maintain appropriate glucose levels will improve  Outcome: Ongoing     Problem: Injury - Acid Base Imbalance:  Goal: Acid-base balance  Description: Acid-base balance  Outcome: Ongoing     Problem: Musculor/Skeletal Functional Status  Goal: Highest potential functional level  Outcome: Ongoing

## 2022-02-03 NOTE — FLOWSHEET NOTE
[facility] [address]  PROGRESS NOTE    Shift date: 2/3/22  Shift day: Thursday   Shift # 1    Room # 361/569-97   Name: Olya Smith            Age: 65 y.o. Gender: female          Sabianist: Anabaptism  Referral: Routine Visit    Admit Date & Time: 2/1/2022  9:55 AM    PATIENT/EVENT DESCRIPTION:  Olya Smith is a 65 y.o. female with whom writer had a follow up visit. SPIRITUAL ASSESSMENT/INTERVENTION:  Ms. Amelie Kelley welcomed writer's visit. She processed her thoughts and feelings about her grief over the loss of her Mother. She shared stories and memories about her Mother with whom she was close. She talked about some challenges in her family and how they were affecting her. She was tearful as she processed her grief. She shared that her tierney helps her cope. She stated her intention to receive counseling. She was receptive to prayer. Writer offered supportive presence and active listening; inquired about Pt's sources of support and strength; affirmed Pt's strengths; offered empathy and prayer. SPIRITUAL CARE FOLLOW-UP PLAN:  Chaplains will remain available to offer spiritual and emotional support as needed. 02/03/22 1233   Encounter Summary   Services provided to: Patient   Referral/Consult From: [address] Family members; Children   Continue Visiting   (2/3/22)   Complexity of Encounter Moderate   Length of Encounter 45 minutes   Spiritual Assessment Completed Yes   Routine   Type Follow up   Spiritual/Episcopal   Type Spiritual support   Assessment Calm; Approachable   Intervention Active listening;Explored feelings, thoughts, concerns;Explored coping resources;Sustaining presence/ Ministry of presence;Prayer;Discussed illness/injury and it's impact   Outcome Expressed gratitude;Coping;Expressed feelings/needs/concerns;Engaged in conversation;Receptive; Hopeful;Encouraged       Electronically signed by Enmanuel Conti on 2/3/2022 at 12:43 PM.  [facility] [facility]  [phone]

## 2022-02-03 NOTE — DISCHARGE SUMMARY
[facility]  Office: [address], DO, Valerie Fletcher, DO, Baldomero Vazquez, DO, Young Amaralpeggy Hwang, DO, Addi Burgos MD, Reji Martin MD, Nikia Sellers MD, Aneesh Diego MD, Mirta Nguyen MD, Geeta Alexandra MD, Tim Leal MD, Ramesh Meek, DO, Fernando Velasco, DO, Leighton Prajapati MD,  Llao Ramirez DO, Shilpa Casey MD, Myrna Ramirez MD, Iron Shaw MD, Vida Navarro MD, Sharon Barbour MD, Jay Rodriguez, [facility], [facility], CNP, Jesika Francisco, CNP, Tim Denise, CNS, Nanda Robb, CNP, Ruddy Norris, CNP, Екатерина Healy, CNP, Alicja García, CNP, Shawna Gu, CNP, Noemi Wu PA-C, Teri Cosme, [facility], Dillan Franco, [facility], Андрей Chang, CNP, Samson Cantor, CNP, Francisco Davis, CNP, Juli Mcclain, CNP, Camilla Varghese, CNP, Tam[address]    Discharge Summary     Patient ID: Alexx An  :  1965   MRN: [id number]     ACCOUNT:  [de-identified]   Patient's PCP: Zulema Lynn MD  Admit Date: 2022   Discharge Date: 2022     Length of Stay: 2  Code Status:  Full Code  Admitting Physician: Jamaal Garcia DO  Discharge Physician: Jamaal Garcia DO     Active Discharge Diagnoses:     Hospital Problem Lists:  Principal Problem:    DKA, type 2, not at goal [facility])  Active Problems:    ROQUE (acute kidney injury) [facility])    Diabetic ketoacidosis without coma associated with diabetes mellitus due to underlying condition [facility])  Resolved Problems:    * No resolved hospital problems. *      Admission Condition:  serious     Discharged Condition: stable    Hospital Stay:     Hospital Course:  Alexx An is a 65 y.o. female who was admitted for the management of  DKA, type 2, not at goal [facility]) , presented to ER with Hyperglycemia    This is a 56-year-old female with longstanding history of diabetes that presents with a recurrent episode of DKA.   She reports her blood sugars are relatively well controlled generally speaking but over the last month she has had increasing blood sugars, she was admitted approximately weeks ago with an episode of DKA shortly after her mother passed away. She apparently is not been eating or taking care of her self or taking her medications since her mother passed away. She presents this time with elevated blood sugars and findings of DKA. She was admitted and placed on insulin drip as well as DKA order set. Ultimately her blood sugar improved and she was resumed on her home dose of Lantus and sliding scale insulin with improvement and control of her blood sugars. Significant therapeutic interventions: As above    Significant Diagnostic Studies:   Labs / Micro:  CBC:   Lab Results   Component Value Date    WBC 7.7 02/02/2022    RBC 4.44 02/02/2022    HGB 12.8 02/02/2022    HCT 38.6 02/02/2022    MCV 87.0 02/02/2022    MCH 28.9 02/02/2022    MCHC 33.2 02/02/2022    RDW 14.9 02/02/2022     02/02/2022     BMP:    Lab Results   Component Value Date    GLUCOSE 79 02/04/2022     02/04/2022    K 3.2 02/04/2022     02/04/2022    CO2 25 02/04/2022    ANIONGAP 9 02/04/2022    BUN 10 02/04/2022    CREATININE <0.40 02/04/2022    BUNCRER NOT REPORTED 02/04/2022    CALCIUM 9.1 02/04/2022    LABGLOM Can not be calculated 02/04/2022    GFRAA Can not be calculated 02/04/2022    GFR      02/04/2022    GFR NOT REPORTED 02/04/2022        Radiology:  XR CHEST PORTABLE    Result Date: 2/1/2022  The lungs are clear . There is no effusion or pneumothorax . The cardiomediastinal silhouette is within normal limits . No acute bony findings . IMPRESSION: No acute pulmonary process.        Consultations:    Consults:     Final Specialist Recommendations/Findings:   IP CONSULT TO DIABETES EDUCATOR  IP CONSULT TO DIETITIAN      The patient was seen and examined on day of discharge and this discharge summary is in conjunction with any daily progress note from day of discharge.     Discharge plan:     Disposition: Home    Physician Follow Up:   Outpatient grief counseling  MD Mike [phone]  [place] [address]    Go on 2/7/2022  at 9am for hospital follow-up for Diabetic Ketoacidosis       Requiring Further Evaluation/Follow Up POST HOSPITALIZATION/Incidental Findings: None    Diet: diabetic diet    Activity: As tolerated    Instructions to Patient: Take medication as prescribed    Discharge Medications:      Medication List      CHANGE how you take these medications    Lantus SoloStar 100 UNIT/ML injection pen  Generic drug: insulin glargine  Inject 45 Units into the skin 2 times daily Inject under the skin 48 units in the morning and 32 units in the evening  What changed:   · how much to take  · how to take this  · when to take this        CONTINUE taking these medications    calcium-vitamin D 500-200 MG-UNIT per tablet     escitalopram 20 MG tablet  Commonly known as: LEXAPRO     glimepiride 4 MG tablet  Commonly known as: AMARYL     HumaLOG KwikPen 200 UNIT/ML Sopn pen  Generic drug: insulin lispro  Administer as directed by physician with sliding scale which was provided up to 60 units daily     Insulin Pen Needle 32G X 4 MM Misc     lisinopril 5 MG tablet  Commonly known as: PRINIVIL;ZESTRIL     metFORMIN 500 MG extended release tablet  Commonly known as: GLUCOPHAGE-XR     predniSONE 10 MG tablet  Commonly known as: DELTASONE     Steglatro 15 MG Tabs  Generic drug: Ertugliflozin L-PyroglutamicAc     therapeutic multivitamin-minerals tablet           Where to Get Your Medications      These medications were sent to [address] [place] [address]  [address] [facility]    Phone: [phone]   · Lantus SoloStar 100 UNIT/ML injection pen         Discharge Procedure Orders   [facility]) Medication Mgmt (Diabetes - Clinical Pharmacy) - Therese Horan Priority: Routine Referral Type: Consult for Advice and Opinion   Referral Reason: Specialty Services Required   Requested Specialty: Pharmacist   Number of Visits Requested: 1 Expiration Date: 02/03/24       Time Spent on discharge is  26-minute in patient examination, evaluation, counseling as well as medication reconciliation, prescriptions for required medications, discharge plan and follow up. Electronically signed by   Parag Guaman DO  2/3/2022  10:06 AM      Thank you Dr. Los Mijares MD for the opportunity to be involved in this patient's care.

## 2022-02-03 NOTE — PROGRESS NOTES
[facility]  Office: [address], DO, Edgardo Ache, DO, Tinoco Basiliakelley, DO, Gorge Diaz Blood, DO, Shaq Amado MD, Jaleesa Perez MD, Ros Riggins MD, Glenda Alan MD, Angie Monge MD, Cristin Jernigan MD, Leslie Patel MD, Sarah Antunez, DO, Kenan Lin, DO, Hitesh Lewis MD,  Carlton Ramirez, DO, Vasquez Mac MD, Rosalind Crews MD, Tawanna Gomez MD, Vik Jones MD, Lindsay Kennedy MD, Jorge Jamil, [facility], [facility] Deb, CNP, Deangelo Khan, CNP, Jose Mann, CNS, Robert Rosa, CNP, Chata Matta, CNP, Zulema Gomez, CNP, Jade Ocean, CNP, Stephon Sims, CNP, Eliot Christie PA-C, Johanna Georges, DNP, David Juárez, DNP, Karin Mina, CNP, Wellington Edmar, CNP, Jen Flair, CNP, Ramses Hero, CNP, Gaston Sacmumtaz, CNP, Tamra Cuevas [address]    Progress Note    2/3/2022    7:11 AM    Name:   Jasper Iverson  MRN:     [id number]     [place]:      [de-identified]   Room:   [address] [place] [address] Day:  2  Admit Date:  2/1/2022  9:55 AM    PCP:   Abdon Luis MD  Code Status:  Full Code    Subjective:     C/C:   Chief Complaint   Patient presents with    Hyperglycemia     Interval History Status: improved. Patient is resting comfortably, had some nausea this morning with one episode of emesis, but otherwise feels well without chest pain, abdominal pain, fevers or chills or acute complaints. Brief History: This is a 30-year-old female with a 10-year history of type 2 diabetes had she reports is well controlled in the past.  In the last month she has been going through grief with the loss of her mother had episode of DKA shortly after her mom's passing. She presents this time with some of polyuria, polydipsia and increased blood sugars and found to have recurrence of DKA.   She reports that she has been taking her medications and has been eating her routine diet without change in her normal program. However, family has reported to staff that the patient has not been eating, drinking or taking her medications appropriately. Review of Systems:     Constitutional:  negative for chills, fevers, sweats  Respiratory:  negative for cough, dyspnea on exertion, shortness of breath, wheezing  Cardiovascular:  negative for chest pain, chest pressure/discomfort, lower extremity edema, palpitations  Gastrointestinal:  negative for abdominal pain, constipation, diarrhea, nausea, vomiting  Neurological:  negative for dizziness, headache    Medications: Allergies:  No Known Allergies    Current Meds:   Scheduled Meds:    glipiZIDE  10 mg Oral QAM AC    insulin glargine  45 Units SubCUTAneous BID    metFORMIN  1,000 mg Oral BID WC    insulin lispro  0-18 Units SubCUTAneous Q4H    Calcium Carb-Cholecalciferol  1 tablet Oral BID WC    Ertugliflozin L-PyroglutamicAc  15 mg Oral Daily    escitalopram  20 mg Oral Daily    lisinopril  5 mg Oral Daily    therapeutic multivitamin-minerals  1 tablet Oral Daily    enoxaparin  40 mg SubCUTAneous Daily     Continuous Infusions:    dextrose      [Held by provider] insulin 0.76 Units/hr (02/02/22 1015)    sodium chloride Stopped (02/01/22 1252)    dextrose 5 % and 0.45 % NaCl Stopped (02/02/22 1812)    dextrose      Or    dextrose       PRN Meds: potassium chloride **OR** potassium alternative oral replacement **OR** potassium chloride, glucose, glucagon (rDNA), dextrose, dextrose 5 % and 0.45 % NaCl, magnesium sulfate, sodium phosphate IVPB **OR** sodium phosphate IVPB **OR** sodium phosphate IVPB, polyethylene glycol, dextrose **OR** dextrose, acetaminophen, ondansetron    Data:     Past Medical History:   has a past medical history of Anxiety, Arthritis, Diabetes ([address] Fibromyalgia, Hernia, Kidney stones, and PONV (postoperative nausea and vomiting). Social History:   reports that she has never smoked.  She has never used smokeless tobacco. She reports that she does not drink alcohol and does not use drugs. Family History:   Family History   Problem Relation Age of Onset    Stroke Mother     COPD Mother     Heart Disease Mother     Other Father         sepsis/SBO       Vitals:  /72   Pulse 77   Temp 98.4 °F (36.9 °C) (Oral)   Resp 16   Ht 5' 3\" (1.6 m)   Wt 187 lb 2.7 oz (84.9 kg)   LMP 2017   SpO2 98%   BMI 33.16 kg/m²   Temp (24hrs), Av °F (36.7 °C), Min:97.5 °F (36.4 °C), Max:98.4 °F (36.9 °C)    Recent Labs     22  19422  2257 22  0343 22  0654   POCGLU 312* 165* 148* 132*       I/O (24Hr): Intake/Output Summary (Last 24 hours) at 2/3/2022 0711  Last data filed at 2022 2259  Gross per 24 hour   Intake --   Output 2150 ml   Net -2150 ml       Labs:  Hematology:  Recent Labs     22  1015 22  0330   WBC 14.0* 7.7   RBC 5.25* 4.44   HGB 15.2 12.8   HCT 47.1* 38.6   MCV 89.7 87.0   MCH 28.9 28.9   MCHC 32.2 33.2   RDW 15.3 14.9   * 329   MPV 8.6 8.2     Chemistry:  Recent Labs     22  1015 22  1529 22  19422  2332 22  0323   *   < > 131* 133* 133*   K 4.5   < > 4.5 3.8 3.5*   CL 98   < > 103 105 105   CO2 6*   < > 13* 16* 19*   GLUCOSE 373*   < > 349* 187* 166*   BUN 19   < > 10 11 9   CREATININE 1.20*   < > 0.60 0.52 0.46*   MG 2.5   < > 1.9 2.0 1.9   ANIONGAP 29*   < > 15 12 9   LABGLOM 46*   < > >60 >60 >60   GFRAA 56*   < > >60 >60 >60   CALCIUM 9.6   < > 8.5* 8.3* 8.5*   PHOS  --    < > 2.0* 1.8* 3.6   TROPHS 12  --   --   --   --     < > = values in this interval not displayed.      Recent Labs     22  1015 22  1109 22  0330 22  0401 22  1219 22  1605 22  1949 22  2257 22  0343 22  0654   PROT 7.7  --   --   --   --   --   --   --   --   --    LABALBU 4.5  --   --   --   --   --   --   --   --   --    LABA1C  --   --  10.8*  --   --   --   --   --   --   --    AST 8  --   --   --   --   -- --   --   --   --    ALT 12  --   --   --   --   --   --   --   --   --    ALKPHOS 119*  --   --   --   --   --   --   --   --   --    BILITOT 0.22*  --   --   --   --   --   --   --   --   --    BILIDIR 0.09  --   --   --   --   --   --   --   --   --    POCGLU  --    < >  --    < > 134* 218* 312* 165* 148* 132*    < > = values in this interval not displayed. ABG:  Lab Results   Component Value Date    POCPH 7.231 01/17/2022    POCPCO2 19.5 01/17/2022    POCPO2 81.0 01/17/2022    POCHCO3 8.2 01/17/2022    NBEA 17 01/17/2022    PBEA NOT REPORTED 01/17/2022    JKV7SSH NOT REPORTED 01/17/2022    LIGE7EEJ 94 01/17/2022    FIO2 NOT REPORTED 01/17/2022     No results found for: SPECIAL  No results found for: CULTURE    Radiology:  XR CHEST PORTABLE    Result Date: 2/1/2022  The lungs are clear . There is no effusion or pneumothorax . The cardiomediastinal silhouette is within normal limits . No acute bony findings . IMPRESSION: No acute pulmonary process. Physical Examination:       General appearance:  alert, cooperative and no distress  Mental Status:  oriented to person, place and time and normal affect  Lungs:  clear to auscultation bilaterally, normal effort  Heart:  regular rate and rhythm, no murmur  Abdomen:  soft, nontender, nondistended, normal bowel sounds, no masses, hepatomegaly, splenomegaly  Extremities:  no edema, redness, tenderness in the calves  Skin:  no gross lesions, rashes, induration    Assessment:     Hospital Problems           Last Modified POA    * (Principal) DKA, type 2, not at goal [facility]) 2/1/2022 Yes    ROQUE (acute kidney injury) ([facility] 2/1/2022 Yes    Diabetic ketoacidosis without coma associated with diabetes mellitus due to underlying condition ([facility] 2/1/2022 Yes                Plan:     1. Continue present insulin  2. Adjust sliding scale before meals and at bedtime  3. Discontinue DKA labs  4. Avoid nephrotoxic agents  5. Activity as tolerated  6. Diabetic diet  7.  Continue home maintenance medications  8. Anticipate discharge today if remains stable able tolerate dietary advancement without further episode nausea or vomiting.     Placido Brennan,   2/3/2022  7:11 AM

## 2022-02-03 NOTE — CARE COORDINATION
[facility] ICU Quality Flow/Interdisciplinary Rounds Progress Note    Quality Flow Rounds held on February 3, 2022 at [address] Attending:  Bedside Nurse, , Respiratory Therapy, Physical Therapy, Occupational Therapy, Spiritual Care/ and Physician    Anticipated Discharge Date:  Expected Discharge Date: 02/04/22    Anticipated Discharge Disposition: home     Readmission Risk              Risk of Unplanned Readmission:  17           Discussed patient goal for the day, patient clinical progression, and barriers to discharge.   The following Goal(s) of the Day/Commitment(s) have been identified:  Activity Progression  as tolerated, Diet Progression  if tolerates diet may discharge and PCP and DM education appts for post d/c follow-up      Harika Medina RN  February 3, 2022

## 2022-02-03 NOTE — PROGRESS NOTES
Physical Therapy  Facility/Department: [facility] MED SURG ICU  Daily Treatment Note  NAME: Roxi Ackerman  : 1965  MRN: [id number]    Date of Service: 2/3/2022    Discharge Recommendations:  Continue to assess pending progress   PT Equipment Recommendations  Equipment Needed: No    Assessment   Body structures, Functions, Activity limitations: Decreased functional mobility ; Decreased strength;Decreased endurance;Decreased balance  Assessment: Pt with mildly impaired mobility requiring SBA to ambulate without device 185' x 1 this date. Writer would expect pt's overall balance and mobility to improve to baseline with improvement in overall medical status without likely need for any further therapy at discharge. Prognosis: Good  PT Education: Transfer Training;Functional Mobility Training;Gait Training  REQUIRES PT FOLLOW UP: Yes  Activity Tolerance  Activity Tolerance: Patient limited by fatigue     Patient Diagnosis(es): The primary encounter diagnosis was Diabetic ketoacidosis without coma associated with diabetes mellitus due to underlying condition ([facility] A diagnosis of DKA, type 2, not at goal [facility]) was also pertinent to this visit. has a past medical history of Anxiety, Arthritis, Diabetes ([facility] Fibromyalgia, Hernia, Kidney stones, and PONV (postoperative nausea and vomiting). has a past surgical history that includes Ovary removal (Right, ); Achilles tendon surgery (Left, ); Tonsillectomy; Knee arthroscopy (Left, ); fracture surgery (Right, 2015); back surgery; Knee arthroscopy (Right, 2017); and pr knee scope,diagnostic (Right, 4/3/2017). Restrictions  Restrictions/Precautions  Restrictions/Precautions: Fall Risk  Required Braces or Orthoses?: No  Position Activity Restriction  Other position/activity restrictions: Up with Assist  Subjective   General  Response To Previous Treatment: Patient with no complaints from previous session.   Family / Caregiver Present: No  Subjective  Subjective: Pt supine in bed upon arrival; pt agreeable to therapy. Pt reports having vomiting and diarrhea this morning. Pain Screening  Patient Currently in Pain: Denies  Pain Assessment  Pain Assessment: 0-10  Pain Level: 0  Vital Signs  Patient Currently in Pain: Denies       Orientation  Orientation  Overall Orientation Status: Within Functional Limits  Cognition      Objective   Bed mobility  Supine to Sit: Supervision  Scooting: Supervision  Transfers  Sit to Stand: Contact guard assistance  Stand to sit: Stand by assistance  Stand Pivot Transfers: Contact guard assistance  Comment: Transfers without device.   Ambulation  Ambulation?: Yes  More Ambulation?: No  Ambulation 1  Surface: level tile  Device: No Device  Assistance: Contact guard assistance;Stand by assistance  Quality of Gait: decreased step length and occasional discontinuous steps, path drift, slow pace  Gait Deviations: Slow Gia;Deviated path;Decreased step length  Distance: 185' x 1  Comments: pt with increased fatigue during ambulation  Stairs/Curb  Stairs?: No        Exercises  Knee Long Arc Quad: 15x (B) LE  Comments: Standing Heel/Toe lifts x 10, Marching (B) LE 10x                        G-Code     OutComes Score                                                     AM-PAC Score             Goals  Short term goals  Time Frame for Short term goals: 14 visits  Short term goal 1: Pt to ambulate 300ft independently without device  Short term goal 2: Pt to sit <> stand transfer independently  Short term goal 3: Pt to demonstrate independent bed mobility  Short term goal 4: Pt to ascend/descend flight of stairs with one hand rail modified independently  Short term goal 5: Pt to demonstrate good standing balance to decrease risk of falls    Plan    Plan  Times per week: 5x  Times per day: Daily  Current Treatment Recommendations: Strengthening,Transfer Training,Balance Training,Functional Mobility Training,Endurance Training,Gait Training,Stair training,Safety Education & Training,Home Exercise Program,Patient/Caregiver Education & Training  Safety Devices  Type of devices: Call light within reach,Gait belt,Left in [employer]  Initially in place: No     Therapy Time   Individual Concurrent Group Co-treatment   Time In 1305         Time Out 1329         Minutes 24         Timed Code Treatment Minutes: 24 Minutes       Nirav Carter, PT

## 2022-02-04 ENCOUNTER — TELEPHONE (OUTPATIENT)
Dept: PHARMACY | Age: 57
End: 2022-02-04

## 2022-02-04 VITALS
SYSTOLIC BLOOD PRESSURE: 123 MMHG | DIASTOLIC BLOOD PRESSURE: 80 MMHG | TEMPERATURE: 97.9 F | BODY MASS INDEX: 33.16 KG/M2 | OXYGEN SATURATION: 98 % | HEIGHT: 63 IN | WEIGHT: 187.17 LBS | HEART RATE: 86 BPM | RESPIRATION RATE: 16 BRPM

## 2022-02-04 LAB
ANION GAP SERPL CALCULATED.3IONS-SCNC: 9 MMOL/L (ref 9–17)
BUN BLDV-MCNC: 10 MG/DL (ref 6–20)
BUN/CREAT BLD: ABNORMAL (ref 9–20)
CALCIUM SERPL-MCNC: 9.1 MG/DL (ref 8.6–10.4)
CHLORIDE BLD-SCNC: 109 MMOL/L (ref 98–107)
CO2: 25 MMOL/L (ref 20–31)
CREAT SERPL-MCNC: <0.4 MG/DL (ref 0.5–0.9)
EKG ATRIAL RATE: 102 BPM
EKG P AXIS: 69 DEGREES
EKG P-R INTERVAL: 118 MS
EKG Q-T INTERVAL: 326 MS
EKG QRS DURATION: 76 MS
EKG QTC CALCULATION (BAZETT): 424 MS
EKG R AXIS: -6 DEGREES
EKG T AXIS: 55 DEGREES
EKG VENTRICULAR RATE: 102 BPM
GFR AFRICAN AMERICAN: ABNORMAL ML/MIN
GFR NON-AFRICAN AMERICAN: ABNORMAL ML/MIN
GFR SERPL CREATININE-BSD FRML MDRD: ABNORMAL ML/MIN/{1.73_M2}
GFR SERPL CREATININE-BSD FRML MDRD: ABNORMAL ML/MIN/{1.73_M2}
GLUCOSE BLD-MCNC: 133 MG/DL (ref 65–105)
GLUCOSE BLD-MCNC: 137 MG/DL (ref 65–105)
GLUCOSE BLD-MCNC: 194 MG/DL (ref 65–105)
GLUCOSE BLD-MCNC: 51 MG/DL (ref 65–105)
GLUCOSE BLD-MCNC: 79 MG/DL (ref 70–99)
MAGNESIUM: 1.9 MG/DL (ref 1.6–2.6)
POTASSIUM SERPL-SCNC: 3.2 MMOL/L (ref 3.7–5.3)
SODIUM BLD-SCNC: 143 MMOL/L (ref 135–144)

## 2022-02-04 PROCEDURE — 6360000002 HC RX W HCPCS: Performed by: INTERNAL MEDICINE

## 2022-02-04 PROCEDURE — 6370000000 HC RX 637 (ALT 250 FOR IP): Performed by: INTERNAL MEDICINE

## 2022-02-04 PROCEDURE — 6370000000 HC RX 637 (ALT 250 FOR IP): Performed by: NURSE PRACTITIONER

## 2022-02-04 PROCEDURE — 99232 SBSQ HOSP IP/OBS MODERATE 35: CPT | Performed by: INTERNAL MEDICINE

## 2022-02-04 PROCEDURE — 80048 BASIC METABOLIC PNL TOTAL CA: CPT

## 2022-02-04 PROCEDURE — 83735 ASSAY OF MAGNESIUM: CPT

## 2022-02-04 PROCEDURE — 82947 ASSAY GLUCOSE BLOOD QUANT: CPT

## 2022-02-04 PROCEDURE — 36415 COLL VENOUS BLD VENIPUNCTURE: CPT

## 2022-02-04 RX ORDER — INSULIN GLARGINE 100 [IU]/ML
32 INJECTION, SOLUTION SUBCUTANEOUS
Status: DISCONTINUED | OUTPATIENT
Start: 2022-02-04 | End: 2022-02-04 | Stop reason: HOSPADM

## 2022-02-04 RX ORDER — INSULIN GLARGINE 100 [IU]/ML
45 INJECTION, SOLUTION SUBCUTANEOUS NIGHTLY
Status: DISCONTINUED | OUTPATIENT
Start: 2022-02-04 | End: 2022-02-04 | Stop reason: HOSPADM

## 2022-02-04 RX ADMIN — Medication 250 MG: at 16:08

## 2022-02-04 RX ADMIN — INSULIN GLARGINE 32 UNITS: 100 INJECTION, SOLUTION SUBCUTANEOUS at 08:25

## 2022-02-04 RX ADMIN — METFORMIN HYDROCHLORIDE 1000 MG: 500 TABLET, EXTENDED RELEASE ORAL at 16:08

## 2022-02-04 RX ADMIN — GLIPIZIDE 10 MG: 5 TABLET ORAL at 06:20

## 2022-02-04 RX ADMIN — LISINOPRIL 5 MG: 5 TABLET ORAL at 08:27

## 2022-02-04 RX ADMIN — METFORMIN HYDROCHLORIDE 1000 MG: 500 TABLET, EXTENDED RELEASE ORAL at 08:26

## 2022-02-04 RX ADMIN — ESCITALOPRAM OXALATE 20 MG: 10 TABLET ORAL at 08:26

## 2022-02-04 RX ADMIN — Medication 250 MG: at 08:26

## 2022-02-04 RX ADMIN — Medication 1 TABLET: at 08:26

## 2022-02-04 RX ADMIN — POTASSIUM BICARBONATE 40 MEQ: 782 TABLET, EFFERVESCENT ORAL at 07:03

## 2022-02-04 RX ADMIN — ENOXAPARIN SODIUM 40 MG: 100 INJECTION SUBCUTANEOUS at 08:26

## 2022-02-04 ASSESSMENT — PAIN SCALES - GENERAL
PAINLEVEL_OUTOF10: 0
PAINLEVEL_OUTOF10: 0

## 2022-02-04 ASSESSMENT — PAIN DESCRIPTION - PROGRESSION
CLINICAL_PROGRESSION: GRADUALLY WORSENING

## 2022-02-04 NOTE — FLOWSHEET NOTE
[address]  PROGRESS NOTE    Shift date: 2/4/22  Shift day: Friday   Shift # 1    Room # 942/766-90   Name: Christin Mohamud            Age: 65 y.o. Gender: female          Anglican: Rastafari    Referral: Visit prior to Patient's discharge from the hospital.    Admit Date & Time: 2/1/2022  9:55 AM    PATIENT/EVENT DESCRIPTION:  Christin Mohamud is a 65 y.o. female with whom writer visited prior to her discharge to home. SPIRITUAL ASSESSMENT/INTERVENTION:  Ms. Fabiano Almodovar smiled and greeted writer. She affirmed that she is going home today. She expressed gratitude. She thanked writer for the support. She stated her intention to take care of herself. She thanked writer. Writer provided supportive presence and wished Pt well. SPIRITUAL CARE FOLLOW-UP PLAN:  As Patient will be discharged from the hospital, there is no plan for follow up visit. 02/04/22 1633   Encounter Summary   Services provided to: Patient   Referral/Consult From: [address] Family members; Children   Continue Visiting   (2/4/22)   Complexity of Encounter Low   Length of Encounter 15 minutes   Routine   Type Follow up   Assessment Calm; Approachable   Intervention Sustaining presence/ Ministry of presence   Outcome Expressed gratitude;Coping     Electronically signed by Amelie Louise on 2/4/2022 at 4:34 PM.  [address] [employer]  [phone]

## 2022-02-04 NOTE — DISCHARGE SUMMARY
Patient discharged home at 1600- VSS, blood sugar under control. Pt education provided, follow up's explained, pain controlled. Comfort and safety maintained. IV's removed. Pt wheeled out with family.

## 2022-02-04 NOTE — PLAN OF CARE
Problem: Pain:  Goal: Pain level will decrease  Description: Pain level will decrease  Outcome: Ongoing  Goal: Control of acute pain  Description: Control of acute pain  Outcome: Ongoing  Goal: Control of chronic pain  Description: Control of chronic pain  Outcome: Ongoing     Problem: Discharge Planning:  Goal: Discharged to appropriate level of care  Description: Discharged to appropriate level of care  Outcome: Ongoing     Problem: Fluid Volume - Imbalance:  Goal: Will remain free of signs and symptoms of dehydration  Description: Will remain free of signs and symptoms of dehydration  Outcome: Ongoing  Goal: Absence of imbalanced fluid volume signs and symptoms  Description: Absence of imbalanced fluid volume signs and symptoms  Outcome: Ongoing     Problem: Serum Glucose Level - Abnormal:  Goal: Ability to maintain appropriate glucose levels will improve  Description: Ability to maintain appropriate glucose levels will improve  Outcome: Ongoing     Problem: Injury - Acid Base Imbalance:  Goal: Acid-base balance  Description: Acid-base balance  Outcome: Ongoing     Problem: Musculor/Skeletal Functional Status  Goal: Highest potential functional level  Outcome: Ongoing     Problem: Falls - Risk of:  Goal: Will remain free from falls  Description: Will remain free from falls  Outcome: Ongoing  Goal: Absence of physical injury  Description: Absence of physical injury  Outcome: Ongoing

## 2022-02-04 NOTE — CARE COORDINATION
Call returned from [facility] Medication management, per requirements must have order from patient's physician that she regularly follows with in order to schedule an appointment, they have sent request to patient PCP and awaiting reply. CM updated that patient was to d/c today and they will need to reach out to patient directly to schedule appointment after discharge.

## 2022-02-04 NOTE — PROGRESS NOTES
[facility]  Office: [address], DO, Alexis Michael, DO, Josiane [facility], DO, Jaquelin Mejia Blood, DO, Efrem Kahn MD, Anatoly Carr MD, Siobhan Lopez MD, Rojelio De Guzman MD, Phil Thomas MD, Giovany Arrieta MD, Lexy Diop MD, Magi Solitario, DO, Gerard Hercules, DO, Charity Villagran MD,  Fernie Rabago, DO, Chao Cain MD, Any Hill MD, Americo Rodgers MD, Amy Duron MD, Caraedson Rubi MD, Matt Boswell, CNP, [facility], CNP, Sherin Mendez, CNP, Bean Avalos, CNS, Eriberto Arellano, CNP, Yves Bella, CNP, Delroy Pan, CNP, Francisca Epley, CNP, Carlos Valenzuela, CNP, LAWRENCE JoyaC, Kavon Leblanc, DNP, Cole Haile, DNP, Stella Mirza, CNP, Asael Krishnamurthy, CNP, Zoran Granados, CNP, Kenneth Seaman, CNP, Pam Zacarias, [facility], Tamra Taylor [address]362    Progress Note    2/4/2022    7:05 AM    Name:   Clancy Goodell  MRN:     [id number]     [place]:      [de-identified]   Room:   [address] [place] [address] Day:  3  Admit Date:  2/1/2022  9:55 AM    PCP:   Brittany Evans MD  Code Status:  Full Code    Subjective:     C/C:   Chief Complaint   Patient presents with    Hyperglycemia     Interval History Status: improved. Discharge delayed yesterday as patient continued nausea and poor dietary tolerance once a day. Has mild exertional dyspnea on and off, slowly improving. Denies chest pain, shortness of breath, fevers or chills or other acute complaints at this time. Brief History:      This is a 60-year-old female with a 10-year history of type 2 diabetes had she reports is well controlled in the past.  In the last month she has been going through grief with the loss of her mother had episode of DKA shortly after her mom's passing.  She presents this time with some of polyuria, polydipsia and increased blood sugars and found to have recurrence of DKA.  She reports that she has been taking her medications and has been eating her routine diet without change in her normal program. Hayley Mejia, family has reported to staff that the patient has not been eating, drinking or taking her medications appropriately. Review of Systems:     Constitutional:  negative for chills, fevers, sweats  Respiratory:  negative for cough, dyspnea on exertion, shortness of breath, wheezing  Cardiovascular:  negative for chest pain, chest pressure/discomfort, lower extremity edema, palpitations  Gastrointestinal:  negative for abdominal pain, constipation, diarrhea, nausea, vomiting  Neurological:  negative for dizziness, headache    Medications: Allergies:  No Known Allergies    Current Meds:   Scheduled Meds:    insulin lispro  0-18 Units SubCUTAneous TID WC    insulin lispro  0-9 Units SubCUTAneous Nightly    glipiZIDE  10 mg Oral QAM AC    insulin glargine  45 Units SubCUTAneous BID    metFORMIN  1,000 mg Oral BID WC    Calcium Carb-Cholecalciferol  1 tablet Oral BID WC    Ertugliflozin L-PyroglutamicAc  15 mg Oral Daily    escitalopram  20 mg Oral Daily    lisinopril  5 mg Oral Daily    therapeutic multivitamin-minerals  1 tablet Oral Daily    enoxaparin  40 mg SubCUTAneous Daily     Continuous Infusions:    dextrose      dextrose 5 % and 0.45 % NaCl Stopped (02/02/22 1812)    dextrose      Or    dextrose       PRN Meds: potassium chloride **OR** potassium alternative oral replacement **OR** potassium chloride, loperamide, glucose, glucagon (rDNA), dextrose, dextrose 5 % and 0.45 % NaCl, magnesium sulfate, sodium phosphate IVPB **OR** sodium phosphate IVPB **OR** sodium phosphate IVPB, polyethylene glycol, dextrose **OR** dextrose, acetaminophen, ondansetron    Data:     Past Medical History:   has a past medical history of Anxiety, Arthritis, Diabetes ([address] Fibromyalgia, Hernia, Kidney stones, and PONV (postoperative nausea and vomiting). Social History:   reports that she has never smoked.  She has never used smokeless tobacco. She reports that she does not drink alcohol and does not use drugs. Family History:   Family History   Problem Relation Age of Onset    Stroke Mother     COPD Mother     Heart Disease Mother     Other Father         sepsis/SBO       Vitals:  BP (!) 140/96   Pulse 92   Temp 98 °F (36.7 °C) (Oral)   Resp 16   Ht 5' 3\" (1.6 m)   Wt 187 lb 2.7 oz (84.9 kg)   LMP 2017   SpO2 98%   BMI 33.16 kg/m²   Temp (24hrs), Av.9 °F (36.6 °C), Min:97.5 °F (36.4 °C), Max:98.1 °F (36.7 °C)    Recent Labs     22  0743 22  1142 22  1525 22   POCGLU 155* 176* 169* 145*       I/O (24Hr):     Intake/Output Summary (Last 24 hours) at 2022 0705  Last data filed at 2/3/2022 1120  Gross per 24 hour   Intake --   Output 950 ml   Net -950 ml       Labs:  Hematology:  Recent Labs     22  1015 22  0330   WBC 14.0* 7.7   RBC 5.25* 4.44   HGB 15.2 12.8   HCT 47.1* 38.6   MCV 89.7 87.0   MCH 28.9 28.9   MCHC 32.2 33.2   RDW 15.3 14.9   * 329   MPV 8.6 8.2     Chemistry:  Recent Labs     22  1015 22  1529 22  2332 22  2332 22  0323 22  0323 22  0737 22  1142 22  0607   *   < > 133*   < > 133*   < > 132* 133* 143   K 4.5   < > 3.8   < > 3.5*   < > 4.0 4.2 3.2*   CL 98   < > 105   < > 105   < > 103 103 109*   CO2 6*   < > 16*   < > 19*   < > 19* 17* 25   GLUCOSE 373*   < > 187*   < > 166*   < > 152* 191* 79   BUN 19   < > 11   < > 9   < > 8 8 10   CREATININE 1.20*   < > 0.52   < > 0.46*   < > 0.48* 0.50 <0.40*   MG 2.5   < > 2.0   < > 1.9  --  2.0  --  1.9   ANIONGAP 29*   < > 12   < > 9   < > 10 13 9   LABGLOM 46*   < > >60   < > >60   < > >60 >60 Can not be calculated   GFRAA 56*   < > >60   < > >60   < > >60 >60 Can not be calculated   CALCIUM 9.6   < > 8.3*   < > 8.5*   < > 8.6 9.0 9.1   PHOS  --    < > 1.8*  --  3.6  --  3.2  --   --    TROPHS 12  --   --   --   --   --   --   --   --     < > = values in this interval not displayed. Recent Labs     02/01/22  1015 02/01/22  1109 02/02/22  0330 02/02/22  0401 02/03/22  0343 02/03/22  0654 02/03/22  0743 02/03/22  1142 02/03/22  1525 02/03/22  2002   PROT 7.7  --   --   --   --   --   --   --   --   --    LABALBU 4.5  --   --   --   --   --   --   --   --   --    LABA1C  --   --  10.8*  --   --   --   --   --   --   --    AST 8  --   --   --   --   --   --   --   --   --    ALT 12  --   --   --   --   --   --   --   --   --    ALKPHOS 119*  --   --   --   --   --   --   --   --   --    BILITOT 0.22*  --   --   --   --   --   --   --   --   --    BILIDIR 0.09  --   --   --   --   --   --   --   --   --    POCGLU  --    < >  --    < > 148* 132* 155* 176* 169* 145*    < > = values in this interval not displayed. ABG:  Lab Results   Component Value Date    POCPH 7.231 01/17/2022    POCPCO2 19.5 01/17/2022    POCPO2 81.0 01/17/2022    POCHCO3 8.2 01/17/2022    NBEA 17 01/17/2022    PBEA NOT REPORTED 01/17/2022    YNF7AGN NOT REPORTED 01/17/2022    ZRWX3KNC 94 01/17/2022    FIO2 NOT REPORTED 01/17/2022     No results found for: SPECIAL  No results found for: CULTURE    Radiology:  XR CHEST PORTABLE    Result Date: 2/1/2022  The lungs are clear . There is no effusion or pneumothorax . The cardiomediastinal silhouette is within normal limits . No acute bony findings . IMPRESSION: No acute pulmonary process.        Physical Examination:       General appearance:  alert, cooperative and no distress  Mental Status:  oriented to person, place and time and normal affect  Lungs:  clear to auscultation bilaterally, normal effort  Heart:  regular rate and rhythm, no murmur  Abdomen:  soft, nontender, nondistended, normal bowel sounds, no masses, hepatomegaly, splenomegaly  Extremities:  no edema, redness, tenderness in the calves  Skin:  no gross lesions, rashes, induration    Assessment:     Hospital Problems           Last Modified POA    * (Principal) DKA, type 2, not at goal ([facility] 2/1/2022 Yes    ROQUE (acute kidney injury) ([facility] 2/1/2022 Yes    Diabetic ketoacidosis without coma associated with diabetes mellitus due to underlying condition ([facility] 2/1/2022 Yes                Plan:     1. Diet as tolerated  2. Reduce morning dose of Lantus  3. GI and DVT prophylaxis  4. Monitor renal function, avoid nephrotoxic agents  5. PT and OT as needed  6. Outpatient grief counseling  7. Discharge planning, home today if tolerating diet.     Myah Brennan,   2/4/2022  7:06 AM

## 2022-02-07 ENCOUNTER — CARE COORDINATION (OUTPATIENT)
Dept: OTHER | Facility: CLINIC | Age: 57
End: 2022-02-07

## 2022-02-07 NOTE — CARE COORDINATION
Care Transitions Outreach Attempt    Call within 2 business days of discharge: Yes   Attempted to reach patient for transitions of care follow up. Unable to reach patient. Patient: Rosy Ch Patient : 1965 MRN: [id number]    Last Discharge [facility]       Complaint Diagnosis Description Type Department Provider    22 Hyperglycemia Diabetic ketoacidosis without coma associated with diabetes mellitus due to underlying condition ([facility] . .. ED to Hosp-Admission (Discharged) (ADMITTED) KHANG PB MS Kenny Young, DO; Nahomy Garcia . .. ACM attempted to reach patient for follow up call regarding CT. HIPAA compliant message left requesting a return phone call at patients convenience. Will continue to follow. Was this an external facility discharge? No Discharge Facility: [facility]    Noted following upcoming appointments from discharge chart review:   [facility] follow up appointment(s):   Future Appointments   Date Time Provider Abdi German   2/10/2022  7:30 AM [facility] MEDICATION MGMT [address] [facility]     Non-[facility] follow up appointment(s): PCP?- Ольга Lema, [address] [place] [address] Coordinator  Associate Care Management  [address] [place] [address] Street  Phone: [phone]  [email or web address]

## 2022-02-09 ENCOUNTER — CARE COORDINATION (OUTPATIENT)
Dept: OTHER | Facility: CLINIC | Age: 57
End: 2022-02-09

## 2022-02-09 NOTE — CARE COORDINATION
Care Transitions Outreach Attempt    Call within 2 business days of discharge: Yes   Attempted to reach patient for transitions of care follow up. Unable to reach patient. Patient: Chelsea Hernandez Patient : 1965 MRN: [id number]    Last Discharge [address] [address]       Complaint Diagnosis Description Type Department Provider    22 Hyperglycemia Diabetic ketoacidosis without coma associated with diabetes mellitus due to underlying condition ([facility] . .. ED to Hosp-Admission (Discharged) (ADMITTED) KHANG PB MS Onofre Marrufo DO; Naila Cedeno . .. ACM attempted to reach patient for follow up call regarding CT, DM med review, DM education, f/u appointments. HIPAA compliant message left requesting a return phone call at patients convenience. Will continue to follow. Noted following upcoming appointments from discharge chart review:   [facility] follow up appointment(s):   Future Appointments   Date Time Provider Abdi German   2/10/2022  7:30 AM [facility] MEDICATION MGMT [address] [facility]     Non-[facility] follow up appointment(s): ?      Lilian Borrego. Sarika Garcia, [address] [facility] Coordinator  Associate Care Management  [address] [place] [address]  Phone: [phone]  [email or web address]

## 2022-02-10 ENCOUNTER — HOSPITAL ENCOUNTER (OUTPATIENT)
Dept: PHARMACY | Age: 57
Setting detail: THERAPIES SERIES
Discharge: HOME | End: 2022-02-10
Payer: COMMERCIAL

## 2022-02-10 VITALS — BODY MASS INDEX: 33.83 KG/M2 | WEIGHT: 191 LBS

## 2022-02-10 PROCEDURE — 99212 OFFICE O/P EST SF 10 MIN: CPT

## 2022-02-10 NOTE — PROGRESS NOTES
Diabetic Medication Management   [address]    [address] [facility] [address]. [place], [address] [facility]  Phone: [phone]  Fax: [phone]    NAME: Kirsten Sepulveda RECORD NUMBER:  [id number]  AGE: 65 y.o. GENDER: female  : 1965  EPISODE DATE:  2/10/2022       Ms. Gasper Nye was referred to [facility] by Dr. Suzanne Tamez per referral:   Fasting blood glucose: < 130  Peak postprandial glucose: < 180  A1C: < 7    Other goals:  Blood pressure goal: 130/90  Weight loss goal (~10%): Target weight  126 lb to be reached by date:  2022 (3-6 months)  Physical Activity goal:    30 minutes 5 times per week to be reached by date: 2022 (3-6 months)  Smoking Cessation   Quit Date: Non-Smoker  Cholesterol at target:   Date: Patient to fast next visit for labs  Annual eye exam:    Date: Patient to schedule Appt  Comprehensive annual foot exam:   Date: 2022  Annual urine Albumin and serum creatinine:   Date: 2021  Micro-Alb Less 12                         22     Serum Creat less 0.4      Patient Specific Goals:  1. Control BS  2. Lose weight  3. Eliminate some of her medications    Subjective   Ms. Gasper Nye is a 65 y.o. female here for the Diabetes Service for self-management education, medication review including over the counter medications and herbal products, overall wellbeing assessment, transition of care and any needed adjustments with updates and recommendations communicated to the referring physician. Patient's name and  verified.      Patient Findings: A1C  10.8  22  Patient has received Flu Vaccine, Covid Vaccines and Booster  Pneumonia Vaccine  Patient enjoys walking and biking in warm weather and plans to rejoin her gym for weight training and crossfit  Patient recently hospitalized twice for Ketoacidosis  Diagnosed as a diabetic ten years ago  Has had much stress recently  Father and Mother both had become ill and the patient moved in with them and took care of them with little help  Father passed away earlier and mother passed two weeks ago  Patient missed medication and meals  Stress likely cause of increased BS levels  Patient still under stress as patient siblings contesting her inheritance as she is adopted  Micro-Alb less 12  Creatinine Less 0.4  Patient non-smoker  BF: eggs/toast with light butter   Flavored water  Lunch: Broccoli, Chicken  Dinner : Brat  1/2 cup mac/cheese  Snack: SF Jello/Yogurt, bananna,pretzels      []  Missed doses   []  Emergency Room Visit    []  Medication changes  [x]  Hospitalization   []  Diet changes   []  Acute illness   []  Activity changes      Symptoms of hypoglycemia   [x]  None    []  Shakiness    []  Lightheadedness or dizziness   []  Confusion      []  Sweating   []  Other       Symptoms of hyperglycemia   []  None   []  Frequent urination    []  Increased thirst   [x]  Other    Medication adverse reactions   [x]  None   []  Diarrhea / Nausea / Vomiting / Constipation / flatulence   []  Hypertension   []  Peripheral edema     []  Signs of infection   []  Headache   []  Vision changes   []  Increased cholesterol    []  Weight gain   []  Change in renal function   []  Increased potassium  []  Other      Comments:        If recent hospital admission / ED visit, was this related to Diabetes Yes .  Discharge date Feb 4th, 2022  Keto acidosis    Objective     PMHx:    Past Medical History:   Diagnosis Date    Anxiety     Arthritis     [facility] ([facility]     Fibromyalgia     Hernia     abdominal, RT    Kidney stones 2009    stent at that time, has had multiple stones    PONV (postoperative nausea and vomiting)     EXTREME, WANTS SCOPE PATCH       Social History:    Social History     Tobacco Use    Smoking status: Never Smoker    Smokeless tobacco: Never Used   Substance Use Topics    Alcohol use: No       Pertinent Labs:    Lab Results   Component Value Date    LABA1C 10.8 (H) 02/02/2022 LABA1C 10.2 (H) 01/16/2022    LABA1C 9.9 (H) 09/15/2021     Lab Results   Component Value Date    CHOL 146 09/15/2021    TRIG 87 09/15/2021    HDL 58 09/15/2021     Lab Results   Component Value Date    CREATININE <0.40 (L) 02/04/2022    BUN 10 02/04/2022     02/04/2022    K 3.2 (L) 02/04/2022     (H) 02/04/2022    CO2 25 02/04/2022     Lab Results   Component Value Date    ALT 12 02/01/2022       Weight:  Wt Readings from Last 3 Encounters:   02/10/22 191 lb (86.6 kg)   02/02/22 187 lb 2.7 oz (84.9 kg)   01/19/22 187 lb 13.3 oz (85.2 kg)       Current medications:  Prior to Admission medications    Medication Sig Start Date End Date Taking?  Authorizing Provider   insulin glargine (LANTUS SOLOSTAR) 100 UNIT/ML injection pen Inject 45 Units into the skin 2 times daily Inject under the skin 48 units in the morning and 32 units in the evening 2/3/22   Bryant Brennan DO   insulin lispro (HUMALOG KWIKPEN) 200 UNIT/ML SOPN pen Administer as directed by physician with sliding scale which was provided up to 60 units daily 1/19/22   Sher Brennan DO   predniSONE (DELTASONE) 10 MG tablet TAKE 2 TABLETS BY MOUTH ONCE A DAY IN THE MORNING FOR 3 DAYS THEN TAKE 1 TABLET BY MOUTH ONE TIME A DAY IN THE MORNING THEREAFTER 11/10/21   Historical Provider, MD   escitalopram (LEXAPRO) 20 MG tablet Take 20 mg by mouth daily 3/9/20   Historical Provider, MD   Ertugliflozin L-PyroglutamicAc (STEGLATRO) 15 MG TABS Take 1 tablet by mouth daily    Historical Provider, MD   Calcium Carb-Cholecalciferol (CALCIUM-VITAMIN D) 500-200 MG-UNIT per tablet Take 1 tablet by mouth 2 times daily (with meals)    Historical Provider, MD   Insulin Pen Needle 32G X 4 MM MISC 1 each by Does not apply route 2 times daily    Historical Provider, MD   glimepiride (AMARYL) 4 MG tablet Take 4mg by mouth in the morning and take 2mg by mouth in the afternoon (with meals)    Historical Provider, MD   metFORMIN (GLUCOPHAGE-XR) 500 MG extended release tablet Take 1,000 mg by mouth 2 times daily (with meals)    Historical Provider, MD   lisinopril (PRINIVIL;ZESTRIL) 5 MG tablet Take 5 mg by mouth daily    Historical Provider, MD   Multiple Vitamins-Minerals (THERAPEUTIC MULTIVITAMIN-MINERALS) tablet Take 1 tablet by mouth daily    Historical Provider, MD       Immunizations:   Most Recent Immunizations   Administered Date(s) Administered    DTaP 01/08/2013    Influenza Vaccine, unspecified formulation 01/08/2013    Influenza Virus Vaccine 11/05/2019    Pneumococcal Polysaccharide ([id number]) 12/13/2018       Home Blood Glucose Results: 2/2/22  A1C  10.9      Blood glucose trends noted: Since Hospital Discharge  FBS : 948,56,46,58,039,197 (8 units)  Before Lunch: 151 (4 units), 81, 186 (4 units), 175 (4units), 246 ( 8 units)  Before Dinner: 172 (4 units), 246 (8 Units), 361 (20 units), 83, 153 (4 units)  Bedtime: 160 (4 units), 181 (4 units), 165 (4 units), 189 (4 units) , 205(8 units)  Assessment     A1c at goal: No: 10.8 (2/2/22)  Blood Pressure at goal: No: 140/96  Weight at goal: No: 191 lbs  Physical activity: 30 minutes 5 times per week  Physical activity at goal: No: Plans to return to gym in cold weather and walk/bike when weather improved  Smoking Cessation: Yes  Non-Smokder  Cholesterol at target: No: Plan lab next visit  Annual eye exam completed: No: Patient to schedule  Comprehensive Foot Exam Completed: Yes   Feb 2022  Annual urine albumin and serum creatinine: Yes  12/2021: Micro-Alb less 12  2/4/2022 : Creatinine less 0.4  Statin: No: Plan lipid panel next visit    Appropriate?:   Changes made:     ACE/ARB: Yes  Appropriate?: Yes  Changes made: Lisinopril 5 mg aily    Aspirin: No: None at present  Appropriate?:   Changes made:     Eating patterns:    []  My plate    []  Mediterranean diet   []  Low sodium   []  DASH diet   [x]  Portion control   [x]  Reduced calorie    []  Fast food / Restaurant  []  High glycemic index foods   [] Sugary beverages   []  Other     Comment:      Current Medications Affecting Diabetes:  Glimepiride 4 mg am and 2 mg pm  Metformin XR 1000 mg Bid  Steglatro 15 mg daily  Lantus : 48 units am and 32 units pm    Humalog Kwikpen:  Sliding Scale with meals  0-150        No Insulin  151-200    4 units  201-250    8 units  251-300   12 units  301-350   16 units  Greater than 351   20 units      Compliant: yes  Barriers to medication therapy: no      Recent exacerbations / new problems:  Two hospitalizations for Diabetic Ketoacidosis    Last office dictation reviewed: yes        type 2 DM under worsening control as evidenced by A1c 10.8  Diabetic Ketoacidosis    Plan     Initial diabetic education materials given to patient including the following: * Blood Glucose Log   * Your Guide to a Healthy Lifestyle   * 5 Helpful Things to Know About A1C   * Using the Plate Method for a Balanced Meal Plan   * Things to Know About Hypoglycemia   * Smoking Cessation Product Information    Discussed general issues about diabetes pathophysiology and management. Continue current Diabetic medications  Plan to fast prior to next appt for lab work  Schedule Eye Exam  Rejoin her Gym for weight Training and [address] as weather improves    Physician Follow-up:  Every 1 months    Medication Management Follow-up:   Diabetes Service   3 weeks       COVID 19 screening completed. At this time patient denies symptoms, recent travel and exposure. Patient educated to screen for temperature and COVID-19 symptoms prior to coming to clinic for next appointment. They are instructed to call the clinic to reschedule if they have any symptoms.         Electronically signed by Baldemar Man [facility] on 2/10/2022 at 3:18 PM

## 2022-02-17 ENCOUNTER — TELEPHONE (OUTPATIENT)
Dept: PHARMACY | Facility: CLINIC | Age: 57
End: 2022-02-17

## 2022-02-17 NOTE — TELEPHONE ENCOUNTER
Second attempt made to contact patient to schedule 2022 yearly pharmacist appointment to discuss medications for Diabetes Management Program.    Patient missed appointment on 1/20/22    No answer. Left VM on 2/17/22: Please call back at [phone] Option #3.      Lorena Adams, Via [facility]   Department, toll free: [phone] Option #3

## 2022-02-17 NOTE — LETTER
Ester   [address], [address]  Phone: toll free [phone] Option #3        [address] [facility] [address]           03/01/22   Dear Alexx An,    Congratulations! You have completed the 2021 requirements for the [facility] Be Well With Diabetes Program. You have been automatically re-enrolled into the [facility] Be Well With Diabetes Program for 2022. One of the requirements to participate in the [facility] Be Well With Diabetes Program is to complete a Clinical Pharmacist Telephone appointment yearly. [facility] has attempted to contact you to schedule your 2022 Diabetes Management telephone appointment but was unable to reach you. We would like to work with you and your doctor to:  - Review your medications, including over-the-counter and herbal medications  - Answer questions about your medications and how to get the most benefit from them  - Identify potential drug interactions or side effects and help fix them  - Identify preferred medications that are equally effective, but available at a lower cost to you  - Help you reach the necessary requirements to remain enrolled in the Diabetes Management Program offered by [facility]     Please call [phone] and select option #3 to schedule this appointment to take advantage of this service. Telephone appointments are available Monday thru Friday from 7:30 AM till 5:30 PM.     This is a courtesy reminder. If you have not scheduled this appointment yet, please contact us at the above number to schedule.      Sincerely,   [address]  Phone: [phone] Option #3

## 2022-02-21 ENCOUNTER — CARE COORDINATION (OUTPATIENT)
Dept: OTHER | Facility: CLINIC | Age: 57
End: 2022-02-21

## 2022-02-21 NOTE — CARE COORDINATION
Care Transitions Outreach Attempt    Call within 2 business days of discharge: Yes   Attempted to reach patient for transitions of care follow up. Unable to reach patient. Patient: Alpa Mendez Patient : 1965 MRN: [id number]    Last Discharge [facility]       Complaint Diagnosis Description Type Department Provider    22 Hyperglycemia Diabetic ketoacidosis without coma associated with diabetes mellitus due to underlying condition ([facility] [address] . .. ED to Hosp-Admission (Discharged) (ADMITTED) KHANG PB MS Jean Wick DO; Tamara Deluca . .. ACM attempted to reach patient for follow up call regarding CT. HIPAA compliant message left requesting a return phone call at patients convenience. Was this an external facility discharge? No Discharge Facility: [facility]     Noted following upcoming appointments from discharge chart review:   [facility] follow up appointment(s):   Future Appointments   Date Time Provider Abdi German   3/3/2022  9:10 AM STCZ MEDICATION MGMT [address] [facility]     Non-[facility] follow up appointment(s): ?    Adi Cooper. Sara Garnett, [facility] Coordinator  Associate Care Management  [address] [place] [address]  Phone: [phone]  [email or web address]

## 2022-02-23 NOTE — TELEPHONE ENCOUNTER
Second attempt made to contact patient to schedule 2022 yearly pharmacist appointment to discuss medications for Diabetes Management Program.    No answer. Left VM on 2/23/22: Please call back at [phone] Option #3. Zawatt message sent to patient.     For Willis Daley in place:  No   Recommendation Provided To: Patient/Caregiver: 1 via Telephone and [employer]   Gap Closed?: No    Intervention Accepted By: Patient/Caregiver: 0   Time Spent (min): Mckinley Hannah, [facility] [employer] [facility]   Department, toll free: [phone] Option #3

## 2022-03-02 ENCOUNTER — TELEPHONE (OUTPATIENT)
Dept: PHARMACY | Age: 57
End: 2022-03-02

## 2022-03-02 NOTE — TELEPHONE ENCOUNTER
Patient called and indicated she needs to cancel her appt for diabetes management on 3/3/22 due to recently have a fracture and being unable to drive. Forgot her appt was tomorrow and when received the reminder call today is not able to find someone to drive her to her appt tomorrow. Offered her an appt on 3/4 but patient states it needs to be next week and prefers afternoon. Offered times on 3/7 and 3/9 in afternoon. Patient will check on ride availability and call us back. Madiha Claudio [facility],Pharm. D,, BCPS, CACP  3/2/2022  3:23 PM

## 2022-03-03 ENCOUNTER — TELEPHONE (OUTPATIENT)
Dept: PHARMACY | Age: 57
End: 2022-03-03

## 2022-03-11 ENCOUNTER — TELEPHONE (OUTPATIENT)
Dept: PHARMACY | Facility: CLINIC | Age: 57
End: 2022-03-11

## 2022-03-11 NOTE — TELEPHONE ENCOUNTER
2022 Annual Pharmacist Visit    Called patient to schedule 2022 yearly pharmacist appointment to discuss medications for Diabetes Management Program.    No answer. Left VM.      Please call back at [phone], option #3         Fabian Greco [employer]   Phone: [phone], option #3

## 2022-03-11 NOTE — TELEPHONE ENCOUNTER
Patient called back and scheduled appoinment for 3/14/22 at 5:00pm.       Angélica Hernandez, [address]   Phone: [phone]

## 2022-03-14 ENCOUNTER — SCHEDULED TELEPHONE ENCOUNTER (OUTPATIENT)
Dept: PHARMACY | Facility: CLINIC | Age: 57
End: 2022-03-14

## 2022-03-14 ENCOUNTER — CLINICAL DOCUMENTATION (OUTPATIENT)
Dept: PHARMACY | Facility: CLINIC | Age: 57
End: 2022-03-14

## 2022-03-14 RX ORDER — ALPRAZOLAM 0.25 MG/1
TABLET ORAL
COMMUNITY
Start: 2022-02-07

## 2022-03-14 NOTE — PROGRESS NOTES
Pharmacy Pop Care Documentation:     AVS received for required office visit on: 1/10/22 with Ernst Mcallister per careeverywhere

## 2022-03-14 NOTE — TELEPHONE ENCOUNTER
[facility] CLINICAL PHARMACY REVIEW - Be Well with Diabetes    Elizabeth Carvalho is a 65 y.o. female enrolled in the [address] [place], [place] [address]Th [employer] [facility] [employer] [facility] Employee Diabetes Program. Patient provided Bishop Luna with verbal consent to remain in the program for this year. Insurance through the following employer: [address] [place] [address]. Patient chose the [facility] this year and did not fully understand what it meant for her prescriptions. All medications are protected and the copays will be the same as they have been in the past, but the cost for her testing supplies have increased drastically. Jeny White would cost >400$ for a 90ds.         Medications:  Current Outpatient Medications   Medication Sig Dispense Refill    ALPRAZolam (XANAX) 0.25 MG tablet take 1 tablet by mouth twice a day if needed for anxiety      insulin glargine (LANTUS SOLOSTAR) 100 UNIT/ML injection pen Inject 45 Units into the skin 2 times daily Inject under the skin 48 units in the morning and 32 units in the evening 5 pen 3    insulin lispro (HUMALOG KWIKPEN) 200 UNIT/ML SOPN pen Administer as directed by physician with sliding scale which was provided up to 60 units daily 2 pen 1    predniSONE (DELTASONE) 10 MG tablet TAKE 2 TABLETS BY MOUTH ONCE A DAY IN THE MORNING FOR 3 DAYS THEN TAKE 1 TABLET BY MOUTH ONE TIME A DAY IN THE MORNING THEREAFTER      escitalopram (LEXAPRO) 20 MG tablet Take 20 mg by mouth daily      Ertugliflozin L-PyroglutamicAc (STEGLATRO) 15 MG TABS Take 1 tablet by mouth daily      Calcium Carb-Cholecalciferol (CALCIUM-VITAMIN D) 500-200 MG-UNIT per tablet Take 1 tablet by mouth 2 times daily (with meals)      Insulin Pen Needle 32G X 4 MM MISC 1 each by Does not apply route 2 times daily      glimepiride (AMARYL) 4 MG tablet Take 4mg by mouth in the morning and take 2mg by mouth in the afternoon (with meals)      metFORMIN (GLUCOPHAGE-XR) 500 MG extended release tablet Take 1,000 mg by mouth 2 times daily (with meals)  lisinopril (PRINIVIL;ZESTRIL) 5 MG tablet Take 5 mg by mouth daily      Multiple Vitamins-Minerals (THERAPEUTIC MULTIVITAMIN-MINERALS) tablet Take 1 tablet by mouth daily       No current facility-administered medications for this visit. Current Pharmacy: [facility]  Current testing supplies/frequency: [employer]- Patient had been on Lin 2, but chose the [facility] this year so it became unaffordable. She will periodically check with [facility] to see if she meets the deductible  Pen needles/syringes: n/a    Allergies:  No Known Allergies   Vitals/Labs:  BP Readings from Last 3 Encounters:   02/04/22 123/80   01/19/22 122/65   11/13/20 136/82     Lab Results   Component Value Date    LABMICR Can not be calculated 12/21/2021     Lab Results   Component Value Date    LABA1C 10.8 (H) 02/02/2022    LABA1C 10.2 (H) 01/16/2022    LABA1C 9.9 (H) 09/15/2021     Lab Results   Component Value Date    CHOL 146 09/15/2021    TRIG 87 09/15/2021    HDL 58 09/15/2021    LDLCHOLESTEROL 71 09/15/2021     ALT   Date Value Ref Range Status   02/01/2022 12 5 - 33 U/L Final     AST   Date Value Ref Range Status   02/01/2022 8 <32 U/L Final     The 10-year ASCVD risk score (Luz Oliva, et al., 2013) is: 2.7%    Values used to calculate the score:      Age: 65 years      Sex: Female      Is Non- : No      Diabetic: Yes      Tobacco smoker: No      Systolic Blood Pressure: 456 mmHg      Is BP treated: No      HDL Cholesterol: 58 mg/dL      Total Cholesterol: 146 mg/dL     Lab Results   Component Value Date    CREATININE <0.40 (L) 02/04/2022     CrCl cannot be calculated (This lab value cannot be used to calculate CrCl because it is not a number: <0.40).     Lab Results   Component Value Date    LABGLOM Can not be calculated 02/04/2022    LABGLOM >60 02/03/2022    LABGLOM >60 02/03/2022    LABGLOM >60 02/03/2022       Immunizations:  Immunization History   Administered Date(s) Administered   Kassie DTaP 01/08/2013    Influenza Vaccine, unspecified formulation 01/08/2013    Influenza Virus Vaccine 11/27/2018, 11/05/2019    Pneumococcal Polysaccharide ([id number]) 12/13/2018      Social History:  Social History     Tobacco Use    Smoking status: Never Smoker    Smokeless tobacco: Never Used   Substance Use Topics    Alcohol use: No     ASSESSMENT:  Initial Program Requirements (Y indicates has completed for the year, N indicates needs to be completed by 07/01/2022): Yes - Provider Visit for DM (1st)  Yes - A1c (1st)     Ongoing Program Requirements (Y indicates has completed for the year, N indicates needs to be completed by 12/31/2022): Yes - Provider Visit for DM (2nd)  Yes - ACC/diabetes educator visit (if A1c over 8%)- Working with Med management clinic   Yes - A1c (2nd)  No - Lipid panel  No - Urine microalbumin  Yes - Pneumococcal vaccination: Up-to-date with Pneumo series. Briefly discussed Angela Slot  Yes - Influenza vaccination for Fall 2022- employee  Yes - Medication adherence over 70%  No - On statin or contraindication(s)- We had provided overrides in the past for LDL <70. Most recent LDL in Sep was 71. Will revisit after future Lipid panel. Yes - On ACEi/ARB or contraindication(s) Lisinopril     Current medications eligible for copay waiver, up to $600, through [facility]:  - Steglatro, Glimepiride, Lantus, Humalog, Lisinopril, Metformin  - Prodigy     Diabetes Care:   - Glycemic Goal: <7.0%. Is not at blood glucose goal.  - Patient had multiple issues arise in late 2021. She had a flare of fibromyalgia and has been on prednisone since 7/2021 and also lost both parents recently which led to signicant stress. She seems to be in good spirits now and is taking an active role in her health. - Home blood sugar records:  Patient is testing with a fingerstick meter.   Not clear how often she is checking, but readings are remaining above goal  - Any episodes of hypoglycemia? no  - Therapy Optimization: GLP1a would be an option. We have discussed with patient in the past. She is aware that cost would not be an issue     Other Considerations:  - Blood Pressure Goal: BP less than 140/90 mmHg due to history of DM: Is at blood pressure goal.   - Lipids: Patient is not prescribed moderate-intensity statin therapy. LDL typically <70, will revisit in the future    PLAN:  - Consideration(s) for provider:   · None at this time  - DM program gaps identified:   · Initial requirements: Requirements met   · Ongoing requirements: Lipid panel, Urine microalbumin and Influenza vaccination for 4445-9910   - Education to patient: Discussed general issues about diabetes pathophysiology and management., Overview of Be Well With Diabetes program and Overview of HHP   - Follow up: PCP for identified gaps or as scheduled below and Pharmacist for identified gaps or as scheduled below  - Upcoming appointments:   Future Appointments   Date Time Provider Abdi German   3/14/2022  5:00 PM SCHEDULE, [facility] Rx None       RUMA Zhao, PharmD, [facility], toll free: [phone]      For Pharmacy Admin Tracking Only     Gap Closed?: Yes    Time Spent (min): 30

## 2022-03-15 ENCOUNTER — CLINICAL DOCUMENTATION (OUTPATIENT)
Dept: PHARMACY | Facility: CLINIC | Age: 57
End: 2022-03-15

## 2022-03-23 ENCOUNTER — TELEPHONE (OUTPATIENT)
Dept: PHARMACY | Age: 57
End: 2022-03-23

## 2022-03-23 NOTE — TELEPHONE ENCOUNTER
Called patient to reschedule DM appointment and the patient states she is going to check with her daughters schedule because she needs a ride to the appointment. States she will call us back.    Donovan Álvarez, PharmD, BCPS  3/23/2022 4:05 PM

## 2022-04-06 ENCOUNTER — TELEPHONE (OUTPATIENT)
Dept: PHARMACY | Age: 57
End: 2022-04-06

## 2022-05-03 ENCOUNTER — TELEPHONE (OUTPATIENT)
Dept: PHARMACY | Age: 57
End: 2022-05-03

## 2022-05-03 NOTE — TELEPHONE ENCOUNTER
Called patient to schedule DM appt.  Patient didn't answer, left VM indicating phone number and interest in a return call if she would like to schedule as well as if she is no longer interested so she can be removed from our list.  Thank you,  Jarrett MottaD, [facility]  PGY-1 Pharmacy Resident

## 2022-05-04 ENCOUNTER — CARE COORDINATION (OUTPATIENT)
Dept: OTHER | Facility: CLINIC | Age: 57
End: 2022-05-04

## 2022-05-04 NOTE — CARE COORDINATION
Ambulatory Care Coordination Note  5/4/2022  CM Risk Score: 2  Charlson 10 Year Mortality Risk Score: 10%     ACC: Muriel Matson, RN    Summary Note: ACM attempted to reach patient for introduction to Associate Care Management related to [facility]. HIPAA compliant message left requesting a return phone call. Will attempt to outreach patient again. No future appointments. Manuel HILL, RN   Ambulatory Care Manager  Associate Care Management  Cell [phone]  [email or web address]

## 2022-05-10 ENCOUNTER — CARE COORDINATION (OUTPATIENT)
Dept: OTHER | Facility: CLINIC | Age: 57
End: 2022-05-10

## 2022-05-10 NOTE — CARE COORDINATION
Ambulatory Care Coordination Note  5/10/2022  CM Risk Score: 6  Charlson 10 Year Mortality Risk Score: 10%     ACC: Patricia Carter RN    Summary Note: ACM attempted 2nd outreach to reach patient for introduction to Associate Care Management rel/to [facility]. HIPAA compliant message left requesting a return phone call at patients convenience. Unable to Reach Letter sent to patient via My Chart. Will continue to outreach patient. No future appointments. Axel HILL, RN   Ambulatory Care Manager  Associate Care Management  Cell [phone]  [email or web address]

## 2022-05-19 ENCOUNTER — CARE COORDINATION (OUTPATIENT)
Dept: OTHER | Facility: CLINIC | Age: 57
End: 2022-05-19

## 2022-05-19 NOTE — CARE COORDINATION
Ambulatory Care Coordination Note  5/19/2022  CM Risk Score: 6  Charlson 10 Year Mortality Risk Score: 10%     ACC: Garland Sender, RN    Summary Note: ACM attempted third and final call to patient for introduction to Associate Care Management rel/to [facility]. HIPAA compliant message left requesting a return phone call at patients convenience. Final Unable to Reach Letter sent via Mail. No further outreach scheduled with this ACM, ACM will sign off care team at this time. Patient has been provided with this ACM's contact information. No future appointments. Cuco Miranda MSN, RN   Ambulatory Care Manager  Associate Care Management  Cell [phone]  [email or web address]

## 2022-05-19 NOTE — LETTER
May 19, 2022        Dear Carmen Jon,    My name is Sulema Silverio , Associate Care Manager for [facility], and I have been trying to reach you. The Associate Care Management (ACM) program is a free-of-charge, confidential service provided to our employees and their family members covered by the [facility]. I can help you with care transitions such as when you come home from the hospital, when help is needed to manage your disease, to assist you to meet goals for your diabetes, or when you need assistance coordinating services or appointments. As healthcare providers, we know that patients do better when they have close follow up with a primary care provider (PCP). I can help you find one that is convenient to you and covered by your insurance. I can also help you understand any after visit instructions, such as what symptoms to watch out for, or any new or changed medications. We can work together using your preferred communication -- telephone, email, [employer]. If you do not have a [employer] account, I can help you request access. Our program is designed to provide you with the opportunity to have a [facility] partner with you for your healthcare needs. Due to not being able to reach you, I am closing out the current program, but will remain available to you should you have any questions. Feel free to contact me at [phone]. Sincerely,  Sulema Silverio MSN, RN   Ambulatory Care Manager  Associate Care Management  Cell [phone]  [email or web address]

## 2022-06-02 ENCOUNTER — TELEPHONE (OUTPATIENT)
Dept: PHARMACY | Age: 57
End: 2022-06-02

## 2022-06-02 NOTE — TELEPHONE ENCOUNTER
Called patient for clarification if she is doing diabetes management with [facility] or if she would like to reschedule an appointment with the [address] [place] [address] Medication Management office. Left voice message with callback number provided.      Angelo Osborn, PharmD 6/2/2022 3:15 PM  [place]. Jaylen Keys 45 PGY1 Resident

## 2022-06-23 ENCOUNTER — TELEPHONE (OUTPATIENT)
Dept: PHARMACY | Age: 57
End: 2022-06-23

## 2022-06-23 NOTE — TELEPHONE ENCOUNTER
Called to speak to patient about scheduling an appointment for medication management for diabetes management. Patient indicates she is not interested in setting up an appt as she is unable to pay for any more appts as she is still trying to pay off bills from previous two ICU admissions. Inquired if she had been given information on our patient assistance programs. 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 States she is currently taking her diabetes medications as covered under the [facility]) Diabetes management program for Employees but once that runs out will likely not be able to take as currently just trying to make sure she has enough money to put food on the table. Discussed website [employer] and recommended patient utilize this website for information on copay waivers from the drug  to help once the employee program is not longer available. Patient accepted information and seemed thankful for information. Will close the referral for this patient and notify referring provider. Patient offered to return to our service at any time and to obtain a new referral at that time. Madiha Claudio RPH,Pharm. D,, BCPS, CACP  6/23/2022  2:09 PM

## 2022-07-13 LAB
CHOLESTEROL/HDL RATIO: 2.4
CHOLESTEROL: 170 MG/DL
GLUCOSE BLD-MCNC: 33 MG/DL (ref 70–99)
HDLC SERPL-MCNC: 72 MG/DL
LDL CHOLESTEROL: 66 MG/DL (ref 0–130)
PATIENT FASTING?: NO
TRIGL SERPL-MCNC: 160 MG/DL

## 2022-07-14 LAB
ESTIMATED AVERAGE GLUCOSE: 189 MG/DL
HBA1C MFR BLD: 8.2 % (ref 4–6)

## 2022-07-19 ENCOUNTER — TELEPHONE (OUTPATIENT)
Dept: PHARMACY | Facility: CLINIC | Age: 57
End: 2022-07-19

## 2022-07-19 NOTE — TELEPHONE ENCOUNTER
POPULATION HEALTH CLINICAL PHARMACY REVIEW - BE WELL WITH DIABETES: HIGH A1C  =============================================  Gustavo Joseph is a 65 y.o. female enrolled in the [facility] Be Well with Diabetes program.    Identified care gap(s): A1c > 8%    DM Program Prescriptions:  Current Outpatient Medications   Medication Sig Dispense Refill    ALPRAZolam (XANAX) 0.25 MG tablet take 1 tablet by mouth twice a day if needed for anxiety      insulin glargine (LANTUS SOLOSTAR) 100 UNIT/ML injection pen Inject 45 Units into the skin 2 times daily Inject under the skin 48 units in the morning and 32 units in the evening 5 pen 3    insulin lispro (HUMALOG KWIKPEN) 200 UNIT/ML SOPN pen Administer as directed by physician with sliding scale which was provided up to 60 units daily 2 pen 1    predniSONE (DELTASONE) 10 MG tablet TAKE 2 TABLETS BY MOUTH ONCE A DAY IN THE MORNING FOR 3 DAYS THEN TAKE 1 TABLET BY MOUTH ONE TIME A DAY IN THE MORNING THEREAFTER      escitalopram (LEXAPRO) 20 MG tablet Take 20 mg by mouth daily      Ertugliflozin L-PyroglutamicAc (STEGLATRO) 15 MG TABS Take 1 tablet by mouth daily      Calcium Carb-Cholecalciferol (CALCIUM-VITAMIN D) 500-200 MG-UNIT per tablet Take 1 tablet by mouth 2 times daily (with meals)      Insulin Pen Needle 32G X 4 MM MISC 1 each by Does not apply route 2 times daily      glimepiride (AMARYL) 4 MG tablet Take 4mg by mouth in the morning and take 2mg by mouth in the afternoon (with meals)      metFORMIN (GLUCOPHAGE-XR) 500 MG extended release tablet Take 1,000 mg by mouth 2 times daily (with meals)      lisinopril (PRINIVIL;ZESTRIL) 5 MG tablet Take 5 mg by mouth daily      Multiple Vitamins-Minerals (THERAPEUTIC MULTIVITAMIN-MINERALS) tablet Take 1 tablet by mouth daily       No current facility-administered medications for this visit.         Allergies:  No Known Allergies     Labs:  Lab Results   Component Value Date    LABA1C 8.2 (H) 07/13/2022 LABA1C 10.8 (H) 02/02/2022    LABA1C 10.2 (H) 01/16/2022     Estimated Creatinine Clearance: 164 mL/min (based on SCr of 0.4 mg/dL). Care Team:   Physician (PCP): Erica Mcdaniel (Last OV: 3/21/22)  - Upcoming appointments:   No future appointments. Diabetes Care:  - Glycemic Goal: <7.0%. Is not at blood glucose goal But has shown some very significant improvement over the last 5 months. A1c is down by over 2.5%   - Patient was having a difficult time earlier in e year. She stopped all her meds at one point and ended up in [facility] with DKA. She has since restarted, and is seeing improvement in her A1c.  - Medication Adherence Assessment: Per [facility] refill history, patient appears to be adherent to all medication therapy  - Patient was referred to med management clinic, but declined to avoid any other copay. Per chart note from [facility] recently, she expressed concern with med cost and prescription copays. Patient chose the [facility] for 2022. Her medication cost is fine, but her Lin is not affordable. She currently has 520$ remaining to help with medication copays. She should not exceed this in 2022. As for her Lin, she has still not met her deductible so cost through insurance is still >400$ for 3 months    Plan:  Called and LVM for patient to return call to [phone] option 3. [email or web address] message also send and will sign off.     John Cm, PharmD, [facility], toll free: [phone]    For Pharmacy Admin Tracking Only    Time Spent (min): 15

## 2022-10-24 ENCOUNTER — HOSPITAL ENCOUNTER (EMERGENCY)
Age: 57
Discharge: HOME | End: 2022-10-24
Attending: EMERGENCY MEDICINE
Payer: COMMERCIAL

## 2022-10-24 VITALS
WEIGHT: 180 LBS | HEIGHT: 63 IN | OXYGEN SATURATION: 98 % | TEMPERATURE: 96.8 F | SYSTOLIC BLOOD PRESSURE: 125 MMHG | HEART RATE: 85 BPM | BODY MASS INDEX: 31.89 KG/M2 | DIASTOLIC BLOOD PRESSURE: 76 MMHG | RESPIRATION RATE: 18 BRPM

## 2022-10-24 DIAGNOSIS — R73.9 HYPERGLYCEMIA: Primary | ICD-10-CM

## 2022-10-24 LAB
ABSOLUTE EOS #: 0.1 K/UL (ref 0–0.4)
ABSOLUTE LYMPH #: 3.2 K/UL (ref 1–4.8)
ABSOLUTE MONO #: 0.8 K/UL (ref 0.1–1.3)
ALLEN TEST: ABNORMAL
ANION GAP SERPL CALCULATED.3IONS-SCNC: 16 MMOL/L (ref 9–17)
BACTERIA: NORMAL
BASOPHILS # BLD: 1 % (ref 0–2)
BASOPHILS ABSOLUTE: 0.2 K/UL (ref 0–0.2)
BETA-HYDROXYBUTYRATE: 3.31 MMOL/L (ref 0.02–0.27)
BILIRUBIN URINE: NEGATIVE
BUN BLDV-MCNC: 26 MG/DL (ref 6–20)
CALCIUM SERPL-MCNC: 10 MG/DL (ref 8.6–10.4)
CARBOXYHEMOGLOBIN: 2.6 % (ref 0–5)
CASTS UA: NORMAL /LPF
CHLORIDE BLD-SCNC: 100 MMOL/L (ref 98–107)
CO2: 18 MMOL/L (ref 20–31)
COLOR: YELLOW
CREAT SERPL-MCNC: 0.77 MG/DL (ref 0.5–0.9)
EOSINOPHILS RELATIVE PERCENT: 0 % (ref 0–4)
EPITHELIAL CELLS UA: NORMAL /HPF
GFR SERPL CREATININE-BSD FRML MDRD: >60 ML/MIN/1.73M2
GLUCOSE BLD-MCNC: 152 MG/DL (ref 65–105)
GLUCOSE BLD-MCNC: 152 MG/DL (ref 70–99)
GLUCOSE BLD-MCNC: 83 MG/DL (ref 65–105)
GLUCOSE BLD-MCNC: 83 MG/DL (ref 65–105)
GLUCOSE URINE: ABNORMAL
HCO3 VENOUS: 18.4 MMOL/L (ref 24–30)
HCT VFR BLD CALC: 42.6 % (ref 36–46)
HEMOGLOBIN: 14.1 G/DL (ref 12–16)
KETONES, URINE: ABNORMAL
LEUKOCYTE ESTERASE, URINE: ABNORMAL
LYMPHOCYTES # BLD: 21 % (ref 24–44)
MCH RBC QN AUTO: 28.5 PG (ref 26–34)
MCHC RBC AUTO-ENTMCNC: 33.1 G/DL (ref 31–37)
MCV RBC AUTO: 86.1 FL (ref 80–100)
METHEMOGLOBIN: 0.3 % (ref 0–1.9)
MONOCYTES # BLD: 5 % (ref 1–7)
NEGATIVE BASE EXCESS, VEN: 7.5 MMOL/L (ref 0–2)
NITRITE, URINE: NEGATIVE
O2 SAT, VEN: 89.9 % (ref 60–85)
PATIENT TEMP: 37
PCO2, VEN: 35.1 MM HG (ref 39–55)
PDW BLD-RTO: 13.9 % (ref 11.5–14.9)
PH UA: 5.5 (ref 5–8)
PH VENOUS: 7.33 (ref 7.32–7.42)
PLATELET # BLD: 509 K/UL (ref 150–450)
PMV BLD AUTO: 7.5 FL (ref 6–12)
PO2, VEN: 62.5 MM HG (ref 30–50)
POTASSIUM SERPL-SCNC: 4.8 MMOL/L (ref 3.7–5.3)
PROTEIN UA: NEGATIVE
RBC # BLD: 4.95 M/UL (ref 4–5.2)
RBC UA: NORMAL /HPF
SEG NEUTROPHILS: 73 % (ref 36–66)
SEGMENTED NEUTROPHILS ABSOLUTE COUNT: 10.7 K/UL (ref 1.3–9.1)
SODIUM BLD-SCNC: 134 MMOL/L (ref 135–144)
SPECIFIC GRAVITY UA: 1.03 (ref 1–1.03)
TURBIDITY: CLEAR
URINE HGB: NEGATIVE
UROBILINOGEN, URINE: NORMAL
WBC # BLD: 14.9 K/UL (ref 3.5–11)
WBC UA: NORMAL /HPF

## 2022-10-24 PROCEDURE — 2580000003 HC RX 258: Performed by: STUDENT IN AN ORGANIZED HEALTH CARE EDUCATION/TRAINING PROGRAM

## 2022-10-24 PROCEDURE — 99284 EMERGENCY DEPT VISIT MOD MDM: CPT

## 2022-10-24 PROCEDURE — 82800 BLOOD PH: CPT

## 2022-10-24 PROCEDURE — 36415 COLL VENOUS BLD VENIPUNCTURE: CPT

## 2022-10-24 PROCEDURE — 80048 BASIC METABOLIC PNL TOTAL CA: CPT

## 2022-10-24 PROCEDURE — 96374 THER/PROPH/DIAG INJ IV PUSH: CPT

## 2022-10-24 PROCEDURE — 6360000002 HC RX W HCPCS: Performed by: STUDENT IN AN ORGANIZED HEALTH CARE EDUCATION/TRAINING PROGRAM

## 2022-10-24 PROCEDURE — 82805 BLOOD GASES W/O2 SATURATION: CPT

## 2022-10-24 PROCEDURE — 82010 KETONE BODYS QUAN: CPT

## 2022-10-24 PROCEDURE — 81001 URINALYSIS AUTO W/SCOPE: CPT

## 2022-10-24 PROCEDURE — 82947 ASSAY GLUCOSE BLOOD QUANT: CPT

## 2022-10-24 PROCEDURE — 85025 COMPLETE CBC W/AUTO DIFF WBC: CPT

## 2022-10-24 PROCEDURE — 87086 URINE CULTURE/COLONY COUNT: CPT

## 2022-10-24 RX ORDER — ONDANSETRON 4 MG/1
4 TABLET, ORALLY DISINTEGRATING ORAL EVERY 8 HOURS PRN
Qty: 20 TABLET | Refills: 0 | Status: SHIPPED | OUTPATIENT
Start: 2022-10-24

## 2022-10-24 RX ORDER — 0.9 % SODIUM CHLORIDE 0.9 %
1000 INTRAVENOUS SOLUTION INTRAVENOUS ONCE
Status: COMPLETED | OUTPATIENT
Start: 2022-10-24 | End: 2022-10-24

## 2022-10-24 RX ORDER — ONDANSETRON 2 MG/ML
4 INJECTION INTRAMUSCULAR; INTRAVENOUS ONCE
Status: COMPLETED | OUTPATIENT
Start: 2022-10-24 | End: 2022-10-24

## 2022-10-24 RX ADMIN — SODIUM CHLORIDE 1000 ML: 9 INJECTION, SOLUTION INTRAVENOUS at 15:30

## 2022-10-24 RX ADMIN — ONDANSETRON 4 MG: 2 INJECTION INTRAMUSCULAR; INTRAVENOUS at 15:29

## 2022-10-24 RX ADMIN — SODIUM CHLORIDE 1000 ML: 9 INJECTION, SOLUTION INTRAVENOUS at 16:56

## 2022-10-24 ASSESSMENT — PAIN DESCRIPTION - DESCRIPTORS: DESCRIPTORS: THROBBING;ACHING

## 2022-10-24 ASSESSMENT — ENCOUNTER SYMPTOMS
ABDOMINAL PAIN: 1
SORE THROAT: 0
NAUSEA: 1
RHINORRHEA: 0
DIARRHEA: 0
COUGH: 0
VOMITING: 1
SHORTNESS OF BREATH: 0

## 2022-10-24 ASSESSMENT — PAIN SCALES - GENERAL: PAINLEVEL_OUTOF10: 3

## 2022-10-24 ASSESSMENT — PAIN - FUNCTIONAL ASSESSMENT: PAIN_FUNCTIONAL_ASSESSMENT: 0-10

## 2022-10-24 ASSESSMENT — PAIN DESCRIPTION - LOCATION: LOCATION: ABDOMEN

## 2022-10-24 NOTE — ED PROVIDER NOTES
[address]  eMERGENCY dEPARTMENT eNCOUnter   Attending Attestation     Pt Name: Aron Alexander  MRN: [id number]  [place] 1965  Date of evaluation: 10/24/22    History, EXAM, MDM:    Aron Alexander is a 65 y.o. female who presents with Hyperglycemia  Presenting with concern for dka. Glucose >500mg/dl last night  She has been treating with subq insulin at home (20units). On exam vss, pt tachycardic and tachypneic. Abdomen soft, no ttp. Laboratory studies show no AG metabolic acidosis. pH 7.32. Beta hydroxy 3. UA does not appear infected. Pt able to eat. No further vomiting. Recommended close follow up with her pcp, return to ED if symptoms worsen, and warning precautions provided. Vitals:   Vitals:    10/24/22 1530 10/24/22 1615 10/24/22 1645 10/24/22 1656   BP: 129/88 125/73 128/77 125/76   Pulse: 91 84 91 85   Resp: 25 21 20 18   Temp:       SpO2: 96% 96% 96% 98%   Weight:       Height:         I performed a history and physical examination of the patient and discussed management with the resident. I reviewed the residents note and agree with the documented findings and plan of care. Any areas of disagreement are noted on the chart. I was personally present for the key portions of any procedures. I have documented in the chart those procedures where I was not present during the key portions. I have personally reviewed all images and agree with the resident's interpretation. I have reviewed the emergency nurses triage note. I agree with the chief complaint, past medical history, past surgical history, allergies, medications, social and family history as documented unless otherwise noted below. Documentation of the HPI, Physical Exam and Medical Decision Making performed by medical students or scribes is based on my personal performance of the HPI, PE and MDM.  For Phys Assistant/ Nurse Practitioner cases/documentation I have had a face to face evaluation of this patient and have completed at least one if not all key elements of the E/M (history, physical exam, and MDM). Additional findings are as noted.     Williams Huff MD  Attending Emergency  Physician                Williams Huff MD  10/24/22 [id number]

## 2022-10-24 NOTE — Clinical Note
Mathew Albarran was seen and treated in our emergency department on 10/24/2022. She may return to work on 10/26/2022. If you have any questions or concerns, please don't hesitate to call.       Yohana Sommer MD

## 2022-10-24 NOTE — ED TRIAGE NOTES
Mode of arrival (squad #, walk in, police, etc) : walk in        Chief complaint(s): hyperglycemia         Arrival Note (brief scenario, treatment PTA, etc). : states she thinks she is in DKA. Emesis, fatigue, 500 sugars yesterday         C= \"Have you ever felt that you should Cut down on your drinking? \"  No  A= \"Have people Annoyed you by criticizing your drinking? \"  No  G= \"Have you ever felt bad or Guilty about your drinking? \"  No  E= \"Have you ever had a drink as an Eye-opener first thing in the morning to steady your nerves or to help a hangover? \"  No      Deferred []      Reason for deferring: N/A    *If yes to two or more: probable alcohol abuse. *

## 2022-10-24 NOTE — ED PROVIDER NOTES
[address]  Emergency Department Encounter  EmergencyMedicine Resident     Pt Name:Lynn Jimenez  MRN: [id number]  [place] 1965  Date of evaluation: 10/24/22  PCP:  Kaylene Barlow MD      CHIEF COMPLAINT       Chief Complaint   Patient presents with    Hyperglycemia       HISTORY OF PRESENT ILLNESS  (Location/Symptom, Timing/Onset, Context/Setting, Quality, Duration, Modifying Factors, Severity.)      Autumn Cruz is a 65 y.o. female who presents with hyperglycemia. Patient states she has history of diabetes and has been feeling increasingly lightheaded, nauseated, intermittent palpitations, with abdominal pain and one episode of nonbloody emesis prior to arrival.  Patient states her blood sugar was in the 560s this morning, she is on a sliding scale insulin and gave herself 16 units, glucose was then in the 200s and she gave an additional 4 units for total of 20 units today. She states she has had decreased p.o. intake with generalized myalgias. She denies current chest pain or palpitations, diarrhea, fevers, chills, cough, rash, numbness, tingling, weakness. PAST MEDICAL / SURGICAL / SOCIAL / FAMILY HISTORY      has a past medical history of Anxiety, Arthritis, Diabetes ([address] Fibromyalgia, Hernia, Kidney stones, and PONV (postoperative nausea and vomiting). has a past surgical history that includes Ovary removal (Right, 2003); Achilles tendon surgery (Left, 2001); Tonsillectomy; Knee arthroscopy (Left, 1993); fracture surgery (Right, 08/04/2015); back surgery; Knee arthroscopy (Right, 04/03/2017); and pr knee scope,diagnostic (Right, 4/3/2017).       Social History     Socioeconomic History    Marital status:      Spouse name: Not on file    Number of children: Not on file    Years of education: Not on file    Highest education level: Not on file   Occupational History    Not on file   Tobacco Use    Smoking status: Never    Smokeless tobacco: Never   Vaping Use    Vaping Use: Never used   Substance and Sexual Activity    Alcohol use: No    Drug use: No    Sexual activity: Not on file   Other Topics Concern    Not on file   Social History Narrative    Not on file     Social Determinants of Health     Financial Resource Strain: Not on file   Food Insecurity: Not on file   Transportation Needs: Not on file   Physical Activity: Not on file   Stress: Not on file   Social Connections: Not on file   Intimate Partner Violence: Not on file   Housing Stability: Not on file       Family History   Problem Relation Age of Onset    Stroke Mother     COPD Mother     Heart Disease Mother     Other Father         sepsis/SBO       Allergies:  Patient has no known allergies. Home Medications:  Prior to Admission medications    Medication Sig Start Date End Date Taking?  Authorizing Provider   ondansetron (ZOFRAN ODT) 4 MG disintegrating tablet Take 1 tablet by mouth every 8 hours as needed for Nausea 10/24/22  Yes Stephanie Diallo MD   ALPRAZolam Stephani Cypher) 0.25 MG tablet take 1 tablet by mouth twice a day if needed for anxiety 2/7/22   Historical Provider, MD   insulin glargine (LANTUS SOLOSTAR) 100 UNIT/ML injection pen Inject 45 Units into the skin 2 times daily Inject under the skin 48 units in the morning and 32 units in the evening 2/3/22   Shelby Brennan DO   insulin lispro (HUMALOG KWIKPEN) 200 UNIT/ML SOPN pen Administer as directed by physician with sliding scale which was provided up to 60 units daily 1/19/22   Sher Brennan DO   predniSONE (DELTASONE) 10 MG tablet TAKE 2 TABLETS BY MOUTH ONCE A DAY IN THE MORNING FOR 3 DAYS THEN TAKE 1 TABLET BY MOUTH ONE TIME A DAY IN THE MORNING THEREAFTER 11/10/21   Historical Provider, MD   escitalopram (LEXAPRO) 20 MG tablet Take 20 mg by mouth daily 3/9/20   Historical Provider, MD   Ertugliflozin L-PyroglutamicAc (STEGLATRO) 15 MG TABS Take 1 tablet by mouth daily    Historical Provider, MD   Calcium Carb-Cholecalciferol (CALCIUM-VITAMIN D) 500-200 MG-UNIT per tablet Take 1 tablet by mouth 2 times daily (with meals)    Historical Provider, MD   Insulin Pen Needle 32G X 4 MM MISC 1 each by Does not apply route 2 times daily    Historical Provider, MD   glimepiride (AMARYL) 4 MG tablet Take 4mg by mouth in the morning and take 2mg by mouth in the afternoon (with meals)    Historical Provider, MD   metFORMIN (GLUCOPHAGE-XR) 500 MG extended release tablet Take 1,000 mg by mouth 2 times daily (with meals)    Historical Provider, MD   lisinopril (PRINIVIL;ZESTRIL) 5 MG tablet Take 5 mg by mouth daily    Historical Provider, MD   Multiple Vitamins-Minerals (THERAPEUTIC MULTIVITAMIN-MINERALS) tablet Take 1 tablet by mouth daily    Historical Provider, MD       REVIEW OF SYSTEMS    (2-9 systems for level 4, 10 or more for level 5)      Review of Systems   Constitutional:  Positive for fatigue. Negative for chills and fever. HENT:  Negative for congestion, rhinorrhea and sore throat. Eyes:  Negative for visual disturbance. Respiratory:  Negative for cough and shortness of breath. Cardiovascular:  Negative for chest pain. Gastrointestinal:  Positive for abdominal pain, nausea and vomiting. Negative for diarrhea. Genitourinary:  Negative for dysuria, flank pain and hematuria. Musculoskeletal:  Positive for myalgias. Negative for arthralgias. Skin:  Negative for rash. Neurological:  Positive for light-headedness. Negative for dizziness, tremors, syncope, weakness, numbness and headaches. All other systems reviewed and are negative. PHYSICAL EXAM   (up to 7 for level 4, 8 or more for level 5)      INITIAL VITALS:   /76   Pulse 85   Temp 96.8 °F (36 °C)   Resp 18   Ht 5' 3\" (1.6 m)   Wt 180 lb (81.6 kg)   LMP 03/18/2017   SpO2 98%   BMI 31.89 kg/m²     Physical Exam  Vitals reviewed. Constitutional:       General: She is not in acute distress. HENT:      Head: Normocephalic and atraumatic. Mouth/Throat:      Mouth: Mucous membranes are moist.      Pharynx: Oropharynx is clear. Eyes:      Extraocular Movements: Extraocular movements intact. Pupils: Pupils are equal, round, and reactive to light. Cardiovascular:      Rate and Rhythm: Normal rate and regular rhythm. Pulses: Normal pulses. Heart sounds: Normal heart sounds. Pulmonary:      Effort: Pulmonary effort is normal.      Breath sounds: Normal breath sounds. No decreased breath sounds, wheezing, rhonchi or rales. Abdominal:      Palpations: Abdomen is soft. Tenderness: There is abdominal tenderness (Mild generalized tenderness to palpation). There is no guarding or rebound. Musculoskeletal:         General: Normal range of motion. Cervical back: Normal range of motion and neck supple. Right lower leg: No edema. Left lower leg: No edema. Skin:     Capillary Refill: Capillary refill takes less than 2 seconds. Neurological:      General: No focal deficit present. Mental Status: She is alert and oriented to person, place, and time. Motor: No weakness.        DIFFERENTIAL  DIAGNOSIS     PLAN (LABS / IMAGING / EKG):  Orders Placed This Encounter   Procedures    Culture, Urine    CBC with Auto Differential    Basic Metabolic Panel    Urinalysis with Reflex to Culture    Beta-Hydroxybutyrate    Blood Gas, Venous    Microscopic Urinalysis    POC Glucose Fingerstick    POC Glucose Fingerstick    POCT Glucose    POC Glucose Fingerstick       MEDICATIONS ORDERED:  Orders Placed This Encounter   Medications    0.9 % sodium chloride bolus    ondansetron (ZOFRAN) injection 4 mg    0.9 % sodium chloride bolus    ondansetron (ZOFRAN ODT) 4 MG disintegrating tablet     Sig: Take 1 tablet by mouth every 8 hours as needed for Nausea     Dispense:  20 tablet     Refill:  0       DDX: Hyperglycemia v HHS v DKA    DIAGNOSTIC RESULTS / EMERGENCY DEPARTMENT COURSE / MDM   LAB RESULTS:  Results for orders placed or performed during the hospital encounter of 10/24/22   Culture, Urine    Specimen: Urine, clean catch   Result Value Ref Range    Specimen Description . CLEAN CATCH URINE     Culture NO SIGNIFICANT GROWTH    CBC with Auto Differential   Result Value Ref Range    WBC 14.9 (H) 3.5 - 11.0 k/uL    RBC 4.95 4.0 - 5.2 m/uL    Hemoglobin 14.1 12.0 - 16.0 g/dL    Hematocrit 42.6 36 - 46 %    MCV 86.1 80 - 100 fL    MCH 28.5 26 - 34 pg    MCHC 33.1 31 - 37 g/dL    RDW 13.9 11.5 - 14.9 %    Platelets 398 (H) 940 - 450 k/uL    MPV 7.5 6.0 - 12.0 fL    Seg Neutrophils 73 (H) 36 - 66 %    Lymphocytes 21 (L) 24 - 44 %    Monocytes 5 1 - 7 %    Eosinophils % 0 0 - 4 %    Basophils 1 0 - 2 %    Segs Absolute 10.70 (H) 1.3 - 9.1 k/uL    Absolute Lymph # 3.20 1.0 - 4.8 k/uL    Absolute Mono # 0.80 0.1 - 1.3 k/uL    Absolute Eos # 0.10 0.0 - 0.4 k/uL    Basophils Absolute 0.20 0.0 - 0.2 k/uL   Basic Metabolic Panel   Result Value Ref Range    Glucose 152 (H) 70 - 99 mg/dL    BUN 26 (H) 6 - 20 mg/dL    Creatinine 0.77 0.50 - 0.90 mg/dL    Est, Glom Filt Rate >60 >60 mL/min/1.73m2    Calcium 10.0 8.6 - 10.4 mg/dL    Sodium 134 (L) 135 - 144 mmol/L    Potassium 4.8 3.7 - 5.3 mmol/L    Chloride 100 98 - 107 mmol/L    CO2 18 (L) 20 - 31 mmol/L    Anion Gap 16 9 - 17 mmol/L   Urinalysis with Reflex to Culture    Specimen: Urine   Result Value Ref Range    Color, UA Yellow Yellow    Turbidity UA Clear Clear    Glucose, Ur LARGE (A) NEGATIVE    Bilirubin Urine NEGATIVE NEGATIVE    Ketones, Urine LARGE (A) NEGATIVE    Specific Gravity, UA 1.031 (H) 1.000 - 1.030    Urine Hgb NEGATIVE NEGATIVE    pH, UA 5.5 5.0 - 8.0    Protein, UA NEGATIVE NEGATIVE    Urobilinogen, Urine Normal Normal    Nitrite, Urine NEGATIVE NEGATIVE    Leukocyte Esterase, Urine TRACE (A) NEGATIVE   Beta-Hydroxybutyrate   Result Value Ref Range    Beta-Hydroxybutyrate 3.31 (H) 0.02 - 0.27 mmol/L   Blood Gas, Venous   Result Value Ref Range    pH, Mario Alberto 7.328 7.320 - 7.420 pCO2, Mario Alberto 35.1 (L) 39.0 - 55.0 mm Hg    pO2, Mario Alberto 62.5 (H) 30.0 - 50.0 mm Hg    HCO3, Venous 18.4 (L) 24.0 - 30.0 mmol/L    Negative Base Excess, Mario Alberto 7.5 (H) 0.0 - 2.0 mmol/L    O2 Sat, Mario Alberto 89.9 (H) 60.0 - 85.0 %    Carboxyhemoglobin 2.6 0 - 5 %    Methemoglobin 0.3 0.0 - 1.9 %    Pt Temp 37.0     Fermin Test NA    Microscopic Urinalysis   Result Value Ref Range    WBC, UA 10 TO 20 /HPF    RBC, UA 0 TO 2 /HPF    Casts UA 0 TO 2 /LPF    Epithelial Cells UA 0 TO 2 /HPF    Bacteria, UA None None   POC Glucose Fingerstick   Result Value Ref Range    POC Glucose 152 (H) 65 - 105 mg/dL   POC Glucose Fingerstick   Result Value Ref Range    POC Glucose 83 65 - 105 mg/dL   POC Glucose Fingerstick   Result Value Ref Range    POC Glucose 83 65 - 105 mg/dL       IMPRESSION: Patient with hyperglycemia this morning, self treated with insulin. Presents to ED initially tachycardic and tachypneic. Pupils equal and reactive. Lungs CTAB. Abdomen soft and nontender. Given normal saline. Lab studies showing no anion gap metabolic acidosis. Urine does not appear infected. Patient is given PO intake and tolerated without difficulty. Patient is not in DKA at this time and glucose is within normal limits. Patient will be discharged with return precautions. Discussed the need for follow up with primary care provider in the next few days for re-evaluation. Discussed return precautions at length. Patient voiced understanding and agreement with plan. EMERGENCY DEPARTMENT COURSE:  ED Course as of 10/27/22 0227   Mon Oct 24, 2022   1559 Beta-Hydroxybutyrate(!): 3.31 [JG]   1601 NS bolus in progress. AG 16, pH 7.32. Will continue IV fluids and PO challenge [JG]   1631 pH, Mario Alberto: 7.328 [JG]   1631 pCO2, Mario Alberto(!): 35.1 [JG]   1631 Anion Gap: 16 [JG]   1631 Beta-Hydroxybutyrate(!): 3.31 [JG]   1820 POC glucose 83, patient tolerating PO well and will continue to offer PO intake.  Will give juice now and then likely discharge [JG]      ED Course User Index  [JG] Tova Salinas MD     CONSULTS:  None    FINAL IMPRESSION      1.  Hyperglycemia          DISPOSITION / PLAN     DISPOSITION Decision To Discharge 10/24/2022 06:57:16 PM      PATIENT REFERRED TO:  [id number]  [address]  [phone]    Schedule an appointment as soon as possible for a visit in 1 day      [facility] ED  [place] [address]  [phone]  Go to   If symptoms worsen    [address] [address] [phone]  Schedule an appointment as soon as possible for a visit   For hernia evaluation    DISCHARGE MEDICATIONS:  Discharge Medication List as of 10/24/2022  7:44 PM        START taking these medications    Details   ondansetron (ZOFRAN ODT) 4 MG disintegrating tablet Take 1 tablet by mouth every 8 hours as needed for Nausea, Disp-20 tablet, R-0Normal             Tova Salinas MD  Emergency Medicine Resident    (Please note that portions of thisnote were completed with a voice recognition program.  Efforts were made to edit the dictations but occasionally words are mis-transcribed.)       Tova Salinas MD  Resident  10/27/22 5215

## 2022-10-24 NOTE — ED NOTES
Pt resting comfortably and is in no acute distress. Respirations even and nonlabored. Pt updated on plan of care and denies needs at this time. Bed in the lowest position, one side rail up, call light within reach.  Will continue to monitor       Leann Chowdhury RN  10/24/22 0665

## 2022-10-24 NOTE — DISCHARGE INSTRUCTIONS
Call your primary care provider for follow up in 1-2 days. Take your medication as indicated and prescribed. Check your blood sugar at minimum 2 - 3 times per day and write down the results to take to your physician. Drink lots of fluids and make sure to keep eating. PLEASE RETURN TO THE EMERGENCY DEPARTMENT IMMEDIATELY for worsening symptoms, persistent elevated blood sugar, low blood sugar, or if you develop any concerning symptoms such as: high fever not relieved by acetaminophen (Tylenol) and/or ibuprofen (Motrin / Advil), chills, shortness of breath, chest pain, feeling of your heart fluttering or racing, persistent nausea and/or vomiting, vomiting up blood, blood in your stool, numbness, loss of consciousness, weakness or tingling in the arms or legs or change in color of the extremities, changes in mental status, persistent headache, blurry vision, loss of bladder / bowel control, unable to follow up with your physician, or other any other care or concern.

## 2022-10-24 NOTE — ED NOTES
Discharge instructions reviewed with patient, noting all directions and education by provider. Patient verbalizes understanding of all information reviewed, gathered personal items, and transferred under own power off unit to lobby without incident.      Dimple Ramirez RN  50/31/52 2117

## 2022-10-25 ENCOUNTER — CARE COORDINATION (OUTPATIENT)
Dept: OTHER | Facility: CLINIC | Age: 57
End: 2022-10-25

## 2022-10-25 LAB
CULTURE: NORMAL
SPECIMEN DESCRIPTION: NORMAL

## 2022-10-25 NOTE — CARE COORDINATION
Ambulatory Care Coordination Note  10/25/2022    ACC: Manolo Priest, MOE    ACM attempted to reach patient for introduction to Associate Care Management related to ED visit for hyperglycemia. HIPAA compliant message left requesting a return phone call. Will attempt to outreach patient again. No future appointments.

## 2022-10-28 ENCOUNTER — CARE COORDINATION (OUTPATIENT)
Dept: OTHER | Facility: CLINIC | Age: 57
End: 2022-10-28

## 2022-10-28 NOTE — CARE COORDINATION
Ambulatory Care Coordination Note  10/28/2022    ACC: Arlen Vega, RN    ACM attempted 2nd outreach to reach patient for introduction to Associate Care Management. HIPAA compliant message left requesting a return phone call at patients convenience. Unable to Reach Letter sent to patient via [email or web address]. Will continue to outreach patient. No future appointments.

## 2022-11-04 ENCOUNTER — CARE COORDINATION (OUTPATIENT)
Dept: OTHER | Facility: CLINIC | Age: 57
End: 2022-11-04

## 2022-11-04 NOTE — CARE COORDINATION
Ambulatory Care Coordination Note  11/4/2022    ACC: Danny Miranda, RN    ACM attempted third and final call to patient for introduction to Associate Care Management. HIPAA compliant message left requesting a return phone call at patients convenience. Final Unable to Reach Letter sent via [employer]. Nurse Access Line brochure and Right Care, Right Place, Right Time brochure sent to patient via [employer]. No further outreach scheduled with this ACM, ACM will sign off care team at this time. Patient has been provided with this ACM's contact information. No future appointments.

## 2022-11-15 ENCOUNTER — TELEPHONE (OUTPATIENT)
Dept: PHARMACY | Facility: CLINIC | Age: 57
End: 2022-11-15

## 2022-11-15 NOTE — LETTER
[address] [facility] [address]  [address], [address]  [address] [facility] [address]           11/30/22     Dear Marga Pride,     Thank you for taking steps towards better health by participating in our employee diabetes program, Be Well With Diabetes!      We recently tried calling you see how you've been doing with your diabetes self-management. We were hoping to discuss how you have been since your recent ED visit. I also wanted to let you know that I think you may have hit the deductible for the year which means you should be able to get a CGM for free if you are still interested. Please let me know if so and I can work to get an order from your doctor. Given the cost I would HIGHLY suggest giving it a try now.       Also, make sure you ask your doctor about ordering a urine microalbumin at your next visit. This is the only requirement you have left for the year to be re-enrolled in 2023     If you could please give us a call at [phone] option 3 or message me through [employer] when you have a chance we can discuss further if you'd like     Sincerely,   RUMA Richard, PharmD, [address] [facility]  Department, toll free: [phone]

## 2022-11-15 NOTE — TELEPHONE ENCOUNTER
[address] [facility],[address] Floor Employee Diabetes Program    Sukhwinder Dixon is a 65 y.o. female enrolled in the [place] with Diabetes Program. The goal of this voluntary program is to help employees and covered dependents reach their health maintenance goals in regards to their diabetes diagnosis. According to our records, patient is missing the following requirement(s) that must be completed by December 31, 2022 to avoid discharge from the program:    Urine protein/microalbumin  Further engagement to complete diabetic education (if A1c > 8%)    Hemoglobin A1C   Date Value Ref Range Status   07/13/2022 8.2 (H) 4.0 - 6.0 % Final        If patient returns call would like to discuss:  - Recent ED visit 10/24 for hyperglycemia. Has she had any improvement since then?    - Patient was enrolled in [facility] in 2022. I believe she has met this so all meds are free. I would highly recommend that she consider maldonado or dexcom. Tried to get earlier this year but cost too high. - Looks to have [address] in December per Valeriy. Remind her to get MJ completed    Plan:  Attempt made to reach patient by telephone to review above. Left voice message for patient to return clinician's phone call to [phone], option 3. [employer] message sent.       Reji Murillo, PharmD, [address]  Department, toll free: [phone]      For Pharmacy Admin Tracking Only    Time Spent (min): 10

## 2022-11-30 NOTE — TELEPHONE ENCOUNTER
[employer] message not read. Will prepare and send as a letter.     Sera Barnett, PharmD, [address] [facility], toll free: [phone]

## 2022-12-07 NOTE — TELEPHONE ENCOUNTER
[address] [place] [address] Employee Diabetes Program  =================================================================  Brandon Hernandez is a 48 y.o. female enrolled in the [address] [place] [address] Employee Diabetes Program. Patient provided Angela Morning with verbal consent to remain in the program for this year. Medications:  Medication Sig     Calcium Carb-Cholecalciferol (CALCIUM-VITAMIN D) 500-200 MG-UNIT per tablet Take 1 tablet by mouth 2 times daily (with meals)     ibuprofen (ADVIL;MOTRIN) 800 MG tablet Take 1 tablet by mouth every 8 hours as needed for Pain     cyclobenzaprine (FLEXERIL) 5 MG tablet Take 1 tablet by mouth 3 times daily as needed for Muscle spasms     naproxen (NAPROSYN) 500 MG tablet Take 1 tablet by mouth 2 times daily (with meals)     Insulin Pen Needle 32G X 4 MM MISC 1 each by Does not apply route 2 times daily     Insulin Regular Human (AFREZZA) 4 & 8 & 12 units POWD Inhale into the lungs as directed by provider. Currently using per sliding scale  Less than 151: none   151-200: 8 units  201-300: 12 units  Over 300: 16 units     glimepiride (AMARYL) 4 MG tablet Take 4mg by mouth in the morning and take 2mg by mouth in the afternoon (with meals)     canagliflozin (INVOKANA) 300 MG TABS tablet Take 300 mg by mouth every morning (before breakfast) Changed to Steglatro     insulin glargine (LANTUS SOLOSTAR) 100 UNIT/ML injection pen Inject under the skin 38 units in the morning and 38 units in the evening     metFORMIN (GLUCOPHAGE-XR) 500 MG extended release tablet Take 1,000 mg by mouth 2 times daily (with meals)     lisinopril (PRINIVIL;ZESTRIL) 5 MG tablet Take 5 mg by mouth daily     Multiple Vitamins-Minerals (THERAPEUTIC MULTIVITAMIN-MINERALS) tablet Take 1 tablet by mouth daily     ibuprofen (ADVIL;MOTRIN) 800 MG tablet Take 1 tablet by mouth every 8 hours as needed for Pain      No current facility-administered medications for this visit.        Current Pharmacy: Review of Systems   Musculoskeletal:  Positive for back pain (not new) and gait problem (uses walker). Neurological:  Positive for gait problem (uses walker). All other systems reviewed and are negative.

## 2022-12-16 ENCOUNTER — HOSPITAL ENCOUNTER (OUTPATIENT)
Age: 57
Discharge: HOME | End: 2022-12-16
Payer: COMMERCIAL

## 2022-12-16 LAB
CREATININE URINE: 29.6 MG/DL (ref 28–217)
ESTIMATED AVERAGE GLUCOSE: 217 MG/DL
HBA1C MFR BLD: 9.2 % (ref 4–6)
MICROALBUMIN/CREAT 24H UR: <12 MG/L
MICROALBUMIN/CREAT UR-RTO: NORMAL MCG/MG CREAT

## 2022-12-16 PROCEDURE — 83036 HEMOGLOBIN GLYCOSYLATED A1C: CPT

## 2022-12-16 PROCEDURE — 82570 ASSAY OF URINE CREATININE: CPT

## 2022-12-16 PROCEDURE — 36415 COLL VENOUS BLD VENIPUNCTURE: CPT

## 2022-12-16 PROCEDURE — 82043 UR ALBUMIN QUANTITATIVE: CPT

## 2022-12-20 ENCOUNTER — TELEPHONE (OUTPATIENT)
Dept: PHARMACY | Facility: CLINIC | Age: 57
End: 2022-12-20

## 2022-12-20 NOTE — TELEPHONE ENCOUNTER
[facility] [place] CLINICAL PHARMACY REVIEW - BE WELL WITH DIABETES: HIGH A1C  =============================================  Leonard Fitzgerald is a 65 y.o. female enrolled in the [address] [place], [place] [address] Be Well with Diabetes program.    Identified care gap(s): A1c > 8%    DM Program Prescriptions:  Current Outpatient Medications   Medication Sig Dispense Refill    ondansetron (ZOFRAN ODT) 4 MG disintegrating tablet Take 1 tablet by mouth every 8 hours as needed for Nausea 20 tablet 0    ALPRAZolam (XANAX) 0.25 MG tablet take 1 tablet by mouth twice a day if needed for anxiety      insulin glargine (LANTUS SOLOSTAR) 100 UNIT/ML injection pen Inject 45 Units into the skin 2 times daily Inject under the skin 48 units in the morning and 32 units in the evening 5 pen 3    insulin lispro (HUMALOG KWIKPEN) 200 UNIT/ML SOPN pen Administer as directed by physician with sliding scale which was provided up to 60 units daily 2 pen 1    predniSONE (DELTASONE) 10 MG tablet TAKE 2 TABLETS BY MOUTH ONCE A DAY IN THE MORNING FOR 3 DAYS THEN TAKE 1 TABLET BY MOUTH ONE TIME A DAY IN THE MORNING THEREAFTER      escitalopram (LEXAPRO) 20 MG tablet Take 20 mg by mouth daily      Ertugliflozin L-PyroglutamicAc (STEGLATRO) 15 MG TABS Take 1 tablet by mouth daily      Calcium Carb-Cholecalciferol (CALCIUM-VITAMIN D) 500-200 MG-UNIT per tablet Take 1 tablet by mouth 2 times daily (with meals)      Insulin Pen Needle 32G X 4 MM MISC 1 each by Does not apply route 2 times daily      glimepiride (AMARYL) 4 MG tablet Take 4mg by mouth in the morning and take 2mg by mouth in the afternoon (with meals)      metFORMIN (GLUCOPHAGE-XR) 500 MG extended release tablet Take 1,000 mg by mouth 2 times daily (with meals)      lisinopril (PRINIVIL;ZESTRIL) 5 MG tablet Take 5 mg by mouth daily      Multiple Vitamins-Minerals (THERAPEUTIC MULTIVITAMIN-MINERALS) tablet Take 1 tablet by mouth daily       No current facility-administered medications for this visit. Allergies:  No Known Allergies     Labs:  Lab Results   Component Value Date    LABA1C 9.2 (H) 12/16/2022    LABA1C 8.2 (H) 07/13/2022    LABA1C 10.8 (H) 02/02/2022     Estimated Creatinine Clearance: 83 mL/min (based on SCr of 0.77 mg/dL). If patient returns call would like to discuss:  - Recent ED visit 10/24 for hyperglycemia. Has she had any improvement since then?    - Patient was enrolled in [facility] in 2022. I believe she has met this so all meds are free. I would highly recommend that she consider maldonado or dexcom. Tried to get earlier this year but cost too high. Review last OV and she is experiencing lows on a pretty frequent basis. Attempted to reach today but had to leave a . Requested call back to [phone] option 3     RUMA Temple, PharmD, [address] [facility], toll free: [phone]      For Pharmacy Admin Tracking Only    Program: [address] [place] [address], [address]  Time Spent (min): 5

## 2022-12-30 ENCOUNTER — HOSPITAL ENCOUNTER (OUTPATIENT)
Dept: GENERAL RADIOLOGY | Age: 57
End: 2022-12-30
Payer: COMMERCIAL

## 2022-12-30 ENCOUNTER — HOSPITAL ENCOUNTER (OUTPATIENT)
Age: 57
Discharge: HOME | End: 2022-12-30
Payer: COMMERCIAL

## 2022-12-30 DIAGNOSIS — M77.8 SHOULDER CAPSULITIS, RIGHT: ICD-10-CM

## 2022-12-30 PROCEDURE — 73030 X-RAY EXAM OF SHOULDER: CPT

## 2023-01-19 ENCOUNTER — HOSPITAL ENCOUNTER (OUTPATIENT)
Dept: MAMMOGRAPHY | Age: 58
Discharge: HOME | End: 2023-01-21
Payer: COMMERCIAL

## 2023-01-19 DIAGNOSIS — Z13.820 SCREENING FOR OSTEOPOROSIS: ICD-10-CM

## 2023-01-19 PROCEDURE — 77080 DXA BONE DENSITY AXIAL: CPT

## 2023-01-20 ENCOUNTER — TELEPHONE (OUTPATIENT)
Dept: PHARMACY | Facility: CLINIC | Age: 58
End: 2023-01-20

## 2023-01-20 NOTE — TELEPHONE ENCOUNTER
[facility] Employee Diabetes Program    Lynn Shaw is a 58 y.o. female enrolled in the [facility] with Diabetes Program. The goal of this voluntary program is to help employees and covered dependents reach their health maintenance goals in regards to their diabetes diagnosis.      According to our records, patient is missing the following requirement(s) that must be completed by 1/31/23 or she WILL be discharged from the program.  May not re-enroll until 2024.    Further engagement to complete diabetic education (if A1c > 8%)       Plan:  Attempt made to reach patient by telephone to review above. Left voice message for patient to return clinician's phone call to [phone], option 3.     MyChart message sent.  Letter sent.    RUMA Moreira, PharmD, BCACP  Ambulatory Care Clinical Pharmacist- [facility] Clinical Pharmacy  Department, toll free: [phone]      For Pharmacy Admin Tracking Only    Program: [employer]  Time Spent (min): 10

## 2023-01-20 NOTE — LETTER
[address] [facility] 12  [address], [address] [address]           01/20/23     Dear Phil Justice,    You are currently enrolled in the Be Well with Diabetes Program.  This is a final reminder of the requirements that were due for the [facility] [place] Be Well With Diabetes Program by December 31st, 2022 - If the below requirement(s) are not completed by January 31, 2023 you will be discharged from the program:     Further engagement to complete diabetic education (if A1c > 8%)    If requirement(s) are not met by January 31, 2023 you will be dis-enrolled from the program and the credit valued at up to $600 towards your diabetic medications and supplies will be revoked. You will be able to reapply the following calendar year. Ester 2  Phone: toll free [phone], option 3  Email: [email or web address]  Fax Number: [phone]

## 2023-01-27 ENCOUNTER — TELEPHONE (OUTPATIENT)
Dept: PHARMACY | Facility: CLINIC | Age: 58
End: 2023-01-27

## 2023-01-27 NOTE — TELEPHONE ENCOUNTER
[facility] CLINICAL PHARMACY REVIEW - Be Well with Diabetes    Lore Torres is a 63 y.o. female enrolled in the [address] [place] [address],[address] Employee Diabetes Program. Patient provided Marcelina Weiss with verbal consent to remain in the program for this year. Patient enrolled 2020.     Insurance through the following employer: [employer] [facility] this as our 2nd required check in for 2022, but also just went ahead and completed full 2023 [facility] visit as well since we were on the phone*    Medications:  Current Outpatient Medications   Medication Instructions    ALPRAZolam (XANAX) 0.25 MG tablet take 1 tablet by mouth twice a day if needed for anxiety    Calcium Carb-Cholecalciferol (CALCIUM-VITAMIN D) 500-200 MG-UNIT per tablet 1 tablet, Oral, 2 TIMES DAILY WITH MEALS    Ertugliflozin L-PyroglutamicAc (STEGLATRO) 15 MG TABS 1 tablet, Oral, DAILY    escitalopram (LEXAPRO) 20 mg, Oral, DAILY    glimepiride (AMARYL) 4 MG tablet Take 4mg by mouth in the morning and take 2mg by mouth in the afternoon (with meals)     insulin lispro (HUMALOG KWIKPEN) 200 UNIT/ML SOPN pen Administer as directed by physician with sliding scale which was provided up to 60 units daily    Insulin Pen Needle 32G X 4 MM MISC 1 each, Does not apply, 2 TIMES DAILY    Lantus SoloStar 45 Units, SubCUTAneous, 2 TIMES DAILY, Inject under the skin 48 units in the morning and 32 units in the evening    lisinopril (PRINIVIL;ZESTRIL) 5 mg, Oral, DAILY    metFORMIN (GLUCOPHAGE-XR) 1,000 mg, Oral, 2 TIMES DAILY WITH MEALS    Multiple Vitamins-Minerals (THERAPEUTIC MULTIVITAMIN-MINERALS) tablet 1 tablet, Oral, DAILY    ondansetron (ZOFRAN ODT) 4 mg, Oral, EVERY 8 HOURS PRN    predniSONE (DELTASONE) 10 MG tablet TAKE 2 TABLETS BY MOUTH ONCE A DAY IN THE MORNING FOR 3 DAYS THEN TAKE 1 TABLET BY MOUTH ONE TIME A DAY IN THE MORNING THEREAFTER     Current Pharmacy: [facility]  Current testing supplies/frequency: Prodigy -TID  Pen needles/syringes: Leader jake    Allergies:  No Known Allergies   Vitals/Labs:  BP Readings from Last 3 Encounters:   10/24/22 125/76   02/04/22 123/80   01/19/22 122/65     Lab Results   Component Value Date    LABMICR Can not be calculated 12/16/2022     Lab Results   Component Value Date    LABA1C 9.2 (H) 12/16/2022    LABA1C 8.2 (H) 07/13/2022    LABA1C 10.8 (H) 02/02/2022     Lab Results   Component Value Date    CHOL 170 07/13/2022    TRIG 160 (H) 07/13/2022    HDL 72 07/13/2022    LDLCHOLESTEROL 66 07/13/2022     ALT   Date Value Ref Range Status   02/01/2022 12 5 - 33 U/L Final     AST   Date Value Ref Range Status   02/01/2022 8 <32 U/L Final     The 10-year ASCVD risk score (Ryan BARAJAS, et al., 2019) is: 3.1%    Values used to calculate the score:      Age: 63 years      Sex: Female      Is Non- : No      Diabetic: Yes      Tobacco smoker: No      Systolic Blood Pressure: 390 mmHg      Is BP treated: No      HDL Cholesterol: 72 mg/dL      Total Cholesterol: 170 mg/dL     Lab Results   Component Value Date    CREATININE 0.77 10/24/2022     Estimated Creatinine Clearance: 82 mL/min (based on SCr of 0.77 mg/dL).     Lab Results   Component Value Date/Time    LABGLOM >60 10/24/2022 03:28 PM    LABGLOM Can not be calculated 02/04/2022 06:07 AM    LABGLOM >60 02/03/2022 11:42 AM    LABGLOM >60 02/03/2022 07:37 AM    LABGLOM >60 02/03/2022 03:23 AM       Immunizations:  Immunization History   Administered Date(s) Administered    DTaP 01/08/2013    Influenza Vaccine, unspecified formulation 01/08/2013    Influenza Virus Vaccine 11/27/2018, 11/05/2019    Pneumococcal Polysaccharide ([id number]) 12/13/2018      Social History:  Social History     Tobacco Use    Smoking status: Never    Smokeless tobacco: Never   Substance Use Topics    Alcohol use: No     ASSESSMENT:  Initial Program Requirements (Y indicates has completed for the year, N indicates needs to be completed by 07/01/2023): No - Provider Visit for DM (1st)  No- A1c (1st)      Ongoing Program Requirements (Y indicates has completed for the year, N indicates needs to be completed by 12/31/2023): No - Provider Visit for DM (2nd)  No - ACC/diabetes educator visit (if A1c over 8%)  No - A1c (2nd)  No - Lipid panel  No - Urine microalbumin  No - Pneumococcal vaccination: Yossi Saucedo- could consider  No - Influenza vaccination for Fall 2023  No - Medication adherence over 70%  Override  - On statin or contraindication(s) LDL < 70 mg/dL  Yes - On ACEi/ARB or contraindication(s)  Lisinopril        Current medications eligible for copay waiver, up to $600, through [employer]:  - Lantus, Humalog, Steglatro, Metformin XR, Glimepiride, Lisinopril  - Prodigy       Diabetes Care:   - Glycemic Goal: <7.0%. Is not at blood glucose goal Type 2 DM under poor control as evidenced by a1c >9%. Patient also spent time on 2 occasions in the hospital with DKA. - Home blood sugar records:  patient tests 1 time(s) per day. Readings are all above goal  - Any episodes of hypoglycemia? yes - reports they are fairly frequent  (unable to provide exact frequency). She did mention 2 severe overnight low episodes that were extremely concerning. Both times readings were around 50. Patient also unfortunately ignored our calls at the end of last year, and selected the high deductible health plan which makes CGM too expensive for her to afford. She also ignored a letter from benefits that would have allowed her to change her selection after the deadline. She is stuck with it this year. Thankfully her medication costs are protected from the deductible. Discussed a way for her to get Freestyle Joe for 75$ per month at a local pharmacy. I highly encouraged her to do this.   Would help prevent the severe overnight lows, and would also help with control and hopefully keep her out of the hospital.  - Duplicate MOA: Sulfonylurea and insulins- Prescribed by outside provider. Visits are [facility]. She claims that her and her pcp have been adjusting the glimepiride dose along with her Humalog. Currently off.  - Reduce Pill Champion: could consider combining steglatro and metformin- not interested at the moment  - Appropriateness of Insulin Therapy: Ratio of Lantus:Humalog looks ok, but pt could likely do a better job with her SSI. She recently also split her Lantus dose    OV note from 12/12/22 in [facility]  Type 2 diabetes mellitus with hyperglycemia, with long-term current use of insulin ([facility])   Overview   Recent diabetic ketoacidosis that was smoldering and recurred again within a few days of her discharge. She is having symptomatic hypoglycemia at times with hyperglycemia mixed in. I can not tell what type of the pattern this is she is on multiple medications at present. What I have asked her to do is temporarily discontinue the glimepiride. This was originally 4 mg in the morning and 2 in the afternoon. In addition I want her to reduce her Lantus from 62 units in the morning and 24 in the evening to 40 units in the morning and 18 units in the evening until I see her back. She will be checking her blood sugar 4 times daily for the next 48 hours and calling to report those results. I told her that it was extraordinarily dangerous for her to be hypoglycemic and having losses of consciousness so we need to reduce the treatment and follow the sugars quite closely. PLAN:  - Consideration(s) for provider:   None at this time- pt seeing pcp frequently  - Lots of education provided to patient. Encouraged her to consider the Evens Walker at an outside pharmacy.  Unsure if she will follow through  - DM program gaps identified:   Initial requirements: Provider Visit for DM (1st) and A1c (1st)   Ongoing requirements: Provider visit for DM (2nd), ACC/diabetes educator visit (if A1c over 8%), A1c (2nd), Lipid panel, Urine microalbumin, and Influenza vaccination for [id number]   - Education to patient: Discussed general issues about diabetes pathophysiology and management. , Discussed ways to avoid symptomatic hypoglycemia, Addressed medication adherence, Overview of Be Well With Diabetes program, Overview of HHP, and Benefit/indication for pneumonia vaccine in patients with diabetes   - Follow up: PCP for identified gaps or as scheduled below  - Upcoming appointments:   No future appointments.     Elier Martinez, PharmD, [address] [facility]  Department, toll free: [phone]    For Pharmacy Admin Tracking Only    Program: [address] in [address]:  No  Recommendation Provided To: Patient/Caregiver: 1 via Telephone  Intervention Detail: New Rx: 1, reason: Needs Additional Therapy  Intervention Accepted By: Patient/Caregiver: 0  Gap Closed?: Yes   Time Spent (min): 60

## 2023-02-24 NOTE — TELEPHONE ENCOUNTER
Called to set up appt for Diabetes Medication Management  Left VM
Sunscreen Recommendations: [employer]
Skin Checks Recommendations: at least annually
Detail Level: Generalized
Detail Level: Zone
Detail Level: Detailed

## 2023-04-03 ENCOUNTER — CLINICAL DOCUMENTATION (OUTPATIENT)
Dept: PHARMACY | Facility: CLINIC | Age: 58
End: 2023-04-03

## 2023-04-03 NOTE — PROGRESS NOTES
Pharmacy Pop Care Documentation:     AVS received for required office visit on:  3/9/23 with Amanda Strickland, PharmD, [address]  Department, toll free: [phone]'    [address]  Only    Program: [facility] (min): 5

## 2023-04-06 ENCOUNTER — CARE COORDINATION (OUTPATIENT)
Dept: OTHER | Facility: CLINIC | Age: 58
End: 2023-04-06

## 2023-04-06 NOTE — CARE COORDINATION
Ambulatory Care Coordination Note    ACM attempted to reach patient for introduction to Associate Care Management related to assessing needs, participation in Be Well Programs, and complete enrollment if appropriate. HIPAA compliant message left requesting a return phone call at patient convenience. Will send [employer] message in the meantime    Plan for follow-up call in 7-10 days      No future appointments.

## 2023-04-17 ENCOUNTER — CARE COORDINATION (OUTPATIENT)
Dept: OTHER | Facility: CLINIC | Age: 58
End: 2023-04-17

## 2023-04-17 NOTE — CARE COORDINATION
Ambulatory Care Coordination Note    ACM attempted to reach patient for Associate Care Management related to Enrollment in DM Management. No voicemail available, UTR letter sent via mail. and , will continue to outreach. Plan for follow-up call in 10-14 days    No future appointments.

## 2023-05-08 ENCOUNTER — CARE COORDINATION (OUTPATIENT)
Dept: OTHER | Facility: CLINIC | Age: 58
End: 2023-05-08

## 2023-05-08 NOTE — CARE COORDINATION
Ambulatory Care Coordination Note    ACM attempted third and final call to patient for introduction to Associate Care Management. HIPAA compliant message left requesting a return phone call at patient convenience. Final Unable to Reach Letter sent to patient via [address] and Mail. No further outreach scheduled with this ACM, patient has been provided with this ACM's contact information. ACM will sign off care team at this time. No future appointments.

## 2023-05-09 ENCOUNTER — HOSPITAL ENCOUNTER (OUTPATIENT)
Age: 58
Discharge: HOME | End: 2023-05-09
Payer: COMMERCIAL

## 2023-05-09 LAB
EST. AVERAGE GLUCOSE BLD GHB EST-MCNC: 263 MG/DL
HBA1C MFR BLD: 10.8 % (ref 4–6)

## 2023-05-09 PROCEDURE — 83036 HEMOGLOBIN GLYCOSYLATED A1C: CPT

## 2023-05-09 PROCEDURE — 36415 COLL VENOUS BLD VENIPUNCTURE: CPT

## 2023-05-10 ENCOUNTER — CARE COORDINATION (OUTPATIENT)
Dept: OTHER | Facility: CLINIC | Age: 58
End: 2023-05-10

## 2023-05-10 NOTE — CARE COORDINATION
Ambulatory Care Coordination Note     ACMsent MyChart message for Associate Care Management follow up related to questions regarding changes and coverage in diabetes medications. See patient message for details and response (as appropriate). Plan:  Plan for follow-up call in 1-2 days. Plan for next call: medication management-discuss options and changes in medications for DM management. Referral to clinical pharmacists for DM medication options.

## 2023-05-11 ENCOUNTER — TELEPHONE (OUTPATIENT)
Dept: PHARMACY | Facility: CLINIC | Age: 58
End: 2023-05-11

## 2023-05-11 ENCOUNTER — CARE COORDINATION (OUTPATIENT)
Dept: OTHER | Facility: CLINIC | Age: 58
End: 2023-05-11

## 2023-05-11 NOTE — CARE COORDINATION
Ambulatory Care Coordination Note     ACMsent MyChart message for Associate Care Management follow up related to request for pharmacy outreach to disc DM medication management, coverage, and patient assistance options if need. See patient message for details and response (as appropriate). Plan:  Plan for follow-up call in 5-7 days.   Plan for next call: medication management-Disc clinical pharmacy recommendations and ACM assistance with medication management

## 2023-05-16 SDOH — HEALTH STABILITY: PHYSICAL HEALTH: ON AVERAGE, HOW MANY DAYS PER WEEK DO YOU ENGAGE IN MODERATE TO STRENUOUS EXERCISE (LIKE A BRISK WALK)?: 4 DAYS

## 2023-05-16 SDOH — HEALTH STABILITY: PHYSICAL HEALTH: ON AVERAGE, HOW MANY MINUTES DO YOU ENGAGE IN EXERCISE AT THIS LEVEL?: 60 MIN

## 2023-05-19 ENCOUNTER — OFFICE VISIT (OUTPATIENT)
Dept: ORTHOPEDIC SURGERY | Age: 58
End: 2023-05-19
Payer: COMMERCIAL

## 2023-05-19 VITALS — BODY MASS INDEX: 31.89 KG/M2 | HEIGHT: 63 IN | RESPIRATION RATE: 14 BRPM | WEIGHT: 180 LBS

## 2023-05-19 DIAGNOSIS — M25.511 RIGHT SHOULDER PAIN, UNSPECIFIED CHRONICITY: Primary | ICD-10-CM

## 2023-05-19 PROCEDURE — 99203 OFFICE O/P NEW LOW 30 MIN: CPT | Performed by: ORTHOPAEDIC SURGERY

## 2023-05-19 NOTE — PROGRESS NOTES
Orthopedic Shoulder Encounter Note     Chief complaint: right shoulder pain    HPI: Aster Shen is a 63 y.o.  right-hand dominant female who presents for evaluation of her right shoulder. She indicates that she has been dealing with pain in her shoulder for about 15 months now. She states that she sustained a fracture to her right proximal humerus in February 2022. She was seen by Dr. Yuri Cárdenas at the time and this was treated conservatively. She was off work for about 3 months and then returned. She works as a nurse at [employer]. She attended physical therapy till December 2022. She continued to have problems with his shoulder and so returned to Dr. Roxana Chen who recommended she be evaluated by shoulder surgeon. She has had several referrals before essentially being referred to see me. She describes having pain deep in her shoulder and over the lateral aspect of her shoulder. It is relatively constant but worse with movement and use especially attempts at raising her arm up. She states that at this time she has worse motion than she did when she stopped physical therapy in about 5 months ago. She states that she feels weak and stiff. She denies having any associated numbness or tingling. Previous treatment:    NSAIDs: Motrin    Physical Therapy:  Yes    Injections: None    Surgeries: None    Review of Systems:   Constitutional: Negative for fever, chills, sweats. Pain Score:   4  Neurological: Negative for headache, numbness, or weakness. Musculoskeletal: As noted in HPI     Past Medical History  Dalphine Aase  has a past medical history of Anxiety, Arthritis, Diabetes ([address] Fibromyalgia, Hernia, Kidney stones, and PONV (postoperative nausea and vomiting). Past Surgical History  Dalphine Aase  has a past surgical history that includes Ovary removal (Right, 2003); Achilles tendon surgery (Left, 2001);  Tonsillectomy; Knee arthroscopy (Left, 1993); fracture surgery (Right, 08/04/2015);

## 2023-05-30 ENCOUNTER — TELEPHONE (OUTPATIENT)
Dept: PHARMACY | Facility: CLINIC | Age: 58
End: 2023-05-30

## 2023-05-30 NOTE — TELEPHONE ENCOUNTER
POPULATION HEALTH CLINICAL PHARMACY REVIEW - BE WELL WITH DIABETES: HIGH A1C  =============================================  Namita Lanier is a 63 y.o. female enrolled in the [facility] [place] Be Well with Diabetes program.    Identified care gap(s): A1c > 9%    DM Program Prescriptions:  Current Outpatient Medications   Medication Instructions    ALPRAZolam (XANAX) 0.25 MG tablet take 1 tablet by mouth twice a day if needed for anxiety    Calcium Carb-Cholecalciferol (CALCIUM-VITAMIN D) 500-200 MG-UNIT per tablet 1 tablet, Oral, 2 TIMES DAILY WITH MEALS    Ertugliflozin L-PyroglutamicAc (STEGLATRO) 15 MG TABS 1 tablet, Oral, DAILY    escitalopram (LEXAPRO) 20 mg, Oral, DAILY    glimepiride (AMARYL) 4 MG tablet Take 4mg by mouth in the morning and take 2mg by mouth in the afternoon (with meals)    insulin lispro (HUMALOG KWIKPEN) 200 UNIT/ML SOPN pen Administer as directed by physician with sliding scale which was provided up to 60 units daily    Insulin Pen Needle 32G X 4 MM MISC 1 each, Does not apply, 2 TIMES DAILY    Lantus SoloStar 45 Units, SubCUTAneous, 2 TIMES DAILY, Inject under the skin 48 units in the morning and 32 units in the evening    lisinopril (PRINIVIL;ZESTRIL) 5 mg, Oral, DAILY    metFORMIN (GLUCOPHAGE-XR) 1,000 mg, Oral, 2 TIMES DAILY WITH MEALS    Multiple Vitamins-Minerals (THERAPEUTIC MULTIVITAMIN-MINERALS) tablet 1 tablet, Oral, DAILY        Allergies:  No Known Allergies     Labs:  Lab Results   Component Value Date    LABA1C 10.8 (H) 05/09/2023    LABA1C 9.2 (H) 12/16/2022    LABA1C 8.2 (H) 07/13/2022       Care Team:   Physician (PCP): Niraj Lepe MD- [facility] (Last OV: 5/9/23)  - Upcoming appointments:   Future Appointments   Date Time Provider Abdi German   6/14/2023  4:00 PM [facility] MRI  [facility] MRI [facility]       Diabetes Care:  - Glycemic Goal: <7.0%. Is not at blood glucose goal and continues to worsen  - Pt has HDHP for 2023.   She is in desparate need

## 2023-06-13 ENCOUNTER — CARE COORDINATION (OUTPATIENT)
Dept: OTHER | Facility: CLINIC | Age: 58
End: 2023-06-13

## 2023-06-21 ENCOUNTER — OFFICE VISIT (OUTPATIENT)
Dept: ORTHOPEDIC SURGERY | Age: 58
End: 2023-06-21

## 2023-06-21 VITALS — RESPIRATION RATE: 14 BRPM | HEIGHT: 63 IN | WEIGHT: 181 LBS | BODY MASS INDEX: 32.07 KG/M2

## 2023-06-21 DIAGNOSIS — M87.019 AVASCULAR NECROSIS OF BONE OF SHOULDER (HCC): Primary | ICD-10-CM

## 2023-06-21 DIAGNOSIS — M25.511 RIGHT SHOULDER PAIN, UNSPECIFIED CHRONICITY: ICD-10-CM

## 2023-06-21 RX ORDER — TRIAMCINOLONE ACETONIDE 40 MG/ML
40 INJECTION, SUSPENSION INTRA-ARTICULAR; INTRAMUSCULAR ONCE
Status: COMPLETED | OUTPATIENT
Start: 2023-06-21 | End: 2023-06-21

## 2023-06-21 RX ORDER — LIDOCAINE HYDROCHLORIDE 10 MG/ML
5 INJECTION, SOLUTION INFILTRATION; PERINEURAL ONCE
Status: COMPLETED | OUTPATIENT
Start: 2023-06-21 | End: 2023-06-21

## 2023-06-21 RX ADMIN — LIDOCAINE HYDROCHLORIDE 5 ML: 10 INJECTION, SOLUTION INFILTRATION; PERINEURAL at 14:10

## 2023-06-21 RX ADMIN — TRIAMCINOLONE ACETONIDE 40 MG: 40 INJECTION, SUSPENSION INTRA-ARTICULAR; INTRAMUSCULAR at 14:10

## 2023-06-21 NOTE — PROGRESS NOTES
HPI: Ms. Omer Valdes is here today to review the results of her right shoulder MRI study which was completed on 6/14/2023. 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 There appears to be some chondral wear involving the glenoid surface as well with subchondral cystic changes. Rotator cuff and bicep tendon appears to be intact. No obvious fracture or dislocation noted. I had a discussion with the patient today educating her about the condition of her shoulder and discussed treatment options available to her including nonoperative and operative intervention. At this time we are proceeding conservatively with a cortisone injection which was administered as outlined below. I will see her back in my clinic as needed but she was encouraged to return or call at anytime with persistent or worsening symptoms or with any questions or concerns. Procedure: right shoulder glenohumeral injection  Following an appropriate discussion with the patient regarding the risks and benefits of the procedure she consented to proceed. her right shoulder was prepped using chlorhexadine solution. Using aseptic technique and through a posterior approach, her right shoulder glenohumeral joint space was injected with a 6 cc mixture of 1cc 40mg/ml kenalog and 5 cc of 1% lidocaine without epinephrine. A band aid was applied to the injection site. she tolerated the injection with no immediate adverse reactions.

## 2023-06-29 ENCOUNTER — CARE COORDINATION (OUTPATIENT)
Dept: OTHER | Facility: CLINIC | Age: 58
End: 2023-06-29

## 2023-07-17 ENCOUNTER — CARE COORDINATION (OUTPATIENT)
Dept: OTHER | Facility: CLINIC | Age: 58
End: 2023-07-17

## 2023-07-17 NOTE — CARE COORDINATION
Ambulatory Care Coordination Note     ACMsent MyChart message for Associate Care Management follow up related to follow up on progress and ongoing ACM needs. See patient message for details and response (as appropriate). Plan:  No further follow-up call indicated.

## 2023-07-28 ENCOUNTER — TELEPHONE (OUTPATIENT)
Dept: PHARMACY | Facility: CLINIC | Age: 58
End: 2023-07-28

## 2023-07-28 NOTE — TELEPHONE ENCOUNTER
POPULATION HEALTH CLINICAL PHARMACY REVIEW - BE WELL WITH DIABETES: HIGH A1C  =============================================  Val Moore is a 63 y.o. female enrolled in the [facility] [place] Be Well with Diabetes program.    Identified care gap(s): A1c >  10 %    DM Program Prescriptions:  Current Outpatient Medications   Medication Instructions    ALPRAZolam (XANAX) 0.25 MG tablet take 1 tablet by mouth twice a day if needed for anxiety    Calcium Carb-Cholecalciferol (CALCIUM-VITAMIN D) 500-200 MG-UNIT per tablet 1 tablet, Oral, 2 TIMES DAILY WITH MEALS    Ertugliflozin L-PyroglutamicAc (STEGLATRO) 15 MG TABS 1 tablet, Oral, DAILY    escitalopram (LEXAPRO) 20 mg, Oral, DAILY    glimepiride (AMARYL) 4 MG tablet Take 4mg by mouth in the morning and take 2mg by mouth in the afternoon (with meals)    insulin lispro (HUMALOG KWIKPEN) 200 UNIT/ML SOPN pen Administer as directed by physician with sliding scale which was provided up to 60 units daily    Insulin Pen Needle 32G X 4 MM MISC 1 each, Does not apply, 2 TIMES DAILY    Lantus SoloStar 45 Units, SubCUTAneous, 2 TIMES DAILY, Inject under the skin 48 units in the morning and 32 units in the evening    lisinopril (PRINIVIL;ZESTRIL) 5 mg, Oral, DAILY    metFORMIN (GLUCOPHAGE-XR) 1,000 mg, Oral, 2 TIMES DAILY WITH MEALS    Multiple Vitamins-Minerals (THERAPEUTIC MULTIVITAMIN-MINERALS) tablet 1 tablet, Oral, DAILY        Allergies:  No Known Allergies     Labs:  Lab Results   Component Value Date    LABA1C 10.8 (H) 05/09/2023    LABA1C 9.2 (H) 12/16/2022    LABA1C 8.2 (H) 07/13/2022     CrCl cannot be calculated (Patient's most recent lab result is older than the maximum 180 days allowed. ). Diabetes Care:  - Glycemic Goal: <7.0%. Is not at blood glucose goal and continues to worsen  - Pt has HDHP for 2023. She is in desparate need of CGM but cannot afford due to having this plan.   See our visit from 1/27/23 for more info  - Pt did start seeing a  Care

## 2023-08-17 ENCOUNTER — CLINICAL DOCUMENTATION (OUTPATIENT)
Dept: PHARMACY | Facility: CLINIC | Age: 58
End: 2023-08-17

## 2023-08-17 NOTE — PROGRESS NOTES
Pharmacy Pop Care Documentation:     AVS received for required office visit on:  8/16/23 with Heather Riddle MD per careeverywhere W. Beryle Kendall, PharmD,  [facility], toll free: [phone]    For Pharmacy Admin Tracking Only    Program: [address] [place] [address]  Time Spent (min): 5

## 2023-08-25 ENCOUNTER — TELEPHONE (OUTPATIENT)
Dept: PHARMACY | Facility: CLINIC | Age: 58
End: 2023-08-25

## 2023-08-25 NOTE — TELEPHONE ENCOUNTER
POPULATION [facility] CLINICAL PHARMACY REVIEW - BE WELL WITH DIABETES  =============================================  Jovita Noble is a 63 y.o. female enrolled in the [facility] [place] Be Well with Diabetes program.     We have struggled to reach her for DM followup in the past due to conflicting schedules. I made it clear that she can always send me a mychart regardless of the time and we can set something up. She did say Weds would be the best time to try reaching her if needed. Brief check in completed this morning. Not much has changed. Pt would significantly benefit from CGM. She has HDHP and still has 2760$ to meet it for the year. Very unlikely to happen. Discussed [address] in great detail. Pt has an Android phone. She is going to look in to joining ASAP. Everything I found online says that she can join. Another longshot option. Pt Currently has $530 remaining on her DM program benefit for the year. The cash price of [employer] is ~$480 for 3 months. We could get her a 3 month supply and she would not have to worry about a copay. She would also still have money remaining for her meds. Plan:  Called and left detailed VM per her request. Mychart also sent.     Jess Garcia, PharmD,  [facility]  Department, toll free: [phone]      For Pharmacy Admin Tracking Only    Program: Samir in place:  No  Recommendation Provided To: Patient/Caregiver: 1 via Telephone  Intervention Detail: Benefit Assistance and New Rx: 1, reason: Needs Additional Therapy  Intervention Accepted By: Patient/Caregiver: 1  Gap Closed?: Yes   Time Spent (min): 30

## 2023-08-28 ENCOUNTER — CARE COORDINATION (OUTPATIENT)
Dept: OTHER | Facility: CLINIC | Age: 58
End: 2023-08-28

## 2023-09-08 ENCOUNTER — CARE COORDINATION (OUTPATIENT)
Dept: OTHER | Facility: CLINIC | Age: 58
End: 2023-09-08

## 2023-09-08 NOTE — CARE COORDINATION
Ambulatory Care Coordination Note    Associate Care Manager [facility]) attempted final outreach to patient for follow up call regarding Dm management, progress, and ongoing ACM needs. Unable to leave a voicemail. Lost to follow up letter sent to patient via [address] [place] [address]. No further outreach scheduled with this ACM, patient has this ACM's contact information if future needs arise. ACM will sign off care team at this time. Episode of Care resolved. No future appointments.

## 2023-09-15 LAB
CHOLEST SERPL-MCNC: 165 MG/DL
CHOLESTEROL/HDL RATIO: 2.7
EST. AVERAGE GLUCOSE BLD GHB EST-MCNC: 258 MG/DL
GLUCOSE SERPL-MCNC: 59 MG/DL (ref 70–99)
HBA1C MFR BLD: 10.6 % (ref 4–6)
HDLC SERPL-MCNC: 61 MG/DL
LDLC SERPL CALC-MCNC: 70 MG/DL (ref 0–130)
PATIENT FASTING?: YES
TRIGL SERPL-MCNC: 168 MG/DL

## 2023-10-11 ENCOUNTER — TELEPHONE (OUTPATIENT)
Dept: PHARMACY | Facility: CLINIC | Age: 58
End: 2023-10-11

## 2023-10-11 NOTE — TELEPHONE ENCOUNTER
Formerly [facility] Employee Diabetes Program    Vincent Munoz is a 63 y.o. female enrolled in the [facility] with Diabetes Program. The goal of this voluntary program is to help employees and covered dependents reach their health maintenance goals in regards to their diabetes diagnosis. According to our records, patient is missing the following requirement(s) that must be completed by December 31, 2023 to avoid discharge from the program:  Urine Microalbumin  Two Diabetes Education visits or other [facility] Emergency/ACM check in (If A1c is >8%)     Plan:  EveryScape message sent.     Ahsan Delacruz, PharmD,  [facility]  Department, toll free: [phone]      For Pharmacy Admin Tracking Only    Program: [address] [place] [address]  Time Spent (min): 45

## 2023-10-12 NOTE — TELEPHONE ENCOUNTER
[facility] CLINICAL PHARMACY REVIEW - BE WELL WITH DIABETES  =============================================  Vincent Munoz is a 63 y.o. female enrolled in the [facility] Be Well with Diabetes program.  Patient returned call and discussed the following:  Needs MJ- will have completed at her appt in Dec.  Pt considering GLP1 earlier this year, but never got started due to uncertainty of coverage. She never reached out to ask us. Discussed each option in great detail. Assured her that it would be covered as long as provider provides DM Dx on script. Has not filled Humalog in a year. Very short on how she has been using, but did say she was using more regularly recently and needed a refill. Sent request to provider. Looks to be compliant to other meds  Asked about autofil- recommended calling pharmacy and asking to be set up. Curently on HDHP- discussed other plan options for 2024 and advised against choosing this one so she can take advantage of CGM coverage. Will pursue Dexcom g7 next year. Pt reports checking her BG pretty regularly. She is also taking chronic prednisone and struggles with her BG. See extremely large fluctuations ( am readings over the last week). Reports no rhyme or reason for fluctuations based on diet. Counting check in's complete for the year. PT will have MJ done in December and will be reaching out to pcp about ozempic. Will check back in 2024.     Ahsan Delacruz, PharmD,  [facility]  Department, toll free: [phone]

## 2023-10-12 NOTE — TELEPHONE ENCOUNTER
Patient returned call and wants to know why the below requirement need to be completed if she's been speaking to a pharmacist through the year?     - Two Diabetes Education visits or other RPh/ACM check in (If A1c is >8%)     She would like a call back as soon as possible    Routing encounter to Marsha fiore

## 2023-12-15 ENCOUNTER — HOSPITAL ENCOUNTER (OUTPATIENT)
Age: 58
Discharge: HOME | End: 2023-12-15
Payer: COMMERCIAL

## 2023-12-15 LAB
CREAT UR-MCNC: 78.5 MG/DL (ref 28–217)
EST. AVERAGE GLUCOSE BLD GHB EST-MCNC: 171 MG/DL
HBA1C MFR BLD: 7.6 % (ref 4–6)
MICROALBUMIN UR-MCNC: 18 MG/L
MICROALBUMIN/CREAT UR-RTO: 23 MCG/MG CREAT

## 2023-12-15 PROCEDURE — 82570 ASSAY OF URINE CREATININE: CPT

## 2023-12-15 PROCEDURE — 36415 COLL VENOUS BLD VENIPUNCTURE: CPT

## 2023-12-15 PROCEDURE — 83036 HEMOGLOBIN GLYCOSYLATED A1C: CPT

## 2023-12-15 PROCEDURE — 82043 UR ALBUMIN QUANTITATIVE: CPT

## 2024-01-08 ENCOUNTER — TELEPHONE (OUTPATIENT)
Dept: PHARMACY | Facility: CLINIC | Age: 59
End: 2024-01-08

## 2024-01-08 NOTE — TELEPHONE ENCOUNTER
**Patient is [facility]     Called patient to schedule 2024 yearly pharmacist appointment to discuss medications for Diabetes Management Program.     No answer. Left VM on mobile TAD: Please call back at [phone] Option #3.     Nasreen Smith CphT   [facility]   Clinical Pharmacy    Department, toll free: [phone] Option #3

## 2024-01-15 NOTE — TELEPHONE ENCOUNTER
**Patient is [facility]**     Second attempt made to contact patient to schedule 2024 yearly pharmacist appointment to discuss medications for Diabetes Management Program.    No answer. Left VM on mobile TAD: Please call back at [phone] Option #3.     zhiwo message sent to patient.    For Pharmacy Admin Tracking Only    Program: [employer]  CPA in place:  No  Gap Closed?: No   Time Spent (min): 5

## 2024-02-29 ENCOUNTER — APPOINTMENT (OUTPATIENT)
Dept: GENERAL RADIOLOGY | Age: 59
DRG: 637 | End: 2024-02-29
Payer: COMMERCIAL

## 2024-02-29 ENCOUNTER — HOSPITAL ENCOUNTER (INPATIENT)
Age: 59
LOS: 5 days | Discharge: HOME | DRG: 637 | End: 2024-03-05
Attending: EMERGENCY MEDICINE | Admitting: INTERNAL MEDICINE
Payer: COMMERCIAL

## 2024-02-29 DIAGNOSIS — E10.10 DIABETIC KETOACIDOSIS WITHOUT COMA ASSOCIATED WITH TYPE 1 DIABETES MELLITUS (HCC): Primary | ICD-10-CM

## 2024-02-29 LAB
ALBUMIN SERPL-MCNC: 4.3 G/DL (ref 3.5–5.2)
ALP SERPL-CCNC: 75 U/L (ref 35–104)
ALT SERPL-CCNC: 14 U/L (ref 5–33)
AMPHET UR QL SCN: NEGATIVE
ANION GAP SERPL CALCULATED.3IONS-SCNC: ABNORMAL MMOL/L (ref 9–17)
AST SERPL-CCNC: 11 U/L
B PARAP IS1001 DNA NPH QL NAA+NON-PROBE: NOT DETECTED
B PERT DNA SPEC QL NAA+PROBE: NOT DETECTED
B-OH-BUTYR SERPL-MCNC: 8.86 MMOL/L (ref 0.02–0.27)
BACTERIA URNS QL MICRO: ABNORMAL
BARBITURATES UR QL SCN: NEGATIVE
BASOPHILS # BLD: 0.1 K/UL (ref 0–0.2)
BASOPHILS NFR BLD: 1 % (ref 0–2)
BENZODIAZ UR QL: NEGATIVE
BILIRUB SERPL-MCNC: 0.3 MG/DL (ref 0.3–1.2)
BILIRUB UR QL STRIP: NEGATIVE
BODY TEMPERATURE: 37
BUN SERPL-MCNC: 14 MG/DL (ref 6–20)
BUN SERPL-MCNC: 15 MG/DL (ref 6–20)
BUN SERPL-MCNC: 19 MG/DL (ref 6–20)
C PNEUM DNA NPH QL NAA+NON-PROBE: NOT DETECTED
CALCIUM SERPL-MCNC: 8 MG/DL (ref 8.6–10.4)
CALCIUM SERPL-MCNC: 8.4 MG/DL (ref 8.6–10.4)
CALCIUM SERPL-MCNC: 8.8 MG/DL (ref 8.6–10.4)
CANNABINOIDS UR QL SCN: NEGATIVE
CASTS #/AREA URNS LPF: ABNORMAL /LPF
CHLORIDE SERPL-SCNC: 100 MMOL/L (ref 98–107)
CHLORIDE SERPL-SCNC: 103 MMOL/L (ref 98–107)
CHLORIDE SERPL-SCNC: 105 MMOL/L (ref 98–107)
CLARITY UR: CLEAR
CO2 SERPL-SCNC: <6 MMOL/L (ref 20–31)
COCAINE UR QL SCN: NEGATIVE
COHGB MFR BLD: 1.9 % (ref 0–5)
COLOR UR: YELLOW
CREAT SERPL-MCNC: 0.7 MG/DL (ref 0.5–0.9)
CREAT SERPL-MCNC: 0.8 MG/DL (ref 0.5–0.9)
CREAT SERPL-MCNC: 0.9 MG/DL (ref 0.5–0.9)
EOSINOPHIL # BLD: 0 K/UL (ref 0–0.4)
EOSINOPHILS RELATIVE PERCENT: 0 % (ref 0–4)
EPI CELLS #/AREA URNS HPF: ABNORMAL /HPF
ERYTHROCYTE [DISTWIDTH] IN BLOOD BY AUTOMATED COUNT: 16 % (ref 11.5–14.9)
EST. AVERAGE GLUCOSE BLD GHB EST-MCNC: 157 MG/DL
FENTANYL UR QL: NEGATIVE
FLUAV AG SPEC QL: NEGATIVE
FLUAV RNA NPH QL NAA+NON-PROBE: NOT DETECTED
FLUBV AG SPEC QL: NEGATIVE
FLUBV RNA NPH QL NAA+NON-PROBE: NOT DETECTED
GFR SERPL CREATININE-BSD FRML MDRD: >60 ML/MIN/1.73M2
GLUCOSE BLD-MCNC: 128 MG/DL (ref 65–105)
GLUCOSE BLD-MCNC: 138 MG/DL (ref 65–105)
GLUCOSE BLD-MCNC: 144 MG/DL (ref 65–105)
GLUCOSE BLD-MCNC: 154 MG/DL (ref 65–105)
GLUCOSE BLD-MCNC: 155 MG/DL (ref 65–105)
GLUCOSE BLD-MCNC: 157 MG/DL (ref 65–105)
GLUCOSE BLD-MCNC: 163 MG/DL (ref 65–105)
GLUCOSE BLD-MCNC: 177 MG/DL (ref 65–105)
GLUCOSE BLD-MCNC: 184 MG/DL (ref 65–105)
GLUCOSE BLD-MCNC: 194 MG/DL (ref 65–105)
GLUCOSE BLD-MCNC: 201 MG/DL (ref 65–105)
GLUCOSE SERPL-MCNC: 160 MG/DL (ref 70–99)
GLUCOSE SERPL-MCNC: 185 MG/DL (ref 70–99)
GLUCOSE SERPL-MCNC: 203 MG/DL (ref 70–99)
GLUCOSE UR STRIP-MCNC: ABNORMAL MG/DL
HADV DNA NPH QL NAA+NON-PROBE: NOT DETECTED
HBA1C MFR BLD: 7.1 % (ref 4–6)
HCO3 VENOUS: 6.2 MMOL/L (ref 24–30)
HCOV 229E RNA NPH QL NAA+NON-PROBE: NOT DETECTED
HCOV HKU1 RNA NPH QL NAA+NON-PROBE: NOT DETECTED
HCOV NL63 RNA NPH QL NAA+NON-PROBE: NOT DETECTED
HCOV OC43 RNA NPH QL NAA+NON-PROBE: NOT DETECTED
HCT VFR BLD AUTO: 48.2 % (ref 36–46)
HGB BLD-MCNC: 15.1 G/DL (ref 12–16)
HGB UR QL STRIP.AUTO: NEGATIVE
HMPV RNA NPH QL NAA+NON-PROBE: NOT DETECTED
HPIV1 RNA NPH QL NAA+NON-PROBE: NOT DETECTED
HPIV2 RNA NPH QL NAA+NON-PROBE: NOT DETECTED
HPIV3 RNA NPH QL NAA+NON-PROBE: NOT DETECTED
HPIV4 RNA NPH QL NAA+NON-PROBE: NOT DETECTED
KETONES UR STRIP-MCNC: ABNORMAL MG/DL
LACTATE BLDV-SCNC: 1 MMOL/L (ref 0.5–2.2)
LEUKOCYTE ESTERASE UR QL STRIP: NEGATIVE
LIPASE SERPL-CCNC: 26 U/L (ref 13–60)
LYMPHOCYTES NFR BLD: 1.5 K/UL (ref 1–4.8)
LYMPHOCYTES RELATIVE PERCENT: 11 % (ref 24–44)
M PNEUMO DNA NPH QL NAA+NON-PROBE: NOT DETECTED
MAGNESIUM SERPL-MCNC: 2.2 MG/DL (ref 1.6–2.6)
MAGNESIUM SERPL-MCNC: 2.3 MG/DL (ref 1.6–2.6)
MAGNESIUM SERPL-MCNC: 2.6 MG/DL (ref 1.6–2.6)
MCH RBC QN AUTO: 28.4 PG (ref 26–34)
MCHC RBC AUTO-ENTMCNC: 31.4 G/DL (ref 31–37)
MCV RBC AUTO: 90.5 FL (ref 80–100)
METHADONE UR QL: NEGATIVE
METHEMOGLOBIN: 1.1 % (ref 0–1.9)
MONOCYTES NFR BLD: 0.6 K/UL (ref 0.1–1.3)
MONOCYTES NFR BLD: 5 % (ref 1–7)
NEGATIVE BASE EXCESS, VEN: 24.2 MMOL/L (ref 0–2)
NEUTROPHILS NFR BLD: 83 % (ref 36–66)
NEUTS SEG NFR BLD: 11 K/UL (ref 1.3–9.1)
NITRITE UR QL STRIP: NEGATIVE
O2 SAT, VEN: 70.6 % (ref 60–85)
OPIATES UR QL SCN: NEGATIVE
OXYCODONE UR QL SCN: NEGATIVE
PCO2, VEN: 22.2 MM HG (ref 39–55)
PCP UR QL SCN: NEGATIVE
PH UR STRIP: 5 [PH] (ref 5–8)
PH VENOUS: 7.05 (ref 7.32–7.42)
PHOSPHATE SERPL-MCNC: 2.2 MG/DL (ref 2.6–4.5)
PHOSPHATE SERPL-MCNC: 2.9 MG/DL (ref 2.6–4.5)
PLATELET # BLD AUTO: 618 K/UL (ref 150–450)
PMV BLD AUTO: 8.2 FL (ref 6–12)
PO2, VEN: 42.8 MM HG (ref 30–50)
POTASSIUM SERPL-SCNC: 4.9 MMOL/L (ref 3.7–5.3)
POTASSIUM SERPL-SCNC: 5 MMOL/L (ref 3.7–5.3)
POTASSIUM SERPL-SCNC: 5.5 MMOL/L (ref 3.7–5.3)
PROT SERPL-MCNC: 7.3 G/DL (ref 6.4–8.3)
PROT UR STRIP-MCNC: ABNORMAL MG/DL
RBC # BLD AUTO: 5.32 M/UL (ref 4–5.2)
RBC #/AREA URNS HPF: ABNORMAL /HPF
RSV RNA NPH QL NAA+NON-PROBE: NOT DETECTED
RV+EV RNA NPH QL NAA+NON-PROBE: NOT DETECTED
SARS-COV-2 RDRP RESP QL NAA+PROBE: NOT DETECTED
SARS-COV-2 RNA NPH QL NAA+NON-PROBE: DETECTED
SODIUM SERPL-SCNC: 135 MMOL/L (ref 135–144)
SP GR UR STRIP: 1.02 (ref 1–1.03)
SPECIMEN DESCRIPTION: ABNORMAL
SPECIMEN DESCRIPTION: NORMAL
T3FREE SERPL-MCNC: 1.29 PG/ML (ref 2.02–4.43)
T4 FREE SERPL-MCNC: 1.3 NG/DL (ref 0.9–1.7)
TEST INFORMATION: NORMAL
TEXT FOR RESPIRATORY: ABNORMAL
TROPONIN I SERPL HS-MCNC: 9 NG/L (ref 0–14)
TROPONIN I SERPL HS-MCNC: 9 NG/L (ref 0–14)
TSH SERPL DL<=0.05 MIU/L-ACNC: 1.47 UIU/ML (ref 0.3–5)
UROBILINOGEN UR STRIP-ACNC: NORMAL EU/DL (ref 0–1)
WBC #/AREA URNS HPF: ABNORMAL /HPF
WBC OTHER # BLD: 13.2 K/UL (ref 3.5–11)

## 2024-02-29 PROCEDURE — 80048 BASIC METABOLIC PNL TOTAL CA: CPT

## 2024-02-29 PROCEDURE — 83036 HEMOGLOBIN GLYCOSYLATED A1C: CPT

## 2024-02-29 PROCEDURE — 82805 BLOOD GASES W/O2 SATURATION: CPT

## 2024-02-29 PROCEDURE — 82010 KETONE BODYS QUAN: CPT

## 2024-02-29 PROCEDURE — 2500000003 HC RX 250 WO HCPCS: Performed by: EMERGENCY MEDICINE

## 2024-02-29 PROCEDURE — 2580000003 HC RX 258: Performed by: EMERGENCY MEDICINE

## 2024-02-29 PROCEDURE — 2000000000 HC ICU R&B

## 2024-02-29 PROCEDURE — 2500000003 HC RX 250 WO HCPCS

## 2024-02-29 PROCEDURE — 84443 ASSAY THYROID STIM HORMONE: CPT

## 2024-02-29 PROCEDURE — 93005 ELECTROCARDIOGRAM TRACING: CPT | Performed by: EMERGENCY MEDICINE

## 2024-02-29 PROCEDURE — 81001 URINALYSIS AUTO W/SCOPE: CPT

## 2024-02-29 PROCEDURE — 82800 BLOOD PH: CPT

## 2024-02-29 PROCEDURE — 36415 COLL VENOUS BLD VENIPUNCTURE: CPT

## 2024-02-29 PROCEDURE — 85025 COMPLETE CBC W/AUTO DIFF WBC: CPT

## 2024-02-29 PROCEDURE — 96374 THER/PROPH/DIAG INJ IV PUSH: CPT

## 2024-02-29 PROCEDURE — 99285 EMERGENCY DEPT VISIT HI MDM: CPT

## 2024-02-29 PROCEDURE — 6370000000 HC RX 637 (ALT 250 FOR IP)

## 2024-02-29 PROCEDURE — 84484 ASSAY OF TROPONIN QUANT: CPT

## 2024-02-29 PROCEDURE — 80307 DRUG TEST PRSMV CHEM ANLYZR: CPT

## 2024-02-29 PROCEDURE — 0202U NFCT DS 22 TRGT SARS-COV-2: CPT

## 2024-02-29 PROCEDURE — 96375 TX/PRO/DX INJ NEW DRUG ADDON: CPT

## 2024-02-29 PROCEDURE — A4216 STERILE WATER/SALINE, 10 ML: HCPCS | Performed by: EMERGENCY MEDICINE

## 2024-02-29 PROCEDURE — 80053 COMPREHEN METABOLIC PANEL: CPT

## 2024-02-29 PROCEDURE — 82947 ASSAY GLUCOSE BLOOD QUANT: CPT

## 2024-02-29 PROCEDURE — 99291 CRITICAL CARE FIRST HOUR: CPT | Performed by: INTERNAL MEDICINE

## 2024-02-29 PROCEDURE — 6370000000 HC RX 637 (ALT 250 FOR IP): Performed by: EMERGENCY MEDICINE

## 2024-02-29 PROCEDURE — 71045 X-RAY EXAM CHEST 1 VIEW: CPT

## 2024-02-29 PROCEDURE — 74018 RADEX ABDOMEN 1 VIEW: CPT

## 2024-02-29 PROCEDURE — 84439 ASSAY OF FREE THYROXINE: CPT

## 2024-02-29 PROCEDURE — 83735 ASSAY OF MAGNESIUM: CPT

## 2024-02-29 PROCEDURE — 6360000002 HC RX W HCPCS

## 2024-02-29 PROCEDURE — 2580000003 HC RX 258

## 2024-02-29 PROCEDURE — 83690 ASSAY OF LIPASE: CPT

## 2024-02-29 PROCEDURE — 84481 FREE ASSAY (FT-3): CPT

## 2024-02-29 PROCEDURE — 87086 URINE CULTURE/COLONY COUNT: CPT

## 2024-02-29 PROCEDURE — 87804 INFLUENZA ASSAY W/OPTIC: CPT

## 2024-02-29 PROCEDURE — 84100 ASSAY OF PHOSPHORUS: CPT

## 2024-02-29 PROCEDURE — 87635 SARS-COV-2 COVID-19 AMP PRB: CPT

## 2024-02-29 PROCEDURE — 83605 ASSAY OF LACTIC ACID: CPT

## 2024-02-29 PROCEDURE — 6360000002 HC RX W HCPCS: Performed by: EMERGENCY MEDICINE

## 2024-02-29 RX ORDER — ACETAMINOPHEN 325 MG/1
650 TABLET ORAL EVERY 4 HOURS PRN
Status: DISCONTINUED | OUTPATIENT
Start: 2024-02-29 | End: 2024-03-05 | Stop reason: HOSPADM

## 2024-02-29 RX ORDER — ESCITALOPRAM OXALATE 20 MG/1
20 TABLET ORAL DAILY
Status: DISCONTINUED | OUTPATIENT
Start: 2024-02-29 | End: 2024-03-05 | Stop reason: HOSPADM

## 2024-02-29 RX ORDER — POTASSIUM CHLORIDE 7.45 MG/ML
10 INJECTION INTRAVENOUS PRN
Status: DISCONTINUED | OUTPATIENT
Start: 2024-02-29 | End: 2024-02-29

## 2024-02-29 RX ORDER — 0.9 % SODIUM CHLORIDE 0.9 %
1000 INTRAVENOUS SOLUTION INTRAVENOUS ONCE
Status: COMPLETED | OUTPATIENT
Start: 2024-02-29 | End: 2024-02-29

## 2024-02-29 RX ORDER — INSULIN GLARGINE 100 [IU]/ML
45 INJECTION, SOLUTION SUBCUTANEOUS 2 TIMES DAILY
Status: DISCONTINUED | OUTPATIENT
Start: 2024-02-29 | End: 2024-03-05 | Stop reason: HOSPADM

## 2024-02-29 RX ORDER — ONDANSETRON 2 MG/ML
4 INJECTION INTRAMUSCULAR; INTRAVENOUS EVERY 6 HOURS PRN
Status: DISCONTINUED | OUTPATIENT
Start: 2024-02-29 | End: 2024-03-05 | Stop reason: HOSPADM

## 2024-02-29 RX ORDER — SODIUM CHLORIDE 450 MG/100ML
INJECTION, SOLUTION INTRAVENOUS CONTINUOUS
Status: DISCONTINUED | OUTPATIENT
Start: 2024-02-29 | End: 2024-02-29

## 2024-02-29 RX ORDER — ENOXAPARIN SODIUM 100 MG/ML
40 INJECTION SUBCUTANEOUS DAILY
Status: DISCONTINUED | OUTPATIENT
Start: 2024-02-29 | End: 2024-03-05 | Stop reason: HOSPADM

## 2024-02-29 RX ORDER — SODIUM CHLORIDE 9 MG/ML
INJECTION, SOLUTION INTRAVENOUS CONTINUOUS
Status: DISCONTINUED | OUTPATIENT
Start: 2024-02-29 | End: 2024-02-29

## 2024-02-29 RX ORDER — POTASSIUM CHLORIDE 7.45 MG/ML
10 INJECTION INTRAVENOUS PRN
Status: DISCONTINUED | OUTPATIENT
Start: 2024-02-29 | End: 2024-03-04

## 2024-02-29 RX ORDER — MAGNESIUM SULFATE HEPTAHYDRATE 40 MG/ML
2000 INJECTION, SOLUTION INTRAVENOUS PRN
Status: DISCONTINUED | OUTPATIENT
Start: 2024-02-29 | End: 2024-02-29

## 2024-02-29 RX ORDER — DEXTROSE MONOHYDRATE, SODIUM CHLORIDE, AND POTASSIUM CHLORIDE 50; 1.49; 4.5 G/1000ML; G/1000ML; G/1000ML
INJECTION, SOLUTION INTRAVENOUS CONTINUOUS
Status: DISCONTINUED | OUTPATIENT
Start: 2024-02-29 | End: 2024-02-29

## 2024-02-29 RX ORDER — DEXTROSE MONOHYDRATE, SODIUM CHLORIDE, AND POTASSIUM CHLORIDE 50; 1.49; 4.5 G/1000ML; G/1000ML; G/1000ML
INJECTION, SOLUTION INTRAVENOUS CONTINUOUS PRN
Status: DISCONTINUED | OUTPATIENT
Start: 2024-02-29 | End: 2024-03-04

## 2024-02-29 RX ORDER — INSULIN LISPRO 100 [IU]/ML
5 INJECTION, SOLUTION INTRAVENOUS; SUBCUTANEOUS
Status: DISCONTINUED | OUTPATIENT
Start: 2024-02-29 | End: 2024-03-05 | Stop reason: HOSPADM

## 2024-02-29 RX ORDER — DEXTROSE AND SODIUM CHLORIDE 5; .45 G/100ML; G/100ML
INJECTION, SOLUTION INTRAVENOUS CONTINUOUS PRN
Status: DISCONTINUED | OUTPATIENT
Start: 2024-02-29 | End: 2024-03-04

## 2024-02-29 RX ORDER — MAGNESIUM SULFATE HEPTAHYDRATE 40 MG/ML
2000 INJECTION, SOLUTION INTRAVENOUS PRN
Status: DISCONTINUED | OUTPATIENT
Start: 2024-02-29 | End: 2024-03-04

## 2024-02-29 RX ORDER — POLYETHYLENE GLYCOL 3350 17 G/17G
17 POWDER, FOR SOLUTION ORAL DAILY PRN
Status: DISCONTINUED | OUTPATIENT
Start: 2024-02-29 | End: 2024-03-05 | Stop reason: HOSPADM

## 2024-02-29 RX ORDER — ONDANSETRON 2 MG/ML
8 INJECTION INTRAMUSCULAR; INTRAVENOUS ONCE
Status: COMPLETED | OUTPATIENT
Start: 2024-02-29 | End: 2024-02-29

## 2024-02-29 RX ORDER — LISINOPRIL 5 MG/1
5 TABLET ORAL DAILY
Status: DISCONTINUED | OUTPATIENT
Start: 2024-02-29 | End: 2024-03-05 | Stop reason: HOSPADM

## 2024-02-29 RX ADMIN — PROMETHAZINE HYDROCHLORIDE 12.5 MG: 25 INJECTION INTRAMUSCULAR; INTRAVENOUS at 17:59

## 2024-02-29 RX ADMIN — SODIUM CHLORIDE 1000 ML: 9 INJECTION, SOLUTION INTRAVENOUS at 09:56

## 2024-02-29 RX ADMIN — POTASSIUM CHLORIDE 10 MEQ: 7.46 INJECTION, SOLUTION INTRAVENOUS at 21:52

## 2024-02-29 RX ADMIN — SODIUM BICARBONATE: 84 INJECTION, SOLUTION INTRAVENOUS at 14:43

## 2024-02-29 RX ADMIN — ONDANSETRON 8 MG: 2 INJECTION INTRAMUSCULAR; INTRAVENOUS at 09:58

## 2024-02-29 RX ADMIN — POTASSIUM CHLORIDE 10 MEQ: 7.46 INJECTION, SOLUTION INTRAVENOUS at 23:11

## 2024-02-29 RX ADMIN — SODIUM CHLORIDE 1000 ML: 9 INJECTION, SOLUTION INTRAVENOUS at 10:30

## 2024-02-29 RX ADMIN — POTASSIUM CHLORIDE, DEXTROSE MONOHYDRATE AND SODIUM CHLORIDE: 150; 5; 450 INJECTION, SOLUTION INTRAVENOUS at 11:25

## 2024-02-29 RX ADMIN — ACETAMINOPHEN 650 MG: 325 TABLET ORAL at 14:39

## 2024-02-29 RX ADMIN — FAMOTIDINE 20 MG: 10 INJECTION, SOLUTION INTRAVENOUS at 09:58

## 2024-02-29 RX ADMIN — POTASSIUM CHLORIDE, DEXTROSE MONOHYDRATE AND SODIUM CHLORIDE: 150; 5; 450 INJECTION, SOLUTION INTRAVENOUS at 13:06

## 2024-02-29 RX ADMIN — ONDANSETRON 4 MG: 2 INJECTION INTRAMUSCULAR; INTRAVENOUS at 14:39

## 2024-02-29 RX ADMIN — POTASSIUM CHLORIDE 10 MEQ: 7.46 INJECTION, SOLUTION INTRAVENOUS at 13:05

## 2024-02-29 RX ADMIN — SODIUM CHLORIDE 0.11 UNITS/HR: 9 INJECTION, SOLUTION INTRAVENOUS at 13:47

## 2024-02-29 RX ADMIN — SODIUM PHOSPHATE, MONOBASIC, MONOHYDRATE AND SODIUM PHOSPHATE, DIBASIC, ANHYDROUS 15 MMOL: 142; 276 INJECTION, SOLUTION INTRAVENOUS at 18:17

## 2024-02-29 RX ADMIN — POTASSIUM CHLORIDE 10 MEQ: 7.46 INJECTION, SOLUTION INTRAVENOUS at 16:58

## 2024-02-29 RX ADMIN — SODIUM CHLORIDE 0.34 UNITS/HR: 9 INJECTION, SOLUTION INTRAVENOUS at 11:23

## 2024-02-29 ASSESSMENT — PAIN SCALES - GENERAL
PAINLEVEL_OUTOF10: 0
PAINLEVEL_OUTOF10: 0
PAINLEVEL_OUTOF10: 5
PAINLEVEL_OUTOF10: 0

## 2024-02-29 ASSESSMENT — LIFESTYLE VARIABLES
HOW OFTEN DO YOU HAVE A DRINK CONTAINING ALCOHOL: NEVER
HOW MANY STANDARD DRINKS CONTAINING ALCOHOL DO YOU HAVE ON A TYPICAL DAY: PATIENT DOES NOT DRINK

## 2024-02-29 ASSESSMENT — PAIN DESCRIPTION - DESCRIPTORS: DESCRIPTORS: ACHING

## 2024-02-29 ASSESSMENT — PAIN - FUNCTIONAL ASSESSMENT: PAIN_FUNCTIONAL_ASSESSMENT: NONE - DENIES PAIN

## 2024-02-29 ASSESSMENT — PAIN DESCRIPTION - LOCATION: LOCATION: HEAD

## 2024-02-29 NOTE — ED NOTES
Report given to MOE Melissa from ICU .   Report method in person   The following was reviewed with receiving RN:   Current vital signs:  /65   Pulse (!) 108   Temp 97.8 °F (36.6 °C) (Axillary)   Resp 16   Ht 1.6 m (5' 3\")   Wt 74.8 kg (165 lb)   LMP 03/18/2017   SpO2 100%   BMI 29.23 kg/m²                      Any medication or safety alerts were reviewed. Any pending diagnostics and notifications were also reviewed, as well as any safety concerns or issues, abnormal labs, abnormal imaging, and abnormal assessment findings. Questions were answered.

## 2024-02-29 NOTE — ED PROVIDER NOTES
EMERGENCY DEPARTMENT ENCOUNTER    Pt Name: Lynn Shaw  MRN: [id number]  Birthdate 1965  Date of evaluation: 2/29/24  CHIEF COMPLAINT       Chief Complaint   Patient presents with    Shortness of Breath    Palpitations    Fatigue     HISTORY OF PRESENT ILLNESS   HPI  Fatigue, malaise, shortness of breath.  Started yesterday after she had to testify in court.  She is at high stress levels recently.  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  She is an insulin-dependent diabetic.  She is compliant with her medications.  She took all her medications yesterday and this morning.  She is concerned she might be in DKA and feels similar to previous episodes of DKA which have occurred twice before.  She denies any skin wounds or infections.  She checks her self frequently.  No foot wounds.  Little bit of urinary frequency and some dysuria.  No chest pain.  No back pain.  Some generalized fatigue and malaise.  Fast heart rate.  Heart rate above 100, it was checked this morning.      REVIEW OF SYSTEMS     Review of Systems   All other systems reviewed and are negative.    PASTMEDICAL HISTORY     Past Medical History:   Diagnosis Date    Anxiety     Arthritis     Diabetes ([facility])     Fibromyalgia     Hernia     abdominal, RT    Kidney stones 2009    stent at that time, has had multiple stones    PONV (postoperative nausea and vomiting)     EXTREME, WANTS SCOPE PATCH     Past Problem List  Patient Active Problem List   Diagnosis Code    Fracture of finger, middle phalanx, right, closed S62.629A    Closed displaced fracture of middle phalanx of right ring finger S62.624A    Displaced fracture of proximal phalanx of right ring finger with routine healing S62.614D    Diabetic ketoacidosis without coma associated with type 2 diabetes mellitus ([facility]) E11.10    High anion gap metabolic acidosis E87.29    ROQUE (acute kidney injury) ([facility]) N17.9    DKA, type 2, not at goal ([facility]) E11.10    Grief F43.21     Standing/Walking

## 2024-02-29 NOTE — PROGRESS NOTES
Pt c/o abdominal pain and overall feeling sick to her stomach. Dr. Small and residents notified. Pt's daughter requesting imaging to check for an ileus. Residents to place orders.

## 2024-02-29 NOTE — H&P
[facility]  IN-PATIENT SERVICE  [facility]  IN-PATIENT SERVICE   [facility]     HISTORY AND PHYSICAL EXAMINATION            Date:   2/29/2024  Patient name:  Lynn Shaw  Date of admission:  2/29/2024  9:17 AM  MRN:   [id number]  Account:  [id number]  YOB: 1965  PCP:    Rayshawn Mckeon MD  Room:   2005/2005-01  Code Status:    Full Code    Chief Complaint:     Chief Complaint   Patient presents with    Shortness of Breath    Palpitations    Fatigue       History Obtained From:     patient    History of Present Illness:     The patient is a 59 y.o.  Non- / non  female who presents withShortness of Breath, Palpitations, and Fatigue   and she is admitted to the hospital for the management of  DKA    Patient is a 60 yo female with Pmhx of DM type II who presented to the ED for fatigue, malaise, palpitations and SOB.  Patient states that she was having symptoms of malaise, fatigue she had a episodes of polyuria prior night but states she has been having these intermittent episodes for a few days prior to admission.  States she is undergoing all severe family stress which was exacerbating her symptoms.  States she was in court for prior scheduled appointment and admits not drinking or eating food during the entire session.  Patient states she has been type II diabetic for approximately 13 years but have been on insulin for the last 3 years.  States she is very compliant with her insulin states checked her blood sugar which was 316 prior day and when she checked her blood sugar in the morning it was slightly above 200.  Patient took her prescribed 45 units of Lantus in the morning alongside with 7.5 units prior to breakfast.  Patient did describe having polyuria with mild burning sensation.    In the ED patient found to have a blood sugar of approximately 185 but her VBG was 7.055.   to deseeding IV insulin  -Once off IV insulin can start feeds    Consultations:   IP CONSULT TO HOSPITALIST  IP CONSULT TO PULMONOLOGY  IP CONSULT TO DIABETES EDUCATOR     Patient is admitted as inpatient status because of co-morbiditieslisted above, severity of signs and symptoms as outlined, requirement for current medical therapies and most importantly because of direct risk to patient if care not provided in a hospital setting.    Joi Pearson MD  2/29/2024  12:50 PM    Copy sent to Rayshawn Brito MD   Attending Physician Statement  I have discussed the care of Lynn Shaw and I have examined the patient myself and taken ROS and HPI, including pertinent history and exam findings, with the resident. I have reviewed the key elements of all parts of the encounter with the resident.  I agree with the assessment, plan and orders as documented by the resident.  Patient is 59-year-old female with past medical history of type 2 diabetes mellitus hypertension presented with feeling well from last couple of days  In the ER found to be severely acidotic, beta-hydroxybutyrate high,  DKA, started on DKA protocol, patient gave herself 6 units of insulin before she came to ER,  DKA protocol  Will start on bicarb drip since she is severely acidotic  Monitor BMP and VBG every 4 hours  Patient is hyperkalemic with potassium of 5.5, to get repeat BMP in 4 hours, expect to improve after insulin and bicarb  Leukocytosis, check upper respiratory panel no sign of any bacterial infection UA is negative chest x-ray negative  Hold onto lisinopril due to hyperkalemia, is been borderline hypertensive, start amlodipine  Electronically signed by Anna Small MD

## 2024-02-29 NOTE — CONSULTS
[facility] PULMONARY, CRITICAL CARE & SLEEP  MD Edgardo Krause MD Ryan Griffith MD Karl Fernandes MD Dean Bernardo MD Cheryl Jeffers APRN Rebecca Baker APRN   CONSULT/H&P NOTE      Date of Admission: 2/29/2024  9:17 AM    Chief complaint: Shortness of breath, fatigue    Referring Physician: * No referring provider recorded for this case *  PCP: Rayshawn Mckeon MD     History of Present Illness: Lynn Shaw is a 59 y.o. White (non-)   female who presents with complaints of shortness of breath and fatigue.  The patient is a insulin-dependent diabetic and reports that she has had more difficult time controlling her blood sugars recently.  She has had episodes of DKA in the past.  Overnight she had blood sugars in the 300s and attempted to treat the high blood sugars by increasing her insulin at home.  Because of increased fatigue she decided to come in for evaluation.  She was noted to be in manisha acidosis and was very tachypneic and tachycardic.  She was dehydrated to the emergency physician and she was started on Fluids.  Initial blood glucose was 177.  Infectious workup was largely negative.  She was then transferred to the ICU for DKA protocol.  I am seeing her in the ICU.  Clinically she is tachypneic but not looking terribly labored with her breathing.  She states that she already is feeling better compared to her arrival in the emergency department.  She denies feeling ill yesterday and states that she did not eat anything or drink anything that was particularly unusual for her.    Problem:  Principal Problem: Diabetic ketoacidosis    PMH:   Past Medical History:   Diagnosis Date    Anxiety     Arthritis     Diabetes (HCC)     Fibromyalgia     Hernia     abdominal, RT    Kidney stones 2009    stent at that time, has had multiple stones    PONV (postoperative nausea and vomiting)     EXTREME, WANTS SCOPE PATCH       PSH:   Past Surgical History:   Procedure  children: Not on file    Years of education: Not on file    Highest education level: Not on file   Occupational History    Not on file   Tobacco Use    Smoking status: Never    Smokeless tobacco: Never   Vaping Use    Vaping Use: Never used   Substance and Sexual Activity    Alcohol use: No    Drug use: No    Sexual activity: Not on file   Other Topics Concern    Not on file   Social History Narrative    Not on file     Social Determinants of Health     Financial Resource Strain: Medium Risk (11/17/2020)    Overall Financial Resource Strain (CARDIA)     Difficulty of Paying Living Expenses: Somewhat hard   Food Insecurity: No Food Insecurity (11/17/2020)    Hunger Vital Sign     Worried About Running Out of Food in the Last Year: Never true     Ran Out of Food in the Last Year: Never true   Transportation Needs: No Transportation Needs (11/17/2020)    PRAPARE - Transportation     Lack of Transportation (Medical): No     Lack of Transportation (Non-Medical): No   Physical Activity: Sufficiently Active (5/16/2023)    Exercise Vital Sign     Days of Exercise per Week: 4 days     Minutes of Exercise per Session: 60 min   Stress: Stress Concern Present (11/17/2020)    [employer] - Occupational Stress Questionnaire     Feeling of Stress : To some extent   Social Connections: Unknown (11/17/2020)    Social Connection and Isolation Panel [NHANES]     Frequency of Communication with Friends and Family: Not on file     Frequency of Social Gatherings with Friends and Family: Not on file     Attends [employer] Services: Not on file     Active Member of Clubs or Organizations: Not on file     Attends Club or Organization Meetings: Not on file     Marital Status:    Intimate Partner Violence: Not At Risk (5/16/2023)    Humiliation, Afraid, Rape, and Kick questionnaire     Fear of Current or Ex-Partner: No     Emotionally Abused: No     Physically Abused: No     Sexually Abused: No   Housing

## 2024-03-01 LAB
ANION GAP SERPL CALCULATED.3IONS-SCNC: 21 MMOL/L (ref 9–17)
ANION GAP SERPL CALCULATED.3IONS-SCNC: 21 MMOL/L (ref 9–17)
ANION GAP SERPL CALCULATED.3IONS-SCNC: 25 MMOL/L (ref 9–17)
ANION GAP SERPL CALCULATED.3IONS-SCNC: 27 MMOL/L (ref 9–17)
ANION GAP SERPL CALCULATED.3IONS-SCNC: ABNORMAL MMOL/L (ref 9–17)
ANION GAP SERPL CALCULATED.3IONS-SCNC: ABNORMAL MMOL/L (ref 9–17)
B-OH-BUTYR SERPL-MCNC: 7.02 MMOL/L (ref 0.02–0.27)
B-OH-BUTYR SERPL-MCNC: 7.28 MMOL/L (ref 0.02–0.27)
B-OH-BUTYR SERPL-MCNC: 7.94 MMOL/L (ref 0.02–0.27)
BASOPHILS # BLD: 0.1 K/UL (ref 0–0.2)
BASOPHILS NFR BLD: 1 % (ref 0–2)
BODY TEMPERATURE: 37
BUN SERPL-MCNC: 11 MG/DL (ref 6–20)
BUN SERPL-MCNC: 11 MG/DL (ref 6–20)
BUN SERPL-MCNC: 7 MG/DL (ref 6–20)
BUN SERPL-MCNC: 7 MG/DL (ref 6–20)
BUN SERPL-MCNC: 8 MG/DL (ref 6–20)
BUN SERPL-MCNC: 9 MG/DL (ref 6–20)
CALCIUM SERPL-MCNC: 7.7 MG/DL (ref 8.6–10.4)
CALCIUM SERPL-MCNC: 7.8 MG/DL (ref 8.6–10.4)
CALCIUM SERPL-MCNC: 7.9 MG/DL (ref 8.6–10.4)
CALCIUM SERPL-MCNC: 7.9 MG/DL (ref 8.6–10.4)
CALCIUM SERPL-MCNC: 8 MG/DL (ref 8.6–10.4)
CALCIUM SERPL-MCNC: 8.1 MG/DL (ref 8.6–10.4)
CHLORIDE SERPL-SCNC: 100 MMOL/L (ref 98–107)
CHLORIDE SERPL-SCNC: 101 MMOL/L (ref 98–107)
CHLORIDE SERPL-SCNC: 102 MMOL/L (ref 98–107)
CHLORIDE SERPL-SCNC: 103 MMOL/L (ref 98–107)
CHLORIDE SERPL-SCNC: 104 MMOL/L (ref 98–107)
CHLORIDE SERPL-SCNC: 99 MMOL/L (ref 98–107)
CO2 SERPL-SCNC: 10 MMOL/L (ref 20–31)
CO2 SERPL-SCNC: 11 MMOL/L (ref 20–31)
CO2 SERPL-SCNC: 6 MMOL/L (ref 20–31)
CO2 SERPL-SCNC: 9 MMOL/L (ref 20–31)
CO2 SERPL-SCNC: <6 MMOL/L (ref 20–31)
CO2 SERPL-SCNC: <6 MMOL/L (ref 20–31)
COHGB MFR BLD: 1.5 % (ref 0–5)
COHGB MFR BLD: 2.8 % (ref 0–5)
COHGB MFR BLD: 2.8 % (ref 0–5)
COHGB MFR BLD: 2.9 % (ref 0–5)
COHGB MFR BLD: 3.9 % (ref 0–5)
COHGB MFR BLD: 5.2 % (ref 0–5)
CREAT SERPL-MCNC: 0.7 MG/DL (ref 0.5–0.9)
CREAT SERPL-MCNC: 0.7 MG/DL (ref 0.5–0.9)
CREAT SERPL-MCNC: 0.8 MG/DL (ref 0.5–0.9)
EKG ATRIAL RATE: 105 BPM
EKG P AXIS: 71 DEGREES
EKG P-R INTERVAL: 122 MS
EKG Q-T INTERVAL: 334 MS
EKG QRS DURATION: 76 MS
EKG QTC CALCULATION (BAZETT): 441 MS
EKG R AXIS: 18 DEGREES
EKG T AXIS: 56 DEGREES
EKG VENTRICULAR RATE: 105 BPM
EOSINOPHIL # BLD: 0 K/UL (ref 0–0.4)
EOSINOPHILS RELATIVE PERCENT: 0 % (ref 0–4)
ERYTHROCYTE [DISTWIDTH] IN BLOOD BY AUTOMATED COUNT: 15.3 % (ref 11.5–14.9)
GAS FLOW.O2 O2 DELIVERY SYS: ABNORMAL L/MIN
GFR SERPL CREATININE-BSD FRML MDRD: >60 ML/MIN/1.73M2
GLUCOSE BLD-MCNC: 130 MG/DL (ref 65–105)
GLUCOSE BLD-MCNC: 139 MG/DL (ref 65–105)
GLUCOSE BLD-MCNC: 143 MG/DL (ref 65–105)
GLUCOSE BLD-MCNC: 146 MG/DL (ref 65–105)
GLUCOSE BLD-MCNC: 157 MG/DL (ref 65–105)
GLUCOSE BLD-MCNC: 157 MG/DL (ref 65–105)
GLUCOSE BLD-MCNC: 158 MG/DL (ref 65–105)
GLUCOSE BLD-MCNC: 167 MG/DL (ref 65–105)
GLUCOSE BLD-MCNC: 167 MG/DL (ref 65–105)
GLUCOSE BLD-MCNC: 170 MG/DL (ref 65–105)
GLUCOSE BLD-MCNC: 173 MG/DL (ref 65–105)
GLUCOSE BLD-MCNC: 175 MG/DL (ref 65–105)
GLUCOSE BLD-MCNC: 177 MG/DL (ref 65–105)
GLUCOSE BLD-MCNC: 177 MG/DL (ref 65–105)
GLUCOSE BLD-MCNC: 181 MG/DL (ref 65–105)
GLUCOSE BLD-MCNC: 183 MG/DL (ref 65–105)
GLUCOSE BLD-MCNC: 185 MG/DL (ref 65–105)
GLUCOSE BLD-MCNC: 186 MG/DL (ref 65–105)
GLUCOSE BLD-MCNC: 189 MG/DL (ref 65–105)
GLUCOSE BLD-MCNC: 194 MG/DL (ref 65–105)
GLUCOSE BLD-MCNC: 198 MG/DL (ref 65–105)
GLUCOSE BLD-MCNC: 199 MG/DL (ref 65–105)
GLUCOSE BLD-MCNC: 202 MG/DL (ref 65–105)
GLUCOSE BLD-MCNC: 206 MG/DL (ref 65–105)
GLUCOSE BLD-MCNC: 235 MG/DL (ref 65–105)
GLUCOSE SERPL-MCNC: 168 MG/DL (ref 70–99)
GLUCOSE SERPL-MCNC: 193 MG/DL (ref 70–99)
GLUCOSE SERPL-MCNC: 196 MG/DL (ref 70–99)
GLUCOSE SERPL-MCNC: 201 MG/DL (ref 70–99)
GLUCOSE SERPL-MCNC: 203 MG/DL (ref 70–99)
GLUCOSE SERPL-MCNC: 228 MG/DL (ref 70–99)
HCO3 VENOUS: 10.1 MMOL/L (ref 24–30)
HCO3 VENOUS: 10.6 MMOL/L (ref 24–30)
HCO3 VENOUS: 9.7 MMOL/L (ref 24–30)
HCT VFR BLD AUTO: 39.5 % (ref 36–46)
HGB BLD-MCNC: 13 G/DL (ref 12–16)
LACTATE BLDV-SCNC: 0.6 MMOL/L (ref 0.5–2.2)
LYMPHOCYTES NFR BLD: 1.7 K/UL (ref 1–4.8)
LYMPHOCYTES RELATIVE PERCENT: 17 % (ref 24–44)
MAGNESIUM SERPL-MCNC: 2 MG/DL (ref 1.6–2.6)
MAGNESIUM SERPL-MCNC: 2.1 MG/DL (ref 1.6–2.6)
MAGNESIUM SERPL-MCNC: 2.2 MG/DL (ref 1.6–2.6)
MAGNESIUM SERPL-MCNC: 2.3 MG/DL (ref 1.6–2.6)
MAGNESIUM SERPL-MCNC: 2.4 MG/DL (ref 1.6–2.6)
MAGNESIUM SERPL-MCNC: 2.5 MG/DL (ref 1.6–2.6)
MCH RBC QN AUTO: 28.3 PG (ref 26–34)
MCHC RBC AUTO-ENTMCNC: 32.9 G/DL (ref 31–37)
MCV RBC AUTO: 86.2 FL (ref 80–100)
METHEMOGLOBIN: 0.9 % (ref 0–1.9)
METHEMOGLOBIN: 1 % (ref 0–1.9)
METHEMOGLOBIN: 1.1 % (ref 0–1.9)
METHEMOGLOBIN: 1.2 % (ref 0–1.9)
MICROORGANISM SPEC CULT: NORMAL
MONOCYTES NFR BLD: 0.7 K/UL (ref 0.1–1.3)
MONOCYTES NFR BLD: 7 % (ref 1–7)
NEGATIVE BASE EXCESS, VEN: 17.2 MMOL/L (ref 0–2)
NEGATIVE BASE EXCESS, VEN: 18.1 MMOL/L (ref 0–2)
NEGATIVE BASE EXCESS, VEN: 18.5 MMOL/L (ref 0–2)
NEUTROPHILS NFR BLD: 75 % (ref 36–66)
NEUTS SEG NFR BLD: 7.2 K/UL (ref 1.3–9.1)
O2 SAT, VEN: 89.5 % (ref 60–85)
O2 SAT, VEN: 93.7 % (ref 60–85)
O2 SAT, VEN: 94.3 % (ref 60–85)
O2 SAT, VEN: 94.5 % (ref 60–85)
O2 SAT, VEN: 94.9 % (ref 60–85)
O2 SAT, VEN: 96.9 % (ref 60–85)
OSMOLALITY SERPL: 287 MOSM/KG (ref 275–295)
PCO2, VEN: 21 MM HG (ref 39–55)
PCO2, VEN: 27.6 MM HG (ref 39–55)
PCO2, VEN: 29.6 MM HG (ref 39–55)
PCO2, VEN: ABNORMAL MM HG (ref 39–55)
PH VENOUS: 7.15 (ref 7.32–7.42)
PH VENOUS: 7.16 (ref 7.32–7.42)
PH VENOUS: 7.17 (ref 7.32–7.42)
PH VENOUS: 7.23 (ref 7.32–7.42)
PH VENOUS: 7.24 (ref 7.32–7.42)
PH VENOUS: 7.27 (ref 7.32–7.42)
PHOSPHATE SERPL-MCNC: 1.8 MG/DL (ref 2.6–4.5)
PHOSPHATE SERPL-MCNC: 2 MG/DL (ref 2.6–4.5)
PHOSPHATE SERPL-MCNC: 2.3 MG/DL (ref 2.6–4.5)
PHOSPHATE SERPL-MCNC: 2.3 MG/DL (ref 2.6–4.5)
PHOSPHATE SERPL-MCNC: 2.6 MG/DL (ref 2.6–4.5)
PHOSPHATE SERPL-MCNC: 2.8 MG/DL (ref 2.6–4.5)
PLATELET # BLD AUTO: 454 K/UL (ref 150–450)
PMV BLD AUTO: 7.5 FL (ref 6–12)
PO2, VEN: 141 MM HG (ref 30–50)
PO2, VEN: 184 MM HG (ref 30–50)
PO2, VEN: 201 MM HG (ref 30–50)
PO2, VEN: 221 MM HG (ref 30–50)
PO2, VEN: 70.1 MM HG (ref 30–50)
PO2, VEN: 96 MM HG (ref 30–50)
POTASSIUM SERPL-SCNC: 4.4 MMOL/L (ref 3.7–5.3)
POTASSIUM SERPL-SCNC: 4.4 MMOL/L (ref 3.7–5.3)
POTASSIUM SERPL-SCNC: 4.5 MMOL/L (ref 3.7–5.3)
POTASSIUM SERPL-SCNC: 4.5 MMOL/L (ref 3.7–5.3)
POTASSIUM SERPL-SCNC: 4.8 MMOL/L (ref 3.7–5.3)
POTASSIUM SERPL-SCNC: 4.8 MMOL/L (ref 3.7–5.3)
RBC # BLD AUTO: 4.58 M/UL (ref 4–5.2)
SALICYLATES SERPL-MCNC: <1 MG/DL (ref 3–10)
SODIUM SERPL-SCNC: 131 MMOL/L (ref 135–144)
SODIUM SERPL-SCNC: 133 MMOL/L (ref 135–144)
SODIUM SERPL-SCNC: 133 MMOL/L (ref 135–144)
SODIUM SERPL-SCNC: 134 MMOL/L (ref 135–144)
SODIUM SERPL-SCNC: 135 MMOL/L (ref 135–144)
SODIUM SERPL-SCNC: 136 MMOL/L (ref 135–144)
SPECIMEN DESCRIPTION: NORMAL
TEXT FOR RESPIRATORY: ABNORMAL
WBC OTHER # BLD: 9.7 K/UL (ref 3.5–11)

## 2024-03-01 PROCEDURE — 80048 BASIC METABOLIC PNL TOTAL CA: CPT

## 2024-03-01 PROCEDURE — 83735 ASSAY OF MAGNESIUM: CPT

## 2024-03-01 PROCEDURE — 82800 BLOOD PH: CPT

## 2024-03-01 PROCEDURE — 83930 ASSAY OF BLOOD OSMOLALITY: CPT

## 2024-03-01 PROCEDURE — 99291 CRITICAL CARE FIRST HOUR: CPT | Performed by: INTERNAL MEDICINE

## 2024-03-01 PROCEDURE — 84100 ASSAY OF PHOSPHORUS: CPT

## 2024-03-01 PROCEDURE — 2580000003 HC RX 258

## 2024-03-01 PROCEDURE — 6360000002 HC RX W HCPCS

## 2024-03-01 PROCEDURE — 2000000000 HC ICU R&B

## 2024-03-01 PROCEDURE — 36415 COLL VENOUS BLD VENIPUNCTURE: CPT

## 2024-03-01 PROCEDURE — 82805 BLOOD GASES W/O2 SATURATION: CPT

## 2024-03-01 PROCEDURE — 2500000003 HC RX 250 WO HCPCS

## 2024-03-01 PROCEDURE — 82947 ASSAY GLUCOSE BLOOD QUANT: CPT

## 2024-03-01 PROCEDURE — 82010 KETONE BODYS QUAN: CPT

## 2024-03-01 PROCEDURE — 6370000000 HC RX 637 (ALT 250 FOR IP)

## 2024-03-01 PROCEDURE — 2500000003 HC RX 250 WO HCPCS: Performed by: INTERNAL MEDICINE

## 2024-03-01 PROCEDURE — 83605 ASSAY OF LACTIC ACID: CPT

## 2024-03-01 PROCEDURE — C9113 INJ PANTOPRAZOLE SODIUM, VIA: HCPCS | Performed by: NURSE PRACTITIONER

## 2024-03-01 PROCEDURE — 93010 ELECTROCARDIOGRAM REPORT: CPT | Performed by: INTERNAL MEDICINE

## 2024-03-01 PROCEDURE — 85025 COMPLETE CBC W/AUTO DIFF WBC: CPT

## 2024-03-01 PROCEDURE — 2580000003 HC RX 258: Performed by: INTERNAL MEDICINE

## 2024-03-01 PROCEDURE — 6360000002 HC RX W HCPCS: Performed by: NURSE PRACTITIONER

## 2024-03-01 PROCEDURE — 80179 DRUG ASSAY SALICYLATE: CPT

## 2024-03-01 PROCEDURE — 2580000003 HC RX 258: Performed by: NURSE PRACTITIONER

## 2024-03-01 RX ORDER — KETOROLAC TROMETHAMINE 30 MG/ML
15 INJECTION, SOLUTION INTRAMUSCULAR; INTRAVENOUS ONCE
Status: COMPLETED | OUTPATIENT
Start: 2024-03-02 | End: 2024-03-02

## 2024-03-01 RX ADMIN — SODIUM PHOSPHATE, MONOBASIC, MONOHYDRATE AND SODIUM PHOSPHATE, DIBASIC, ANHYDROUS 15 MMOL: 142; 276 INJECTION, SOLUTION INTRAVENOUS at 15:48

## 2024-03-01 RX ADMIN — POTASSIUM CHLORIDE, DEXTROSE MONOHYDRATE AND SODIUM CHLORIDE: 150; 5; 450 INJECTION, SOLUTION INTRAVENOUS at 16:26

## 2024-03-01 RX ADMIN — POTASSIUM CHLORIDE, DEXTROSE MONOHYDRATE AND SODIUM CHLORIDE: 150; 5; 450 INJECTION, SOLUTION INTRAVENOUS at 10:50

## 2024-03-01 RX ADMIN — POTASSIUM CHLORIDE 10 MEQ: 7.46 INJECTION, SOLUTION INTRAVENOUS at 15:24

## 2024-03-01 RX ADMIN — SODIUM BICARBONATE: 84 INJECTION, SOLUTION INTRAVENOUS at 00:30

## 2024-03-01 RX ADMIN — POTASSIUM CHLORIDE 10 MEQ: 7.46 INJECTION, SOLUTION INTRAVENOUS at 02:28

## 2024-03-01 RX ADMIN — SODIUM PHOSPHATE, MONOBASIC, MONOHYDRATE AND SODIUM PHOSPHATE, DIBASIC, ANHYDROUS 15 MMOL: 142; 276 INJECTION, SOLUTION INTRAVENOUS at 10:49

## 2024-03-01 RX ADMIN — ONDANSETRON 4 MG: 2 INJECTION INTRAMUSCULAR; INTRAVENOUS at 22:11

## 2024-03-01 RX ADMIN — POTASSIUM CHLORIDE 10 MEQ: 7.46 INJECTION, SOLUTION INTRAVENOUS at 11:25

## 2024-03-01 RX ADMIN — ACETAMINOPHEN 650 MG: 325 TABLET ORAL at 17:28

## 2024-03-01 RX ADMIN — POTASSIUM CHLORIDE 10 MEQ: 7.46 INJECTION, SOLUTION INTRAVENOUS at 20:23

## 2024-03-01 RX ADMIN — POTASSIUM CHLORIDE 10 MEQ: 7.46 INJECTION, SOLUTION INTRAVENOUS at 07:20

## 2024-03-01 RX ADMIN — ONDANSETRON 4 MG: 2 INJECTION INTRAMUSCULAR; INTRAVENOUS at 14:24

## 2024-03-01 RX ADMIN — POTASSIUM CHLORIDE 10 MEQ: 7.46 INJECTION, SOLUTION INTRAVENOUS at 16:26

## 2024-03-01 RX ADMIN — SODIUM CHLORIDE, PRESERVATIVE FREE 40 MG: 5 INJECTION INTRAVENOUS at 08:22

## 2024-03-01 RX ADMIN — POTASSIUM CHLORIDE 10 MEQ: 7.46 INJECTION, SOLUTION INTRAVENOUS at 18:46

## 2024-03-01 RX ADMIN — ONDANSETRON 4 MG: 2 INJECTION INTRAMUSCULAR; INTRAVENOUS at 01:25

## 2024-03-01 RX ADMIN — SODIUM BICARBONATE: 84 INJECTION, SOLUTION INTRAVENOUS at 08:18

## 2024-03-01 RX ADMIN — POTASSIUM CHLORIDE 10 MEQ: 7.46 INJECTION, SOLUTION INTRAVENOUS at 10:17

## 2024-03-01 RX ADMIN — ONDANSETRON 4 MG: 2 INJECTION INTRAMUSCULAR; INTRAVENOUS at 08:22

## 2024-03-01 RX ADMIN — POTASSIUM CHLORIDE 10 MEQ: 7.46 INJECTION, SOLUTION INTRAVENOUS at 06:18

## 2024-03-01 RX ADMIN — SODIUM PHOSPHATE, MONOBASIC, MONOHYDRATE AND SODIUM PHOSPHATE, DIBASIC, ANHYDROUS 15 MMOL: 142; 276 INJECTION, SOLUTION INTRAVENOUS at 02:34

## 2024-03-01 RX ADMIN — POTASSIUM CHLORIDE 10 MEQ: 7.46 INJECTION, SOLUTION INTRAVENOUS at 03:30

## 2024-03-01 ASSESSMENT — PAIN SCALES - GENERAL
PAINLEVEL_OUTOF10: 4
PAINLEVEL_OUTOF10: 3

## 2024-03-01 ASSESSMENT — PAIN DESCRIPTION - LOCATION
LOCATION: HEAD
LOCATION: HEAD

## 2024-03-01 ASSESSMENT — PAIN DESCRIPTION - DESCRIPTORS: DESCRIPTORS: ACHING

## 2024-03-01 NOTE — CARE COORDINATION
Case Management Assessment  Initial Evaluation    Date/Time of Evaluation: 3/1/2024 11:18 AM  Assessment Completed by: Filomena Muñoz RN    If patient is discharged prior to next notation, then this note serves as note for discharge by case management.    Patient Name: Lynn Shaw                   YOB: 1965  Diagnosis: DKA, type 1, not at goal (HCC) [E10.10]  Diabetic ketoacidosis without coma associated with type 1 diabetes mellitus (HCC) [E10.10]                   Date / Time: 2/29/2024  9:17 AM    Patient Admission Status: Inpatient   Readmission Risk (Low < 19, Mod (19-27), High > 27): Readmission Risk Score: 9.2    Current PCP: Rayshawn Mckeon MD  PCP verified by ? Yes    Chart Reviewed: Yes      History Provided by: Patient  Patient Orientation: Alert and Oriented    Patient Cognition: Alert    Hospitalization in the last 30 days (Readmission):  No    If yes, Readmission Assessment in  Navigator will be completed.    Advance Directives:      Code Status: Full Code   Patient's Primary Decision Maker is: Legal Next of Kin      Discharge Planning:    Patient lives with: Friends Type of Home: House  Primary Care Giver: Self  Patient Support Systems include: Children, Friends/Neighbors, Family Members   Current Financial resources: Other (Comment) ([employer] commercial)  Current community resources: None  Current services prior to admission: Durable Medical Equipment            Current DME: Glucometer            Type of Home Care services:  None    ADLS  Prior functional level: Independent in ADLs/IADLs  Current functional level:      PT AM-PAC:   /24  OT AM-PAC:   /24    Family can provide assistance at DC:    Would you like Case Management to discuss the discharge plan with any other family members/significant others, and if so, who? No  Plans to Return to Present Housing: Yes  Other Identified Issues/Barriers to RETURNING to current housing: no barriers   Potential Assistance needed at  the providers was provided to: Patient   Patient Representative Name:       The Patient and/or Patient Representative Agree with the Discharge Plan? Yes    Filomena Muñoz RN  Case Management Department  Ph: [phone] Fax: [phone]

## 2024-03-01 NOTE — PROGRESS NOTES
Sodium Bicarb 150mEq in D5 at 150mL/hr discontinued per resident group. D5 in 0.45% Normal Saline with 20mEq Kcl at 150mL/hr to run instead. Ok per Dr Lorenzo

## 2024-03-01 NOTE — PROGRESS NOTES
Psychiatric:         Behavior: Behavior normal. Behavior is cooperative.         Cognition and Memory: Cognition and memory normal.         Judgment: Judgment normal.       Infusions:      insulin 0.1 Units/hr (03/01/24 0915)    dextrose 5% and 0.45% NaCl with KCl 20 mEq Stopped (02/29/24 1443)    dextrose 5 % and 0.45 % NaCl      sodium bicarbonate 150 mEq in dextrose 5 % 1,000 mL infusion 150 mL/hr at 03/01/24 0818     Meds:     Current Facility-Administered Medications:     pantoprazole (PROTONIX) 40 mg in sodium chloride (PF) 0.9 % 10 mL injection, 40 mg, IntraVENous, Daily, Rosemarie Kaur APRN - NP, 40 mg at 03/01/24 0822    insulin regular (HUMULIN R;NOVOLIN R) 100 Units in sodium chloride 0.9 % 100 mL infusion, 0.1-50 Units/hr, IntraVENous, Continuous, Joi Pearson MD, Last Rate: 0.1 mL/hr at 03/01/24 0915, 0.1 Units/hr at 03/01/24 0915    dextrose bolus 10% 125 mL, 125 mL, IntraVENous, PRN **OR** dextrose bolus 10% 250 mL, 250 mL, IntraVENous, PRN, Joi Pearson MD    magnesium sulfate 2000 mg in water 50 mL IVPB, 2,000 mg, IntraVENous, PRN, Joi Pearson MD    dextrose 5 % and 0.45 % NaCl with KCl 20 mEq infusion, , IntraVENous, Continuous PRN, Joi Pearson MD, Stopped at 02/29/24 1443    potassium chloride 10 mEq/100 mL IVPB (Peripheral Line), 10 mEq, IntraVENous, PRN, Tadeo, Quratulain, DO, Last Rate: 100 mL/hr at 03/01/24 0720, 10 mEq at 03/01/24 0720    sodium phosphate 15 mmol in sodium chloride 0.9 % 250 mL IVPB, 15 mmol, IntraVENous, PRN, Tadeo, Quratulain, DO, Stopped at 03/01/24 0534    polyethylene glycol (GLYCOLAX) packet 17 g, 17 g, Oral, Daily PRN, Tadeo, Quratulain, DO    enoxaparin (LOVENOX) injection 40 mg, 40 mg, SubCUTAneous, Daily, Jef Piedra, DO    dextrose 5 % and 0.45 % sodium chloride infusion, , IntraVENous, Continuous PRN, Shruthi Piedraulain, DO    sodium bicarbonate 150 mEq in dextrose 5 % 1,000 mL infusion, , IntraVENous, Continuous, Anna Small MD,  29  CODE STATUS-full code    PLAN:   From a respiratory standpoint she is stable, I see no indication for Decadron or remdesivir  Droplet precautions  Currently not very clear why this patient has a mixed anion gap/non-anion gap metabolic acidosis.  Originally we were under the impression that this patient was simple DKA but with additional evaluation were finding that the patient may have a second acidosis present.  Because of this is not readily obtainable.  Patient denies any ingestion of alcohol or illicit substances.  She is not on any strange supplements that are not listed in her home medications.  I do not see any adverse reactions noted on her home medications that would explain this phenomenon.  I asked pharmacy to look into it with greater detail and steglatro can sometimes cause DKA with low blood glucose.  This medication may need to be discontinued.  Patient also admitted to the pharmacist that she is taking Mounjaro, this is likely resulting in profound ketosis which could explain a lot of the clinical picture.  Check serum osmolarity  Check salicylate  Repeat lactic acid  Consider nephrology consult, this patient's behaving almost like she is spilling bicarbonate but she does not have an NG tube in, has not been vomiting and has a ascitic pH on her urinalysis  I would be okay with the patient drinking water  Will continue to monitor in the ICU    Critical Care Time: 30 minutes    If you have any questions, please feel free to contact me directly.    Janak Lorenzo MD, MS  Pulmonary/Critical Care  [facility]  [phone] --(Cell)  [phone] (office/answering service)  [phone] (fax)      Electronically signed by Janak Lorenzo MD on 03/01/24     This progress note was completed using a voice transcription system. Every effort was made to ensure accuracy. However, inadvertent computerized transcription errors may be present.    [phone] (office/answering

## 2024-03-01 NOTE — PROGRESS NOTES
Blood sugar 130, insulin drip paused per order. Blood sugar to be rechecked in one hour (2025). See MAR

## 2024-03-01 NOTE — PLAN OF CARE
Problem: Discharge Planning  Goal: Discharge to home or other facility with appropriate resources  3/1/2024 1527 by Aminata Mena RN  Outcome: Progressing  Flowsheets (Taken 3/1/2024 1527)  Discharge to home or other facility with appropriate resources: Identify barriers to discharge with patient and caregiver  3/1/2024 0430 by Shena Koehler RN  Outcome: Progressing     Problem: Chronic Conditions and Co-morbidities  Goal: Patient's chronic conditions and co-morbidity symptoms are monitored and maintained or improved  3/1/2024 1527 by Aminata Mena RN  Outcome: Progressing  Flowsheets (Taken 3/1/2024 1527)  Care Plan - Patient's Chronic Conditions and Co-Morbidity Symptoms are Monitored and Maintained or Improved: Monitor and assess patient's chronic conditions and comorbid symptoms for stability, deterioration, or improvement  3/1/2024 0430 by Shena Koehler RN  Outcome: Progressing     Problem: Pain  Goal: Verbalizes/displays adequate comfort level or baseline comfort level  3/1/2024 1527 by Aminata Mena RN  Outcome: Progressing  Flowsheets (Taken 3/1/2024 1527)  Verbalizes/displays adequate comfort level or baseline comfort level:   Encourage patient to monitor pain and request assistance   Assess pain using appropriate pain scale   Administer analgesics based on type and severity of pain and evaluate response  3/1/2024 0430 by Shena Koehler RN  Outcome: Progressing     Problem: Safety - Adult  Goal: Free from fall injury  3/1/2024 1527 by Aminata Mena RN  Outcome: Progressing  Flowsheets (Taken 3/1/2024 1527)  Free From Fall Injury: Instruct family/caregiver on patient safety  3/1/2024 0430 by Shena Koehler RN  Outcome: Progressing     Problem: Gastrointestinal - Adult  Goal: Minimal or absence of nausea and vomiting  3/1/2024 1527 by Aminata Mena RN  Outcome: Progressing  3/1/2024 0430 by Shena Koehler RN  Flowsheets (Taken 3/1/2024 0430)  Minimal or absence of nausea and vomiting:

## 2024-03-01 NOTE — ACP (ADVANCE CARE PLANNING)
Advance Care Planning     Advance Care Planning Activator (Inpatient)  Conversation Note      Date of ACP Conversation: 3/1/2024     Conversation Conducted with: Patient with Decision Making Capacity    ACP Activator: Filomena Muñoz RN        Health Care Decision Maker:     Current Designated Health Care Decision Maker:     Primary Decision Maker: JAYLA MAC - Child - [phone]    Primary Decision Maker: Flo Shaw - Child - [phone]    Primary Decision Maker: Wilian Shaw - Child - [phone]    Today we documented Decision Maker(s) consistent with Legal Next of Kin hierarchy.    Care Preferences    Ventilation:  \"If you were in your present state of health and suddenly became very ill and were unable to breathe on your own, what would your preference be about the use of a ventilator (breathing machine) if it were available to you?\"      Would the patient desire the use of ventilator (breathing machine)?: yes    \"If your health worsens and it becomes clear that your chance of recovery is unlikely, what would your preference be about the use of a ventilator (breathing machine) if it were available to you?\"     Would the patient desire the use of ventilator (breathing machine)?: Yes      Resuscitation  \"CPR works best to restart the heart when there is a sudden event, like a heart attack, in someone who is otherwise healthy. Unfortunately, CPR does not typically restart the heart for people who have serious health conditions or who are very sick.\"    \"In the event your heart stopped as a result of an underlying serious health condition, would you want attempts to be made to restart your heart (answer \"yes\" for attempt to resuscitate) or would you prefer a natural death (answer \"no\" for do not attempt to resuscitate)?\" yes       [] Yes   [] No   Educated Patient / Decision Maker regarding differences between Advance Directives and portable DNR orders.    Length of ACP Conversation in minutes:       Conversation Outcomes:  ACP discussion completed    Follow-up plan:    [] Schedule follow-up conversation to continue planning  [] Referred individual to Provider for additional questions/concerns   [] Advised patient/agent/surrogate to review completed ACP document and update if needed with changes in condition, patient preferences or care setting    [] This note routed to one or more involved healthcare providers

## 2024-03-01 NOTE — PROGRESS NOTES
Prayer outside patient's room     03/01/24 1035   Encounter Summary   Encounter Overview/Reason  Spiritual/Emotional Needs   Service Provided For: Patient   Referral/Consult From: Rounding   Complexity of Encounter Low   Spiritual/Emotional needs   Type Spiritual Support   Assessment/Intervention/Outcome   Assessment Unable to assess  (covid isolation)   Intervention Prayer (assurance of)/[place]        Detail Level: Detailed

## 2024-03-01 NOTE — PROGRESS NOTES
ileus Reason for Exam: Concern for ileus -  pt in DKA.  States diarrhea 2 days ago. Pt states severe back pain currently. Additional signs and symptoms: Concern for ileus -  pt in DKA.  States diarrhea 2 days ago.  Pt states severe back pain currently. Relevant Medical/Surgical History: Concern for ileus -  pt in DKA.  States diarrhea 2 days ago.  Pt states severe back pain currently. FINDINGS: The visualized lung bases are clear.  There is gaseous distention of the stomach.  The small bowel is otherwise normal in caliber.  There are no abnormal calcifications.  No acute osseous abnormality is identified.     Gaseous distention of the stomach, without radiographic evidence of bowel obstruction or ileus.     XR CHEST PORTABLE    Result Date: 2/29/2024  EXAMINATION: ONE XRAY VIEW OF THE CHEST 2/29/2024 10:06 am COMPARISON: 02/01/2022 HISTORY: ORDERING SYSTEM PROVIDED HISTORY: dyspnea TECHNOLOGIST PROVIDED HISTORY: dyspnea Reason for Exam: Pt states she thinks she's having diabetic ketoacidosis, dyspnea FINDINGS: The lungs are without acute focal process.  There is no effusion or pneumothorax. The cardiomediastinal silhouette is stable. The osseous structures are stable.     No acute process.         Physical Examination:        Physical Exam  Physical Exam  Constitutional:       General: She is not in acute distress.     Appearance: She is ill-appearing.   HENT:      Mouth/Throat:      Mouth: Mucous membranes are dry.   Cardiovascular:      Rate and Rhythm: Normal rate and regular rhythm.      Pulses: Normal pulses.      Heart sounds: Normal heart sounds.   Pulmonary:      Effort: Pulmonary effort is normal. No respiratory distress.      Breath sounds: No stridor. No wheezing, rhonchi or rales.   Chest:      Chest wall: No tenderness.   Abdominal:      General: Abdomen is flat. Bowel sounds are normal.      Palpations: Abdomen is soft.   Neurological:      Mental Status: She is alert and oriented to person, place, and

## 2024-03-01 NOTE — PLAN OF CARE
Problem: Discharge Planning  Goal: Discharge to home or other facility with appropriate resources  Outcome: Progressing  Flowsheets (Taken 2/29/2024 1200)  Discharge to home or other facility with appropriate resources:   Identify barriers to discharge with patient and caregiver   Arrange for needed discharge resources and transportation as appropriate   Identify discharge learning needs (meds, wound care, etc)   Arrange for interpreters to assist at discharge as needed   Refer to discharge planning if patient needs post-hospital services based on physician order or complex needs related to functional status, cognitive ability or social support system     Problem: Chronic Conditions and Co-morbidities  Goal: Patient's chronic conditions and co-morbidity symptoms are monitored and maintained or improved  Outcome: Progressing  Flowsheets (Taken 2/29/2024 1200)  Care Plan - Patient's Chronic Conditions and Co-Morbidity Symptoms are Monitored and Maintained or Improved:   Monitor and assess patient's chronic conditions and comorbid symptoms for stability, deterioration, or improvement   Collaborate with multidisciplinary team to address chronic and comorbid conditions and prevent exacerbation or deterioration   Update acute care plan with appropriate goals if chronic or comorbid symptoms are exacerbated and prevent overall improvement and discharge     Problem: Pain  Goal: Verbalizes/displays adequate comfort level or baseline comfort level  Outcome: Progressing     Problem: Safety - Adult  Goal: Free from fall injury  Outcome: Progressing     Problem: Gastrointestinal - Adult  Goal: Minimal or absence of nausea and vomiting  Outcome: Progressing  Goal: Maintains or returns to baseline bowel function  Outcome: Progressing  Goal: Maintains adequate nutritional intake  Outcome: Progressing     Problem: Metabolic/Fluid and Electrolytes - Adult  Goal: Electrolytes maintained within normal limits  Outcome:

## 2024-03-01 NOTE — PLAN OF CARE
Problem: Discharge Planning  Goal: Discharge to home or other facility with appropriate resources  3/1/2024 0430 by Shena Koehler RN  Outcome: Progressing     Problem: Chronic Conditions and Co-morbidities  Goal: Patient's chronic conditions and co-morbidity symptoms are monitored and maintained or improved  3/1/2024 0430 by Shena Koehler RN  Outcome: Progressing     Problem: Pain  Goal: Verbalizes/displays adequate comfort level or baseline comfort level  3/1/2024 0430 by Shena Koehler RN  Outcome: Progressing     Problem: Safety - Adult  Goal: Free from fall injury  3/1/2024 0430 by Shena Koehler RN  Outcome: Progressing     Problem: Gastrointestinal - Adult  Goal: Minimal or absence of nausea and vomiting  3/1/2024 0430 by Shena Koehler RN  Flowsheets (Taken 3/1/2024 0430)  Minimal or absence of nausea and vomiting:   Administer IV fluids as ordered to ensure adequate hydration   Maintain NPO status until nausea and vomiting are resolved   Administer ordered antiemetic medications as needed  Note: Zofran given once this shift, no emesis     Problem: Gastrointestinal - Adult  Goal: Maintains or returns to baseline bowel function  3/1/2024 0430 by Shena Koehler RN  Outcome: Progressing     Problem: Gastrointestinal - Adult  Goal: Maintains adequate nutritional intake  3/1/2024 0430 by Shena Koehler RN  Note: Npo currently     Problem: Metabolic/Fluid and Electrolytes - Adult  Goal: Electrolytes maintained within normal limits  3/1/2024 0430 by Shena Koehler RN  Outcome: Progressing  Flowsheets (Taken 3/1/2024 0430)  Electrolytes maintained within normal limits:   Monitor labs and assess patient for signs and symptoms of electrolyte imbalances   Administer electrolyte replacement as ordered   Monitor response to electrolyte replacements, including repeat lab results as appropriate  Note: Insulin gtt continues per protocol, lab work monitored every 4 hrs with electrolyte replaced as per scale,

## 2024-03-02 ENCOUNTER — APPOINTMENT (OUTPATIENT)
Dept: GENERAL RADIOLOGY | Age: 59
DRG: 637 | End: 2024-03-02
Payer: COMMERCIAL

## 2024-03-02 LAB
ANION GAP SERPL CALCULATED.3IONS-SCNC: 21 MMOL/L (ref 9–17)
ANION GAP SERPL CALCULATED.3IONS-SCNC: 22 MMOL/L (ref 9–17)
ANION GAP SERPL CALCULATED.3IONS-SCNC: 23 MMOL/L (ref 9–17)
ANION GAP SERPL CALCULATED.3IONS-SCNC: ABNORMAL MMOL/L (ref 9–17)
ARTERIAL PATENCY WRIST A: ABNORMAL
B-OH-BUTYR SERPL-MCNC: 6.49 MMOL/L (ref 0.02–0.27)
B-OH-BUTYR SERPL-MCNC: 6.51 MMOL/L (ref 0.02–0.27)
B-OH-BUTYR SERPL-MCNC: 7.2 MMOL/L (ref 0.02–0.27)
B-OH-BUTYR SERPL-MCNC: 7.39 MMOL/L (ref 0.02–0.27)
B-OH-BUTYR SERPL-MCNC: 7.45 MMOL/L (ref 0.02–0.27)
B-OH-BUTYR SERPL-MCNC: 7.74 MMOL/L (ref 0.02–0.27)
B-OH-BUTYR SERPL-MCNC: 8.94 MMOL/L (ref 0.02–0.27)
BASOPHILS # BLD: 0 K/UL (ref 0–0.2)
BASOPHILS NFR BLD: 1 % (ref 0–2)
BDY SITE: ABNORMAL
BODY TEMPERATURE: 37
BUN SERPL-MCNC: 3 MG/DL (ref 6–20)
BUN SERPL-MCNC: 5 MG/DL (ref 6–20)
BUN SERPL-MCNC: 6 MG/DL (ref 6–20)
CALCIUM SERPL-MCNC: 7.6 MG/DL (ref 8.6–10.4)
CALCIUM SERPL-MCNC: 7.6 MG/DL (ref 8.6–10.4)
CALCIUM SERPL-MCNC: 7.7 MG/DL (ref 8.6–10.4)
CALCIUM SERPL-MCNC: 7.8 MG/DL (ref 8.6–10.4)
CALCIUM SERPL-MCNC: 8 MG/DL (ref 8.6–10.4)
CALCIUM SERPL-MCNC: 8.1 MG/DL (ref 8.6–10.4)
CHLORIDE SERPL-SCNC: 101 MMOL/L (ref 98–107)
CHLORIDE SERPL-SCNC: 103 MMOL/L (ref 98–107)
CHLORIDE SERPL-SCNC: 105 MMOL/L (ref 98–107)
CHLORIDE SERPL-SCNC: 106 MMOL/L (ref 98–107)
CHLORIDE SERPL-SCNC: 106 MMOL/L (ref 98–107)
CHLORIDE SERPL-SCNC: 98 MMOL/L (ref 98–107)
CO2 SERPL-SCNC: 11 MMOL/L (ref 20–31)
CO2 SERPL-SCNC: 16 MMOL/L (ref 20–31)
CO2 SERPL-SCNC: 6 MMOL/L (ref 20–31)
CO2 SERPL-SCNC: 7 MMOL/L (ref 20–31)
CO2 SERPL-SCNC: 9 MMOL/L (ref 20–31)
CO2 SERPL-SCNC: <6 MMOL/L (ref 20–31)
COHGB MFR BLD: 2.2 % (ref 0–5)
COHGB MFR BLD: 2.2 % (ref 0–5)
COHGB MFR BLD: 6.8 % (ref 0–5)
CREAT SERPL-MCNC: 0.5 MG/DL (ref 0.5–0.9)
CREAT SERPL-MCNC: 0.6 MG/DL (ref 0.5–0.9)
CREAT SERPL-MCNC: 0.6 MG/DL (ref 0.5–0.9)
CREAT SERPL-MCNC: 0.7 MG/DL (ref 0.5–0.9)
EOSINOPHIL # BLD: 0.1 K/UL (ref 0–0.4)
EOSINOPHILS RELATIVE PERCENT: 1 % (ref 0–4)
ERYTHROCYTE [DISTWIDTH] IN BLOOD BY AUTOMATED COUNT: 16.7 % (ref 11.5–14.9)
GFR SERPL CREATININE-BSD FRML MDRD: >60 ML/MIN/1.73M2
GLUCOSE BLD-MCNC: 128 MG/DL (ref 65–105)
GLUCOSE BLD-MCNC: 136 MG/DL (ref 65–105)
GLUCOSE BLD-MCNC: 138 MG/DL (ref 65–105)
GLUCOSE BLD-MCNC: 140 MG/DL (ref 65–105)
GLUCOSE BLD-MCNC: 143 MG/DL (ref 65–105)
GLUCOSE BLD-MCNC: 148 MG/DL (ref 65–105)
GLUCOSE BLD-MCNC: 151 MG/DL (ref 65–105)
GLUCOSE BLD-MCNC: 153 MG/DL (ref 65–105)
GLUCOSE BLD-MCNC: 159 MG/DL (ref 65–105)
GLUCOSE BLD-MCNC: 163 MG/DL (ref 65–105)
GLUCOSE BLD-MCNC: 172 MG/DL (ref 65–105)
GLUCOSE BLD-MCNC: 181 MG/DL (ref 65–105)
GLUCOSE BLD-MCNC: 181 MG/DL (ref 65–105)
GLUCOSE BLD-MCNC: 183 MG/DL (ref 65–105)
GLUCOSE BLD-MCNC: 188 MG/DL (ref 65–105)
GLUCOSE BLD-MCNC: 189 MG/DL (ref 65–105)
GLUCOSE BLD-MCNC: 196 MG/DL (ref 65–105)
GLUCOSE BLD-MCNC: 205 MG/DL (ref 65–105)
GLUCOSE BLD-MCNC: 206 MG/DL (ref 65–105)
GLUCOSE BLD-MCNC: 207 MG/DL (ref 65–105)
GLUCOSE BLD-MCNC: 222 MG/DL (ref 65–105)
GLUCOSE BLD-MCNC: 238 MG/DL (ref 65–105)
GLUCOSE BLD-MCNC: 278 MG/DL (ref 65–105)
GLUCOSE BLD-MCNC: 98 MG/DL (ref 65–105)
GLUCOSE SERPL-MCNC: 169 MG/DL (ref 70–99)
GLUCOSE SERPL-MCNC: 187 MG/DL (ref 70–99)
GLUCOSE SERPL-MCNC: 195 MG/DL (ref 70–99)
GLUCOSE SERPL-MCNC: 209 MG/DL (ref 70–99)
GLUCOSE SERPL-MCNC: 215 MG/DL (ref 70–99)
GLUCOSE SERPL-MCNC: 259 MG/DL (ref 70–99)
HCO3 VENOUS: 12.8 MMOL/L (ref 24–30)
HCO3 VENOUS: 6.1 MMOL/L (ref 24–30)
HCO3 VENOUS: 6.2 MMOL/L (ref 24–30)
HCT VFR BLD AUTO: 43.2 % (ref 36–46)
HGB BLD-MCNC: 12.6 G/DL (ref 12–16)
LACTATE BLDV-SCNC: 0.6 MMOL/L (ref 0.5–2.2)
LYMPHOCYTES NFR BLD: 2.4 K/UL (ref 1–4.8)
LYMPHOCYTES RELATIVE PERCENT: 31 % (ref 24–44)
MAGNESIUM SERPL-MCNC: 1.9 MG/DL (ref 1.6–2.6)
MAGNESIUM SERPL-MCNC: 2 MG/DL (ref 1.6–2.6)
MAGNESIUM SERPL-MCNC: 2.1 MG/DL (ref 1.6–2.6)
MAGNESIUM SERPL-MCNC: 2.2 MG/DL (ref 1.6–2.6)
MCH RBC QN AUTO: 28.2 PG (ref 26–34)
MCHC RBC AUTO-ENTMCNC: 29.2 G/DL (ref 31–37)
MCV RBC AUTO: 96.6 FL (ref 80–100)
METHEMOGLOBIN: 1 % (ref 0–1.9)
METHEMOGLOBIN: 1.1 % (ref 0–1.9)
METHEMOGLOBIN: 1.1 % (ref 0–1.9)
MONOCYTES NFR BLD: 0.8 K/UL (ref 0.1–1.3)
MONOCYTES NFR BLD: 10 % (ref 1–7)
NEGATIVE BASE EXCESS, VEN: 13.4 MMOL/L (ref 0–2)
NEGATIVE BASE EXCESS, VEN: 21.9 MMOL/L (ref 0–2)
NEGATIVE BASE EXCESS, VEN: 22.1 MMOL/L (ref 0–2)
NEUTROPHILS NFR BLD: 57 % (ref 36–66)
NEUTS SEG NFR BLD: 4.4 K/UL (ref 1.3–9.1)
O2 SAT, VEN: 83.8 % (ref 60–85)
O2 SAT, VEN: 91.2 % (ref 60–85)
O2 SAT, VEN: 94.9 % (ref 60–85)
PCO2, VEN: 15.6 MM HG (ref 39–55)
PCO2, VEN: 16.2 MM HG (ref 39–55)
PCO2, VEN: 25.6 MM HG (ref 39–55)
PH VENOUS: 7.19 (ref 7.32–7.42)
PH VENOUS: 7.2 (ref 7.32–7.42)
PH VENOUS: 7.31 (ref 7.32–7.42)
PHOSPHATE SERPL-MCNC: 1.5 MG/DL (ref 2.6–4.5)
PHOSPHATE SERPL-MCNC: 1.5 MG/DL (ref 2.6–4.5)
PHOSPHATE SERPL-MCNC: 1.9 MG/DL (ref 2.6–4.5)
PHOSPHATE SERPL-MCNC: 2.4 MG/DL (ref 2.6–4.5)
PHOSPHATE SERPL-MCNC: 2.5 MG/DL (ref 2.6–4.5)
PHOSPHATE SERPL-MCNC: 2.8 MG/DL (ref 2.6–4.5)
PLATELET # BLD AUTO: 363 K/UL (ref 150–450)
PMV BLD AUTO: 7.8 FL (ref 6–12)
PO2, VEN: 104 MM HG (ref 30–50)
PO2, VEN: 129 MM HG (ref 30–50)
PO2, VEN: 46.8 MM HG (ref 30–50)
POTASSIUM SERPL-SCNC: 3.5 MMOL/L (ref 3.7–5.3)
POTASSIUM SERPL-SCNC: 4 MMOL/L (ref 3.7–5.3)
POTASSIUM SERPL-SCNC: 4.2 MMOL/L (ref 3.7–5.3)
POTASSIUM SERPL-SCNC: 4.6 MMOL/L (ref 3.7–5.3)
POTASSIUM SERPL-SCNC: 4.6 MMOL/L (ref 3.7–5.3)
POTASSIUM SERPL-SCNC: 5.2 MMOL/L (ref 3.7–5.3)
RBC # BLD AUTO: 4.47 M/UL (ref 4–5.2)
SODIUM SERPL-SCNC: 134 MMOL/L (ref 135–144)
SODIUM SERPL-SCNC: 135 MMOL/L (ref 135–144)
SODIUM SERPL-SCNC: 135 MMOL/L (ref 135–144)
TEXT FOR RESPIRATORY: ABNORMAL
TEXT FOR RESPIRATORY: ABNORMAL
WBC OTHER # BLD: 7.7 K/UL (ref 3.5–11)

## 2024-03-02 PROCEDURE — 83735 ASSAY OF MAGNESIUM: CPT

## 2024-03-02 PROCEDURE — 2500000003 HC RX 250 WO HCPCS

## 2024-03-02 PROCEDURE — 6360000002 HC RX W HCPCS: Performed by: SURGERY

## 2024-03-02 PROCEDURE — 5A1D70Z PERFORMANCE OF URINARY FILTRATION, INTERMITTENT, LESS THAN 6 HOURS PER DAY: ICD-10-PCS | Performed by: SURGERY

## 2024-03-02 PROCEDURE — 36556 INSERT NON-TUNNEL CV CATH: CPT

## 2024-03-02 PROCEDURE — 6360000002 HC RX W HCPCS: Performed by: NURSE PRACTITIONER

## 2024-03-02 PROCEDURE — 80048 BASIC METABOLIC PNL TOTAL CA: CPT

## 2024-03-02 PROCEDURE — 2500000003 HC RX 250 WO HCPCS: Performed by: INTERNAL MEDICINE

## 2024-03-02 PROCEDURE — 6360000002 HC RX W HCPCS

## 2024-03-02 PROCEDURE — 6370000000 HC RX 637 (ALT 250 FOR IP)

## 2024-03-02 PROCEDURE — 2000000000 HC ICU R&B

## 2024-03-02 PROCEDURE — 86317 IMMUNOASSAY INFECTIOUS AGENT: CPT

## 2024-03-02 PROCEDURE — 82805 BLOOD GASES W/O2 SATURATION: CPT

## 2024-03-02 PROCEDURE — 86705 HEP B CORE ANTIBODY IGM: CPT

## 2024-03-02 PROCEDURE — 36415 COLL VENOUS BLD VENIPUNCTURE: CPT

## 2024-03-02 PROCEDURE — 2580000003 HC RX 258: Performed by: INTERNAL MEDICINE

## 2024-03-02 PROCEDURE — 87340 HEPATITIS B SURFACE AG IA: CPT

## 2024-03-02 PROCEDURE — C9113 INJ PANTOPRAZOLE SODIUM, VIA: HCPCS | Performed by: NURSE PRACTITIONER

## 2024-03-02 PROCEDURE — 99233 SBSQ HOSP IP/OBS HIGH 50: CPT | Performed by: INTERNAL MEDICINE

## 2024-03-02 PROCEDURE — 2580000003 HC RX 258

## 2024-03-02 PROCEDURE — 82947 ASSAY GLUCOSE BLOOD QUANT: CPT

## 2024-03-02 PROCEDURE — 82010 KETONE BODYS QUAN: CPT

## 2024-03-02 PROCEDURE — 84100 ASSAY OF PHOSPHORUS: CPT

## 2024-03-02 PROCEDURE — 05HY33Z INSERTION OF INFUSION DEVICE INTO UPPER VEIN, PERCUTANEOUS APPROACH: ICD-10-PCS | Performed by: SURGERY

## 2024-03-02 PROCEDURE — 83605 ASSAY OF LACTIC ACID: CPT

## 2024-03-02 PROCEDURE — 90935 HEMODIALYSIS ONE EVALUATION: CPT

## 2024-03-02 PROCEDURE — 85025 COMPLETE CBC W/AUTO DIFF WBC: CPT

## 2024-03-02 PROCEDURE — 71045 X-RAY EXAM CHEST 1 VIEW: CPT

## 2024-03-02 PROCEDURE — 2580000003 HC RX 258: Performed by: NURSE PRACTITIONER

## 2024-03-02 PROCEDURE — 82800 BLOOD PH: CPT

## 2024-03-02 RX ORDER — LIDOCAINE HYDROCHLORIDE 10 MG/ML
INJECTION, SOLUTION INFILTRATION; PERINEURAL
Status: COMPLETED
Start: 2024-03-02 | End: 2024-03-02

## 2024-03-02 RX ORDER — KETOROLAC TROMETHAMINE 30 MG/ML
15 INJECTION, SOLUTION INTRAMUSCULAR; INTRAVENOUS EVERY 8 HOURS PRN
Status: DISCONTINUED | OUTPATIENT
Start: 2024-03-02 | End: 2024-03-03

## 2024-03-02 RX ORDER — KETOROLAC TROMETHAMINE 30 MG/ML
15 INJECTION, SOLUTION INTRAMUSCULAR; INTRAVENOUS ONCE
Status: COMPLETED | OUTPATIENT
Start: 2024-03-02 | End: 2024-03-02

## 2024-03-02 RX ORDER — HEPARIN 100 UNIT/ML
500 SYRINGE INTRAVENOUS
Status: DISCONTINUED | OUTPATIENT
Start: 2024-03-02 | End: 2024-03-05 | Stop reason: HOSPADM

## 2024-03-02 RX ADMIN — SODIUM BICARBONATE: 84 INJECTION, SOLUTION INTRAVENOUS at 22:29

## 2024-03-02 RX ADMIN — SODIUM CHLORIDE, PRESERVATIVE FREE 40 MG: 5 INJECTION INTRAVENOUS at 08:11

## 2024-03-02 RX ADMIN — POTASSIUM CHLORIDE 10 MEQ: 7.46 INJECTION, SOLUTION INTRAVENOUS at 18:07

## 2024-03-02 RX ADMIN — ACETAMINOPHEN 650 MG: 325 TABLET ORAL at 19:42

## 2024-03-02 RX ADMIN — SODIUM BICARBONATE 100 MEQ: 84 INJECTION, SOLUTION INTRAVENOUS at 16:34

## 2024-03-02 RX ADMIN — POTASSIUM CHLORIDE 10 MEQ: 7.46 INJECTION, SOLUTION INTRAVENOUS at 01:37

## 2024-03-02 RX ADMIN — POTASSIUM CHLORIDE 10 MEQ: 7.46 INJECTION, SOLUTION INTRAVENOUS at 16:50

## 2024-03-02 RX ADMIN — POTASSIUM CHLORIDE 10 MEQ: 7.46 INJECTION, SOLUTION INTRAVENOUS at 14:20

## 2024-03-02 RX ADMIN — KETOROLAC TROMETHAMINE 15 MG: 30 INJECTION, SOLUTION INTRAMUSCULAR; INTRAVENOUS at 12:22

## 2024-03-02 RX ADMIN — POTASSIUM CHLORIDE 10 MEQ: 7.46 INJECTION, SOLUTION INTRAVENOUS at 12:37

## 2024-03-02 RX ADMIN — KETOROLAC TROMETHAMINE 15 MG: 30 INJECTION INTRAMUSCULAR; INTRAVENOUS at 06:36

## 2024-03-02 RX ADMIN — SODIUM BICARBONATE: 84 INJECTION, SOLUTION INTRAVENOUS at 08:10

## 2024-03-02 RX ADMIN — SODIUM PHOSPHATE, MONOBASIC, MONOHYDRATE AND SODIUM PHOSPHATE, DIBASIC, ANHYDROUS 15 MMOL: 142; 276 INJECTION, SOLUTION INTRAVENOUS at 00:40

## 2024-03-02 RX ADMIN — POTASSIUM CHLORIDE 10 MEQ: 7.46 INJECTION, SOLUTION INTRAVENOUS at 19:27

## 2024-03-02 RX ADMIN — POTASSIUM CHLORIDE 10 MEQ: 7.46 INJECTION, SOLUTION INTRAVENOUS at 00:33

## 2024-03-02 RX ADMIN — POTASSIUM CHLORIDE 10 MEQ: 7.46 INJECTION, SOLUTION INTRAVENOUS at 06:34

## 2024-03-02 RX ADMIN — SODIUM PHOSPHATE, MONOBASIC, MONOHYDRATE AND SODIUM PHOSPHATE, DIBASIC, ANHYDROUS 15 MMOL: 142; 276 INJECTION, SOLUTION INTRAVENOUS at 13:07

## 2024-03-02 RX ADMIN — SODIUM CHLORIDE 0.39 UNITS/HR: 9 INJECTION, SOLUTION INTRAVENOUS at 18:12

## 2024-03-02 RX ADMIN — SODIUM PHOSPHATE, MONOBASIC, MONOHYDRATE AND SODIUM PHOSPHATE, DIBASIC, ANHYDROUS 15 MMOL: 142; 276 INJECTION, SOLUTION INTRAVENOUS at 21:19

## 2024-03-02 RX ADMIN — Medication 1.2 ML: at 21:43

## 2024-03-02 RX ADMIN — ONDANSETRON 4 MG: 2 INJECTION INTRAMUSCULAR; INTRAVENOUS at 18:07

## 2024-03-02 RX ADMIN — SODIUM PHOSPHATE, MONOBASIC, MONOHYDRATE AND SODIUM PHOSPHATE, DIBASIC, ANHYDROUS 15 MMOL: 142; 276 INJECTION, SOLUTION INTRAVENOUS at 09:21

## 2024-03-02 RX ADMIN — ONDANSETRON 4 MG: 2 INJECTION INTRAMUSCULAR; INTRAVENOUS at 12:17

## 2024-03-02 RX ADMIN — POTASSIUM CHLORIDE 10 MEQ: 7.46 INJECTION, SOLUTION INTRAVENOUS at 05:38

## 2024-03-02 RX ADMIN — LIDOCAINE HYDROCHLORIDE: 10 INJECTION, SOLUTION INFILTRATION; PERINEURAL at 18:19

## 2024-03-02 RX ADMIN — KETOROLAC TROMETHAMINE 15 MG: 30 INJECTION, SOLUTION INTRAMUSCULAR; INTRAVENOUS at 00:23

## 2024-03-02 RX ADMIN — SODIUM BICARBONATE: 84 INJECTION, SOLUTION INTRAVENOUS at 09:19

## 2024-03-02 RX ADMIN — POTASSIUM CHLORIDE, DEXTROSE MONOHYDRATE AND SODIUM CHLORIDE: 150; 5; 450 INJECTION, SOLUTION INTRAVENOUS at 06:36

## 2024-03-02 ASSESSMENT — PAIN SCALES - GENERAL
PAINLEVEL_OUTOF10: 4
PAINLEVEL_OUTOF10: 7
PAINLEVEL_OUTOF10: 0
PAINLEVEL_OUTOF10: 7
PAINLEVEL_OUTOF10: 6
PAINLEVEL_OUTOF10: 6
PAINLEVEL_OUTOF10: 7
PAINLEVEL_OUTOF10: 0

## 2024-03-02 ASSESSMENT — PAIN DESCRIPTION - LOCATION
LOCATION: HEAD;BACK
LOCATION: GENERALIZED;HEAD
LOCATION: HEAD
LOCATION: GENERALIZED
LOCATION: GENERALIZED;HEAD
LOCATION: GENERALIZED

## 2024-03-02 ASSESSMENT — PAIN DESCRIPTION - DESCRIPTORS
DESCRIPTORS: ACHING

## 2024-03-02 ASSESSMENT — PAIN DESCRIPTION - ORIENTATION: ORIENTATION: ANTERIOR

## 2024-03-02 NOTE — PROGRESS NOTES
Writer spoke with patient's daughter and provided updates. All questions answered. Daughter voiced appreciation.

## 2024-03-02 NOTE — PROGRESS NOTES
Phleb notified writer that patient's BMP was unable to be drawn. Writer paged lab to ask phleb to come attempt lab draw again.

## 2024-03-02 NOTE — PLAN OF CARE
Maintained or Improved:   Monitor and assess patient's chronic conditions and comorbid symptoms for stability, deterioration, or improvement   Collaborate with multidisciplinary team to address chronic and comorbid conditions and prevent exacerbation or deterioration   Update acute care plan with appropriate goals if chronic or comorbid symptoms are exacerbated and prevent overall improvement and discharge     Problem: Discharge Planning  Goal: Discharge to home or other facility with appropriate resources  3/2/2024 0659 by Ilana Morgan RN  Outcome: Progressing  Flowsheets (Taken 3/1/2024 2000)  Discharge to home or other facility with appropriate resources:   Identify barriers to discharge with patient and caregiver   Arrange for needed discharge resources and transportation as appropriate   Identify discharge learning needs (meds, wound care, etc)   Arrange for interpreters to assist at discharge as needed   Refer to discharge planning if patient needs post-hospital services based on physician order or complex needs related to functional status, cognitive ability or social support system

## 2024-03-02 NOTE — PLAN OF CARE
Problem: Discharge Planning  Goal: Discharge to home or other facility with appropriate resources  Outcome: Progressing  Flowsheets (Taken 3/1/2024 2000)  Discharge to home or other facility with appropriate resources:   Identify barriers to discharge with patient and caregiver   Arrange for needed discharge resources and transportation as appropriate   Identify discharge learning needs (meds, wound care, etc)   Arrange for interpreters to assist at discharge as needed   Refer to discharge planning if patient needs post-hospital services based on physician order or complex needs related to functional status, cognitive ability or social support system     Problem: Chronic Conditions and Co-morbidities  Goal: Patient's chronic conditions and co-morbidity symptoms are monitored and maintained or improved  Outcome: Progressing  Flowsheets (Taken 3/1/2024 2000)  Care Plan - Patient's Chronic Conditions and Co-Morbidity Symptoms are Monitored and Maintained or Improved:   Monitor and assess patient's chronic conditions and comorbid symptoms for stability, deterioration, or improvement   Collaborate with multidisciplinary team to address chronic and comorbid conditions and prevent exacerbation or deterioration   Update acute care plan with appropriate goals if chronic or comorbid symptoms are exacerbated and prevent overall improvement and discharge     Problem: Pain  Goal: Verbalizes/displays adequate comfort level or baseline comfort level  Outcome: Progressing  Flowsheets (Taken 3/1/2024 2000)  Verbalizes/displays adequate comfort level or baseline comfort level:   Assess pain using appropriate pain scale   Encourage patient to monitor pain and request assistance   Administer analgesics based on type and severity of pain and evaluate response   Implement non-pharmacological measures as appropriate and evaluate response   Consider cultural and social influences on pain and pain management   Notify Licensed Independent

## 2024-03-02 NOTE — CONSULTS
Department of Internal Medicine  Nephrology Debbie Atkinson MD   Consult Note    Reason for consultation: Management of recalcitrant anion gap metabolic acidosis.    Consulting physician: Anna Small MD.    History of present illness: This is a 59 y.o. female with a significant past medical history of  type 2 diabetes mellitus [diagnosed 7 years ago and insulin-dependent], Nephrolithiasis [s/p prior ureteral stent, fibromyalgia and osteoarthritis, who presented to the hospital on 2/29/2024 with complaints of generalized weakness, fatigue and malaise and shortness of breath.  Vital signs were stable at presentation with laboratory studies were remarkable for serum bicarbonate less than 6 mmol/L associated with venous pH 7.055 and pCO2 22.2.  Beta hydroxybutyric acid level was elevated at 8.86 mmol/L.  She has been on insulin drip but acidosis and anion gap have not improved.  Serum lactic acid level 0.6 mmol/L is within normal.   Home medications include metformin 1000 mg p.o. twice daily and lisinopril 5 mg p.o. daily.    She is awake alert and responsive.    Patient has no known allergies.    Past Medical History:   Diagnosis Date    Anxiety     Arthritis     Diabetes (HCC)     Fibromyalgia     Hernia     abdominal, RT    Kidney stones 2009    stent at that time, has had multiple stones    PONV (postoperative nausea and vomiting)     EXTREME, WANTS SCOPE PATCH     Scheduled Meds:   pantoprazole (PROTONIX) 40 mg in sodium chloride (PF) 0.9 % 10 mL injection  40 mg IntraVENous Daily    enoxaparin  40 mg SubCUTAneous Daily    [Held by provider] escitalopram  20 mg Oral Daily    [Held by provider] insulin glargine  45 Units SubCUTAneous BID    [Held by provider] lisinopril  5 mg Oral Daily    [Held by provider] insulin lispro  5 Units SubCUTAneous 4x Daily AC & HS     Continuous Infusions:   sodium bicarbonate 150 mEq in dextrose 5 % 1,000 mL infusion 100 mL/hr at 03/02/24 0919    insulin 0.1 Units/hr  bowel sounds normal; no masses,  no organomegaly    Back: symmetric, no curvature. ROM normal. No CVA tenderness.    Extremities: extremities normal, atraumatic, no cyanosis or edema    Neuro:  Grossly normal    CBC:   Recent Labs     02/29/24  0955 03/01/24  0518 03/02/24  0432   WBC 13.2* 9.7 7.7   HGB 15.1 13.0 12.6   * 454* 363     BMP:    Recent Labs     03/01/24  2331 03/02/24  0432 03/02/24  0736    134* 135   K 4.8 4.6 5.2    106 105   CO2 6* <6* 7*   BUN 7 6 5*   CREATININE 0.8 0.6 0.7   GLUCOSE 228* 209* 215*     Lab Results   Component Value Date/Time    NITRU NEGATIVE 02/29/2024 11:07 AM    COLORU Yellow 02/29/2024 11:07 AM    PHUR 5.0 02/29/2024 11:07 AM    WBCUA 0 TO 2 02/29/2024 11:07 AM    RBCUA 0 TO 2 02/29/2024 11:07 AM    MUCUS 1+ 02/01/2022 10:35 AM    TRICHOMONAS NOT REPORTED 02/01/2022 10:35 AM    YEAST NOT REPORTED 02/01/2022 10:35 AM    BACTERIA None 02/29/2024 11:07 AM    SPECGRAV 1.022 02/29/2024 11:07 AM    LEUKOCYTESUR NEGATIVE 02/29/2024 11:07 AM    UROBILINOGEN Normal 02/29/2024 11:07 AM    BILIRUBINUR NEGATIVE 02/29/2024 11:07 AM    GLUCOSEU SMALL 02/29/2024 11:07 AM    KETUA 4+ 02/29/2024 11:07 AM    AMORPHOUS NOT REPORTED 02/01/2022 10:35 AM     Urine Creatinine:     Lab Results   Component Value Date/Time    LABCREA 78.5 12/15/2023 12:12 PM     IMPRESSION/RECOMMENDATIONS:      1.  Anion gap metabolic acidosis - most consistent with diabetic ketoacidosis.  Metformin induced lactic acidosis is less likely given normal GFR and normal lactic acid levels.  There are no indications for administration of bicarbonate supplement in this situation.  Plan: Will start isotonic bicarbonate drip at 100 mL/h.  Toxicology studies.  Continue insulin drip until anion gap less than 15 mmol/L.  Hold metformin.    2.  Systemic hypertension - blood pressure is adequately controlled.    Prognosis is guarded.    Thank you very much for the courtesy and confidence of this

## 2024-03-02 NOTE — PROGRESS NOTES
Writer had in depth discussion with patient regarding Dr. Atkinson's recommendations. Patient nervous but is agreeable. Writer provided education in length for patient. Patient agreeable to plan.

## 2024-03-02 NOTE — PROGRESS NOTES
[facility]  IN-PATIENT SERVICE  [facility]    PROGRESS NOTE             3/2/2024    7:07 AM    Name:   Lynn Shaw  MRN:     [id number]     Acct:      [id number]   Room:   2005/2005-01  IP Day:  2  Admit Date:  2/29/2024  9:17 AM    PCP:  Rayshawn Mckeon MD  Code Status:  Full Code    Subjective:     C/C:   Chief Complaint   Patient presents with    Shortness of Breath    Palpitations    Fatigue     Interval History Status: not changed.    Patient seen and examined at bedside this morning. Continues to endorse nausea. Does not have an appetite at all. Remains in DKA on insulin gtt and D5 in half normal     Unable to calculate AG on labs d/t bicarb <6  Nephrology consulted, appreciate recs            Brief History:     The patient is a 59 y.o.  Non- / non  female who presents withShortness of Breath, Palpitations, and Fatigue   and she is admitted to the hospital for the management of  DKA     Patient is a 60 yo female with Pmhx of DM type II who presented to the ED for fatigue, malaise, palpitations and SOB.  Patient states that she was having symptoms of malaise, fatigue she had a episodes of polyuria prior night but states she has been having these intermittent episodes for a few days prior to admission.  States she is undergoing all severe family stress which was exacerbating her symptoms.  States she was in court for prior scheduled appointment and admits not drinking or eating food during the entire session.  Patient states she has been type II diabetic for approximately 13 years but have been on insulin for the last 3 years.  States she is very compliant with her insulin states checked her blood sugar which was 316 prior day and when she checked her blood sugar in the morning it was slightly above 200.  Patient took her prescribed 45 units of Lantus in the morning alongside with 7.5 units prior to breakfast.  Patient did describe having  and 0.45 % NaCl       PRN Meds: dextrose bolus **OR** dextrose bolus, magnesium sulfate, dextrose 5% and 0.45% NaCl with KCl 20 mEq, potassium chloride, sodium phosphate 15 mmol in sodium chloride 0.9 % 250 mL IVPB, polyethylene glycol, dextrose 5 % and 0.45 % NaCl, ondansetron, acetaminophen, promethazine (PHENERGAN) 12.5 mg in sodium chloride 0.9 % 50 mL IVPB    Data:     Past Medical History:   has a past medical history of Anxiety, Arthritis, Diabetes (HCC), Fibromyalgia, Hernia, Kidney stones, and PONV (postoperative nausea and vomiting).    Social History:   reports that she has never smoked. She has never used smokeless tobacco. She reports that she does not drink alcohol and does not use drugs.     Family History:   Family History   Problem Relation Age of Onset    Stroke Mother     COPD Mother     Heart Disease Mother     Other Father         sepsis/SBO       Vitals:  /65   Pulse 85   Temp 98.1 °F (36.7 °C) (Oral)   Resp 22   Ht 1.6 m (5' 3\")   Wt 73.9 kg (162 lb 14.4 oz)   LMP 2017   SpO2 97%   BMI 28.86 kg/m²   Temp (24hrs), Av.9 °F (36.6 °C), Min:97.4 °F (36.3 °C), Max:98.6 °F (37 °C)    Recent Labs     24  0231 24  0329 24  0428 24  0531   POCGLU 136* 159* 188* 206*         I/O(24Hr):    Intake/Output Summary (Last 24 hours) at 3/2/2024 0707  Last data filed at 3/2/2024 0539  Gross per 24 hour   Intake 5324.18 ml   Output 6450 ml   Net -1125.82 ml         Labs:    [unfilled]    No results found for: \"SPECIAL\"  Lab Results   Component Value Date/Time    CULTURE NO SIGNIFICANT GROWTH 2024 11:07 AM       [unfilled]    Radiology:    XR ABDOMEN (KUB) (SINGLE AP VIEW)    Result Date: 2024  EXAMINATION: ONE SUPINE XRAY VIEW(S) OF THE ABDOMEN 2024 6:06 pm COMPARISON: 2006 HISTORY: ORDERING SYSTEM PROVIDED HISTORY: concern for ileus TECHNOLOGIST PROVIDED HISTORY: concern for ileus Reason for Exam: Concern for ileus -  pt in DKA.  States

## 2024-03-02 NOTE — PLAN OF CARE
Problem: Discharge Planning  Goal: Discharge to home or other facility with appropriate resources  3/2/2024 1512 by Dipesh Welch RN  Outcome: Progressing  3/2/2024 0704 by Ilana Morgan RN  Outcome: Progressing  3/2/2024 0659 by Ilana Morgan RN  Outcome: Progressing  Flowsheets (Taken 3/1/2024 2000)  Discharge to home or other facility with appropriate resources:   Identify barriers to discharge with patient and caregiver   Arrange for needed discharge resources and transportation as appropriate   Identify discharge learning needs (meds, wound care, etc)   Arrange for interpreters to assist at discharge as needed   Refer to discharge planning if patient needs post-hospital services based on physician order or complex needs related to functional status, cognitive ability or social support system     Problem: Chronic Conditions and Co-morbidities  Goal: Patient's chronic conditions and co-morbidity symptoms are monitored and maintained or improved  3/2/2024 1512 by Dipesh Welch RN  Outcome: Progressing  3/2/2024 0704 by Ilana Morgan RN  Outcome: Progressing  3/2/2024 0659 by Ilana Morgan RN  Outcome: Progressing  Flowsheets (Taken 3/1/2024 2000)  Care Plan - Patient's Chronic Conditions and Co-Morbidity Symptoms are Monitored and Maintained or Improved:   Monitor and assess patient's chronic conditions and comorbid symptoms for stability, deterioration, or improvement   Collaborate with multidisciplinary team to address chronic and comorbid conditions and prevent exacerbation or deterioration   Update acute care plan with appropriate goals if chronic or comorbid symptoms are exacerbated and prevent overall improvement and discharge     Problem: Pain  Goal: Verbalizes/displays adequate comfort level or baseline comfort level  3/2/2024 1512 by Dipesh Welch RN  Outcome: Progressing  3/2/2024 0704 by Ilana Morgan RN  Outcome: Progressing  3/2/2024 0659 by Eddie  bowel function:   Assess bowel function   Encourage oral fluids to ensure adequate hydration   Administer IV fluids as ordered to ensure adequate hydration   Administer ordered medications as needed   Encourage mobilization and activity   Nutrition consult to assist patient with appropriate food choices  Goal: Maintains adequate nutritional intake  3/2/2024 1512 by Dipesh Welch RN  Outcome: Progressing  3/2/2024 0704 by Ilana Morgan RN  Outcome: Progressing  3/2/2024 0659 by Ilana Morgan RN  Outcome: Progressing  Flowsheets (Taken 3/1/2024 2000)  Maintains adequate nutritional intake:   Monitor intake and output, weight and lab values   Obtain nutritional consult as needed     Problem: Metabolic/Fluid and Electrolytes - Adult  Goal: Electrolytes maintained within normal limits  3/2/2024 1512 by Dipesh Welch RN  Outcome: Progressing  3/2/2024 0704 by Ilana Morgan RN  Outcome: Progressing  3/2/2024 0659 by Ilana Morgan RN  Outcome: Progressing  Flowsheets (Taken 3/1/2024 2000)  Electrolytes maintained within normal limits:   Monitor labs and assess patient for signs and symptoms of electrolyte imbalances   Administer electrolyte replacement as ordered   Monitor response to electrolyte replacements, including repeat lab results as appropriate  Goal: Hemodynamic stability and optimal renal function maintained  3/2/2024 1512 by Dipesh Welch RN  Outcome: Progressing  3/2/2024 0704 by Ilana Morgan RN  Outcome: Progressing  3/2/2024 0659 by Ilana Morgan RN  Outcome: Progressing  Flowsheets (Taken 3/1/2024 2000)  Hemodynamic stability and optimal renal function maintained:   Monitor labs and assess for signs and symptoms of volume excess or deficit   Monitor intake, output and patient weight  Goal: Glucose maintained within prescribed range  3/2/2024 1512 by Dipesh Welch RN  Outcome: Progressing  3/2/2024 0704 by Ilana Morgan RN  Outcome:

## 2024-03-02 NOTE — PROGRESS NOTES
dextrose bolus, magnesium sulfate, dextrose 5% and 0.45% NaCl with KCl 20 mEq, potassium chloride, sodium phosphate 15 mmol in sodium chloride 0.9 % 250 mL IVPB, polyethylene glycol, dextrose 5 % and 0.45 % NaCl, ondansetron, acetaminophen, promethazine (PHENERGAN) 12.5 mg in sodium chloride 0.9 % 50 mL IVPB  IV Drips/Infusions   sodium bicarbonate 150 mEq in dextrose 5 % 1,000 mL infusion      insulin 1.09 Units/hr (03/02/24 0808)    dextrose 5% and 0.45% NaCl with KCl 20 mEq Stopped (03/02/24 0810)    dextrose 5 % and 0.45 % NaCl         Diet/Nutrition   Diet NPO    Exam      Constitutional - Alert, arousable  General Appearance  well developed, well nourished  HEENT -normocephalic, atraumatic. PERRLA  Lungs - Chest expands equally, no wheezes, rales or rhonchi.  Cardiovascular - Heart sounds are normal.  normal rate and rhythm regular, no murmur, gallop or rub.  Abdomen - soft, nontender, nondistended, no masses or organomegaly  Neurologic - CN II-XII are grossly intact. There are no focal motor deficits  Skin - no bruising or bleeding  Extremities - no cyanosis, clubbing or edema    Lab Results   CBC     Lab Results   Component Value Date/Time    WBC 7.7 03/02/2024 04:32 AM    RBC 4.47 03/02/2024 04:32 AM    HGB 12.6 03/02/2024 04:32 AM    HCT 43.2 03/02/2024 04:32 AM     03/02/2024 04:32 AM    MCV 96.6 03/02/2024 04:32 AM    MCH 28.2 03/02/2024 04:32 AM    MCHC 29.2 03/02/2024 04:32 AM    RDW 16.7 03/02/2024 04:32 AM    METASPCT 1 01/16/2022 04:56 PM    LYMPHOPCT 31 03/02/2024 04:32 AM    MONOPCT 10 03/02/2024 04:32 AM    BASOPCT 1 03/02/2024 04:32 AM    MONOSABS 0.80 03/02/2024 04:32 AM    LYMPHSABS 2.40 03/02/2024 04:32 AM    EOSABS 0.10 03/02/2024 04:32 AM    BASOSABS 0.00 03/02/2024 04:32 AM    DIFFTYPE NOT REPORTED 02/01/2022 10:15 AM       BMP   Lab Results   Component Value Date/Time     03/02/2024 07:36 AM    K 5.2 03/02/2024 07:36 AM     03/02/2024 07:36 AM    CO2 7 03/02/2024 07:36  drip ordered by nephrology  Okay for the patient to have clear liquid diet from my standpoint  Continue ICU monitoring      Critical Care Time   0 min    Electronically signed by Barrie Muñoz MD on 3/2/2024 at 9:05 AM

## 2024-03-02 NOTE — PROGRESS NOTES
Dr. Atkinson responds \"She would need acute Hemodialysis. Give 2 amps of iv sodium bicarbonate and get a [place] dialysis catheter line in stat. Notify the Dialysis Nurse. I will be putting in Dialysis orders shortly.\"

## 2024-03-02 NOTE — PROGRESS NOTES
MYESHA Morrison notified labs still unable to be drawn at this time. Okay to attempt lab draw in patient's foot.

## 2024-03-02 NOTE — CONSULTS
arthralgias, gait problem and myalgias.   Skin:  Negative for color change, rash and wound.   Allergic/Immunologic: Negative for environmental allergies and food allergies.   Neurological:  Negative for dizziness, seizures and headaches.   Hematological:  Negative for adenopathy. Does not bruise/bleed easily.   Psychiatric/Behavioral:  Negative for agitation, confusion and hallucinations.            PHYSICAL EXAM:    VITALS:  BP (!) 140/76   Pulse 91   Temp 97.6 °F (36.4 °C) (Axillary)   Resp 20   Ht 1.6 m (5' 3\")   Wt 73.9 kg (162 lb 14.4 oz)   LMP 03/18/2017   SpO2 100%   BMI 28.86 kg/m²   INTAKE/OUTPUT: .  Intake/Output Summary (Last 24 hours) at 3/2/2024 1801  Last data filed at 3/2/2024 1615  Gross per 24 hour   Intake 5324.18 ml   Output 6050 ml   Net -725.82 ml       Physical Exam  Vitals and nursing note reviewed.   Constitutional:       Appearance: Normal appearance. She is normal weight.   HENT:      Head: Normocephalic and atraumatic.      Right Ear: Tympanic membrane, ear canal and external ear normal.      Left Ear: Tympanic membrane, ear canal and external ear normal.      Nose: Nose normal.      Mouth/Throat:      Mouth: Mucous membranes are moist.      Pharynx: Oropharynx is clear. No oropharyngeal exudate.   Eyes:      General: No scleral icterus.     Extraocular Movements: Extraocular movements intact.      Conjunctiva/sclera: Conjunctivae normal.      Pupils: Pupils are equal, round, and reactive to light.   Cardiovascular:      Rate and Rhythm: Normal rate and regular rhythm.      Pulses: Normal pulses.   Pulmonary:      Effort: Pulmonary effort is normal.      Breath sounds: Normal breath sounds. No wheezing.   Abdominal:      General: Abdomen is flat. Bowel sounds are normal.      Palpations: Abdomen is soft.      Tenderness: There is no abdominal tenderness. There is no rebound.   Genitourinary:     General: Normal vulva.      Rectum: Normal.   Musculoskeletal:         General: No  mellitus which is uncontrolled.  Patient needs IV access for dialysis    PLAN    Placement of left subclavian dialysis catheter  Risks options and benefits especially bleeding infection and pneumothorax of procedure were thoroughly discussed with patient who understands and agrees to to proceed and gives her verbal consent.  This to be done at the bedside in the ICU at [facility].        Electronically signed by Sky Perdomo DO  on 3/2/2024 at 6:01 PM

## 2024-03-02 NOTE — PROGRESS NOTES
Fluid orders clarified with Dr Atkinson regarding insulin drip and no D5 ordered. New orders received for sodium bicarb 150mEq in D5 at 100mL/hr and to d/c previous order (see MAR).

## 2024-03-02 NOTE — OP NOTE
Operative Note  3/2/2024    Patient: Lynn Shaw  YOB: 1965  MRN: [id number]    Date of Procedure: 2/29/2024    Preop diagnosis: Need IV access for dialysis    Post-Op Diagnosis: Same       Procedure: Placement QM dialysis catheter left subclavian vein    Surgeon: Conor    Assistant:   none    Anesthesia: 1% lidocaine local infiltration    Estimated Blood Loss (mL): Minimal    Complications: None    Specimens:   None    Implants:  None      Drains: None    Findings: Need IV access for dialysis        Detailed Description of Procedure:   After verbalizing consent the patient's left shoulder was prepped and draped in usual sterile fashion and ultrasound examination was made demonstrating subclavian vein in normal position and diameter and patient was placed in Trendelenburg position and left subclavian vein was intubated via infraclavicular approach through a 1% lidocaine anesthetize the field.  After achieving good blood return, syringe was withdrawn and guide wire was passed through the guide needle which was removed from the end of the guidewire.  A small nick was made in the skin and sequential dilators were passed over the guidewire into the subclavian vein and withdrawn and QM catheter was then passed over the guidewire and the guidewire was removed from the distal port and ports were seen to have good blood return and flushed with saline and then with concentrated heparin solution in each channel.  Catheter was sutured in position with 3-0 nylon and dialysis catheter tips were placed on the end of the catheter.  Sterile dressing was then placed and confirmatory chest x-ray was taken for confirmation of position.  Patient tolerated procedure well.    Electronically signed by Sky Perdomo DO on 3/2/2024 at 6:07 PM

## 2024-03-02 NOTE — PROGRESS NOTES
Patient's brother Jayy called for an update on patient. Patient okay with an update being given. No further questions at this time.

## 2024-03-02 NOTE — PROGRESS NOTES
ABG results sent to Dr. Muñoz.   Dr. Gutiérrez and Dr. Atkinson also notified. Dr. Muñoz recommends 2 amp bicarb but wanted writer to notify primary as they ordered the blood gas.   Per Dr. Gutiérrez, go off of nephrology recommendations. Awaiting response from Dr. Atkinson at this time.

## 2024-03-02 NOTE — PROGRESS NOTES
Blood sugar 136. Insulin gtt paused per order. See MAR. Blood sugar recheck due in one hour (0333).

## 2024-03-02 NOTE — PROGRESS NOTES
Dr Atkinson rounding at bedside. New orders received for sodium bicarb 75mEq in 0.45% NS at 100mL/hr.

## 2024-03-03 LAB
AMPHET UR QL SCN: NEGATIVE
ANION GAP SERPL CALCULATED.3IONS-SCNC: 12 MMOL/L (ref 9–17)
ANION GAP SERPL CALCULATED.3IONS-SCNC: 18 MMOL/L (ref 9–17)
ANION GAP SERPL CALCULATED.3IONS-SCNC: 20 MMOL/L (ref 9–17)
ANION GAP SERPL CALCULATED.3IONS-SCNC: 23 MMOL/L (ref 9–17)
ANION GAP SERPL CALCULATED.3IONS-SCNC: 24 MMOL/L (ref 9–17)
ANION GAP SERPL CALCULATED.3IONS-SCNC: 26 MMOL/L (ref 9–17)
B-OH-BUTYR SERPL-MCNC: 2.51 MMOL/L (ref 0.02–0.27)
B-OH-BUTYR SERPL-MCNC: 4.74 MMOL/L (ref 0.02–0.27)
B-OH-BUTYR SERPL-MCNC: 5.57 MMOL/L (ref 0.02–0.27)
B-OH-BUTYR SERPL-MCNC: 7.47 MMOL/L (ref 0.02–0.27)
B-OH-BUTYR SERPL-MCNC: 7.66 MMOL/L (ref 0.02–0.27)
B-OH-BUTYR SERPL-MCNC: 8.64 MMOL/L (ref 0.02–0.27)
BACTERIA URNS QL MICRO: ABNORMAL
BARBITURATES UR QL SCN: NEGATIVE
BASOPHILS # BLD: 0 K/UL (ref 0–0.2)
BASOPHILS NFR BLD: 1 % (ref 0–2)
BENZODIAZ UR QL: NEGATIVE
BILIRUB UR QL STRIP: NEGATIVE
BODY TEMPERATURE: 37
BUN SERPL-MCNC: 3 MG/DL (ref 6–20)
BUN SERPL-MCNC: 4 MG/DL (ref 6–20)
CALCIUM SERPL-MCNC: 7.7 MG/DL (ref 8.6–10.4)
CALCIUM SERPL-MCNC: 7.7 MG/DL (ref 8.6–10.4)
CALCIUM SERPL-MCNC: 7.8 MG/DL (ref 8.6–10.4)
CALCIUM SERPL-MCNC: 7.9 MG/DL (ref 8.6–10.4)
CALCIUM SERPL-MCNC: 8 MG/DL (ref 8.6–10.4)
CALCIUM SERPL-MCNC: 8.2 MG/DL (ref 8.6–10.4)
CANNABINOIDS UR QL SCN: NEGATIVE
CASTS #/AREA URNS LPF: ABNORMAL /LPF
CHLORIDE SERPL-SCNC: 100 MMOL/L (ref 98–107)
CHLORIDE SERPL-SCNC: 103 MMOL/L (ref 98–107)
CHLORIDE SERPL-SCNC: 104 MMOL/L (ref 98–107)
CHLORIDE SERPL-SCNC: 97 MMOL/L (ref 98–107)
CHLORIDE SERPL-SCNC: 98 MMOL/L (ref 98–107)
CHLORIDE SERPL-SCNC: 99 MMOL/L (ref 98–107)
CHLORIDE UR-SCNC: 23 MMOL/L
CLARITY UR: CLEAR
CO2 SERPL-SCNC: 13 MMOL/L (ref 20–31)
CO2 SERPL-SCNC: 14 MMOL/L (ref 20–31)
CO2 SERPL-SCNC: 16 MMOL/L (ref 20–31)
CO2 SERPL-SCNC: 17 MMOL/L (ref 20–31)
CO2 SERPL-SCNC: 18 MMOL/L (ref 20–31)
CO2 SERPL-SCNC: 23 MMOL/L (ref 20–31)
COCAINE UR QL SCN: NEGATIVE
COHGB MFR BLD: 1.9 % (ref 0–5)
COHGB MFR BLD: 2.1 % (ref 0–5)
COHGB MFR BLD: 2.1 % (ref 0–5)
COHGB MFR BLD: 2.2 % (ref 0–5)
COHGB MFR BLD: 2.3 % (ref 0–5)
COHGB MFR BLD: 2.5 % (ref 0–5)
COLOR UR: YELLOW
CREAT SERPL-MCNC: 0.5 MG/DL (ref 0.5–0.9)
CREAT SERPL-MCNC: 0.5 MG/DL (ref 0.5–0.9)
CREAT SERPL-MCNC: 0.6 MG/DL (ref 0.5–0.9)
CREAT SERPL-MCNC: 0.7 MG/DL (ref 0.5–0.9)
EOSINOPHIL # BLD: 0.1 K/UL (ref 0–0.4)
EOSINOPHILS RELATIVE PERCENT: 2 % (ref 0–4)
EPI CELLS #/AREA URNS HPF: ABNORMAL /HPF
ERYTHROCYTE [DISTWIDTH] IN BLOOD BY AUTOMATED COUNT: 14.8 % (ref 11.5–14.9)
FENTANYL UR QL: NEGATIVE
GFR SERPL CREATININE-BSD FRML MDRD: >60 ML/MIN/1.73M2
GLUCOSE BLD-MCNC: 125 MG/DL (ref 65–105)
GLUCOSE BLD-MCNC: 131 MG/DL (ref 65–105)
GLUCOSE BLD-MCNC: 133 MG/DL (ref 65–105)
GLUCOSE BLD-MCNC: 140 MG/DL (ref 65–105)
GLUCOSE BLD-MCNC: 143 MG/DL (ref 65–105)
GLUCOSE BLD-MCNC: 145 MG/DL (ref 65–105)
GLUCOSE BLD-MCNC: 146 MG/DL (ref 65–105)
GLUCOSE BLD-MCNC: 148 MG/DL (ref 65–105)
GLUCOSE BLD-MCNC: 156 MG/DL (ref 65–105)
GLUCOSE BLD-MCNC: 164 MG/DL (ref 65–105)
GLUCOSE BLD-MCNC: 164 MG/DL (ref 65–105)
GLUCOSE BLD-MCNC: 170 MG/DL (ref 65–105)
GLUCOSE BLD-MCNC: 171 MG/DL (ref 65–105)
GLUCOSE BLD-MCNC: 178 MG/DL (ref 65–105)
GLUCOSE BLD-MCNC: 202 MG/DL (ref 65–105)
GLUCOSE BLD-MCNC: 202 MG/DL (ref 65–105)
GLUCOSE BLD-MCNC: 204 MG/DL (ref 65–105)
GLUCOSE BLD-MCNC: 209 MG/DL (ref 65–105)
GLUCOSE BLD-MCNC: 212 MG/DL (ref 65–105)
GLUCOSE BLD-MCNC: 232 MG/DL (ref 65–105)
GLUCOSE BLD-MCNC: 234 MG/DL (ref 65–105)
GLUCOSE BLD-MCNC: 237 MG/DL (ref 65–105)
GLUCOSE BLD-MCNC: 257 MG/DL (ref 65–105)
GLUCOSE BLD-MCNC: 290 MG/DL (ref 65–105)
GLUCOSE BLD-MCNC: 329 MG/DL (ref 65–105)
GLUCOSE SERPL-MCNC: 152 MG/DL (ref 70–99)
GLUCOSE SERPL-MCNC: 156 MG/DL (ref 70–99)
GLUCOSE SERPL-MCNC: 188 MG/DL (ref 70–99)
GLUCOSE SERPL-MCNC: 216 MG/DL (ref 70–99)
GLUCOSE SERPL-MCNC: 242 MG/DL (ref 70–99)
GLUCOSE SERPL-MCNC: 275 MG/DL (ref 70–99)
GLUCOSE UR STRIP-MCNC: ABNORMAL MG/DL
HBV CORE IGM SERPL QL IA: NONREACTIVE
HBV SURFACE AB SERPL IA-ACNC: 16.58 MIU/ML
HBV SURFACE AG SERPL QL IA: NONREACTIVE
HCO3 VENOUS: 13.3 MMOL/L (ref 24–30)
HCO3 VENOUS: 14 MMOL/L (ref 24–30)
HCO3 VENOUS: 17.8 MMOL/L (ref 24–30)
HCO3 VENOUS: 17.9 MMOL/L (ref 24–30)
HCO3 VENOUS: 18.1 MMOL/L (ref 24–30)
HCO3 VENOUS: 19.1 MMOL/L (ref 24–30)
HCT VFR BLD AUTO: 35.1 % (ref 36–46)
HGB BLD-MCNC: 11.4 G/DL (ref 12–16)
HGB UR QL STRIP.AUTO: NEGATIVE
KETONES UR STRIP-MCNC: ABNORMAL MG/DL
LEUKOCYTE ESTERASE UR QL STRIP: ABNORMAL
LYMPHOCYTES NFR BLD: 2 K/UL (ref 1–4.8)
LYMPHOCYTES RELATIVE PERCENT: 29 % (ref 24–44)
MAGNESIUM SERPL-MCNC: 1.7 MG/DL (ref 1.6–2.6)
MAGNESIUM SERPL-MCNC: 1.8 MG/DL (ref 1.6–2.6)
MAGNESIUM SERPL-MCNC: 1.9 MG/DL (ref 1.6–2.6)
MCH RBC QN AUTO: 27.8 PG (ref 26–34)
MCHC RBC AUTO-ENTMCNC: 32.4 G/DL (ref 31–37)
MCV RBC AUTO: 85.8 FL (ref 80–100)
METHADONE UR QL: NEGATIVE
METHEMOGLOBIN: 1 % (ref 0–1.9)
METHEMOGLOBIN: 1.1 % (ref 0–1.9)
METHEMOGLOBIN: 1.2 % (ref 0–1.9)
MONOCYTES NFR BLD: 0.6 K/UL (ref 0.1–1.3)
MONOCYTES NFR BLD: 9 % (ref 1–7)
NEGATIVE BASE EXCESS, VEN: 11.5 MMOL/L (ref 0–2)
NEGATIVE BASE EXCESS, VEN: 11.9 MMOL/L (ref 0–2)
NEGATIVE BASE EXCESS, VEN: 5.8 MMOL/L (ref 0–2)
NEGATIVE BASE EXCESS, VEN: 6.1 MMOL/L (ref 0–2)
NEGATIVE BASE EXCESS, VEN: 6.7 MMOL/L (ref 0–2)
NEGATIVE BASE EXCESS, VEN: 6.8 MMOL/L (ref 0–2)
NEUTROPHILS NFR BLD: 59 % (ref 36–66)
NEUTS SEG NFR BLD: 4.2 K/UL (ref 1.3–9.1)
NITRITE UR QL STRIP: NEGATIVE
O2 SAT, VEN: 93.4 % (ref 60–85)
O2 SAT, VEN: 93.8 % (ref 60–85)
O2 SAT, VEN: 94.5 % (ref 60–85)
O2 SAT, VEN: 95.5 % (ref 60–85)
O2 SAT, VEN: 96.5 % (ref 60–85)
O2 SAT, VEN: 96.6 % (ref 60–85)
OPIATES UR QL SCN: NEGATIVE
OXYCODONE UR QL SCN: NEGATIVE
PCO2, VEN: 22.9 MM HG (ref 39–55)
PCO2, VEN: 25.3 MM HG (ref 39–55)
PCO2, VEN: 27.1 MM HG (ref 39–55)
PCO2, VEN: 27.7 MM HG (ref 39–55)
PCO2, VEN: 28.6 MM HG (ref 39–55)
PCO2, VEN: 31 MM HG (ref 39–55)
PCP UR QL SCN: NEGATIVE
PH UR STRIP: 6 [PH] (ref 5–8)
PH VENOUS: 7.35 (ref 7.32–7.42)
PH VENOUS: 7.37 (ref 7.32–7.42)
PH VENOUS: 7.4 (ref 7.32–7.42)
PH VENOUS: 7.41 (ref 7.32–7.42)
PH VENOUS: 7.42 (ref 7.32–7.42)
PH VENOUS: 7.43 (ref 7.32–7.42)
PHOSPHATE SERPL-MCNC: 1.9 MG/DL (ref 2.6–4.5)
PHOSPHATE SERPL-MCNC: 1.9 MG/DL (ref 2.6–4.5)
PHOSPHATE SERPL-MCNC: 2.1 MG/DL (ref 2.6–4.5)
PHOSPHATE SERPL-MCNC: 2.4 MG/DL (ref 2.6–4.5)
PHOSPHATE SERPL-MCNC: 2.7 MG/DL (ref 2.6–4.5)
PHOSPHATE SERPL-MCNC: 2.8 MG/DL (ref 2.6–4.5)
PLATELET # BLD AUTO: 323 K/UL (ref 150–450)
PMV BLD AUTO: 7.7 FL (ref 6–12)
PO2, VEN: 105 MM HG (ref 30–50)
PO2, VEN: 183 MM HG (ref 30–50)
PO2, VEN: 183 MM HG (ref 30–50)
PO2, VEN: 66.8 MM HG (ref 30–50)
PO2, VEN: 77.9 MM HG (ref 30–50)
PO2, VEN: 91.9 MM HG (ref 30–50)
POTASSIUM SERPL-SCNC: 3.6 MMOL/L (ref 3.7–5.3)
POTASSIUM SERPL-SCNC: 3.8 MMOL/L (ref 3.7–5.3)
POTASSIUM SERPL-SCNC: 3.9 MMOL/L (ref 3.7–5.3)
POTASSIUM SERPL-SCNC: 4 MMOL/L (ref 3.7–5.3)
POTASSIUM SERPL-SCNC: 4 MMOL/L (ref 3.7–5.3)
POTASSIUM SERPL-SCNC: 4.6 MMOL/L (ref 3.7–5.3)
POTASSIUM, UR: 16.3 MMOL/L
PROT UR STRIP-MCNC: NEGATIVE MG/DL
RBC # BLD AUTO: 4.09 M/UL (ref 4–5.2)
RBC #/AREA URNS HPF: ABNORMAL /HPF
SODIUM SERPL-SCNC: 134 MMOL/L (ref 135–144)
SODIUM SERPL-SCNC: 136 MMOL/L (ref 135–144)
SODIUM SERPL-SCNC: 138 MMOL/L (ref 135–144)
SODIUM SERPL-SCNC: 139 MMOL/L (ref 135–144)
SODIUM UR-SCNC: 61 MMOL/L
SP GR UR STRIP: 1.01 (ref 1–1.03)
TEST INFORMATION: NORMAL
TEXT FOR RESPIRATORY: ABNORMAL
UROBILINOGEN UR STRIP-ACNC: NORMAL EU/DL (ref 0–1)
WBC #/AREA URNS HPF: ABNORMAL /HPF
WBC OTHER # BLD: 7 K/UL (ref 3.5–11)

## 2024-03-03 PROCEDURE — 2500000003 HC RX 250 WO HCPCS

## 2024-03-03 PROCEDURE — 80048 BASIC METABOLIC PNL TOTAL CA: CPT

## 2024-03-03 PROCEDURE — 82010 KETONE BODYS QUAN: CPT

## 2024-03-03 PROCEDURE — C9113 INJ PANTOPRAZOLE SODIUM, VIA: HCPCS | Performed by: NURSE PRACTITIONER

## 2024-03-03 PROCEDURE — 2580000003 HC RX 258: Performed by: INTERNAL MEDICINE

## 2024-03-03 PROCEDURE — 82436 ASSAY OF URINE CHLORIDE: CPT

## 2024-03-03 PROCEDURE — 84100 ASSAY OF PHOSPHORUS: CPT

## 2024-03-03 PROCEDURE — 80307 DRUG TEST PRSMV CHEM ANLYZR: CPT

## 2024-03-03 PROCEDURE — 2580000003 HC RX 258

## 2024-03-03 PROCEDURE — 2580000003 HC RX 258: Performed by: NURSE PRACTITIONER

## 2024-03-03 PROCEDURE — 84133 ASSAY OF URINE POTASSIUM: CPT

## 2024-03-03 PROCEDURE — 85025 COMPLETE CBC W/AUTO DIFF WBC: CPT

## 2024-03-03 PROCEDURE — 82800 BLOOD PH: CPT

## 2024-03-03 PROCEDURE — 82805 BLOOD GASES W/O2 SATURATION: CPT

## 2024-03-03 PROCEDURE — 84300 ASSAY OF URINE SODIUM: CPT

## 2024-03-03 PROCEDURE — 81001 URINALYSIS AUTO W/SCOPE: CPT

## 2024-03-03 PROCEDURE — 6360000002 HC RX W HCPCS

## 2024-03-03 PROCEDURE — 6360000002 HC RX W HCPCS: Performed by: NURSE PRACTITIONER

## 2024-03-03 PROCEDURE — 6370000000 HC RX 637 (ALT 250 FOR IP)

## 2024-03-03 PROCEDURE — 83735 ASSAY OF MAGNESIUM: CPT

## 2024-03-03 PROCEDURE — 2500000003 HC RX 250 WO HCPCS: Performed by: INTERNAL MEDICINE

## 2024-03-03 PROCEDURE — 36415 COLL VENOUS BLD VENIPUNCTURE: CPT

## 2024-03-03 PROCEDURE — 2000000000 HC ICU R&B

## 2024-03-03 PROCEDURE — 99291 CRITICAL CARE FIRST HOUR: CPT | Performed by: INTERNAL MEDICINE

## 2024-03-03 RX ADMIN — POTASSIUM CHLORIDE 10 MEQ: 7.46 INJECTION, SOLUTION INTRAVENOUS at 14:35

## 2024-03-03 RX ADMIN — SODIUM CHLORIDE, PRESERVATIVE FREE 40 MG: 5 INJECTION INTRAVENOUS at 09:03

## 2024-03-03 RX ADMIN — ONDANSETRON 4 MG: 2 INJECTION INTRAMUSCULAR; INTRAVENOUS at 00:23

## 2024-03-03 RX ADMIN — SODIUM PHOSPHATE, MONOBASIC, MONOHYDRATE AND SODIUM PHOSPHATE, DIBASIC, ANHYDROUS 15 MMOL: 142; 276 INJECTION, SOLUTION INTRAVENOUS at 23:07

## 2024-03-03 RX ADMIN — POTASSIUM CHLORIDE 10 MEQ: 7.46 INJECTION, SOLUTION INTRAVENOUS at 19:06

## 2024-03-03 RX ADMIN — SODIUM PHOSPHATE, MONOBASIC, MONOHYDRATE AND SODIUM PHOSPHATE, DIBASIC, ANHYDROUS 15 MMOL: 142; 276 INJECTION, SOLUTION INTRAVENOUS at 00:22

## 2024-03-03 RX ADMIN — SODIUM PHOSPHATE, MONOBASIC, MONOHYDRATE AND SODIUM PHOSPHATE, DIBASIC, ANHYDROUS 15 MMOL: 142; 276 INJECTION, SOLUTION INTRAVENOUS at 13:24

## 2024-03-03 RX ADMIN — SODIUM PHOSPHATE, MONOBASIC, MONOHYDRATE AND SODIUM PHOSPHATE, DIBASIC, ANHYDROUS 15 MMOL: 142; 276 INJECTION, SOLUTION INTRAVENOUS at 09:32

## 2024-03-03 RX ADMIN — POTASSIUM CHLORIDE 10 MEQ: 7.46 INJECTION, SOLUTION INTRAVENOUS at 00:21

## 2024-03-03 RX ADMIN — ACETAMINOPHEN 650 MG: 325 TABLET ORAL at 17:04

## 2024-03-03 RX ADMIN — SODIUM BICARBONATE: 84 INJECTION, SOLUTION INTRAVENOUS at 10:33

## 2024-03-03 RX ADMIN — POTASSIUM CHLORIDE 10 MEQ: 7.46 INJECTION, SOLUTION INTRAVENOUS at 15:49

## 2024-03-03 RX ADMIN — POTASSIUM CHLORIDE 10 MEQ: 7.46 INJECTION, SOLUTION INTRAVENOUS at 06:36

## 2024-03-03 RX ADMIN — POTASSIUM CHLORIDE 10 MEQ: 7.46 INJECTION, SOLUTION INTRAVENOUS at 13:26

## 2024-03-03 RX ADMIN — SODIUM BICARBONATE: 84 INJECTION, SOLUTION INTRAVENOUS at 22:08

## 2024-03-03 RX ADMIN — SODIUM BICARBONATE 50 MEQ: 84 INJECTION, SOLUTION INTRAVENOUS at 05:30

## 2024-03-03 RX ADMIN — POTASSIUM CHLORIDE 10 MEQ: 7.46 INJECTION, SOLUTION INTRAVENOUS at 17:01

## 2024-03-03 RX ADMIN — POTASSIUM CHLORIDE 10 MEQ: 7.46 INJECTION, SOLUTION INTRAVENOUS at 11:38

## 2024-03-03 RX ADMIN — POTASSIUM CHLORIDE 10 MEQ: 7.46 INJECTION, SOLUTION INTRAVENOUS at 02:08

## 2024-03-03 RX ADMIN — POTASSIUM CHLORIDE 10 MEQ: 7.46 INJECTION, SOLUTION INTRAVENOUS at 04:49

## 2024-03-03 RX ADMIN — SODIUM PHOSPHATE, MONOBASIC, MONOHYDRATE AND SODIUM PHOSPHATE, DIBASIC, ANHYDROUS 15 MMOL: 142; 276 INJECTION, SOLUTION INTRAVENOUS at 17:28

## 2024-03-03 RX ADMIN — POTASSIUM CHLORIDE 10 MEQ: 7.46 INJECTION, SOLUTION INTRAVENOUS at 07:56

## 2024-03-03 RX ADMIN — ONDANSETRON 4 MG: 2 INJECTION INTRAMUSCULAR; INTRAVENOUS at 09:03

## 2024-03-03 RX ADMIN — POTASSIUM CHLORIDE 10 MEQ: 7.46 INJECTION, SOLUTION INTRAVENOUS at 21:27

## 2024-03-03 RX ADMIN — ACETAMINOPHEN 650 MG: 325 TABLET ORAL at 23:53

## 2024-03-03 RX ADMIN — POTASSIUM CHLORIDE 10 MEQ: 7.46 INJECTION, SOLUTION INTRAVENOUS at 03:12

## 2024-03-03 RX ADMIN — POTASSIUM CHLORIDE 10 MEQ: 7.46 INJECTION, SOLUTION INTRAVENOUS at 09:31

## 2024-03-03 RX ADMIN — POTASSIUM CHLORIDE 10 MEQ: 7.46 INJECTION, SOLUTION INTRAVENOUS at 10:32

## 2024-03-03 RX ADMIN — POTASSIUM CHLORIDE 10 MEQ: 7.46 INJECTION, SOLUTION INTRAVENOUS at 22:30

## 2024-03-03 RX ADMIN — POTASSIUM CHLORIDE 10 MEQ: 7.46 INJECTION, SOLUTION INTRAVENOUS at 18:05

## 2024-03-03 ASSESSMENT — PAIN SCALES - GENERAL
PAINLEVEL_OUTOF10: 3
PAINLEVEL_OUTOF10: 0
PAINLEVEL_OUTOF10: 2

## 2024-03-03 ASSESSMENT — PAIN DESCRIPTION - ORIENTATION: ORIENTATION: LEFT;UPPER

## 2024-03-03 ASSESSMENT — PAIN DESCRIPTION - LOCATION
LOCATION: BACK
LOCATION: CHEST

## 2024-03-03 ASSESSMENT — PAIN DESCRIPTION - DESCRIPTORS: DESCRIPTORS: SORE

## 2024-03-03 NOTE — PROGRESS NOTES
Department of Internal Medicine  Nephrology Debbie Atkinson MD Progress Note    Reason for consultation: Management of recalcitrant anion gap metabolic acidosis.    Consulting physician: Anna Small MD.    Interval history: Patient was seen and examined today and she is awake, alert and oriented.  She continues to have positive beta hydroxybutyric acid with normal serum lactic acid level.  Required placement of Jong catheter yesterday and received acute hemodialysis with improvement in serum bicarbonate from 6 to-18 but it has trended down again to 13 mmol/L.  Anion gap remains wide.    History of present illness: This is a 59 y.o. female with a significant past medical history of  type 2 diabetes mellitus [diagnosed 7 years ago and insulin-dependent], Nephrolithiasis [s/p prior ureteral stent, fibromyalgia and osteoarthritis, who presented to the hospital on 2/29/2024 with complaints of generalized weakness, fatigue and malaise and shortness of breath.  Vital signs were stable at presentation with laboratory studies were remarkable for serum bicarbonate less than 6 mmol/L associated with venous pH 7.055 and pCO2 22.2.  Beta hydroxybutyric acid level was elevated at 8.86 mmol/L.  She has been on insulin drip but acidosis and anion gap have not improved.  Serum lactic acid level 0.6 mmol/L is within normal.   Home medications include metformin 1000 mg p.o. twice daily and lisinopril 5 mg p.o. daily. She is awake alert and responsive.    Scheduled Meds:   alteplase  1 mg IntraCATHeter Once    alteplase  1 mg IntraCATHeter Once    pantoprazole (PROTONIX) 40 mg in sodium chloride (PF) 0.9 % 10 mL injection  40 mg IntraVENous Daily    enoxaparin  40 mg SubCUTAneous Daily    [Held by provider] escitalopram  20 mg Oral Daily    [Held by provider] insulin glargine  45 Units SubCUTAneous BID    [Held by provider] lisinopril  5 mg Oral Daily    [Held by provider] insulin lispro  5 Units SubCUTAneous 4x Daily AC &  3.8   CL 98 97* 99   CO2 16* 13* 17*   BUN 3* 4* 4*   CREATININE 0.5 0.5 0.5   GLUCOSE 169* 216* 152*       Lab Results   Component Value Date/Time    NITRU NEGATIVE 03/03/2024 08:06 AM    COLORU Yellow 03/03/2024 08:06 AM    PHUR 6.0 03/03/2024 08:06 AM    WBCUA 6 TO 9 03/03/2024 08:06 AM    RBCUA 0 TO 2 03/03/2024 08:06 AM    MUCUS 1+ 02/01/2022 10:35 AM    TRICHOMONAS NOT REPORTED 02/01/2022 10:35 AM    YEAST NOT REPORTED 02/01/2022 10:35 AM    BACTERIA None 03/03/2024 08:06 AM    SPECGRAV 1.015 03/03/2024 08:06 AM    LEUKOCYTESUR SMALL 03/03/2024 08:06 AM    UROBILINOGEN Normal 03/03/2024 08:06 AM    BILIRUBINUR NEGATIVE 03/03/2024 08:06 AM    GLUCOSEU LARGE 03/03/2024 08:06 AM    KETUA 4+ 03/03/2024 08:06 AM    AMORPHOUS NOT REPORTED 02/01/2022 10:35 AM     Urine Creatinine:     Lab Results   Component Value Date/Time    LABCREA 78.5 12/15/2023 12:12 PM     IMPRESSION/RECOMMENDATIONS:      1.  Anion gap metabolic acidosis - most consistent with diabetic ketoacidosis.  Metformin induced lactic acidosis is less likely given normal GFR and normal lactic acid levels.  There are no indications for administration of bicarbonate supplement in this situation.  Plan:  Continue isotonic bicarbonate drip at 100 mL/h.  Toxicology studies.  Continue insulin drip until anion gap less than 15 mmol/L.  Hold metformin.   Will perform hemodialysis again tomorrow if necessary.    2.  Systemic hypertension - blood pressure is adequately controlled.    Prognosis is guarded.    Debbie Atkinson MD FACP  Attending Nephrologist  3/3/2024 10:26 AM

## 2024-03-03 NOTE — PROGRESS NOTES
[facility]  IN-PATIENT SERVICE  [place] [facility]    PROGRESS NOTE             3/3/2024    7:10 AM    Name:   Lynn Shaw  MRN:     [id number]     Acct:      [id number]   Room:   2005/2005-01  IP Day:  3  Admit Date:  2/29/2024  9:17 AM    PCP:  Rayshawn Mckeon MD  Code Status:  Full Code    Subjective:     C/C:   Chief Complaint   Patient presents with    Shortness of Breath    Palpitations    Fatigue     Interval History Status: not changed.    Patient seen and examined at bedside this morning. Started on a clear liquid diet yesterday. Refractory acidosis, underwent stat HD yesterday s/p skip cath placement, per nephrology. Potassium is being replaced.         Last   Last beta byhdroxybutyrate 8.64        Brief History:     The patient is a 59 y.o.  Non- / non  female who presents withShortness of Breath, Palpitations, and Fatigue   and she is admitted to the hospital for the management of  DKA     Patient is a 58 yo female with Pmhx of DM type II who presented to the ED for fatigue, malaise, palpitations and SOB.  Patient states that she was having symptoms of malaise, fatigue she had a episodes of polyuria prior night but states she has been having these intermittent episodes for a few days prior to admission.  States she is undergoing all severe family stress which was exacerbating her symptoms.  States she was in court for prior scheduled appointment and admits not drinking or eating food during the entire session.  Patient states she has been type II diabetic for approximately 13 years but have been on insulin for the last 3 years.  States she is very compliant with her insulin states checked her blood sugar which was 316 prior day and when she checked her blood sugar in the morning it was slightly above 200.  Patient took her prescribed 45 units of Lantus in the morning alongside with 7.5 units prior to breakfast.  Patient did  wall: No tenderness.   Abdominal:      General: Abdomen is flat. Bowel sounds are normal.      Palpations: Abdomen is soft.   Neurological:      Mental Status: She is alert and oriented to person, place, and time. Mental status is at baseline.     Assessment:        Primary Problem  Diabetic ketoacidosis without coma associated with type 2 diabetes mellitus (HCC)    Active Hospital Problems    Diagnosis Date Noted    Diabetic ketoacidosis without coma associated with type 2 diabetes mellitus (HCC) [E11.10] 01/16/2022       Plan:        Patient status Admit as inpatient in the  Medical ICU     Euglycemic DKA (DM T2)  -initial  beta hydroxybutyrate 8.86 pH   -was on sglt2 inhibitor, discontinue on DC  -given 2L NS boluses in the ED --> was started on bicarb in D5 @150ml/hr in ED --> switched to D5 in half normal w/ Kcl 3/1  -HbA1c 7.1  -insulin gtt 0.1 U/kg initiated in the ED  -subq 2 hours before stopping gtt  -POCT glucose q1h  -uds unremarkable  -lactate, lipase wnl   -UA ketones, glucose   -phenergan prn  -BMP q4h was ordered   -npo for now, progress as tolerated   -COVID +  -droplet isolation  -Nephrology consulted, appreciate recs   -underwent stat hemodialysis yesterday, s/p jong cath placement             DVT prophylaxis: Lovenox 40 mg SC  GI prophylaxis: Protonix 40 mg daily      Plan will be discussed with the attending      Veena Carlson MD  PGY 1 TY Resident  3/3/2024 7:10 AM   Attending Physician Statement  I have discussed the care of Lynn Shaw and I have examined the patient myself and taken ROS and HPI, including pertinent history and exam findings, with the resident. I have reviewed the key elements of all parts of the encounter with the resident.  I agree with the assessment, plan and orders as documented by the resident.  Patient, had refractory acidosis, underwent dialysis yesterday, Jong was placed in left subclavian  Patient still on insulin drip  Anion gap is still not

## 2024-03-03 NOTE — PLAN OF CARE
level:   Encourage patient to monitor pain and request assistance   Administer analgesics based on type and severity of pain and evaluate response   Assess pain using appropriate pain scale  3/2/2024 1512 by Dipesh Welch RN  Outcome: Progressing     Problem: Safety - Adult  Goal: Free from fall injury  3/3/2024 0356 by Caitlin Cota RN  Outcome: Progressing  3/2/2024 1512 by Diepsh Welch RN  Outcome: Progressing     Problem: Gastrointestinal - Adult  Goal: Minimal or absence of nausea and vomiting  3/3/2024 0356 by Caitlin Cota RN  Outcome: Progressing  Flowsheets (Taken 3/2/2024 1940 by Laure Mackay RN)  Minimal or absence of nausea and vomiting:   Administer IV fluids as ordered to ensure adequate hydration   Administer ordered antiemetic medications as needed  3/2/2024 1512 by Dipesh Welch RN  Outcome: Progressing  Goal: Maintains or returns to baseline bowel function  3/3/2024 0356 by Caitlin Cota RN  Outcome: Progressing  Flowsheets (Taken 3/2/2024 1940 by Laure Mackay RN)  Maintains or returns to baseline bowel function:   Assess bowel function   Encourage oral fluids to ensure adequate hydration   Administer IV fluids as ordered to ensure adequate hydration   Administer ordered medications as needed  3/2/2024 1512 by Dipesh Welch RN  Outcome: Progressing  Goal: Maintains adequate nutritional intake  3/3/2024 0356 by Caitlin Cota RN  Outcome: Progressing  Flowsheets (Taken 3/2/2024 1940 by Laure Mackay RN)  Maintains adequate nutritional intake:   Monitor intake and output, weight and lab values   Obtain nutritional consult as needed  3/2/2024 1512 by Dipesh Welch RN  Outcome: Progressing     Problem: Metabolic/Fluid and Electrolytes - Adult  Goal: Electrolytes maintained within normal limits  3/3/2024 0356 by Caitlin Cota RN  Outcome: Progressing  Flowsheets (Taken 3/2/2024 1940 by Laure Mackay RN)  Electrolytes maintained within  normal limits:   Monitor labs and assess patient for signs and symptoms of electrolyte imbalances   Administer electrolyte replacement as ordered   Monitor response to electrolyte replacements, including repeat lab results as appropriate  3/2/2024 1512 by Dipesh Welch RN  Outcome: Progressing  Goal: Hemodynamic stability and optimal renal function maintained  3/3/2024 0356 by Caitlin Cota RN  Outcome: Progressing  Flowsheets (Taken 3/2/2024 1940 by Laure Mackay RN)  Hemodynamic stability and optimal renal function maintained:   Monitor labs and assess for signs and symptoms of volume excess or deficit   Monitor intake, output and patient weight   Monitor urine specific gravity, serum osmolarity and serum sodium as indicated or ordered   Monitor response to interventions for patient's volume status, including labs, urine output, blood pressure (other measures as available)  3/2/2024 1512 by Dipesh Welch RN  Outcome: Progressing  Goal: Glucose maintained within prescribed range  3/3/2024 0356 by Caitlin Cota RN  Outcome: Progressing  Flowsheets (Taken 3/2/2024 1940 by Laure Mackay RN)  Glucose maintained within prescribed range:   Monitor blood glucose as ordered   Assess for signs and symptoms of hyperglycemia and hypoglycemia   Administer ordered medications to maintain glucose within target range  3/2/2024 1512 by Dipesh Welch RN  Outcome: Progressing

## 2024-03-03 NOTE — PROGRESS NOTES
[Held by provider] insulin lispro  5 Units SubCUTAneous 4x Daily AC & HS     heparin (PF), anticoagulant sodium citrate, anticoagulant sodium citrate, dextrose bolus **OR** dextrose bolus, magnesium sulfate, dextrose 5% and 0.45% NaCl with KCl 20 mEq, potassium chloride, sodium phosphate 15 mmol in sodium chloride 0.9 % 250 mL IVPB, polyethylene glycol, dextrose 5 % and 0.45 % NaCl, ondansetron, acetaminophen, promethazine (PHENERGAN) 12.5 mg in sodium chloride 0.9 % 50 mL IVPB  IV Drips/Infusions   sodium bicarbonate 150 mEq in dextrose 5 % 1,000 mL infusion 100 mL/hr at 03/02/24 2229    insulin 5.4 Units/hr (03/03/24 0934)    dextrose 5% and 0.45% NaCl with KCl 20 mEq Stopped (03/02/24 0810)    dextrose 5 % and 0.45 % NaCl         Diet/Nutrition   ADULT DIET; Clear Liquid; 4 carb choices (60 gm/meal)    Exam      Constitutional - Alert, arousable  General Appearance  well developed, well nourished  HEENT -normocephalic, atraumatic. PERRLA  Lungs - Chest expands equally, no wheezes, rales or rhonchi.  Cardiovascular - Heart sounds are normal.  normal rate and rhythm regular, no murmur, gallop or rub.  Abdomen - soft, nontender, nondistended, no masses or organomegaly  Neurologic - CN II-XII are grossly intact. There are no focal motor deficits  Skin - no bruising or bleeding  Extremities - no cyanosis, clubbing or edema    Lab Results   CBC     Lab Results   Component Value Date/Time    WBC 7.0 03/03/2024 03:43 AM    RBC 4.09 03/03/2024 03:43 AM    HGB 11.4 03/03/2024 03:43 AM    HCT 35.1 03/03/2024 03:43 AM     03/03/2024 03:43 AM    MCV 85.8 03/03/2024 03:43 AM    MCH 27.8 03/03/2024 03:43 AM    MCHC 32.4 03/03/2024 03:43 AM    RDW 14.8 03/03/2024 03:43 AM    METASPCT 1 01/16/2022 04:56 PM    LYMPHOPCT 29 03/03/2024 03:43 AM    MONOPCT 9 03/03/2024 03:43 AM    BASOPCT 1 03/03/2024 03:43 AM    MONOSABS 0.60 03/03/2024 03:43 AM    LYMPHSABS 2.00 03/03/2024 03:43 AM    EOSABS 0.10 03/03/2024 03:43 AM

## 2024-03-03 NOTE — PLAN OF CARE
Problem: Discharge Planning  Goal: Discharge to home or other facility with appropriate resources  3/3/2024 1456 by Aminata Mena RN  Outcome: Progressing  Flowsheets (Taken 3/3/2024 1456)  Discharge to home or other facility with appropriate resources: Identify barriers to discharge with patient and caregiver  3/3/2024 0356 by Caitlin Cota RN  Outcome: Progressing  Flowsheets (Taken 3/2/2024 1940 by Laure Mackay, RN)  Discharge to home or other facility with appropriate resources:   Identify barriers to discharge with patient and caregiver   Arrange for needed discharge resources and transportation as appropriate   Identify discharge learning needs (meds, wound care, etc)     Problem: Chronic Conditions and Co-morbidities  Goal: Patient's chronic conditions and co-morbidity symptoms are monitored and maintained or improved  3/3/2024 1456 by Aminata Mena RN  Outcome: Progressing  Flowsheets (Taken 3/3/2024 1456)  Care Plan - Patient's Chronic Conditions and Co-Morbidity Symptoms are Monitored and Maintained or Improved: Monitor and assess patient's chronic conditions and comorbid symptoms for stability, deterioration, or improvement  3/3/2024 0356 by Caitlin Cota RN  Outcome: Progressing  Flowsheets (Taken 3/2/2024 1940 by Laure Mackay, RN)  Care Plan - Patient's Chronic Conditions and Co-Morbidity Symptoms are Monitored and Maintained or Improved:   Monitor and assess patient's chronic conditions and comorbid symptoms for stability, deterioration, or improvement   Collaborate with multidisciplinary team to address chronic and comorbid conditions and prevent exacerbation or deterioration   Update acute care plan with appropriate goals if chronic or comorbid symptoms are exacerbated and prevent overall improvement and discharge     Problem: Pain  Goal: Verbalizes/displays adequate comfort level or baseline comfort level  3/3/2024 1456 by Aminata Mena RN  Outcome: Progressing  Flowsheets  Taken  3/3/2024 1456 by Aminata Mena RN  Verbalizes/displays adequate comfort level or baseline comfort level:   Encourage patient to monitor pain and request assistance   Assess pain using appropriate pain scale  Taken 3/3/2024 0400 by Laure Mackay RN  Verbalizes/displays adequate comfort level or baseline comfort level:   Encourage patient to monitor pain and request assistance   Assess pain using appropriate pain scale   Administer analgesics based on type and severity of pain and evaluate response  3/3/2024 0356 by Caitlin Cota RN  Outcome: Progressing  Flowsheets  Taken 3/3/2024 0000 by Laure Mackay RN  Verbalizes/displays adequate comfort level or baseline comfort level:   Encourage patient to monitor pain and request assistance   Assess pain using appropriate pain scale   Administer analgesics based on type and severity of pain and evaluate response  Taken 3/2/2024 1940 by Laure Mackay RN  Verbalizes/displays adequate comfort level or baseline comfort level:   Encourage patient to monitor pain and request assistance   Administer analgesics based on type and severity of pain and evaluate response   Assess pain using appropriate pain scale     Problem: Safety - Adult  Goal: Free from fall injury  3/3/2024 1456 by Aminata Mena RN  Outcome: Progressing  Flowsheets (Taken 3/2/2024 0142 by Ilana Morgan, RN)  Free From Fall Injury: Instruct family/caregiver on patient safety  3/3/2024 0356 by Caitlin Cota RN  Outcome: Progressing     Problem: Gastrointestinal - Adult  Goal: Minimal or absence of nausea and vomiting  3/3/2024 1456 by Aminata Mena RN  Outcome: Progressing  3/3/2024 0356 by Caitlin Cota RN  Outcome: Progressing  Flowsheets (Taken 3/2/2024 1940 by Laure Mackay RN)  Minimal or absence of nausea and vomiting:   Administer IV fluids as ordered to ensure adequate hydration   Administer ordered antiemetic medications as needed  Goal: Maintains or returns to baseline bowel

## 2024-03-03 NOTE — DIALYSIS
First attempt at accessing skip arterial line resulted in no blood return despite repositioning and laying flat. Venous line provided brisk blood return. Order for cathflo obtained. Second attempt to access skip was successful without cathflo use but positional. Patient is laying flat with head turned to right bfr at 250

## 2024-03-03 NOTE — DIALYSIS
HEMODIALYSIS POST TREATMENT NOTE    Treatment time ordered: 1 hour    Actual treatment time: 1 hour     UltraFiltration Goal: 0  UltraFiltration Removed: 0      Pre Treatment weight:   Post Treatment weight: 67.6 kg  Estimated Dry Weight:     Access used:     Central Venous Catheter:          Tunneled or Non-tunneled: non tunneled           Site: left neck          Access Flow: positional         Sign and symptoms of infection: wnl       If YES:     Medications or blood products given: none    Summary of response to treatment: Treatment 1 tolerated well. Bicarb dialysate composition at 40. No fluid removal over 1 hour. Brisk blood return noted to arterial and venous lumens post treatment. Jong placed today 3.2.24 access flow positional. Patient remained with bed flat and head turned to right through duration of treatment.     Explain if orders NOT met, was physician notified:n/a      ACES flowsheet faxed to patient unit/ placed in patient chart: in process    Post assessment completed: yes    Report given to: Caitlin RN      * Intra-treatment documented Safety Checks include the followin) Access and face visible at all times.     2) All connections and blood lines are secure with no kinks.     3) NVL alarm engaged.     4) Hemosafe device applied (if applicable).     5) No collapse of Arterial or Venous blood chambers.     6) All blood lines / pump segments in the air detectors.

## 2024-03-04 LAB
ANION GAP SERPL CALCULATED.3IONS-SCNC: 15 MMOL/L (ref 9–17)
ANION GAP SERPL CALCULATED.3IONS-SCNC: 18 MMOL/L (ref 9–17)
ANION GAP SERPL CALCULATED.3IONS-SCNC: 19 MMOL/L (ref 9–17)
B-OH-BUTYR SERPL-MCNC: 6.62 MMOL/L (ref 0.02–0.27)
B-OH-BUTYR SERPL-MCNC: 7.14 MMOL/L (ref 0.02–0.27)
BASOPHILS # BLD: 0.1 K/UL (ref 0–0.2)
BASOPHILS NFR BLD: 1 % (ref 0–2)
BODY TEMPERATURE: 37
BODY TEMPERATURE: 37
BUN SERPL-MCNC: 4 MG/DL (ref 6–20)
CALCIUM SERPL-MCNC: 8.1 MG/DL (ref 8.6–10.4)
CALCIUM SERPL-MCNC: 8.3 MG/DL (ref 8.6–10.4)
CALCIUM SERPL-MCNC: 8.7 MG/DL (ref 8.6–10.4)
CHLORIDE SERPL-SCNC: 101 MMOL/L (ref 98–107)
CHLORIDE SERPL-SCNC: 101 MMOL/L (ref 98–107)
CHLORIDE SERPL-SCNC: 103 MMOL/L (ref 98–107)
CO2 SERPL-SCNC: 17 MMOL/L (ref 20–31)
CO2 SERPL-SCNC: 20 MMOL/L (ref 20–31)
CO2 SERPL-SCNC: 25 MMOL/L (ref 20–31)
COHGB MFR BLD: 1.9 % (ref 0–5)
COHGB MFR BLD: 2.3 % (ref 0–5)
COHGB MFR BLD: 2.4 % (ref 0–5)
COHGB MFR BLD: 3.1 % (ref 0–5)
CREAT SERPL-MCNC: 0.5 MG/DL (ref 0.5–0.9)
CREAT SERPL-MCNC: 0.5 MG/DL (ref 0.5–0.9)
CREAT SERPL-MCNC: 0.6 MG/DL (ref 0.5–0.9)
EOSINOPHIL # BLD: 0.1 K/UL (ref 0–0.4)
EOSINOPHILS RELATIVE PERCENT: 3 % (ref 0–4)
ERYTHROCYTE [DISTWIDTH] IN BLOOD BY AUTOMATED COUNT: 15 % (ref 11.5–14.9)
GFR SERPL CREATININE-BSD FRML MDRD: >60 ML/MIN/1.73M2
GLUCOSE BLD-MCNC: 114 MG/DL (ref 65–105)
GLUCOSE BLD-MCNC: 140 MG/DL (ref 65–105)
GLUCOSE BLD-MCNC: 143 MG/DL (ref 65–105)
GLUCOSE BLD-MCNC: 147 MG/DL (ref 65–105)
GLUCOSE BLD-MCNC: 150 MG/DL (ref 65–105)
GLUCOSE BLD-MCNC: 155 MG/DL (ref 65–105)
GLUCOSE BLD-MCNC: 158 MG/DL (ref 65–105)
GLUCOSE BLD-MCNC: 163 MG/DL (ref 65–105)
GLUCOSE BLD-MCNC: 171 MG/DL (ref 65–105)
GLUCOSE BLD-MCNC: 177 MG/DL (ref 65–105)
GLUCOSE BLD-MCNC: 200 MG/DL (ref 65–105)
GLUCOSE BLD-MCNC: 206 MG/DL (ref 65–105)
GLUCOSE BLD-MCNC: 228 MG/DL (ref 65–105)
GLUCOSE BLD-MCNC: 228 MG/DL (ref 65–105)
GLUCOSE BLD-MCNC: 252 MG/DL (ref 65–105)
GLUCOSE BLD-MCNC: 259 MG/DL (ref 65–105)
GLUCOSE BLD-MCNC: 295 MG/DL (ref 65–105)
GLUCOSE BLD-MCNC: 94 MG/DL (ref 65–105)
GLUCOSE SERPL-MCNC: 170 MG/DL (ref 70–99)
GLUCOSE SERPL-MCNC: 240 MG/DL (ref 70–99)
GLUCOSE SERPL-MCNC: 260 MG/DL (ref 70–99)
HCO3 VENOUS: 17.8 MMOL/L (ref 24–30)
HCO3 VENOUS: 20.6 MMOL/L (ref 24–30)
HCO3 VENOUS: 23.5 MMOL/L (ref 24–30)
HCO3 VENOUS: 25.9 MMOL/L (ref 24–30)
HCT VFR BLD AUTO: 35.9 % (ref 36–46)
HGB BLD-MCNC: 11.7 G/DL (ref 12–16)
LYMPHOCYTES NFR BLD: 1.9 K/UL (ref 1–4.8)
LYMPHOCYTES RELATIVE PERCENT: 33 % (ref 24–44)
MAGNESIUM SERPL-MCNC: 1.9 MG/DL (ref 1.6–2.6)
MAGNESIUM SERPL-MCNC: 2 MG/DL (ref 1.6–2.6)
MCH RBC QN AUTO: 28.1 PG (ref 26–34)
MCHC RBC AUTO-ENTMCNC: 32.6 G/DL (ref 31–37)
MCV RBC AUTO: 86 FL (ref 80–100)
METHEMOGLOBIN: 0.8 % (ref 0–1.9)
METHEMOGLOBIN: 1 % (ref 0–1.9)
MONOCYTES NFR BLD: 0.5 K/UL (ref 0.1–1.3)
MONOCYTES NFR BLD: 8 % (ref 1–7)
NEGATIVE BASE EXCESS, VEN: 0.3 MMOL/L (ref 0–2)
NEGATIVE BASE EXCESS, VEN: 3.7 MMOL/L (ref 0–2)
NEGATIVE BASE EXCESS, VEN: 6.4 MMOL/L (ref 0–2)
NEUTROPHILS NFR BLD: 55 % (ref 36–66)
NEUTS SEG NFR BLD: 3.2 K/UL (ref 1.3–9.1)
O2 SAT, VEN: 75.2 % (ref 60–85)
O2 SAT, VEN: 90.1 % (ref 60–85)
O2 SAT, VEN: 91.7 % (ref 60–85)
O2 SAT, VEN: 95.1 % (ref 60–85)
PCO2, VEN: 26.4 MM HG (ref 39–55)
PCO2, VEN: 30.9 MM HG (ref 39–55)
PCO2, VEN: 32.9 MM HG (ref 39–55)
PCO2, VEN: 43.3 MM HG (ref 39–55)
PH VENOUS: 7.39 (ref 7.32–7.42)
PH VENOUS: 7.43 (ref 7.32–7.42)
PH VENOUS: 7.44 (ref 7.32–7.42)
PH VENOUS: 7.46 (ref 7.32–7.42)
PHOSPHATE SERPL-MCNC: 2.4 MG/DL (ref 2.6–4.5)
PHOSPHATE SERPL-MCNC: 3 MG/DL (ref 2.6–4.5)
PHOSPHATE SERPL-MCNC: 7.6 MG/DL (ref 2.6–4.5)
PLATELET # BLD AUTO: 315 K/UL (ref 150–450)
PMV BLD AUTO: 8 FL (ref 6–12)
PO2, VEN: 115 MM HG (ref 30–50)
PO2, VEN: 40.2 MM HG (ref 30–50)
PO2, VEN: 55.8 MM HG (ref 30–50)
PO2, VEN: 65.1 MM HG (ref 30–50)
POSITIVE BASE EXCESS, VEN: 0.9 MMOL/L (ref 0–2)
POTASSIUM SERPL-SCNC: 4.3 MMOL/L (ref 3.7–5.3)
POTASSIUM SERPL-SCNC: 4.3 MMOL/L (ref 3.7–5.3)
POTASSIUM SERPL-SCNC: 5 MMOL/L (ref 3.7–5.3)
RBC # BLD AUTO: 4.18 M/UL (ref 4–5.2)
SODIUM SERPL-SCNC: 137 MMOL/L (ref 135–144)
SODIUM SERPL-SCNC: 141 MMOL/L (ref 135–144)
SODIUM SERPL-SCNC: 141 MMOL/L (ref 135–144)
TEXT FOR RESPIRATORY: ABNORMAL
WBC OTHER # BLD: 5.9 K/UL (ref 3.5–11)

## 2024-03-04 PROCEDURE — 6370000000 HC RX 637 (ALT 250 FOR IP)

## 2024-03-04 PROCEDURE — 2580000003 HC RX 258: Performed by: NURSE PRACTITIONER

## 2024-03-04 PROCEDURE — 83735 ASSAY OF MAGNESIUM: CPT

## 2024-03-04 PROCEDURE — 82947 ASSAY GLUCOSE BLOOD QUANT: CPT

## 2024-03-04 PROCEDURE — 6360000002 HC RX W HCPCS: Performed by: NURSE PRACTITIONER

## 2024-03-04 PROCEDURE — 2500000003 HC RX 250 WO HCPCS

## 2024-03-04 PROCEDURE — 82010 KETONE BODYS QUAN: CPT

## 2024-03-04 PROCEDURE — 80048 BASIC METABOLIC PNL TOTAL CA: CPT

## 2024-03-04 PROCEDURE — 2580000003 HC RX 258

## 2024-03-04 PROCEDURE — 2580000003 HC RX 258: Performed by: INTERNAL MEDICINE

## 2024-03-04 PROCEDURE — 2500000003 HC RX 250 WO HCPCS: Performed by: INTERNAL MEDICINE

## 2024-03-04 PROCEDURE — 97161 PT EVAL LOW COMPLEX 20 MIN: CPT

## 2024-03-04 PROCEDURE — 85025 COMPLETE CBC W/AUTO DIFF WBC: CPT

## 2024-03-04 PROCEDURE — 82805 BLOOD GASES W/O2 SATURATION: CPT

## 2024-03-04 PROCEDURE — 36415 COLL VENOUS BLD VENIPUNCTURE: CPT

## 2024-03-04 PROCEDURE — 82800 BLOOD PH: CPT

## 2024-03-04 PROCEDURE — 6360000002 HC RX W HCPCS

## 2024-03-04 PROCEDURE — 84100 ASSAY OF PHOSPHORUS: CPT

## 2024-03-04 PROCEDURE — 2060000000 HC ICU INTERMEDIATE R&B

## 2024-03-04 PROCEDURE — 97165 OT EVAL LOW COMPLEX 30 MIN: CPT

## 2024-03-04 PROCEDURE — C9113 INJ PANTOPRAZOLE SODIUM, VIA: HCPCS | Performed by: NURSE PRACTITIONER

## 2024-03-04 PROCEDURE — 99233 SBSQ HOSP IP/OBS HIGH 50: CPT | Performed by: INTERNAL MEDICINE

## 2024-03-04 RX ORDER — INSULIN LISPRO 100 [IU]/ML
0-4 INJECTION, SOLUTION INTRAVENOUS; SUBCUTANEOUS NIGHTLY
Status: DISCONTINUED | OUTPATIENT
Start: 2024-03-04 | End: 2024-03-05 | Stop reason: HOSPADM

## 2024-03-04 RX ORDER — INSULIN GLARGINE 100 [IU]/ML
45 INJECTION, SOLUTION SUBCUTANEOUS DAILY
Status: DISCONTINUED | OUTPATIENT
Start: 2024-03-04 | End: 2024-03-05 | Stop reason: HOSPADM

## 2024-03-04 RX ORDER — INSULIN LISPRO 100 [IU]/ML
0-4 INJECTION, SOLUTION INTRAVENOUS; SUBCUTANEOUS
Status: DISCONTINUED | OUTPATIENT
Start: 2024-03-04 | End: 2024-03-05 | Stop reason: HOSPADM

## 2024-03-04 RX ADMIN — INSULIN LISPRO 5 UNITS: 100 INJECTION, SOLUTION INTRAVENOUS; SUBCUTANEOUS at 21:08

## 2024-03-04 RX ADMIN — POTASSIUM CHLORIDE 10 MEQ: 7.46 INJECTION, SOLUTION INTRAVENOUS at 05:03

## 2024-03-04 RX ADMIN — POTASSIUM CHLORIDE 10 MEQ: 7.46 INJECTION, SOLUTION INTRAVENOUS at 06:08

## 2024-03-04 RX ADMIN — SODIUM CHLORIDE 0.54 UNITS/HR: 9 INJECTION, SOLUTION INTRAVENOUS at 01:36

## 2024-03-04 RX ADMIN — POTASSIUM CHLORIDE 10 MEQ: 7.46 INJECTION, SOLUTION INTRAVENOUS at 02:42

## 2024-03-04 RX ADMIN — POTASSIUM CHLORIDE 10 MEQ: 7.46 INJECTION, SOLUTION INTRAVENOUS at 00:04

## 2024-03-04 RX ADMIN — POTASSIUM CHLORIDE 10 MEQ: 7.46 INJECTION, SOLUTION INTRAVENOUS at 12:41

## 2024-03-04 RX ADMIN — POTASSIUM CHLORIDE, DEXTROSE MONOHYDRATE AND SODIUM CHLORIDE: 150; 5; 450 INJECTION, SOLUTION INTRAVENOUS at 12:42

## 2024-03-04 RX ADMIN — POTASSIUM CHLORIDE 10 MEQ: 7.46 INJECTION, SOLUTION INTRAVENOUS at 01:10

## 2024-03-04 RX ADMIN — SODIUM CHLORIDE, PRESERVATIVE FREE 40 MG: 5 INJECTION INTRAVENOUS at 08:38

## 2024-03-04 RX ADMIN — POTASSIUM CHLORIDE 10 MEQ: 7.46 INJECTION, SOLUTION INTRAVENOUS at 08:37

## 2024-03-04 RX ADMIN — INSULIN GLARGINE 45 UNITS: 100 INJECTION, SOLUTION SUBCUTANEOUS at 12:57

## 2024-03-04 RX ADMIN — ACETAMINOPHEN 650 MG: 325 TABLET ORAL at 04:50

## 2024-03-04 RX ADMIN — SODIUM BICARBONATE: 84 INJECTION, SOLUTION INTRAVENOUS at 10:34

## 2024-03-04 RX ADMIN — POTASSIUM CHLORIDE 10 MEQ: 7.46 INJECTION, SOLUTION INTRAVENOUS at 11:39

## 2024-03-04 RX ADMIN — POTASSIUM CHLORIDE 10 MEQ: 7.46 INJECTION, SOLUTION INTRAVENOUS at 09:40

## 2024-03-04 RX ADMIN — ACETAMINOPHEN 650 MG: 325 TABLET ORAL at 16:28

## 2024-03-04 RX ADMIN — SODIUM PHOSPHATE, MONOBASIC, MONOHYDRATE AND SODIUM PHOSPHATE, DIBASIC, ANHYDROUS 15 MMOL: 142; 276 INJECTION, SOLUTION INTRAVENOUS at 08:44

## 2024-03-04 ASSESSMENT — PAIN SCALES - GENERAL
PAINLEVEL_OUTOF10: 0
PAINLEVEL_OUTOF10: 4
PAINLEVEL_OUTOF10: 0
PAINLEVEL_OUTOF10: 0

## 2024-03-04 ASSESSMENT — PAIN DESCRIPTION - LOCATION: LOCATION: SHOULDER

## 2024-03-04 NOTE — PLAN OF CARE
Problem: Discharge Planning  Goal: Discharge to home or other facility with appropriate resources  3/4/2024 1644 by Aminata Mena RN  Outcome: Progressing  Flowsheets (Taken 3/4/2024 1644)  Discharge to home or other facility with appropriate resources: Identify barriers to discharge with patient and caregiver  3/4/2024 0558 by Caitlin Cota RN  Outcome: Progressing  Flowsheets (Taken 3/3/2024 2000)  Discharge to home or other facility with appropriate resources:   Identify barriers to discharge with patient and caregiver   Arrange for needed discharge resources and transportation as appropriate   Refer to discharge planning if patient needs post-hospital services based on physician order or complex needs related to functional status, cognitive ability or social support system     Problem: Chronic Conditions and Co-morbidities  Goal: Patient's chronic conditions and co-morbidity symptoms are monitored and maintained or improved  3/4/2024 1644 by Aminata Mena RN  Outcome: Progressing  Flowsheets (Taken 3/4/2024 1644)  Care Plan - Patient's Chronic Conditions and Co-Morbidity Symptoms are Monitored and Maintained or Improved: Monitor and assess patient's chronic conditions and comorbid symptoms for stability, deterioration, or improvement  3/4/2024 0558 by Caitlin Cota RN  Outcome: Progressing  Flowsheets (Taken 3/3/2024 2000)  Care Plan - Patient's Chronic Conditions and Co-Morbidity Symptoms are Monitored and Maintained or Improved:   Monitor and assess patient's chronic conditions and comorbid symptoms for stability, deterioration, or improvement   Collaborate with multidisciplinary team to address chronic and comorbid conditions and prevent exacerbation or deterioration   Update acute care plan with appropriate goals if chronic or comorbid symptoms are exacerbated and prevent overall improvement and discharge     Problem: Pain  Goal: Verbalizes/displays adequate comfort level or baseline comfort  mobility to safest level of function  Outcome: Progressing  Goal: Maintain proper alignment of affected body part  Outcome: Progressing  Goal: Return ADL status to a safe level of function  Outcome: Progressing

## 2024-03-04 NOTE — PROGRESS NOTES
Medicine Residents updated on situation, residents to look at patient's most recent labs and bridge patient to subcutaneous insulin

## 2024-03-04 NOTE — PROGRESS NOTES
Occupational Therapy  [facility]   Occupational Therapy Evaluation  Date: 3/4/24  Patient Name: Lynn Shaw       Room:   MRN: [id number]  Account: [id number]   : 1965  (59 y.o.) Gender: female     Discharge Recommendations:  The patient's needs are being met with no further Occupational Therapy recommended at discharge.     OT Equipment Recommendations  Equipment Needed: No    Referring Practitioner: Anna Small MD  Diagnosis: Diabetic ketoacidosis without coma associated with type 2 diabetes mellitus   Additional Pertinent Hx: Per chart: Fatigue, malaise, shortness of breath.  Started yesterday after she had to testify in court.  She is at high stress levels recently.  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  She is an insulin-dependent diabetic.  She is compliant with her medications.  She took all her medications yesterday and this morning.  She is concerned she might be in DKA and feels similar to previous episodes of DKA which have occurred twice before.  She denies any skin wounds or infections.  She checks her self frequently.  No foot wounds.  Little bit of urinary frequency and some dysuria.  No chest pain.  No back pain.  Some generalized fatigue and malaise.  Fast heart rate.  Heart rate above 100, it was checked this morning.         Past Medical History:  has a past medical history of Anxiety, Arthritis, Diabetes (HCC), Fibromyalgia, Hernia, Kidney stones, and PONV (postoperative nausea and vomiting).    Past Surgical History:   has a past surgical history that includes Ovary removal (Right, ); Achilles tendon surgery (Left, ); Tonsillectomy; Knee arthroscopy (Left, ); fracture surgery (Right, 2015); back surgery; Knee arthroscopy (Right, 2017); and pr arthroscopy knee diagnostic w/wo synovial bx spx (Right, 4/3/2017).    Restrictions  Restrictions/Precautions  Restrictions/Precautions: General

## 2024-03-04 NOTE — PROGRESS NOTES
Dr Imam huynh at bedside. Updates given regarding patient. No plans for dialysis today. Jong catheter to be left in another day. Current fluid order to be d/c and to follow DKA protocol for continuous IV fluids. Writer to contact Internal Medicine residents for management of insulin drip and transitioning to subcutaneous insulin because of stable HCO3 levels and a stable anion gap, despite it not being closed.

## 2024-03-04 NOTE — PROGRESS NOTES
03/04/24 1529   Encounter Summary   Encounter Overview/Reason  Spiritual/Emotional Needs   Service Provided For: Patient   Referral/Consult From: Rounding   Support System Family members;Friends/neighbors   Last Encounter  03/04/24   Complexity of Encounter Moderate   Begin Time 1445   End Time  1510   Total Time Calculated 25 min   Spiritual/Emotional needs   Type Spiritual Support   Assessment/Intervention/Outcome   Assessment Hopeful;Coping   Intervention Sustaining Presence/Ministry of presence;Active listening;Life review/Legacy;Discussed relationship with God;Explored/Affirmed feelings, thoughts, concerns;Discussed illness injury and it’s impact;Prayer (assurance of)/[place]   Outcome Engaged in conversation;Expressed feelings, needs, and concerns;Encouraged;Acceptance;Comfort;Expressed Gratitude

## 2024-03-04 NOTE — PROGRESS NOTES
2/29/2024 6:06 pm COMPARISON: 04/11/2006 HISTORY: ORDERING SYSTEM PROVIDED HISTORY: concern for ileus TECHNOLOGIST PROVIDED HISTORY: concern for ileus Reason for Exam: Concern for ileus -  pt in DKA.  States diarrhea 2 days ago. Pt states severe back pain currently. Additional signs and symptoms: Concern for ileus -  pt in DKA.  States diarrhea 2 days ago.  Pt states severe back pain currently. Relevant Medical/Surgical History: Concern for ileus -  pt in DKA.  States diarrhea 2 days ago.  Pt states severe back pain currently. FINDINGS: The visualized lung bases are clear.  There is gaseous distention of the stomach.  The small bowel is otherwise normal in caliber.  There are no abnormal calcifications.  No acute osseous abnormality is identified.     Gaseous distention of the stomach, without radiographic evidence of bowel obstruction or ileus.     XR CHEST PORTABLE    Result Date: 2/29/2024  EXAMINATION: ONE XRAY VIEW OF THE CHEST 2/29/2024 10:06 am COMPARISON: 02/01/2022 HISTORY: ORDERING SYSTEM PROVIDED HISTORY: dyspnea TECHNOLOGIST PROVIDED HISTORY: dyspnea Reason for Exam: Pt states she thinks she's having diabetic ketoacidosis, dyspnea FINDINGS: The lungs are without acute focal process.  There is no effusion or pneumothorax. The cardiomediastinal silhouette is stable. The osseous structures are stable.     No acute process.         Physical Examination:        Physical Exam  Physical Exam  Constitutional:       General: She is not in acute distress.     Appearance: She is ill-appearing.   HENT:      Mouth/Throat:      Mouth: Mucous membranes are dry.   Cardiovascular:      Rate and Rhythm: Normal rate and regular rhythm.      Pulses: Normal pulses.      Heart sounds: Normal heart sounds.   Pulmonary:      Effort: Pulmonary effort is normal. No respiratory distress.      Breath sounds: No stridor. No wheezing, rhonchi or rales.   Chest:      Chest wall: No tenderness.   Abdominal:      General: Abdomen is  of ICU  Tolerating diet      Electronically signed by Donald Gutiérrez MD

## 2024-03-04 NOTE — PROGRESS NOTES
Physical Therapy  Facility/Department: [facility] ICU  Physical Therapy Initial Assessment    Name: Lynn Shaw  : 1965  MRN: [id number]  Date of Service: 3/4/2024    Discharge Recommendations:  No therapy recommended at discharge          Patient Diagnosis(es): The encounter diagnosis was Diabetic ketoacidosis without coma associated with type 1 diabetes mellitus (HCC).  Past Medical History:  has a past medical history of Anxiety, Arthritis, Diabetes (HCC), Fibromyalgia, Hernia, Kidney stones, and PONV (postoperative nausea and vomiting).  Past Surgical History:  has a past surgical history that includes Ovary removal (Right, ); Achilles tendon surgery (Left, ); Tonsillectomy; Knee arthroscopy (Left, ); fracture surgery (Right, 2015); back surgery; Knee arthroscopy (Right, 2017); and pr arthroscopy knee diagnostic w/wo synovial bx spx (Right, 4/3/2017).    Assessment   Assessment: Demonstrates safe mobility, no shortness of breath, pt on room air. Pt  asymptomatic. No need for skilled PT.  Decision Making: Low Complexity        Plan   Physical Therapy Plan  General Plan: Discharge with evaluation only  Safety Devices  Type of Devices: Left in bed, Call light within reach (RN in the room)     Restrictions  Restrictions/Precautions  Implants present? :  (pt denies)     Subjective   Pain: pt reports slight discomfort in L subclavian due to dialysis port  General  Patient assessed for rehabilitation services?: Yes  Additional Pertinent Hx: The patient is a 59 y.o.  Non- / non  female who presents withShortness of Breath, Palpitations, and Fatigue   and she is admitted to the hospital for the management of  DKA     Patient is a 60 yo female with Pmhx of DM type II who presented to the ED for fatigue, malaise, palpitations and SOB.  Patient states that she was having symptoms of malaise, fatigue she had a episodes of polyuria prior night but states she has been having these

## 2024-03-04 NOTE — PROGRESS NOTES
Department of Internal Medicine  Nephrology Jef Piedra DO    Progress Note        SUBJECTIVE:      Patient seen and examined at bedside this morning  Is currently on a full diet  No respiratory distress  Patient's bicarb has improved significantly  Did receive bicarb through dialysis 2 days prior      Physical Exam:    VITALS:  /73   Pulse 84   Temp 97.2 °F (36.2 °C) (Axillary)   Resp 12   Ht 1.6 m (5' 3\")   Wt 67.6 kg (149 lb)   LMP 2017   SpO2 100%   BMI 26.39 kg/m²   TEMPERATURE:  Current - Temp: 97.2 °F (36.2 °C); Max - Temp  Av.7 °F (36.5 °C)  Min: 97.2 °F (36.2 °C)  Max: 98.1 °F (36.7 °C)  RESPIRATIONS RANGE: Resp  Av.6  Min: 10  Max: 27  PULSE RANGE: Pulse  Av.9  Min: 73  Max: 104  BLOOD PRESSURE RANGE:  Systolic (24hrs), Av , Min:118 , Max:149  ; Diastolic (24hrs), Av, Min:47, Max:91   PULSE OXIMETRY RANGE: SpO2  Av.5 %  Min: 94 %  Max: 100 %  24HR INTAKE/OUTPUT:    Intake/Output Summary (Last 24 hours) at 3/4/2024 1623  Last data filed at 3/4/2024 0638  Gross per 24 hour   Intake 2836.02 ml   Output 5750 ml   Net -2913.98 ml       Constitutional: Alert and arousable  Cardiovascular/Edema: No bilateral edema  Respiratory: No wheezing, rales, rhonchi  Gastrointestinal: Soft nontender, nondistended abdomen      CBC:   Recent Labs     24  0432 24  0343 24  0651   WBC 7.7 7.0 5.9   HGB 12.6 11.4* 11.7*    323 315     BMP:    Recent Labs     24  2307 24  0339 24  0651    141 137   K 3.9 4.3 5.0    103 101   CO2 23 20 17*   BUN 4* 4* 4*   CREATININE 0.6 0.5 0.5   GLUCOSE 156* 170* 240*       Lab Results   Component Value Date/Time    NITRU NEGATIVE 2024 08:06 AM    COLORU Yellow 2024 08:06 AM    PHUR 6.0 2024 08:06 AM    WBCUA 6 TO 9 2024 08:06 AM    RBCUA 0 TO 2 2024 08:06 AM    MUCUS 1+ 2022 10:35 AM    TRICHOMONAS NOT REPORTED 2022 10:35 AM    YEAST NOT  REPORTED 02/01/2022 10:35 AM    BACTERIA None 03/03/2024 08:06 AM    SPECGRAV 1.015 03/03/2024 08:06 AM    LEUKOCYTESUR SMALL 03/03/2024 08:06 AM    UROBILINOGEN Normal 03/03/2024 08:06 AM    BILIRUBINUR NEGATIVE 03/03/2024 08:06 AM    GLUCOSEU LARGE 03/03/2024 08:06 AM    KETUA 4+ 03/03/2024 08:06 AM    AMORPHOUS NOT REPORTED 02/01/2022 10:35 AM     Urine Sodium:     Lab Results   Component Value Date/Time    KALINA 61 03/03/2024 08:06 AM     Urine Potassium:    Lab Results   Component Value Date/Time    KUR 16.3 03/03/2024 08:06 AM     Urine Chloride:    Lab Results   Component Value Date/Time    CLUR 23 03/03/2024 08:06 AM     Urine Osmolarity: No results found for: \"OSMOU\"  Urine Protein:   No results found for: \"TPU\"  Urine Creatinine:     Lab Results   Component Value Date/Time    LABCREA 78.5 12/15/2023 12:12 PM     Urine Eosinophils:  No components found for: \"UEOS\"      IMPRESSION/RECOMMENDATIONS:      Will be discontinuing bicarb  Can moved to D5 half-normal saline per the DKA protocol  Since bicarb has improved significantly, patient will not need dialysis today  Anion gap not closed    Jef Piedra DO  Family Medicine Resident PGY 2    Addendum to Resident [place] note:   Pt seen,examined and evaluated. I have reviewed the current history, physical findings, labs and assessment and plan and agree with note as documented by resident physician.    Patient is doing better  DKA resolving AG is 15, hco3 25  Patient required one dialysis for severe metabolic aciodisis.    Plan  No need for dialysis.  Change IVF , metabolic acidosis resolved.  Can remove Jong catheter tomorrow.    Terrence Nunez MD

## 2024-03-04 NOTE — PROGRESS NOTES
ICU Progress Note (Non-Vent)  [facility]    Patient - Lynn Shaw,  Age - 59 y.o.    - 1965      Room Number -    N -  [id number]   [facility] # - [id number]  Date of Admission -  2024  9:17 AM    Events of Past 24 Hours   She appears to be stable hemodynamically  Has no respiratory complaint  Does feel like she is \"swelling up\"    Vitals    height is 1.6 m (5' 3\") and weight is 67.6 kg (149 lb). Her axillary temperature is 97.2 °F (36.2 °C). Her blood pressure is 126/73 and her pulse is 84. Her respiration is 12 and oxygen saturation is 100%.       Temperature Range: Temp: 97.2 °F (36.2 °C) Temp  Av.7 °F (36.5 °C)  Min: 97.2 °F (36.2 °C)  Max: 98.1 °F (36.7 °C)  BP Range:  Systolic (24hrs), Av , Min:118 , Max:149     Diastolic (24hrs), Av, Min:47, Max:91    Pulse Range: Pulse  Av.9  Min: 73  Max: 104  Respiration Range: Resp  Av.6  Min: 10  Max: 27  Current Pulse Ox::  SpO2: 100 %  24HR Pulse Ox Range:  SpO2  Av.5 %  Min: 94 %  Max: 100 %  Oxygen Amount and Delivery:      Wt Readings from Last 3 Encounters:   24 67.6 kg (149 lb)   23 82.1 kg (181 lb)   23 82.1 kg (181 lb)     I/O     Intake/Output Summary (Last 24 hours) at 3/4/2024 1605  Last data filed at 3/4/2024 0638  Gross per 24 hour   Intake 2836.02 ml   Output 5750 ml   Net -2913.98 ml     DRAIN/TUBE OUTPUT       Invasive Lines   ICP PRESSURE RANGE  No data recorded  CVP PRESSURE RANGE  No data recorded      Medications      insulin glargine  45 Units SubCUTAneous Daily    insulin lispro  0-4 Units SubCUTAneous TID WC    insulin lispro  0-4 Units SubCUTAneous Nightly    alteplase  1 mg IntraCATHeter Once    alteplase  1 mg IntraCATHeter Once    pantoprazole (PROTONIX) 40 mg in sodium chloride (PF) 0.9 % 10 mL injection  40 mg IntraVENous Daily    enoxaparin  40 mg SubCUTAneous  discontinue  No acute issues that need our attention, we will sign off    Critical Care Time   0 min    Electronically signed by Barrie Muñoz MD on 3/4/2024 at 4:05 PM

## 2024-03-04 NOTE — PLAN OF CARE
glucose within target range   Monitor blood glucose as ordered   Assess for signs and symptoms of hyperglycemia and hypoglycemia

## 2024-03-05 VITALS
HEART RATE: 72 BPM | DIASTOLIC BLOOD PRESSURE: 78 MMHG | RESPIRATION RATE: 20 BRPM | SYSTOLIC BLOOD PRESSURE: 151 MMHG | TEMPERATURE: 97.6 F | HEIGHT: 63 IN | WEIGHT: 162.5 LBS | OXYGEN SATURATION: 99 % | BODY MASS INDEX: 28.79 KG/M2

## 2024-03-05 LAB
ANION GAP SERPL CALCULATED.3IONS-SCNC: 8 MMOL/L (ref 9–17)
BASOPHILS # BLD: 0.1 K/UL (ref 0–0.2)
BASOPHILS NFR BLD: 2 % (ref 0–2)
BUN SERPL-MCNC: 6 MG/DL (ref 6–20)
CALCIUM SERPL-MCNC: 9 MG/DL (ref 8.6–10.4)
CHLORIDE SERPL-SCNC: 107 MMOL/L (ref 98–107)
CO2 SERPL-SCNC: 27 MMOL/L (ref 20–31)
CREAT SERPL-MCNC: <0.4 MG/DL (ref 0.5–0.9)
EOSINOPHIL # BLD: 0.2 K/UL (ref 0–0.4)
EOSINOPHILS RELATIVE PERCENT: 3 % (ref 0–4)
ERYTHROCYTE [DISTWIDTH] IN BLOOD BY AUTOMATED COUNT: 14.8 % (ref 11.5–14.9)
GFR SERPL CREATININE-BSD FRML MDRD: ABNORMAL ML/MIN/1.73M2
GLUCOSE BLD-MCNC: 131 MG/DL (ref 65–105)
GLUCOSE BLD-MCNC: 235 MG/DL (ref 65–105)
GLUCOSE SERPL-MCNC: 207 MG/DL (ref 70–99)
HCT VFR BLD AUTO: 34.6 % (ref 36–46)
HGB BLD-MCNC: 11.3 G/DL (ref 12–16)
LYMPHOCYTES NFR BLD: 2.2 K/UL (ref 1–4.8)
LYMPHOCYTES RELATIVE PERCENT: 38 % (ref 24–44)
MCH RBC QN AUTO: 28.1 PG (ref 26–34)
MCHC RBC AUTO-ENTMCNC: 32.7 G/DL (ref 31–37)
MCV RBC AUTO: 85.9 FL (ref 80–100)
MONOCYTES NFR BLD: 0.5 K/UL (ref 0.1–1.3)
MONOCYTES NFR BLD: 9 % (ref 1–7)
NEUTROPHILS NFR BLD: 48 % (ref 36–66)
NEUTS SEG NFR BLD: 2.8 K/UL (ref 1.3–9.1)
PLATELET # BLD AUTO: 285 K/UL (ref 150–450)
PMV BLD AUTO: 8.2 FL (ref 6–12)
POTASSIUM SERPL-SCNC: 3.6 MMOL/L (ref 3.7–5.3)
RBC # BLD AUTO: 4.03 M/UL (ref 4–5.2)
SODIUM SERPL-SCNC: 142 MMOL/L (ref 135–144)
WBC OTHER # BLD: 5.9 K/UL (ref 3.5–11)

## 2024-03-05 PROCEDURE — 82947 ASSAY GLUCOSE BLOOD QUANT: CPT

## 2024-03-05 PROCEDURE — 36415 COLL VENOUS BLD VENIPUNCTURE: CPT

## 2024-03-05 PROCEDURE — 80048 BASIC METABOLIC PNL TOTAL CA: CPT

## 2024-03-05 PROCEDURE — 99239 HOSP IP/OBS DSCHRG MGMT >30: CPT | Performed by: INTERNAL MEDICINE

## 2024-03-05 PROCEDURE — 94761 N-INVAS EAR/PLS OXIMETRY MLT: CPT

## 2024-03-05 PROCEDURE — C9113 INJ PANTOPRAZOLE SODIUM, VIA: HCPCS | Performed by: NURSE PRACTITIONER

## 2024-03-05 PROCEDURE — 85025 COMPLETE CBC W/AUTO DIFF WBC: CPT

## 2024-03-05 PROCEDURE — 2580000003 HC RX 258: Performed by: NURSE PRACTITIONER

## 2024-03-05 PROCEDURE — 6360000002 HC RX W HCPCS: Performed by: NURSE PRACTITIONER

## 2024-03-05 PROCEDURE — 6370000000 HC RX 637 (ALT 250 FOR IP)

## 2024-03-05 RX ORDER — INSULIN GLARGINE 100 [IU]/ML
18 INJECTION, SOLUTION SUBCUTANEOUS NIGHTLY
Qty: 10 ML | Refills: 2 | Status: SHIPPED | OUTPATIENT
Start: 2024-03-05

## 2024-03-05 RX ORDER — INSULIN GLARGINE 100 [IU]/ML
48 INJECTION, SOLUTION SUBCUTANEOUS EVERY MORNING
Qty: 10 ML | Refills: 3 | Status: SHIPPED | OUTPATIENT
Start: 2024-03-05

## 2024-03-05 RX ADMIN — INSULIN LISPRO 5 UNITS: 100 INJECTION, SOLUTION INTRAVENOUS; SUBCUTANEOUS at 11:08

## 2024-03-05 RX ADMIN — ACETAMINOPHEN 650 MG: 325 TABLET ORAL at 08:31

## 2024-03-05 RX ADMIN — SODIUM CHLORIDE, PRESERVATIVE FREE 40 MG: 5 INJECTION INTRAVENOUS at 08:22

## 2024-03-05 RX ADMIN — INSULIN LISPRO 1 UNITS: 100 INJECTION, SOLUTION INTRAVENOUS; SUBCUTANEOUS at 11:08

## 2024-03-05 RX ADMIN — INSULIN LISPRO 5 UNITS: 100 INJECTION, SOLUTION INTRAVENOUS; SUBCUTANEOUS at 08:21

## 2024-03-05 RX ADMIN — ACETAMINOPHEN 650 MG: 325 TABLET ORAL at 00:26

## 2024-03-05 RX ADMIN — INSULIN GLARGINE 45 UNITS: 100 INJECTION, SOLUTION SUBCUTANEOUS at 08:21

## 2024-03-05 ASSESSMENT — PAIN DESCRIPTION - DESCRIPTORS: DESCRIPTORS: ACHING;SORE

## 2024-03-05 ASSESSMENT — PAIN DESCRIPTION - ORIENTATION: ORIENTATION: LEFT

## 2024-03-05 ASSESSMENT — PAIN SCALES - GENERAL
PAINLEVEL_OUTOF10: 4
PAINLEVEL_OUTOF10: 4

## 2024-03-05 ASSESSMENT — PAIN DESCRIPTION - LOCATION
LOCATION: SHOULDER
LOCATION: SHOULDER

## 2024-03-05 NOTE — PROGRESS NOTES
Hospital Problems    Diagnosis Date Noted    Diabetic ketoacidosis without coma associated with type 2 diabetes mellitus (HCC) [E11.10] 01/16/2022       Plan:        Patient status Admit as inpatient in the  Medical ICU     Euglycemic DKA (DM T2)  -initial  beta hydroxybutyrate 8.86 pH   -was on sglt2 inhibitor, discontinue on DC  -given 2L NS boluses in the ED --> was started on bicarb in D5 @150ml/hr in ED --> switched to D5 in half normal w/ Kcl 3/1  -HbA1c 7.1  -insulin gtt 0.1 U/kg initiated in the ED  -subq 2 hours before stopping gtt  -POCT glucose q1h  -uds unremarkable  -lactate, lipase wnl   -UA ketones, glucose   -phenergan prn  -BMP q4h was ordered   -npo for now, progress as tolerated   -COVID +  -droplet isolation  -Nephrology consulted, appreciate recs   -underwent stat hemodialysis, s/p jong cath placement    -additional session yesterday, can remove jong today per nephro       DVT prophylaxis: Lovenox 40 mg SC  GI prophylaxis: Protonix 40 mg daily  Diet: started regular 4-carb choice  Code Status: Full Code     Plan will be discussed with the attending      Veena Carlson MD  PGY 1 TY Resident  3/5/2024 7:10 AM    Attending Physician Statement  I have discussed the care of Lynn Shaw and I have examined the patient myself and taken ROS and HPI, including pertinent history and exam findings, with the resident. I have reviewed the key elements of all parts of the encounter with the resident.  I agree with the assessment, plan and orders as documented by the resident.  Patient, clinically doing better  Anion gap closed  Will be transferred out of ICU today  Will start patient on home dose of 48 units of Lantus in the morning and 18 in the night  Will remove Jong catheter, once okay with nephro  DC later today once Jong is removed  No SGLT2, metformin on discharge  Explained to patient she was understanding    Electronically signed by Donald Gutiérrez MD

## 2024-03-05 NOTE — PROGRESS NOTES
Department of Internal Medicine  Nephrology Terrence Nunez MD    Progress Note        SUBJECTIVE:      Patient seen and examined at bedside this morning  Is currently on a full diet  No respiratory distress  Patient's bicarb has improved significantly  Off of insulin drip      Physical Exam:    VITALS:  BP (!) 151/78   Pulse 72   Temp 97.6 °F (36.4 °C) (Axillary)   Resp 20   Ht 1.6 m (5' 3\")   Wt 73.7 kg (162 lb 8 oz)   LMP 2017   SpO2 99%   BMI 28.79 kg/m²   TEMPERATURE:  Current - Temp: 97.6 °F (36.4 °C); Max - Temp  Av.7 °F (36.5 °C)  Min: 97.3 °F (36.3 °C)  Max: 98.1 °F (36.7 °C)  RESPIRATIONS RANGE: Resp  Av.4  Min: 12  Max: 28  PULSE RANGE: Pulse  Av.1  Min: 66  Max: 93  BLOOD PRESSURE RANGE:  Systolic (24hrs), Av , Min:125 , Max:151   ; Diastolic (24hrs), Av, Min:62, Max:91    PULSE OXIMETRY RANGE: SpO2  Av.8 %  Min: 92 %  Max: 100 %  24HR INTAKE/OUTPUT:    Intake/Output Summary (Last 24 hours) at 3/5/2024 1212  Last data filed at 3/5/2024 0826  Gross per 24 hour   Intake 1533.7 ml   Output 2750 ml   Net -1216.3 ml       Constitutional: Alert and arousable  Cardiovascular/Edema: No bilateral edema  Respiratory: No wheezing, rales, rhonchi  Gastrointestinal: Soft nontender, nondistended abdomen      CBC:   Recent Labs     24  0343 24  0651 24  0334   WBC 7.0 5.9 5.9   HGB 11.4* 11.7* 11.3*    315 285     BMP:    Recent Labs     24  0651 24  1103 24  0334    141 142   K 5.0 4.3 3.6*    101 107   CO2 17* 25 27   BUN 4* 4* 6   CREATININE 0.5 0.6 <0.4*   GLUCOSE 240* 260* 207*       Lab Results   Component Value Date/Time    NITRU NEGATIVE 2024 08:06 AM    COLORU Yellow 2024 08:06 AM    PHUR 6.0 2024 08:06 AM    WBCUA 6 TO 9 2024 08:06 AM    RBCUA 0 TO 2 2024 08:06 AM    MUCUS 1+ 2022 10:35 AM    TRICHOMONAS NOT REPORTED 2022 10:35 AM    YEAST NOT REPORTED

## 2024-03-05 NOTE — CARE COORDINATION
ONGOING DISCHARGE PLAN:    Patient is alert and oriented x4.    Spoke with patient regarding discharge plan and patient confirms that plan is still home. Denies needs.      99% on room air    Blood glucose 235     Discharge home today after skip removed.    Will continue to follow for additional discharge needs.    If patient is discharged prior to next notation, then this note serves as note for discharge by case management.    Electronically signed by Filomena Muñoz RN on 3/5/2024 at 12:07 PM

## 2024-03-05 NOTE — DISCHARGE SUMMARY
[facility]   IN-PATIENT SERVICE   [facility]    Discharge Summary     Patient ID: Lynn Shaw  :  1965   MRN: [id number]     ACCOUNT:  [id number]   Patient's PCP: Rayshawn Mckeon MD  Admit Date: 2024   Discharge Date: 3/5/2024     Length of Stay: 5  Code Status:  Prior  Admitting Physician: Anna Small MD  Discharge Physician: Veena Carlson MD     Active Discharge Diagnoses:       Primary Problem  Diabetic ketoacidosis without coma associated with type 2 diabetes mellitus (HCC)      Hospital Problems  Active Hospital Problems    Diagnosis Date Noted    Diabetic ketoacidosis without coma associated with type 2 diabetes mellitus (HCC) [E11.10] 2022       Admission Condition:  stable     Discharged Condition: stable    Hospital Stay:       Hospital Course         The patient is a 59 y.o.  Non- / non  female who presents withShortness of Breath, Palpitations, and Fatigue   and she is admitted to the hospital for the management of  DKA     Patient is a 60 yo female with Pmhx of DM type II who presented to the ED for fatigue, malaise, palpitations and SOB.  Patient states that she was having symptoms of malaise, fatigue she had a episodes of polyuria prior night but states she has been having these intermittent episodes for a few days prior to admission.  States she is undergoing all severe family stress which was exacerbating her symptoms.  States she was in court for prior scheduled appointment and admits not drinking or eating food during the entire session.  Patient states she has been type II diabetic for approximately 13 years but have been on insulin for the last 3 years.  States she is very compliant with her insulin states checked her blood sugar which was 316 prior day and when she checked her blood sugar in the morning it was slightly above 200.  Patient took her prescribed 45 units of Lantus in the morning alongside with 7.5  units prior to breakfast.  Patient did describe having polyuria with mild burning sensation.     In the ED patient found to have a blood sugar of approximately 185 but her VBG was 7.055.  Beta hydroxybutyrate was 8.86 and her bicarb was less than 6.  Was unable to gain anion gap due to severely low bicarb.  Patient was tachypneic and tachycardic in the ED insulin drip, D5 with half-normal saline and potassium IV was started in the ED and continued in the ICU.  Patient will be admitted to the ICU for DKA management.  Patient will be maintained on n.p.o. with repeat labs every 4 POCT glucose every 1 hour.   Patient had refractory acidosis with bicarb and anion gap too low to be calculated due to low bicarb.  Nephrology was consulted and she underwent stat hemodialysis for refractory acidosis.  Had Skip cath placed temporarily which was removed yesterday.  She was bridged to subcu yesterday, DKA resolved.  Anion gap of unclear etiology now resolved, will d discontinue SGLT2 inhibitor on discharge.        Significant Diagnostic Studies:   Labs / Micro:  CBC:   Lab Results   Component Value Date/Time    WBC 5.9 03/05/2024 03:34 AM    RBC 4.03 03/05/2024 03:34 AM    HGB 11.3 03/05/2024 03:34 AM    HCT 34.6 03/05/2024 03:34 AM    MCV 85.9 03/05/2024 03:34 AM    MCH 28.1 03/05/2024 03:34 AM    MCHC 32.7 03/05/2024 03:34 AM    RDW 14.8 03/05/2024 03:34 AM     03/05/2024 03:34 AM       Radiology:    XR CHEST PORTABLE    Result Date: 3/2/2024  EXAMINATION: ONE XRAY VIEW OF THE CHEST 3/2/2024 3:03 pm COMPARISON: 02/29/2024 HISTORY: ORDERING SYSTEM PROVIDED HISTORY: skip placement TECHNOLOGIST PROVIDED HISTORY: skip placement Reason for Exam: Skip cath. placement on the left side of the pt.'s chest. FINDINGS: Left central venous catheter with tip in brachiocephalic vein.  The lungs are without acute focal process.  There is no effusion or pneumothorax. The cardiomediastinal silhouette is stable. The osseous

## 2024-03-05 NOTE — PROGRESS NOTES
Only discharge medication Lantus pt already has at home hold Rx no [id number] Nurse Andree aware 3/5/24 10:16pm jere

## 2024-03-05 NOTE — PLAN OF CARE
Problem: Discharge Planning  Goal: Discharge to home or other facility with appropriate resources  3/5/2024 0541 by Caitlin Cota RN  Outcome: Progressing  3/4/2024 1644 by Aminata Mena RN  Outcome: Progressing  Flowsheets (Taken 3/4/2024 1644)  Discharge to home or other facility with appropriate resources: Identify barriers to discharge with patient and caregiver     Problem: Chronic Conditions and Co-morbidities  Goal: Patient's chronic conditions and co-morbidity symptoms are monitored and maintained or improved  3/5/2024 0541 by Caitlin Cota RN  Outcome: Progressing  3/4/2024 1644 by Aminata Mena RN  Outcome: Progressing  Flowsheets (Taken 3/4/2024 1644)  Care Plan - Patient's Chronic Conditions and Co-Morbidity Symptoms are Monitored and Maintained or Improved: Monitor and assess patient's chronic conditions and comorbid symptoms for stability, deterioration, or improvement     Problem: Pain  Goal: Verbalizes/displays adequate comfort level or baseline comfort level  3/5/2024 0541 by Caitlin Cota RN  Outcome: Progressing  Flowsheets (Taken 3/4/2024 2000 by Laure Mackay RN)  Verbalizes/displays adequate comfort level or baseline comfort level:   Encourage patient to monitor pain and request assistance   Assess pain using appropriate pain scale   Administer analgesics based on type and severity of pain and evaluate response  3/4/2024 1644 by Aminata Mena RN  Outcome: Progressing  Flowsheets (Taken 3/4/2024 1644)  Verbalizes/displays adequate comfort level or baseline comfort level:   Encourage patient to monitor pain and request assistance   Assess pain using appropriate pain scale   Administer analgesics based on type and severity of pain and evaluate response   Implement non-pharmacological measures as appropriate and evaluate response     Problem: Safety - Adult  Goal: Free from fall injury  3/5/2024 0541 by Caitlin Cota RN  Outcome: Progressing  3/4/2024 1644 by Trina

## 2024-03-06 ENCOUNTER — CARE COORDINATION (OUTPATIENT)
Dept: OTHER | Facility: CLINIC | Age: 59
End: 2024-03-06

## 2024-03-06 NOTE — CARE COORDINATION
Care Transitions Note    Initial Call - Call within 2 business days of discharge: Yes     Attempted to reach patient for transitions of care follow up. Unable to reach patient.    Outreach Attempts:   HIPAA compliant voicemail left for patient.   Callvinet message sent.     Patient: Lynn Shaw    Patient : 1965   MRN: [id number]    Reason for Admission: DKA  Discharge Date: 3/5/24  RURS: Readmission Risk              Risk of Unplanned Readmission:  0          Last Discharge Facility       Date Complaint Diagnosis Description Type Department Provider    24 Shortness of Breath; Palpitations; Fatigue Diabetic ketoacidosis without coma associated with type 1 diabetes mellitus (HCC) ED to Hosp-Admission (Discharged) (ADMITTED) [facility] ICU Anna Small MD; Mirza Potter...       Was this an external facility discharge? No    Follow Up Appointment:   Patient does not have a follow up appointment scheduled at time of call.       Plan for follow-up on next business day.

## 2024-03-07 ENCOUNTER — CARE COORDINATION (OUTPATIENT)
Dept: OTHER | Facility: CLINIC | Age: 59
End: 2024-03-07

## 2024-03-07 NOTE — CARE COORDINATION
worsening symptoms  follow-up appointment-Discuss progress, plan of care, and RTW recommendation      Kylie Benoit RN

## 2024-03-11 ENCOUNTER — CARE COORDINATION (OUTPATIENT)
Dept: OTHER | Facility: CLINIC | Age: 59
End: 2024-03-11

## 2024-03-12 NOTE — PROGRESS NOTES
Physician Progress Note      PATIENT:               DESIREE MARES  [facility] #:                  [id number]  :                       1965  ADMIT DATE:       2024 9:17 AM  DISCH DATE:        3/5/2024 1:53 PM  RESPONDING  PROVIDER #:        Anna Small MD          QUERY TEXT:    Patient admitted with DKA.  Noted documentation of Covid on 3/1. Covid   diagnosis is dropped from DS.  If possible, please document in progress notes the status of Covid for this   admission.    The medical record reflects the following:  Risk Factors: DKA, Unknown COVID-19 vaccination status, Overweight-BMI 29  Clinical Indicators: patient presented w Shortness of Breath, Palpitations,   and Fatigue.  H&P  COVID flu negative. Pulmonology progress notes 3/2/2024   - COVID-19, influenza was negative on initial screen but COVID positive on   respiratory pathogen panel. H&P No evidence of any pulmonary COVID infection.    Lab records 2024 - COVID not detected . COVID-19 infection-tested   positive 2024  Treatment: Droplet isolation    Thank you, Rocio CDS  Options provided:  -- Covid confirmed present or evolving on admission  -- Covid was ruled out after study  -- Other - I will add my own diagnosis  -- Disagree - Not applicable / Not valid  -- Disagree - Clinically unable to determine / Unknown  -- Refer to Clinical Documentation Reviewer    PROVIDER RESPONSE TEXT:    After study, Covid confirmed present or evolving on admission.    Query created by: Dotty Leija on 3/12/2024 9:45 AM      Electronically signed by:  Anna Small MD 3/12/2024 1:57 PM

## 2024-03-14 ENCOUNTER — CARE COORDINATION (OUTPATIENT)
Dept: OTHER | Facility: CLINIC | Age: 59
End: 2024-03-14

## 2024-03-14 NOTE — CARE COORDINATION
Care Transitions Note    Follow Up Call      Patient Current Location:  Home: [facility] [address]  [place] [id number]    Care Transition Nurse contacted the patient by telephone. Verified name and  as identifiers.    Additional needs identified to be addressed with provider   None         Method of communication with provider: none.    Care Summary Note: Patient states that she is still not feeling well.  She finds she does get quite short of breath when walking up stairs or wit any activity.  She has noticed some welling in her ankles also.  She called her PCP's office, but was not able to get an appt until Monday.  She will remain off work until she is seen int e office.  She is able to describe Red flag symptoms to report.  She denies any immediate ACM needs and is agreeable to follow up contact.      Addressed changes since last contact:  Review of patient management of conditions/medications: Patient is able to describe Red flag symptoms to report,  has her follow up appts scheduled and medications filled and taking as directed.      Advance Care Planning:   Does patient have an Advance Directive: not on file .    Medication Review:  Full medication reconciliation completed during previous call.    Assessments:  Care Transitions Subsequent and Final Call    Schedule Follow Up Appointment with PCP: Completed  Subsequent and Final Calls  Do you have any ongoing symptoms?: Yes  Onset of Patient-reported symptoms: In the past 7 days  Patient-reported symptoms: Shortness of Breath, Fatigue  Interventions for patient-reported symptoms: Notified PCP/Physician  Have your medications changed?: No  Do you have any questions related to your medications?: No  Do you currently have any active services?: No  Do you have any needs or concerns that I can assist you with?: No  Identified Barriers: Impairment  Care Transitions Interventions  Other Interventions:              Follow Up Appointment:   Reviewed upcoming

## 2024-03-21 ENCOUNTER — CARE COORDINATION (OUTPATIENT)
Dept: OTHER | Facility: CLINIC | Age: 59
End: 2024-03-21

## 2024-03-26 ENCOUNTER — CARE COORDINATION (OUTPATIENT)
Dept: OTHER | Facility: CLINIC | Age: 59
End: 2024-03-26

## 2024-03-26 NOTE — CARE COORDINATION
Care Transitions Note    Follow Up Call      Attempted to reach patient for transitions of care follow up.  Unable to reach patient.      Outreach Attempts:   HIPAA compliant voicemail left for patient.     Care Summary Note: Care Transitions Follow Up Call    ACM attempted to reach patient for Care Transitions follow up call. HIPAA compliant message left requesting a return phone call at patient convenience.          Follow Up Appointment:   No future appointments.    Plan for follow-up call in 2-5 days based on severity of symptoms and risk factors. Plan for next call: follow-up appointment-Discuss progress and plan of care    Kylie Benoit RN

## 2024-03-26 NOTE — CARE COORDINATION
Care Transitions Note    Follow Up Call      Attempted to reach patient for transitions of care follow up.  Unable to reach patient.      Outreach Attempts:   HIPAA compliant voicemail left for patient.   Mychart message sent.     Care Summary Note:   ACM attempted to reach patient for Care Transitions follow up call. HIPAA compliant message left requesting a return phone call at patient convenience. Will send "[employer]t message in the meantime.       Follow Up Appointment:   No future appointments.    Plan for follow-up call in 2-5 days based on severity of symptoms and risk factors. Plan for next call: self management-discuss progress, medication compliance, BG ranges,  referral status, and ongoing ACM needs    Kylie Benoit RN

## 2024-03-29 ENCOUNTER — CARE COORDINATION (OUTPATIENT)
Dept: OTHER | Facility: CLINIC | Age: 59
End: 2024-03-29

## 2024-03-29 ENCOUNTER — TELEPHONE (OUTPATIENT)
Dept: PHARMACY | Facility: CLINIC | Age: 59
End: 2024-03-29

## 2024-03-29 NOTE — CARE COORDINATION
Ambulatory Care Coordination Note     ACMsent MyChart message for Associate Care Management follow up related to Ongoing symptoms and symptom management. See patient message for details and response (as appropriate).     Plan:  Plan for follow-up call in 3-5 days.  Plan for next call: symptom management-follow up on new or worsening symptoms

## 2024-03-29 NOTE — TELEPHONE ENCOUNTER
CLINICAL PHARMACY CONSULT: MEDICATION CONVERSION INITIATIVE    Patient currently identified with a current prescription for Steglatro.     This medication lost the manufacture copay card and is no longer an affordable option.     PLAN:  - Medications:  Suggest discontinue steglatro & change to Brenda Ramirez LM for patient to return my call. Steglatro no longer in med list. May have been discontinued.     Thank you,  Jodi Peck, PharmD, [facility]  [facility]  [facility] Clinical Pharmacist  Department: [phone]    For Pharmacy Admin Tracking Only    Program: [employer]  Time Spent (min): 10

## 2024-04-04 ENCOUNTER — CARE COORDINATION (OUTPATIENT)
Dept: OTHER | Facility: CLINIC | Age: 59
End: 2024-04-04

## 2024-04-04 NOTE — CARE COORDINATION
Care Transitions Note    Follow Up Call      Attempted to reach patient for transitions of care follow up.  Unable to reach patient.      Outreach Attempts:   Mychart message sent.     Care Summary Note:   ACM contacted patient via [employer] message for follow up on symptoms and new issues or concerns.    Follow Up Appointment:   Future Appointments         Provider Specialty Dept Phone    4/10/2024 9:45 AM Romario Magallanes, SONYA Orthopedic Surgery [phone]          Plan for follow-up call in 2-5 days based on severity of symptoms and risk factors. Plan for next call: symptom management-discuss progress, plan of care, and ongoing needs    Kylie Benoit RN

## 2024-04-10 ENCOUNTER — OFFICE VISIT (OUTPATIENT)
Dept: ORTHOPEDIC SURGERY | Age: 59
End: 2024-04-10
Payer: COMMERCIAL

## 2024-04-10 VITALS — WEIGHT: 177 LBS | BODY MASS INDEX: 31.36 KG/M2 | HEIGHT: 63 IN

## 2024-04-10 DIAGNOSIS — M25.511 RIGHT SHOULDER PAIN, UNSPECIFIED CHRONICITY: ICD-10-CM

## 2024-04-10 DIAGNOSIS — M87.019 AVASCULAR NECROSIS OF BONE OF SHOULDER (HCC): Primary | ICD-10-CM

## 2024-04-10 PROCEDURE — 20610 DRAIN/INJ JOINT/BURSA W/O US: CPT

## 2024-04-10 PROCEDURE — 99213 OFFICE O/P EST LOW 20 MIN: CPT

## 2024-04-10 RX ORDER — LIDOCAINE HYDROCHLORIDE 10 MG/ML
5 INJECTION, SOLUTION INFILTRATION; PERINEURAL ONCE
Status: COMPLETED | OUTPATIENT
Start: 2024-04-10 | End: 2024-04-10

## 2024-04-10 RX ORDER — TRIAMCINOLONE ACETONIDE 40 MG/ML
40 INJECTION, SUSPENSION INTRA-ARTICULAR; INTRAMUSCULAR ONCE
Status: COMPLETED | OUTPATIENT
Start: 2024-04-10 | End: 2024-04-10

## 2024-04-10 RX ADMIN — LIDOCAINE HYDROCHLORIDE 5 ML: 10 INJECTION, SOLUTION INFILTRATION; PERINEURAL at 11:05

## 2024-04-10 RX ADMIN — TRIAMCINOLONE ACETONIDE 40 MG: 40 INJECTION, SUSPENSION INTRA-ARTICULAR; INTRAMUSCULAR at 11:05

## 2024-04-10 NOTE — PROGRESS NOTES
HPI: Ms. Shaw is a 59-year-old female who presents today for a follow-up appointment regarding her right shoulder.  Patient last saw Dr. 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  They did discuss at that time treatment options in the form of conservative versus surgical.  At that time she did wish to proceed with a right shoulder glenohumeral injection as she did not wish to proceed surgically at that time.  Patient states that the injection took about a month to kick in but she got about 8 months of relief from the injection at that time.  She states that about 1 to 2 weeks ago the pain in her shoulder did return and it feels the same as it did prior to the first injection.  She endorses the pain over the lateral aspect of her shoulder at this time.  Denies any new onset of symptoms different from previous visits.    Of note patient does states she was hospitalized for DKA and at that time was put onto a continuous glucose monitor and she is on insulin for management.    Physical exam: No warmth, erythema, ecchymosis or swelling noted over the right shoulder.  2+ radial pulse.  Patient has 110 degrees of forward elevation, 100 degrees of abduction, 35 degrees external rotation, internal rotation to L4.  Patient does have restriction in motion in the fields of forward elevation and abduction passively as well.    Imagin view x-rays of the right shoulder completed on 4/10/2024 reviewed independently demonstrating well-preserved glenohumeral joint.  There is representation of a previous proximal humerus fracture that does appear fully healed.  Some advancement in the irregularity of the superior aspect of the humeral head when compared to previous imaging that would indicate progression of the avascular necrosis of the right shoulder.    Impression/Plan: Ms. Shaw is a 59-year-old female who presents today for a

## 2024-04-12 ENCOUNTER — CARE COORDINATION (OUTPATIENT)
Dept: OTHER | Facility: CLINIC | Age: 59
End: 2024-04-12

## 2024-04-12 NOTE — CARE COORDINATION
Care Transitions Note    Final Call      Attempted to reach patient for transitions of care follow up.  Unable to reach patient.      Outreach Attempts:   HIPAA compliant voicemail left for patient.   [employer] message sent.     Patient graduated from the Care Transitions program on 4/12/24.  Patient/family has the ability to self manage at this time..      Handoff:   Condition management for Diabetes.     Assessments:  Care Transitions Subsequent and Final Call    Schedule Follow Up Appointment with PCP: Completed  Subsequent and Final Calls  Care Transitions Interventions    Specialty Service Referral: Completed    Other Interventions:            Upcoming Appointments:        Klyie Benoit RN

## 2024-04-16 ENCOUNTER — CARE COORDINATION (OUTPATIENT)
Dept: OTHER | Facility: CLINIC | Age: 59
End: 2024-04-16

## 2024-04-17 ENCOUNTER — CLINICAL DOCUMENTATION (OUTPATIENT)
Dept: PHARMACY | Facility: CLINIC | Age: 59
End: 2024-04-17

## 2024-04-17 ENCOUNTER — PATIENT MESSAGE (OUTPATIENT)
Dept: PHARMACY | Facility: CLINIC | Age: 59
End: 2024-04-17

## 2024-04-17 NOTE — PROGRESS NOTES
1st Quarterly Reminder sent to patient for the DM Program - See Mychart message or Letter for more information.      [facility]  Clinical Pharmacy   Phone: [phone], Option #3       For Pharmacy Admin Tracking Only    Program: [email or web address]  CPA in place:  No  Gap Closed?: Yes   Time Spent (min): 5

## 2024-04-18 ENCOUNTER — HOSPITAL ENCOUNTER (OUTPATIENT)
Age: 59
Discharge: HOME | End: 2024-04-18
Payer: COMMERCIAL

## 2024-04-18 LAB
C PEPTIDE SERPL-MCNC: <0 NG/ML (ref 1.1–4.4)
CREAT SERPL-MCNC: 1 MG/DL (ref 0.5–0.9)
GFR SERPL CREATININE-BSD FRML MDRD: 65 ML/MIN/1.73M2

## 2024-04-18 PROCEDURE — 82565 ASSAY OF CREATININE: CPT

## 2024-04-18 PROCEDURE — 36415 COLL VENOUS BLD VENIPUNCTURE: CPT

## 2024-04-18 PROCEDURE — 86341 ISLET CELL ANTIBODY: CPT

## 2024-04-18 PROCEDURE — 84681 ASSAY OF C-PEPTIDE: CPT

## 2024-04-20 LAB
GLUTAMIC ACID DECARB AB: >250 IU/ML (ref 0–5)
PANC ISLET CELL AB TITR SER: NORMAL {TITER}

## 2024-04-25 NOTE — TELEPHONE ENCOUNTER
[employer] message not read by patient.  Letter mailed to patient with the information from the [employer] message.    [place] [facility]  Clinical Pharmacy   Department, toll free: [phone] Option #3      For Pharmacy Admin Tracking Only    Program: [employer]  CPA in place:  No  Gap Closed?: Yes   Time Spent (min): 5

## 2024-04-29 ENCOUNTER — TELEPHONE (OUTPATIENT)
Dept: PHARMACY | Facility: CLINIC | Age: 59
End: 2024-04-29

## 2024-04-29 NOTE — TELEPHONE ENCOUNTER
Patient called back and scheduled appointment for 5/10/24 at 9:00am.       [facility]  Clinical Pharmacy   Phone: [phone], Option #3       For Pharmacy Admin Tracking Only    Program: [employer]  CPA in place:  No  Recommendation Provided To: Patient/Caregiver: 1 via Telephone  Intervention Detail: Scheduled Appointment  Intervention Accepted By: Patient/Caregiver: 1  Gap Closed?: Yes   Time Spent (min): 5

## 2024-04-29 NOTE — TELEPHONE ENCOUNTER
**Patient is [facility]**     2024 Annual Pharmacist Visit    Called patient to schedule 2024 yearly pharmacist appointment to discuss medications for Diabetes Management Program.    No answer. Left VM.     Please call back at [phone], option #3         [facility]  Clinical Pharmacy   Phone: [phone], option #3

## 2024-05-01 ENCOUNTER — HOSPITAL ENCOUNTER (EMERGENCY)
Age: 59
Discharge: HOME | End: 2024-05-02
Attending: STUDENT IN AN ORGANIZED HEALTH CARE EDUCATION/TRAINING PROGRAM
Payer: COMMERCIAL

## 2024-05-01 ENCOUNTER — APPOINTMENT (OUTPATIENT)
Dept: GENERAL RADIOLOGY | Age: 59
End: 2024-05-01
Payer: COMMERCIAL

## 2024-05-01 DIAGNOSIS — S82.454A CLOSED NONDISPLACED COMMINUTED FRACTURE OF SHAFT OF RIGHT FIBULA, INITIAL ENCOUNTER: Primary | ICD-10-CM

## 2024-05-01 PROCEDURE — 73590 X-RAY EXAM OF LOWER LEG: CPT

## 2024-05-01 PROCEDURE — 6370000000 HC RX 637 (ALT 250 FOR IP)

## 2024-05-01 PROCEDURE — 6360000002 HC RX W HCPCS

## 2024-05-01 PROCEDURE — 99284 EMERGENCY DEPT VISIT MOD MDM: CPT

## 2024-05-01 PROCEDURE — 73610 X-RAY EXAM OF ANKLE: CPT

## 2024-05-01 PROCEDURE — 96372 THER/PROPH/DIAG INJ SC/IM: CPT

## 2024-05-01 RX ORDER — OXYCODONE HYDROCHLORIDE AND ACETAMINOPHEN 5; 325 MG/1; MG/1
1 TABLET ORAL ONCE
Status: COMPLETED | OUTPATIENT
Start: 2024-05-01 | End: 2024-05-01

## 2024-05-01 RX ORDER — KETOROLAC TROMETHAMINE 15 MG/ML
15 INJECTION, SOLUTION INTRAMUSCULAR; INTRAVENOUS ONCE
Status: COMPLETED | OUTPATIENT
Start: 2024-05-01 | End: 2024-05-01

## 2024-05-01 RX ORDER — PREDNISONE 10 MG/1
10 TABLET ORAL DAILY
COMMUNITY

## 2024-05-01 RX ADMIN — OXYCODONE HYDROCHLORIDE AND ACETAMINOPHEN 1 TABLET: 5; 325 TABLET ORAL at 23:15

## 2024-05-01 RX ADMIN — KETOROLAC TROMETHAMINE 15 MG: 15 INJECTION, SOLUTION INTRAMUSCULAR; INTRAVENOUS at 23:15

## 2024-05-01 ASSESSMENT — PAIN DESCRIPTION - PAIN TYPE: TYPE: ACUTE PAIN

## 2024-05-01 ASSESSMENT — PAIN DESCRIPTION - DESCRIPTORS: DESCRIPTORS: ACHING

## 2024-05-01 ASSESSMENT — PAIN SCALES - GENERAL: PAINLEVEL_OUTOF10: 7

## 2024-05-01 ASSESSMENT — PAIN DESCRIPTION - LOCATION: LOCATION: LEG

## 2024-05-01 ASSESSMENT — PAIN DESCRIPTION - ORIENTATION: ORIENTATION: RIGHT

## 2024-05-01 ASSESSMENT — PAIN - FUNCTIONAL ASSESSMENT: PAIN_FUNCTIONAL_ASSESSMENT: 0-10

## 2024-05-01 NOTE — CARE COORDINATION
Ambulatory Care Coordination Note  4/16/24    Patient Current Location:  [place]     Patient contacted the  ACM  by [employer] message.   Challenges to be reviewed by the provider   Additional needs identified to be addressed with provider: No         Method of communication with provider: none.    ACM: Kylie Benoit RN    Ambulatory Care Coordination Note     Late Entry: Patient sent [employer] message for Associate Care Management follow up related to Progress, plan of care, and DM self management. See patient message for details and response (as appropriate).     Plan:  Plan for follow-up call in 10-14 days.  Plan for next call: self management-Discuss progress, BG ranges, and Plan of care         Care Coordination Interventions    Referral from Primary Care Provider: No  Suggested Interventions and Community Resources  Pharmacist: Completed (Comment: Enrolled in [facility] DM management)         Future Appointments   Date Time Provider Department Center   5/10/2024  9:00 AM SCHEDULE, [facility]ICAL [facility] Rx None

## 2024-05-02 VITALS
SYSTOLIC BLOOD PRESSURE: 160 MMHG | TEMPERATURE: 98.1 F | RESPIRATION RATE: 18 BRPM | BODY MASS INDEX: 30.86 KG/M2 | OXYGEN SATURATION: 97 % | WEIGHT: 174.16 LBS | HEIGHT: 63 IN | DIASTOLIC BLOOD PRESSURE: 90 MMHG | HEART RATE: 84 BPM

## 2024-05-02 RX ORDER — OXYCODONE HYDROCHLORIDE AND ACETAMINOPHEN 5; 325 MG/1; MG/1
1 TABLET ORAL EVERY 6 HOURS PRN
Qty: 12 TABLET | Refills: 0 | Status: SHIPPED | OUTPATIENT
Start: 2024-05-02 | End: 2024-05-05

## 2024-05-02 NOTE — ED PROVIDER NOTES
[facility] EMERGENCY DEPARTMENT  Emergency Department Encounter  Mid Level Provider     Pt Name: Lynn Shaw  MRN: [id number]  Birthdate 1965  Date of evaluation: 5/1/24  PCP:  Rayshawn Mckeon MD    CHIEF COMPLAINT       Chief Complaint   Patient presents with    Ankle Pain    Knee Injury     Right knee, calf, and ankle pains.  Accidental injury today while mowing the grass.  Skin intact.        HISTORY OF PRESENT ILLNESS  (Location/Symptom, Timing/Onset,Context/Setting, Quality, Duration, Modifying Factors, Severity.)      Lynn Shaw is a 59 y.o. female who presents with complaints of right ankle and shin pain status post slip and fall while mowing grass.  She reports that her knee buckled her foot stayed planted however her leg moved forward making a popping sensation in the ankle and lower leg.  She reports she has had swelling and pain since.  She has had decreased range of motion secondary to pain.  No numbness no tingling.  No loss of sensation.  Denies any recent or previous injuries or surgeries to this leg.    PAST MEDICAL /SURGICAL / SOCIAL / FAMILY HISTORY      has a past medical history of Anxiety, Arthritis, Diabetes (HCC), Fibromyalgia, Hernia, Kidney stones, and PONV (postoperative nausea and vomiting).     has a past surgical history that includes Ovary removal (Right, 2003); Achilles tendon surgery (Left, 2001); Tonsillectomy; Knee arthroscopy (Left, 1993); fracture surgery (Right, 08/04/2015); back surgery; Knee arthroscopy (Right, 04/03/2017); and pr arthroscopy knee diagnostic w/wo synovial bx spx (Right, 4/3/2017).    Social History     Socioeconomic History    Marital status:      Spouse name: Not on file    Number of children: Not on file    Years of education: Not on file    Highest education level: Not on file   Occupational History    Not on file   Tobacco Use    Smoking status: Never    Smokeless tobacco: Never   Vaping Use    Vaping Use:

## 2024-05-02 NOTE — ED PROVIDER NOTES
[facility] EMERGENCY DEPARTMENT  EMERGENCY DEPARTMENT ENCOUNTER  INDEPENDENT ATTESTATION         1. Closed nondisplaced comminuted fracture of shaft of right fibula, initial encounter          Pt Name: Lynn Shaw  MRN: [id number]  Birthdate 1965  Date of evaluation: 5/1/24    Lynn Shaw is a 59 y.o. female who presents with Ankle Pain and Knee Injury (Right knee, calf, and ankle pains.  Accidental injury today while mowing the grass.  Skin intact. )  .    Based on the medical record, the care appears appropriate. I was personally available for consultation in the Emergency Department.      FINAL IMPRESSION      1. Closed nondisplaced comminuted fracture of shaft of right fibula, initial encounter          DISPOSITION / PLAN     DISPOSITION Decision To Discharge 05/02/2024 12:22:33 AM      PATIENT REFERRED TO:  Milka Lovelace MD  [address] [address] [phone]    Call in 1 day  to arrange for close follow up      DISCHARGE MEDICATIONS:  New Prescriptions    OXYCODONE-ACETAMINOPHEN (PERCOCET) 5-325 MG PER TABLET    Take 1 tablet by mouth every 6 hours as needed for Pain for up to 3 days. Intended supply: 3 days. Take lowest dose possible to manage pain Max Daily Amount: 4 tablets       Dr. Ann-Marie Chappell DO   Attending Emergency Physician       Ann-Marie Chappell DO  05/02/24 0031       Ann-Marie Chappell DO  05/02/24 0052

## 2024-05-03 ENCOUNTER — CARE COORDINATION (OUTPATIENT)
Dept: OTHER | Facility: CLINIC | Age: 59
End: 2024-05-03

## 2024-05-03 ENCOUNTER — TELEPHONE (OUTPATIENT)
Dept: ORTHOPEDIC SURGERY | Age: 59
End: 2024-05-03

## 2024-05-03 ENCOUNTER — OFFICE VISIT (OUTPATIENT)
Dept: ORTHOPEDIC SURGERY | Age: 59
End: 2024-05-03
Payer: COMMERCIAL

## 2024-05-03 VITALS — WEIGHT: 174 LBS | BODY MASS INDEX: 30.83 KG/M2 | HEIGHT: 63 IN | RESPIRATION RATE: 14 BRPM

## 2024-05-03 DIAGNOSIS — S82.434A CLOSED NONDISPLACED OBLIQUE FRACTURE OF SHAFT OF RIGHT FIBULA, INITIAL ENCOUNTER: Primary | ICD-10-CM

## 2024-05-03 PROCEDURE — 99214 OFFICE O/P EST MOD 30 MIN: CPT | Performed by: PHYSICIAN ASSISTANT

## 2024-05-03 NOTE — PROGRESS NOTES
[facility] Orthoped[facility] & Sports Medicine                Antonio Linares PA-C            [address], [address]               [place], [place] [address]           Dept Phone: [phone]           Dept Fax:  [phone] [address] [facility]                       [address]           [place], [place] [address]          Dept Phone: [phone]           Dept Fax:  [phone]      Chief Compliant:  Chief Complaint   Patient presents with    Follow-up     Right tibia fracture        History of Present Illness:  This is a 59 y.o. female who presents to the clinic today for evaluation of acute right lower leg pain.  Patient reports she was mowing the lawn on 5/1/2024 she was going down a steep incline describes a slip and twist type injury of the ankle with a sudden onset of pain to the mid lateral lower leg.  Patient was evaluated in the ED shortly thereafter where she had x-rays of the lower leg and ankle which revealed acute oblique fracture through the proximal fibular shaft that remained nondisplaced.  Patient was placed in a long walking boot given crutches and recommended follow-up with us.    Patient has been utilizing crutches but reports this is severely aggravating her right shoulder pain.  Patient reports she is awaiting a right shoulder replacement and has had significant increased pain with these crutches so is hoping that she can discontinue these today.    Patient localized pain most severely to the lateral mid shin area/proximal fibular area.  She denies any significant knee or ankle pain.    Past History:    Current Outpatient Medications:     oxyCODONE-acetaminophen (PERCOCET) 5-325 MG per tablet, Take 1 tablet by mouth every 6 hours as needed for Pain for up to 3 days. Intended supply: 3 days. Take lowest dose possible to manage pain Max Daily Amount: 4 tablets, Disp: 12 tablet, Rfl: 0    predniSONE (DELTASONE) 10 MG tablet, Take 1 tablet by mouth daily, Disp: , Rfl:

## 2024-05-03 NOTE — CARE COORDINATION
ED Follow Up Note    Initial Call - Call within 2 business days of discharge: Yes     Attempted to reach patient for ED follow up assessment. Unable to reach patient.    Outreach Attempts:   HIPAA compliant voicemail left for patient.   [employer] message sent.     Patient: Lynn Shaw    Patient : 1965   MRN: [id number]    Reason for Admission: fibula fracture  Discharge Date: 24  RURS: Readmission Risk              Risk of Unplanned Readmission:  0          Last Discharge Facility       Date Complaint Diagnosis Description Type Department Provider    24 Ankle Pain; Knee Injury Closed nondisplaced comminuted fracture of shaft of right fibula, initial encounter ED (DISCHARGE) [facility] ED Ann-Marie Chappell, DO       Was this an external facility discharge? No    Follow Up Appointment:   Patient has hospital follow up appointment scheduled within 7 days of discharge.    Antonio Linares PA  Physician Assistant [phone] [place] [address]      Appointment: 5/3/2024 10:00 AM  Instructions: ED follow up       Future Appointments         Provider Specialty Dept Phone    5/10/2024 9:00 AM SCHEDULE, [facility] CLINICAL PHARMACY Pharmacy [phone]    2024 10:15 AM Antonio Linares PA Orthopedic Surgery [phone]            Plan for follow-up on next business day.      Kylie Benoit RN

## 2024-05-07 ENCOUNTER — CARE COORDINATION (OUTPATIENT)
Dept: OTHER | Facility: CLINIC | Age: 59
End: 2024-05-07

## 2024-05-08 ENCOUNTER — TELEPHONE (OUTPATIENT)
Dept: EMERGENCY DEPT | Age: 59
End: 2024-05-08

## 2024-05-08 NOTE — CARE COORDINATION
ED Follow Up Note    Initial Call - Call within 2 business days of discharge: Yes     Attempted to reach patient for ED follow up assessment. Unable to reach patient.    Outreach Attempts:   HIPAA compliant voicemail left for patient.   [employer] message sent.     Patient: Lynn Shaw    Patient : 1965   MRN: [id number]    Reason for Admission: R Fibula fracture  Discharge Date: 24  RURS: Readmission Risk              Risk of Unplanned Readmission:  0          Last Discharge Facility       Date Complaint Diagnosis Description Type Department Provider    24 Ankle Pain; Knee Injury Closed nondisplaced comminuted fracture of shaft of right fibula, initial encounter ED (DISCHARGE) [facility] ED Ann-Marie Chappell, DO     Was this an external facility discharge? No    Follow Up Appointment:   Patient has hospital follow up appointment scheduled within 7 days of discharge.      Antonio Linares PA  Physician Assistant [phone] [place] [address]      Appointment: 5/3/2024 10:00 AM  Instructions: ED follow up/ Ortho       Future Appointments         Provider Specialty Dept Phone    5/10/2024 9:00 AM SCHEDULE, [facility] CLINICAL PHARMACY Pharmacy [phone]    2024 10:15 AM Antonio Linares PA Orthopedic Surgery [phone]            Plan for follow-up call in 2-5 days     Kylie Benoit RN

## 2024-05-10 ENCOUNTER — TELEPHONE (OUTPATIENT)
Dept: PHARMACY | Facility: CLINIC | Age: 59
End: 2024-05-10

## 2024-05-10 NOTE — TELEPHONE ENCOUNTER
hypoglycemia? no  - Current symptoms/problems include none  - Known diabetic complications: none  - Eye exam current (within one year): yes  - Foot exam current (within one year): yes  - Current regimen : insulin only now since acting like a type 1   - Medication changes since last A1c: all oral DM meds were stopped and insulin was decreased once it showed she is more like a type 1 now  - Medications/Classes already tried/failed: steglato and metformin stopped after recent hospital admission for DKA.   - Therapy Optimization:  States has an appt with endo 5/17/24 to review dexcom and make changes. Endo lowered her insulin and stopped oral meds to see how she would do. Her sugars have been higher and likely endo will make insulin adjustments.   - Daily aspirin? no  - Medication compliance: compliant all of the time  - Diet compliance: compliant most of the time  - Current exercise: no regular exercise, has broken leg (broke her leg cutting the grass, she slipped on hill bc grass was wet)    Other Considerations:  - Blood Pressure Goal:   Patient prescribed a ACEi/ARB:yes - lisinopril 5 mg  BP less than 130/80 mmHg due to history of DM: slightly above  Recommendation: Patient has been in pain from her broken leg. She states her doctors are just monitoring her BP for now. Usually she is at goal    PLAN:  - endo is in the process of monitoring her A1c and making insulin adjustments after stopping all DM oral meds since she is now acting like a type 1. Has an appt soon and adjustments will be made  - DM program gaps identified:   Requirements recommended to be completed by 7/1: Requirements met   Requirements due by 12/31: Provider visit for DM (2nd), ACC/diabetes educator visit (if A1c over 8%), A1c (2nd), Lipid panel, Urine microalbumin, and Influenza vaccination for [id number]   - Education to patient: Discussed general issues about diabetes pathophysiology and management., Addressed diet and exercise, Reminded to get

## 2024-05-14 ENCOUNTER — OFFICE VISIT (OUTPATIENT)
Dept: ORTHOPEDIC SURGERY | Age: 59
End: 2024-05-14
Payer: COMMERCIAL

## 2024-05-14 VITALS — WEIGHT: 174 LBS | RESPIRATION RATE: 14 BRPM | HEIGHT: 63 IN | BODY MASS INDEX: 30.83 KG/M2

## 2024-05-14 DIAGNOSIS — S82.434D CLOSED NONDISPLACED OBLIQUE FRACTURE OF SHAFT OF RIGHT FIBULA WITH ROUTINE HEALING, SUBSEQUENT ENCOUNTER: Primary | ICD-10-CM

## 2024-05-14 PROCEDURE — 99213 OFFICE O/P EST LOW 20 MIN: CPT | Performed by: PHYSICIAN ASSISTANT

## 2024-05-15 ENCOUNTER — CARE COORDINATION (OUTPATIENT)
Dept: OTHER | Facility: CLINIC | Age: 59
End: 2024-05-15

## 2024-05-15 ENCOUNTER — TELEPHONE (OUTPATIENT)
Dept: ORTHOPEDIC SURGERY | Age: 59
End: 2024-05-15

## 2024-05-15 NOTE — CARE COORDINATION
ED Follow Up Note    Follow Up Call      Attempted to reach patient for ED follow up assessment. Unable to reach patient.    Outreach Attempts:   Multiple attempts to contact patient at phone numbers on file.   HIPAA compliant voicemail left for patient.   [employer] message sent.     Patient: Lynn Shaw    Patient : 1965   MRN: [id number]    Reason for Admission: Fibula fracture  Discharge Date: 24  RURS: Readmission Risk              Risk of Unplanned Readmission:  0          Last Discharge Facility       Date Complaint Diagnosis Description Type Department Provider    24 Ankle Pain; Knee Injury Closed nondisplaced comminuted fracture of shaft of right fibula, initial encounter ED (DISCHARGE) [facility] ED Ann-Marie Chappell, DO     Was this an external facility discharge? No    Summary:ACM reviewed chart and find patient has completed follow up appts and is engaged with providers. ACM will sign off as patient has follow up appointments with providers scheduled/ completed routine/ diagnostic testing completed/ scheduled appropriate utilization of services.  Patient has ACM contact information for any future needs.     Follow Up Appointment:   Patient has hospital follow up appointment scheduled within 7 days of discharge.    Future Appointments         Provider Specialty Dept Phone    6/3/2024 9:45 AM Antonio Linares PA Orthopedic Surgery [phone]            No further follow-up call indicated     Kylie Benoit RN

## 2024-05-15 NOTE — PROGRESS NOTES
[facility] Orthopedics & Sports Medicine                   Antonio Linares PA-C            [address], [address]               [place], [place] [address]           Dept Phone: [phone]           Dept Fax:  [phone] [address] [facility]                       [address]           [place], [place] [address]          Dept Phone: [phone]           Dept Fax:  [phone]      Chief Compliant:  Chief Complaint   Patient presents with    Follow-up     Right tibia fracture        History of Present Illness:  Lynn returns today.  This is a 59 y.o. female who presents to the clinic today for follow up of right proximal fibular shaft fracture. Date of injury occurred on 5/1/2024 while mowing the lawn.  Patient was instructed to continue in cam boot at appointment with me on 5/3/2024 she has been weightbearing in the walking boot over the last week.  She continues to note some moderate pain over the fracture site but denies any significant pain at the ankle or the knee.  She denies any numbness or tingling no significant weakness at this time.  Patient denies any new injury or trauma..       Review of Systems   Constitutional: Negative for fever, chills, sweats, recent illness, or recent injury.   Neurological: Negative for headaches, numbness, or weakness.   Integumentary: Negative for rash, itching, ecchymosis, abrasions, or laceration.   Musculoskeletal: Positive for Follow-up (Right tibia fracture)       Physical Exam:  Constitutional: Patient is oriented to person, place, and time. Patient appears well-developed and well nourished.   Musculoskeletal:    Right lower extremity:  Patient with some mild focal tenderness to the proximal third of the fibular shaft.  No direct tenderness over the fibular head, lateral or medial joint lines.  No tenderness about the bony prominences of the knee or ankle.  There is some mild bruising centered over the lower leg anterior laterally but this seems to be

## 2024-05-16 DIAGNOSIS — S82.434D CLOSED NONDISPLACED OBLIQUE FRACTURE OF SHAFT OF RIGHT FIBULA WITH ROUTINE HEALING, SUBSEQUENT ENCOUNTER: Primary | ICD-10-CM

## 2024-05-16 RX ORDER — CYCLOBENZAPRINE HCL 10 MG
10 TABLET ORAL NIGHTLY PRN
Qty: 10 TABLET | Refills: 0 | Status: SHIPPED | OUTPATIENT
Start: 2024-05-16 | End: 2024-05-26

## 2024-05-16 NOTE — TELEPHONE ENCOUNTER
The patient was seen on 5/14/24. She stated that Flexeril 10 was going to be called in for her due to her muscle spasms. Jeffery is currently out of town, so routing to you to review.

## 2024-05-17 ENCOUNTER — TELEPHONE (OUTPATIENT)
Dept: ORTHOPEDIC SURGERY | Age: 59
End: 2024-05-17

## 2024-05-17 NOTE — TELEPHONE ENCOUNTER
Called patient to make sure she received her medication and to also update her on her disability paperwork. It is filled out and scanned into her chart and is Only waiting on provider signature. That will be completed on Monday. Encouraged patient to call with any questions that I did not answer in the message. Thank you

## 2024-05-21 ENCOUNTER — TELEPHONE (OUTPATIENT)
Dept: ORTHOPEDIC SURGERY | Age: 59
End: 2024-05-21

## 2024-05-21 NOTE — TELEPHONE ENCOUNTER
DENNIS Gil, Is it possible to get this form that is scanned into Lynn's chart signed today so I can get it back to her? It is time sensitive

## 2024-05-22 NOTE — TELEPHONE ENCOUNTER
Viki Downey! Jeffery signed the [employer] paperwork and I re scanned it to the media. It has not yet been faxed to [employer], and patient has not been advised. Just so you know!

## 2024-06-03 ENCOUNTER — OFFICE VISIT (OUTPATIENT)
Dept: ORTHOPEDIC SURGERY | Age: 59
End: 2024-06-03
Payer: COMMERCIAL

## 2024-06-03 VITALS — BODY MASS INDEX: 30.83 KG/M2 | HEIGHT: 63 IN | WEIGHT: 174 LBS

## 2024-06-03 DIAGNOSIS — S82.434D CLOSED NONDISPLACED OBLIQUE FRACTURE OF SHAFT OF RIGHT FIBULA WITH ROUTINE HEALING, SUBSEQUENT ENCOUNTER: Primary | ICD-10-CM

## 2024-06-03 PROCEDURE — 99213 OFFICE O/P EST LOW 20 MIN: CPT | Performed by: PHYSICIAN ASSISTANT

## 2024-06-03 RX ORDER — CYCLOBENZAPRINE HCL 10 MG
10 TABLET ORAL 3 TIMES DAILY PRN
Qty: 21 TABLET | Refills: 0 | Status: SHIPPED | OUTPATIENT
Start: 2024-06-03 | End: 2024-06-13

## 2024-06-03 NOTE — PROGRESS NOTES
[facility] Orthopedics [facility]                   Antonio Linares PA-C            [address], [address]               [place], [place] [address]           Dept Phone: [phone]           Dept Fax:  [phone] [address] [facility]                       [address]           [place], [place] [address]          Dept Phone: [phone]           Dept Fax:  [phone]      Chief Compliant:  Chief Complaint   Patient presents with    Knee Pain     Right         History of Present Illness:  Lynn returns today.  This is a 59 y.o. female who presents to the clinic today for follow up of right proximal fibular shaft fracture.  Date of injury occurred on 5/1/2024.  Patient has been in tall walking boot since the initial appointment with us on 5/3/2024.  Unfortunate she continues to be moderately painful especially at night.  She does feel like she is improving but far greater than she anticipated about a month out from the injury itself.    She continues to localize pain most severely to the proximal fibular area greatest over the fracture site.  She denies any significant pain in the knee but does note intermittent pain in the ankle especially with any prolonged period of walking or standing.      Review of Systems   Constitutional: Negative for fever, chills, sweats, recent illness, or recent injury.   Neurological: Negative for headaches, numbness, or weakness.   Integumentary: Negative for rash, itching, ecchymosis, abrasions, or laceration.   Musculoskeletal: Positive for Knee Pain (Right )       Physical Exam:  Constitutional: Patient is oriented to person, place, and time. Patient appears well-developed and well nourished.   Musculoskeletal:    Right lower extremity:  Patient with moderate focal tenderness to the proximal third of the fibular shaft.  No direct tenderness over the fibular head, lateral or medial joint lines.  No tenderness about the bony prominences of the knee or ankle.

## 2024-06-04 NOTE — PROGRESS NOTES
Pharmacy Pop Care Documentation:     AVS received for required office visit on: 3/09/18 - Per Care Everywhere
04-Jun-2024 10:58

## 2024-06-12 ENCOUNTER — TELEPHONE (OUTPATIENT)
Dept: ORTHOPEDIC SURGERY | Age: 59
End: 2024-06-12

## 2024-06-12 NOTE — TELEPHONE ENCOUNTER
Left detailed message that [facility] paperwork is completed just waiting for Antonio VILLATORO to sign it.

## 2024-06-17 ENCOUNTER — OFFICE VISIT (OUTPATIENT)
Dept: ORTHOPEDIC SURGERY | Age: 59
End: 2024-06-17
Payer: COMMERCIAL

## 2024-06-17 VITALS — HEIGHT: 63 IN | WEIGHT: 175 LBS | RESPIRATION RATE: 17 BRPM | BODY MASS INDEX: 31.01 KG/M2

## 2024-06-17 DIAGNOSIS — S82.434D CLOSED NONDISPLACED OBLIQUE FRACTURE OF SHAFT OF RIGHT FIBULA WITH ROUTINE HEALING, SUBSEQUENT ENCOUNTER: Primary | ICD-10-CM

## 2024-06-17 PROCEDURE — 99213 OFFICE O/P EST LOW 20 MIN: CPT | Performed by: PHYSICIAN ASSISTANT

## 2024-06-17 NOTE — PROGRESS NOTES
of the fibular shaft.  No direct tenderness over the fibular head, lateral or medial joint lines.  No tenderness about the bony prominences of the knee or ankle.     Examination of the knee demonstrates no evidence of ligamentous instability.  Negative Lachman's, negative anterior and posterior drawer.  No instability with varus or valgus stress.  Anterior drawer, Cota test and talar tilt test are negative of the right ankle.     Sensation intact to light touch in all distal dermatomes.  Patient with excellent strength with plantar and dorsiflexion.  EHL is intact.2  Neurological: Patient is alert and oriented to person, place, and time. Normal strenght. No sensory deficit.  Skin: Skin is warm and dry  Psychiatric: Behavior is normal. Thought content normal.  Nursing note and vitals reviewed.     Labs and Imaging:     XR taken today:  No results found.     X-rays taken in clinic and preliminarily reviewed by me 6/17/24:   AP and lateral views of the tib-fib and good demonstrate nondisplaced fracture through the proximal third of the fibular shaft.  There is abundant bony callus and early fracture line sclerosis noted on both views today. This remains non-displaced.     Assessment and Plan:  1. Closed nondisplaced oblique fracture of shaft of right fibula with routine healing, subsequent encounter              PLAN:  This is a 59 y.o. female who presents to the clinic today for follow up right proximal fibular fracture.  Date of injury current on 5/1/2024 patient is approximately 6+ weeks out from the injury itself although slightly improved and minimally painful in the boot she continues to have severe pain at night and when walking outside of the boot.  Given that patient continues to be moderately painful despite being 6+ weeks out from the injury she may continue immobilization.  She does feel comfortable in the boot however I would like patient to start transitioning to a lace up ankle brace and is fitted

## 2024-06-26 ENCOUNTER — TELEPHONE (OUTPATIENT)
Dept: SURGERY | Age: 59
End: 2024-06-26

## 2024-06-26 NOTE — TELEPHONE ENCOUNTER
Pt called asking if her disability paperwork was received in the office and if it has been completed. Please return pt's call.

## 2024-06-27 NOTE — TELEPHONE ENCOUNTER
I spoke with patient, advised her we did not receive any new paperwork,  She stated she will call them and have the paperwork faxed to the office.

## 2024-07-08 ENCOUNTER — OFFICE VISIT (OUTPATIENT)
Dept: ORTHOPEDIC SURGERY | Age: 59
End: 2024-07-08
Payer: COMMERCIAL

## 2024-07-08 VITALS — BODY MASS INDEX: 30.83 KG/M2 | WEIGHT: 174 LBS | HEIGHT: 63 IN

## 2024-07-08 DIAGNOSIS — S82.434D CLOSED NONDISPLACED OBLIQUE FRACTURE OF SHAFT OF RIGHT FIBULA WITH ROUTINE HEALING, SUBSEQUENT ENCOUNTER: Primary | ICD-10-CM

## 2024-07-08 PROCEDURE — 99213 OFFICE O/P EST LOW 20 MIN: CPT | Performed by: PHYSICIAN ASSISTANT

## 2024-07-08 NOTE — PROGRESS NOTES
[facility] Orthopedics & Sports Medicine                   Antonio Linares PA-C            [address], [address]               [place], [place] [address]           Dept Phone: [phone]           Dept Fax:  [phone] [address] [facility]                       [address]           [place], [place] [address]          Dept Phone: [phone]           Dept Fax:  [phone]      Chief Compliant:  Chief Complaint   Patient presents with    Ankle Injury     Fibular shaft fracture right.  Date of injury 5/1/2024        History of Present Illness:  Lynn returns today.  This is a 59 y.o. female who presents to the clinic today for follow up of right proximal fibular shaft fracture.  Date of injury occurred on 5/1/2024.  Patient has remained in tall walking boot since the injury.  At last visit with Dr. Duran she was allowed to gradually wean from boot which she states she has walked short distances in the house without the boot but continues to wear the boot secondary to her pain with any long distance standing or walking.    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  But does not believe she be able to complete her duties safely secondary to the pain that she has with any prolonged period of standing or walking.    She continues to localize pain most severely over the mid fibular area no significant pain at the knee or ankle but patient does report some occasional swelling over the lateral ankle as well..       Review of Systems   Constitutional: Negative for fever, chills, sweats, recent illness, or recent injury.   Neurological: Negative for headaches, numbness, or weakness.   Integumentary: Negative for rash, itching, ecchymosis, abrasions, or laceration.   Musculoskeletal: Positive for Ankle Injury (Tibia fracture right)       Physical Exam:  Constitutional: Patient is

## 2024-07-19 ENCOUNTER — APPOINTMENT (OUTPATIENT)
Dept: CT IMAGING | Age: 59
End: 2024-07-19
Payer: COMMERCIAL

## 2024-07-19 ENCOUNTER — APPOINTMENT (OUTPATIENT)
Dept: GENERAL RADIOLOGY | Age: 59
End: 2024-07-19
Payer: COMMERCIAL

## 2024-07-19 ENCOUNTER — HOSPITAL ENCOUNTER (EMERGENCY)
Age: 59
Discharge: HOME | End: 2024-07-20
Attending: SPECIALIST
Payer: COMMERCIAL

## 2024-07-19 DIAGNOSIS — E11.65 HYPERGLYCEMIA DUE TO DIABETES MELLITUS (HCC): ICD-10-CM

## 2024-07-19 DIAGNOSIS — S80.12XA CONTUSION OF LEFT LOWER EXTREMITY, INITIAL ENCOUNTER: ICD-10-CM

## 2024-07-19 DIAGNOSIS — I31.39 PERICARDIAL EFFUSION: ICD-10-CM

## 2024-07-19 DIAGNOSIS — S09.90XA CLOSED HEAD INJURY, INITIAL ENCOUNTER: ICD-10-CM

## 2024-07-19 DIAGNOSIS — S62.111A CLOSED DISPLACED FRACTURE OF TRIQUETRUM OF RIGHT WRIST, INITIAL ENCOUNTER: Primary | ICD-10-CM

## 2024-07-19 PROCEDURE — 99285 EMERGENCY DEPT VISIT HI MDM: CPT

## 2024-07-19 PROCEDURE — 96374 THER/PROPH/DIAG INJ IV PUSH: CPT

## 2024-07-19 PROCEDURE — 29125 APPL SHORT ARM SPLINT STATIC: CPT

## 2024-07-19 PROCEDURE — 96372 THER/PROPH/DIAG INJ SC/IM: CPT

## 2024-07-19 PROCEDURE — 6370000000 HC RX 637 (ALT 250 FOR IP): Performed by: SPECIALIST

## 2024-07-19 PROCEDURE — 71250 CT THORAX DX C-: CPT

## 2024-07-19 PROCEDURE — 70450 CT HEAD/BRAIN W/O DYE: CPT

## 2024-07-19 PROCEDURE — 73590 X-RAY EXAM OF LOWER LEG: CPT

## 2024-07-19 PROCEDURE — 72125 CT NECK SPINE W/O DYE: CPT

## 2024-07-19 PROCEDURE — 73110 X-RAY EXAM OF WRIST: CPT

## 2024-07-19 RX ORDER — HYDROCODONE BITARTRATE AND ACETAMINOPHEN 5; 325 MG/1; MG/1
1 TABLET ORAL ONCE
Status: COMPLETED | OUTPATIENT
Start: 2024-07-19 | End: 2024-07-19

## 2024-07-19 RX ADMIN — HYDROCODONE BITARTRATE AND ACETAMINOPHEN 1 TABLET: 5; 325 TABLET ORAL at 22:52

## 2024-07-19 ASSESSMENT — PAIN - FUNCTIONAL ASSESSMENT: PAIN_FUNCTIONAL_ASSESSMENT: 0-10

## 2024-07-19 ASSESSMENT — ENCOUNTER SYMPTOMS
BACK PAIN: 0
NAUSEA: 0
SHORTNESS OF BREATH: 0
VOMITING: 0
DIARRHEA: 0
SORE THROAT: 0
ABDOMINAL PAIN: 0

## 2024-07-19 ASSESSMENT — PAIN DESCRIPTION - PAIN TYPE: TYPE: ACUTE PAIN

## 2024-07-19 ASSESSMENT — PAIN DESCRIPTION - DESCRIPTORS: DESCRIPTORS: ACHING

## 2024-07-19 ASSESSMENT — PAIN DESCRIPTION - LOCATION: LOCATION: HEAD;CHEST

## 2024-07-19 ASSESSMENT — PAIN SCALES - GENERAL: PAINLEVEL_OUTOF10: 7

## 2024-07-20 VITALS
SYSTOLIC BLOOD PRESSURE: 136 MMHG | TEMPERATURE: 98.6 F | WEIGHT: 176.37 LBS | RESPIRATION RATE: 18 BRPM | BODY MASS INDEX: 31.25 KG/M2 | DIASTOLIC BLOOD PRESSURE: 78 MMHG | HEART RATE: 91 BPM | HEIGHT: 63 IN | OXYGEN SATURATION: 98 %

## 2024-07-20 LAB
ANION GAP SERPL CALCULATED.3IONS-SCNC: 7 MMOL/L (ref 9–17)
BASOPHILS # BLD: 0.03 K/UL (ref 0–0.2)
BASOPHILS NFR BLD: 0 % (ref 0–2)
BILIRUB UR QL STRIP: NEGATIVE
BUN SERPL-MCNC: 19 MG/DL (ref 6–20)
CALCIUM SERPL-MCNC: 8.7 MG/DL (ref 8.6–10.4)
CHLORIDE SERPL-SCNC: 99 MMOL/L (ref 98–107)
CLARITY UR: CLEAR
CO2 SERPL-SCNC: 29 MMOL/L (ref 20–31)
COLOR UR: YELLOW
COMMENT: ABNORMAL
CREAT SERPL-MCNC: 0.7 MG/DL (ref 0.5–0.9)
EKG ATRIAL RATE: 80 BPM
EKG P AXIS: 18 DEGREES
EKG P-R INTERVAL: 142 MS
EKG Q-T INTERVAL: 382 MS
EKG QRS DURATION: 76 MS
EKG QTC CALCULATION (BAZETT): 440 MS
EKG R AXIS: -11 DEGREES
EKG T AXIS: 23 DEGREES
EKG VENTRICULAR RATE: 80 BPM
EOSINOPHIL # BLD: 0.11 K/UL (ref 0–0.4)
EOSINOPHILS RELATIVE PERCENT: 1 % (ref 1–4)
ERYTHROCYTE [DISTWIDTH] IN BLOOD BY AUTOMATED COUNT: 14.6 % (ref 12.5–15.4)
GFR, ESTIMATED: >90 ML/MIN/1.73M2
GLUCOSE BLD-MCNC: 333 MG/DL (ref 65–105)
GLUCOSE SERPL-MCNC: 408 MG/DL (ref 70–99)
GLUCOSE UR STRIP-MCNC: ABNORMAL MG/DL
HCT VFR BLD AUTO: 35.9 % (ref 36–46)
HGB BLD-MCNC: 11.8 G/DL (ref 12–16)
HGB UR QL STRIP.AUTO: NEGATIVE
KETONES UR STRIP-MCNC: NEGATIVE MG/DL
LEUKOCYTE ESTERASE UR QL STRIP: NEGATIVE
LYMPHOCYTES NFR BLD: 2.51 K/UL (ref 1–4.8)
LYMPHOCYTES RELATIVE PERCENT: 21 % (ref 24–44)
MCH RBC QN AUTO: 28.7 PG (ref 26–34)
MCHC RBC AUTO-ENTMCNC: 32.9 G/DL (ref 31–37)
MCV RBC AUTO: 87.3 FL (ref 80–100)
MONOCYTES NFR BLD: 0.85 K/UL (ref 0.1–1.2)
MONOCYTES NFR BLD: 7 % (ref 2–11)
NEUTROPHILS NFR BLD: 71 % (ref 36–66)
NEUTS SEG NFR BLD: 8.57 K/UL (ref 1.8–7.7)
NITRITE UR QL STRIP: NEGATIVE
PH UR STRIP: 6 [PH] (ref 5–8)
PLATELET # BLD AUTO: 333 K/UL (ref 140–450)
PMV BLD AUTO: 10.2 FL (ref 8–14)
POTASSIUM SERPL-SCNC: 4 MMOL/L (ref 3.7–5.3)
PROT UR STRIP-MCNC: NEGATIVE MG/DL
RBC # BLD AUTO: 4.11 M/UL (ref 4–5.2)
SODIUM SERPL-SCNC: 135 MMOL/L (ref 135–144)
SP GR UR STRIP: 1.01 (ref 1–1.03)
TROPONIN I SERPL HS-MCNC: <6 NG/L (ref 0–14)
UROBILINOGEN UR STRIP-ACNC: NORMAL EU/DL (ref 0–1)
WBC OTHER # BLD: 12.1 K/UL (ref 3.5–11)

## 2024-07-20 PROCEDURE — 2580000003 HC RX 258: Performed by: SPECIALIST

## 2024-07-20 PROCEDURE — 84484 ASSAY OF TROPONIN QUANT: CPT

## 2024-07-20 PROCEDURE — 81003 URINALYSIS AUTO W/O SCOPE: CPT

## 2024-07-20 PROCEDURE — 6370000000 HC RX 637 (ALT 250 FOR IP): Performed by: SPECIALIST

## 2024-07-20 PROCEDURE — 36415 COLL VENOUS BLD VENIPUNCTURE: CPT

## 2024-07-20 PROCEDURE — 85025 COMPLETE CBC W/AUTO DIFF WBC: CPT

## 2024-07-20 PROCEDURE — 80048 BASIC METABOLIC PNL TOTAL CA: CPT

## 2024-07-20 PROCEDURE — 93005 ELECTROCARDIOGRAM TRACING: CPT | Performed by: SPECIALIST

## 2024-07-20 PROCEDURE — 6360000002 HC RX W HCPCS: Performed by: SPECIALIST

## 2024-07-20 PROCEDURE — 82947 ASSAY GLUCOSE BLOOD QUANT: CPT

## 2024-07-20 RX ORDER — 0.9 % SODIUM CHLORIDE 0.9 %
1000 INTRAVENOUS SOLUTION INTRAVENOUS ONCE
Status: COMPLETED | OUTPATIENT
Start: 2024-07-20 | End: 2024-07-20

## 2024-07-20 RX ORDER — HYDROCODONE BITARTRATE AND ACETAMINOPHEN 5; 325 MG/1; MG/1
1 TABLET ORAL EVERY 6 HOURS PRN
Qty: 15 TABLET | Refills: 0 | Status: SHIPPED | OUTPATIENT
Start: 2024-07-20 | End: 2024-07-27

## 2024-07-20 RX ORDER — MORPHINE SULFATE 4 MG/ML
4 INJECTION, SOLUTION INTRAMUSCULAR; INTRAVENOUS ONCE
Status: DISCONTINUED | OUTPATIENT
Start: 2024-07-20 | End: 2024-07-20

## 2024-07-20 RX ORDER — MORPHINE SULFATE 4 MG/ML
4 INJECTION, SOLUTION INTRAMUSCULAR; INTRAVENOUS ONCE
Status: COMPLETED | OUTPATIENT
Start: 2024-07-20 | End: 2024-07-20

## 2024-07-20 RX ADMIN — SODIUM CHLORIDE 1000 ML: 9 INJECTION, SOLUTION INTRAVENOUS at 02:32

## 2024-07-20 RX ADMIN — INSULIN HUMAN 15 UNITS: 100 INJECTION, SOLUTION PARENTERAL at 04:41

## 2024-07-20 RX ADMIN — MORPHINE SULFATE 4 MG: 4 INJECTION INTRAVENOUS at 01:23

## 2024-07-20 ASSESSMENT — PAIN SCALES - GENERAL: PAINLEVEL_OUTOF10: 8

## 2024-07-20 NOTE — DISCHARGE INSTRUCTIONS
Please understand that at this time there is no evidence for a more serious underlying process, but that early in the process of an illness or injury, an emergency department workup can be falsely reassuring.  You should contact your family doctor within the next 48 hours for a follow up appointment    THANK YOU!!!    From [facility] and [facility]    On behalf of the Emergency Department staff at [facility], I would like to thank you for giving us the opportunity to address your health care needs and concerns.    We hope that during your visit, our service was delivered in a professional and caring manner. Please keep [facility] in mind as we walk with you down the path to your own personal wellness.     Please expect an automated text message or email from us so we can ask a few questions about your health and progress. Based on your answers, a clinician may call you back to offer help and instructions.    Please understand that early in the process of an illness or injury, an emergency department workup can be falsely reassuring.  If you notice any worsening, changing or persistent symptoms please call your family doctor or return to the ER immediately.     Tell us how we did during your visit at [email or web address]   and let us know about your experience

## 2024-07-20 NOTE — ED PROVIDER NOTES
[facility]  EMERGENCY DEPARTMENT ENCOUNTER      Pt Name: Lynn Shaw  MRN: [id number]  Birthdate 1965  Date of evaluation: 7/19/24      CHIEF COMPLAINT       Chief Complaint   Patient presents with    Fall     Stumble and fall down 12 stairs tonight.  Wounds to face and chin with small amount of bleeding (Broken glass during the event) Pt also complains of chest pain and right wrist pains and left lower leg pains.          HISTORY OF PRESENT ILLNESS    Lynn Shaw is a 59 y.o. female who presents to the emergency department accompanied by her family after patient apparently stumbled and fell down about 12 stairs on the hardwood floor at about 9:30 PM prior to arrival.  She sustained head injury but denies any loss of consciousness.  She sustained superficial abrasion on the left forehead and chin admits to having a headache 7 out of 10 in intensity.  She also complains of chest pain, right wrist pain and left lower leg pain.  She was able to get up and ambulate after the fall.  She denies any abdominal pain, lightheadedness or dizziness.  She denies any upper or lower back pain or flank pain and denies any tingling, numbness or weakness in any of the extremities.  She has type 1 diabetes mellitus and blood sugar was 204 prior to arrival.  She denies any visual disturbances denies any shortness of breath, palpitations or diaphoresis.  There are no exacerbating or relieving factors and patient has not taken any medications for the pain prior to arrival.      REVIEW OF SYSTEMS       Review of Systems   Constitutional:  Negative for diaphoresis, fatigue and fever.   HENT:  Negative for congestion and sore throat.    Respiratory:  Negative for shortness of breath.    Cardiovascular:  Positive for chest pain. Negative for palpitations.   Gastrointestinal:  Negative for abdominal pain, diarrhea, nausea and vomiting.   Genitourinary:  Negative for dysuria, frequency and hematuria.    Result Value Ref Range    Ventricular Rate 80 BPM    Atrial Rate 80 BPM    P-R Interval 142 ms    QRS Duration 76 ms    Q-T Interval 382 ms    QTc Calculation (Bazett) 440 ms    P Axis 18 degrees    R Axis -11 degrees    T Axis 23 degrees       Patient has mild leukocytosis, hyperglycemia but no evidence of DKA    EMERGENCY DEPARTMENT COURSE:   Vitals:    Vitals:    07/19/24 2226 07/20/24 0123   BP: 136/78    Pulse: 91    Resp: 18 18   Temp: 98.6 °F (37 °C)    SpO2: 98%    Weight: 80 kg (176 lb 5.9 oz)    Height: 1.61 m (5' 3.39\")      -------------------------  BP: 136/78, Temp: 98.6 °F (37 °C), Pulse: 91, Respirations: 18    Orders Placed This Encounter   Medications    HYDROcodone-acetaminophen (NORCO) 5-325 MG per tablet 1 tablet    DISCONTD: morphine (PF) injection 4 mg    sodium chloride 0.9 % bolus 1,000 mL    morphine injection 4 mg    insulin regular (HUMULIN R;NOVOLIN R) injection 15 Units    HYDROcodone-acetaminophen (NORCO) 5-325 MG per tablet     Sig: Take 1 tablet by mouth every 6 hours as needed for Pain for up to 7 days. Intended supply: 3 days. Take lowest dose possible to manage pain Max Daily Amount: 4 tablets     Dispense:  15 tablet     Refill:  0         During the emergency department course, patient was given Norco 1 tablet orally, normal saline 1 L bolus, morphine 4 mg IV push and Humulin R 15 units subcutaneously.  Volar splint was applied to the right forearm.  Neurovascular examination is intact after splint application.  Plan is to discharge the patient with instructions drink plenty of oral fluids, continue Lantus and Humalog, take Tylenol and ibuprofen as needed for the pain, Norco for uncontrolled pain, elevate the right wrist, apply ice packs locally, follow-up with PCP and your orthopedic surgeon Dr. Lovelace for further evaluation of trace pericardial effusion and a right wrist fracture, return anytime for worsening symptoms or any new symptoms.    CONSULTS:  PCP and orthopedic

## 2024-07-22 ENCOUNTER — CARE COORDINATION (OUTPATIENT)
Dept: OTHER | Facility: CLINIC | Age: 59
End: 2024-07-22

## 2024-07-22 NOTE — CARE COORDINATION
Ambulatory Care Coordination Note     7/22/2024 1:00 PM     Patient outreach attempt by this ACM today to offer care management services. ACM was unable to reach the patient by telephone today; left voice message requesting a return phone call to this ACM.     ACM: Ivania Montemayor RN     Care Summary Note: ED visit 7/19/24 for closed displaced fracture of triquetrum of right wrist    PCP/Specialist follow up:   Future Appointments         Provider Specialty Dept Phone    7/25/2024 4:15 PM NGUYỄN FISHER MRI RM 1 Radiology [phone]            Follow Up:   Plan for next AC outreach in approximately 1-2 days  to complete:  - outreach attempt to offer care management services.

## 2024-07-23 ENCOUNTER — CARE COORDINATION (OUTPATIENT)
Dept: OTHER | Facility: CLINIC | Age: 59
End: 2024-07-23

## 2024-07-23 NOTE — CARE COORDINATION
Ambulatory Care Coordination Note     7/23/2024 2:07 PM     Patient outreach attempt by this ACM today to offer care management services. ACM was unable to reach the patient by telephone today; left voice message requesting a return phone call to this ACM.  [employer] message sent requesting patient to contact this ACM.     ACM: Ivania Montemayor RN     Care Summary Note: Unable to reach letter sent via [employer].    PCP/Specialist follow up:   Future Appointments         Provider Specialty Dept Phone    7/24/2024 4:30 PM Milka Lovelace MD Orthopedic Surgery [phone]    7/25/2024 4:15 PM NGUYỄN FISHER MRI RM 1 Radiology [phone]            Follow Up:   Plan for next AC outreach in approximately 1 week to complete:  - outreach attempt to offer care management services.

## 2024-07-24 ENCOUNTER — OFFICE VISIT (OUTPATIENT)
Dept: ORTHOPEDIC SURGERY | Age: 59
End: 2024-07-24
Payer: COMMERCIAL

## 2024-07-24 VITALS — RESPIRATION RATE: 14 BRPM | WEIGHT: 176 LBS | HEIGHT: 63 IN | BODY MASS INDEX: 31.18 KG/M2

## 2024-07-24 DIAGNOSIS — S62.111A CLOSED CHIP FRACTURE OF TRIQUETRUM OF RIGHT WRIST, INITIAL ENCOUNTER: Primary | ICD-10-CM

## 2024-07-24 PROCEDURE — 99213 OFFICE O/P EST LOW 20 MIN: CPT | Performed by: ORTHOPAEDIC SURGERY

## 2024-07-24 NOTE — PROGRESS NOTES
the carpal bones likely the triquetrum.  No obvious dislocation or subluxation.    Assessment and Plan  Lynn Shaw is a 59 y.o. old female with a closed right wrist carpal bone avulsion fracture.  I had a discussion with the patient today educating her about her injury and discussed treatment options available to her.  I recommended proceeding conservatively at this time.  She was placed in a fracture brace today to be worn for the next 4 weeks.  She may use his hand for light activities of daily living.  I will see her back for reevaluation in 4 weeks time with repeat x-rays but she may return or call earlier with questions or concerns.    This note is created with the assistance of a speech recognition program.  While intending to generate adocument that actually reflects the content of the visit, the document can still have some errors including those of syntax and sound a like substitutions which may escape proof reading.  It such instances, actual meaningcan be extrapolated by contextual diversion.

## 2024-07-25 ENCOUNTER — HOSPITAL ENCOUNTER (OUTPATIENT)
Dept: MRI IMAGING | Age: 59
Discharge: HOME | End: 2024-07-27
Payer: COMMERCIAL

## 2024-07-25 ENCOUNTER — HOSPITAL ENCOUNTER (OUTPATIENT)
Dept: GENERAL RADIOLOGY | Age: 59
Discharge: HOME | End: 2024-07-27
Payer: COMMERCIAL

## 2024-07-25 DIAGNOSIS — S82.434D CLOSED NONDISPLACED OBLIQUE FRACTURE OF SHAFT OF RIGHT FIBULA WITH ROUTINE HEALING, SUBSEQUENT ENCOUNTER: ICD-10-CM

## 2024-07-25 DIAGNOSIS — S50.851A SUPERFICIAL FOREIGN BODY OF RIGHT FOREARM, INITIAL ENCOUNTER: ICD-10-CM

## 2024-07-25 PROCEDURE — 73718 MRI LOWER EXTREMITY W/O DYE: CPT

## 2024-07-25 PROCEDURE — 73090 X-RAY EXAM OF FOREARM: CPT

## 2024-07-26 ENCOUNTER — PATIENT MESSAGE (OUTPATIENT)
Dept: PHARMACY | Facility: CLINIC | Age: 59
End: 2024-07-26

## 2024-07-26 ENCOUNTER — CLINICAL DOCUMENTATION (OUTPATIENT)
Dept: PHARMACY | Facility: CLINIC | Age: 59
End: 2024-07-26

## 2024-07-26 NOTE — PROGRESS NOTES
2nd Quarterly Reminder sent to patient for the DM Program - See Mychart message or Letter for more information.      [facility]  Clinical Pharmacy   Phone: [phone], Option #3      For Pharmacy Admin Tracking Only    Program: [employer]  CPA in place:  No  Gap Closed?: Yes   Time Spent (min): 5

## 2024-07-31 ENCOUNTER — CARE COORDINATION (OUTPATIENT)
Dept: OTHER | Facility: CLINIC | Age: 59
End: 2024-07-31

## 2024-07-31 NOTE — CARE COORDINATION
Ambulatory Care Coordination Note     7/31/2024 12:09 PM     patient outreach attempt by this AC today to offer care management services. ACM was unable to reach the patient by telephone today; left voice message requesting a return phone call to this ACM.  mychart message sent requesting patient to contact this ACM.     Patient closed (unable to reach patient) from the High Risk Care Management program on 7/31/2024.  No further Ambulatory Care Manager follow up scheduled.

## 2024-08-01 ENCOUNTER — TELEPHONE (OUTPATIENT)
Dept: ORTHOPEDIC SURGERY | Age: 59
End: 2024-08-01

## 2024-08-01 NOTE — TELEPHONE ENCOUNTER
Disability form was obtained from [employer] contact provided by patient by the name of Lucas Glasgow. It was filled out and returned to him directly by fax and email. He stated as soon as it was in his hands it would be completed/processed within 24 hours.

## 2024-08-01 NOTE — TELEPHONE ENCOUNTER
Patient called in extremely upset stating she was about to lose her job with [employer] because nothing has been updated for her disability. They said she has been AWOL. She has been under the care of Dr Lovelace and Jeffery Linares following instruction from our office for her injuries and provided with notes for time off work for each visit she has had. This is very time sensitive. She provided me with the name of the contact she has been working with at [employer]. Lucas Glasgow at [phone] is his number. I told her I was going to call him to see exactly what he needed and get it to him ASAP and get back to her with any and all information.

## 2024-08-02 ENCOUNTER — HOSPITAL ENCOUNTER (OUTPATIENT)
Age: 59
Discharge: HOME | End: 2024-08-02
Payer: COMMERCIAL

## 2024-08-02 VITALS — WEIGHT: 176 LBS | BODY MASS INDEX: 31.18 KG/M2 | HEIGHT: 63 IN

## 2024-08-02 DIAGNOSIS — I31.39 IDIOPATHIC PERICARDIAL EFFUSION: ICD-10-CM

## 2024-08-02 LAB
ECHO AO ASC DIAM: 3 CM
ECHO AO ASCENDING AORTA INDEX: 1.64 CM/M2
ECHO AO ROOT DIAM: 2.8 CM
ECHO AO ROOT INDEX: 1.53 CM/M2
ECHO AV AREA PEAK VELOCITY: 2.6 CM2
ECHO AV AREA VTI: 2.5 CM2
ECHO AV AREA/BSA PEAK VELOCITY: 1.4 CM2/M2
ECHO AV AREA/BSA VTI: 1.4 CM2/M2
ECHO AV MEAN GRADIENT: 5 MMHG
ECHO AV MEAN VELOCITY: 1.1 M/S
ECHO AV PEAK GRADIENT: 7 MMHG
ECHO AV PEAK VELOCITY: 1.3 M/S
ECHO AV VELOCITY RATIO: 0.77
ECHO AV VTI: 31 CM
ECHO BSA: 1.88 M2
ECHO EST RA PRESSURE: 3 MMHG
ECHO IVC EXP: 1.4 CM
ECHO IVC INSP: 0.6 CM
ECHO LA AREA 2C: 15.5 CM2
ECHO LA AREA 4C: 14.5 CM2
ECHO LA DIAMETER INDEX: 2.13 CM/M2
ECHO LA DIAMETER: 3.9 CM
ECHO LA MAJOR AXIS: 4.5 CM
ECHO LA MINOR AXIS: 4.5 CM
ECHO LA TO AORTIC ROOT RATIO: 1.39
ECHO LA VOL BP: 39 ML (ref 22–52)
ECHO LA VOL MOD A2C: 42 ML (ref 22–52)
ECHO LA VOL MOD A4C: 37 ML (ref 22–52)
ECHO LA VOL/BSA BIPLANE: 21 ML/M2 (ref 16–34)
ECHO LA VOLUME INDEX MOD A2C: 23 ML/M2 (ref 16–34)
ECHO LA VOLUME INDEX MOD A4C: 20 ML/M2 (ref 16–34)
ECHO LV E' LATERAL VELOCITY: 9 CM/S
ECHO LV E' SEPTAL VELOCITY: 5 CM/S
ECHO LV FRACTIONAL SHORTENING: 35 % (ref 28–44)
ECHO LV INTERNAL DIMENSION DIASTOLE INDEX: 2.35 CM/M2
ECHO LV INTERNAL DIMENSION DIASTOLIC: 4.3 CM (ref 3.9–5.3)
ECHO LV INTERNAL DIMENSION SYSTOLIC INDEX: 1.53 CM/M2
ECHO LV INTERNAL DIMENSION SYSTOLIC: 2.8 CM
ECHO LV IVSD: 1 CM (ref 0.6–0.9)
ECHO LV MASS 2D: 142.5 G (ref 67–162)
ECHO LV MASS INDEX 2D: 77.9 G/M2 (ref 43–95)
ECHO LV POSTERIOR WALL DIASTOLIC: 1 CM (ref 0.6–0.9)
ECHO LV RELATIVE WALL THICKNESS RATIO: 0.47
ECHO LVOT AREA: 3.5 CM2
ECHO LVOT AV VTI INDEX: 0.72
ECHO LVOT DIAM: 2.1 CM
ECHO LVOT MEAN GRADIENT: 2 MMHG
ECHO LVOT PEAK GRADIENT: 4 MMHG
ECHO LVOT PEAK VELOCITY: 1 M/S
ECHO LVOT STROKE VOLUME INDEX: 42.2 ML/M2
ECHO LVOT SV: 77.2 ML
ECHO LVOT VTI: 22.3 CM
ECHO MV A VELOCITY: 0.51 M/S
ECHO MV E DECELERATION TIME (DT): 158 MS
ECHO MV E VELOCITY: 0.5 M/S
ECHO MV E/A RATIO: 0.98
ECHO MV E/E' LATERAL: 5.56
ECHO MV E/E' RATIO (AVERAGED): 7.78
ECHO MV E/E' SEPTAL: 10
ECHO RIGHT VENTRICULAR SYSTOLIC PRESSURE (RVSP): 22 MMHG
ECHO RV BASAL DIMENSION: 2.6 CM
ECHO RV FREE WALL PEAK S': 15 CM/S
ECHO TV REGURGITANT MAX VELOCITY: 2.2 M/S
ECHO TV REGURGITANT PEAK GRADIENT: 19 MMHG

## 2024-08-02 PROCEDURE — 93306 TTE W/DOPPLER COMPLETE: CPT | Performed by: INTERNAL MEDICINE

## 2024-08-02 PROCEDURE — 93306 TTE W/DOPPLER COMPLETE: CPT

## 2024-08-02 NOTE — TELEPHONE ENCOUNTER
[employer] message not read by patient.  Letter mailed to patient with the information from the [employer] message.    [facility]  Clinical Pharmacy   Department, toll free: [phone] Option #3    For Pharmacy Admin Tracking Only    Program: [employer]  CPA in place:  No  Gap Closed?: Yes   Time Spent (min): 5

## 2024-08-09 ENCOUNTER — TELEPHONE (OUTPATIENT)
Dept: ORTHOPEDIC SURGERY | Age: 59
End: 2024-08-09

## 2024-08-09 NOTE — TELEPHONE ENCOUNTER
----- Message from VINOD Gonsalez sent at 7/29/2024  3:39 PM EDT -----  MRI confirms healing fracture of the proximal fibular shaft with good anatomic alignment.  Some mild swelling in the musculature overlying but no evidence of tearing.  Patient may be weightbearing and activity as tolerated

## 2024-08-22 ENCOUNTER — HOSPITAL ENCOUNTER (OUTPATIENT)
Dept: GENERAL RADIOLOGY | Age: 59
Discharge: HOME | End: 2024-08-24
Payer: COMMERCIAL

## 2024-08-22 ENCOUNTER — HOSPITAL ENCOUNTER (OUTPATIENT)
Age: 59
Discharge: HOME | End: 2024-08-24
Payer: COMMERCIAL

## 2024-08-22 DIAGNOSIS — M79.642 LEFT HAND PAIN: ICD-10-CM

## 2024-08-22 PROCEDURE — 73130 X-RAY EXAM OF HAND: CPT

## 2024-08-28 ENCOUNTER — OFFICE VISIT (OUTPATIENT)
Dept: ORTHOPEDIC SURGERY | Age: 59
End: 2024-08-28
Payer: COMMERCIAL

## 2024-08-28 VITALS — WEIGHT: 180 LBS | RESPIRATION RATE: 14 BRPM | HEIGHT: 63 IN | BODY MASS INDEX: 31.89 KG/M2

## 2024-08-28 DIAGNOSIS — M25.532 LEFT WRIST PAIN: ICD-10-CM

## 2024-08-28 DIAGNOSIS — S62.111A CLOSED CHIP FRACTURE OF TRIQUETRUM OF RIGHT WRIST, INITIAL ENCOUNTER: Primary | ICD-10-CM

## 2024-08-28 DIAGNOSIS — M79.642 LEFT HAND PAIN: ICD-10-CM

## 2024-08-28 PROCEDURE — 99213 OFFICE O/P EST LOW 20 MIN: CPT | Performed by: ORTHOPAEDIC SURGERY

## 2024-08-29 ENCOUNTER — TELEPHONE (OUTPATIENT)
Dept: ORTHOPEDIC SURGERY | Age: 59
End: 2024-08-29

## 2024-08-29 NOTE — TELEPHONE ENCOUNTER
Patient called in stating she will need her disability paperwork updated for [employer] with inclusion of new dates. She was provided with a note in office and informed it will be completed as soon as possible with the new date of 9/18/24.

## 2024-08-30 ENCOUNTER — TELEPHONE (OUTPATIENT)
Dept: ORTHOPEDIC SURGERY | Age: 59
End: 2024-08-30

## 2024-08-30 ENCOUNTER — HOSPITAL ENCOUNTER (OUTPATIENT)
Dept: OCCUPATIONAL THERAPY | Facility: CLINIC | Age: 59
Setting detail: THERAPIES SERIES
Discharge: HOME | End: 2024-08-30
Payer: COMMERCIAL

## 2024-08-30 PROCEDURE — 97110 THERAPEUTIC EXERCISES: CPT

## 2024-08-30 PROCEDURE — 97165 OT EVAL LOW COMPLEX 30 MIN: CPT

## 2024-08-30 NOTE — PROGRESS NOTES
ORTHOPEDIC PATIENT EVALUATION      HPI / Chief Complaint  Lynn Shaw is a 59 y.o. right hand dominant female about a month out from a right wrist injury.  She was suspected of having a nondisplaced triquetrum fracture.  She was placed in a fracture brace a month ago.  She indicates that she still having some pain mostly along the dorsal radial aspect of her hand and wrist.  More recently she fell on the left side  hurting her left wrist.  She was placed in a volar splint.    Past Medical History  Lynn  has a past medical history of Anxiety, Arthritis, Diabetes (HCC), Fibromyalgia, Hernia, Kidney stones, and PONV (postoperative nausea and vomiting).    Past Surgical History  Lynn  has a past surgical history that includes Ovary removal (Right, 2003); Achilles tendon surgery (Left, 2001); Tonsillectomy; Knee arthroscopy (Left, 1993); fracture surgery (Right, 08/04/2015); back surgery; Knee arthroscopy (Right, 04/03/2017); and pr arthroscopy knee diagnostic w/wo synovial bx spx (Right, 4/3/2017).    Current Medications  Current Outpatient Medications   Medication Sig Dispense Refill    predniSONE (DELTASONE) 10 MG tablet Take 1 tablet by mouth daily      Cholecalciferol (VITAMIN D3) 125 MCG (5000 UT) TABS Take 1 tablet by mouth daily      insulin glargine (LANTUS) 100 UNIT/ML injection vial Inject 48 Units into the skin every morning (Patient taking differently: Inject 30 Units into the skin every morning) 10 mL 3    insulin glargine (LANTUS) 100 UNIT/ML injection vial Inject 18 Units into the skin nightly 10 mL 2    ALPRAZolam (XANAX) 0.25 MG tablet take 1 tablet by mouth twice a day if needed for anxiety      insulin lispro (HUMALOG KWIKPEN) 200 UNIT/ML SOPN pen Administer as directed by physician with sliding scale which was provided up to 60 units daily 2 pen 1    escitalopram (LEXAPRO) 20 MG tablet Take 1 tablet by mouth daily      Calcium Carb-Cholecalciferol (CALCIUM-VITAMIN D) 500-200 MG-UNIT per

## 2024-08-30 NOTE — TELEPHONE ENCOUNTER
Please complete FMLA - Called patient and notified her that we received FMLA paperwork and confirmed with the patient that this is for her wrist/left hand. Seen with Dr. Lovelace on 7/24/24 and RTW 9/19/24.

## 2024-08-30 NOTE — CONSULTS
visit  Increase function:UE Functional Index Score to 50/80 or more points to promote increased functional abilities  Patient to be independent with home exercise program as demonstrated by performance with correct form without cues.    Long Term Goals: (  16  Treatments)  Decrease Pain in R wrist to 1/10 and L wrist to 1/10  Increase AROM in R wrist   Flexion by 30 degrees  Ext by 20 degrees  Increase AROM in L wrist   Flexion by 20 degrees  Ext by 20 degrees  Increase  strength: will be set at future visit  Increase pinch strength: will be set at future visit  Increase function:UE Functional Index Score to 70/80 or more points to promote increased functional abilities  Patient to be independent with home exercise program as demonstrated by performance with correct form without cues.    Patient Goals: \"have use of hand/arm w/out pn\"    Treatment Potential: Good Suggested Professional Referral: No  Domestic Concerns: No   Barriers to Goal Achievement: No    Comments/Assessment: Pt is a 59 yr/old R hand dominate female who presents on this date s/p nondisplaced triquetrum fracture on 7/19/24 and non-specific L hand injury secondary to a fall a few weeks ago. Pt presents to clinic w/ B removable braces. Upon inspection pt displays min edema on the dorsum of the R hand and ulnar aspect of the L wrist. Pt's AROM in B wrists is limited w/ the R wrist being more limited (see table above for details). Pt is able to make a tight composite fist w/ B UE. Pt is minimally TTP over the 5th metacarpal of the L hand. Formal strength testing not completed on this date secondary to c/o pn in B wrists/hands. Pt would benefit from skilled OT services to address decreased ROM  and c/o pn  in the B wrists for the completion of ADLs/IADLs and for the safe return to work.       Home Program Initiated: Written, Verbal, and Demo Comprehension of Education: Yes       Plans, Goals, Benefits, Discussed with, and Patient    Treatment  Plan:   [x]  Therapeutic Exercise   26403              []  Iontophoresis: 4 mg/mL Dexamethasone Sodium Phosphate  mAmin  69588    []  Therapeutic Activity  25464  [x]  Vasopneumatic cold with compression  27451                []  ADL training  90197  []  Ultrasound        83049    []  Neuromuscular Re-education  62512  []  Electrical Stimulation Attended  22147    [x]  Manual Therapy  51193  Orthotic    []  Fit  70925     []  Train 41195    [x]  Instruction in HEP        Prosthetic    []  Fit 54088      []  Train 98004    []  Cognitive Interventions, (first 15 min 30315, subsequent 15 min 97352)  [] Dry Needling, 1 or 2 muscles  02193    []  Massage   89859      [] Dry Needling, 3 or more muscles  20561   [x]  Cold/hotpack        []  Medication allergies reviewed for use of    Dexamethasone Sodium Phosphate 4mg/ml     with iontophoresis treatments.   Pt is not allergic.     Treatment Plan:  Frequency: 2 x/week for 16 visits     Today's Treatment:  Modalities:  Precautions: N/A   Exercise Flow Sheet:  Exercise Reps/Time Weight/Level Comments   Wrist AROM 3x10 AROM HEP  Flex/ext  Radial/ulnar dev   Pronation/supination 3x10 AROM HEP   Tendon gliding 15 AROM HEP                     Other: Access Code: [email or web address]  URL: [email or web address]  Date: 08/30/2024  Prepared by: Benito Harp    Specific Instructions for Next Treatment:  AROM and light PROM exercises     Evaluation Complexity:  History (Personal factors, comorbidities) [x]  0 []  1-2 []  3+   Exam (limitations, restrictions) [x]  1-2 []  3 []  4+   Decision Making [x]  Low []  Moderate []  High   ? [x]  Low Complexity []  Moderate   Complexity []  High Complexity      Treatment Charges: Mins Units Time In/Out   Evaluation  []  High  []  Moderate  [x]  Low  15  1    [x]  Ther Exercise 15 1    []  Manual Therapy      []  Ther Activities      []  Orthotic fit/train      []  Orthotic recheck      []        Total Billable time 30 min       Time In:

## 2024-09-03 NOTE — TELEPHONE ENCOUNTER
Patient called, she received a letter from [employer] stating [employer] will melinda her as AWOL if her FMLA paperwork isn't signed before Thursday. Writer spoke to Ellen at [facility] who explained paperwork needed to be signed but Dr. Lovelace will not be in until Thursday. Ellen spoke to patient explaining her FMLA was uploaded in her chart and suggested Nasreen from [facility] print off the paperwork and have Dr. Lovelace sign it this Wednesday and fax it to [facility] [employer] [facility].

## 2024-09-03 NOTE — TELEPHONE ENCOUNTER
Paperwork received by [place] [facility]. Paperwork nearly completed awaiting approval and signature from provider.    Release of Paperwork on file in patients media.   Paperwork at  in [place].

## 2024-09-09 ENCOUNTER — HOSPITAL ENCOUNTER (OUTPATIENT)
Dept: OCCUPATIONAL THERAPY | Facility: CLINIC | Age: 59
Setting detail: THERAPIES SERIES
Discharge: HOME | End: 2024-09-09
Payer: COMMERCIAL

## 2024-09-09 PROCEDURE — 97140 MANUAL THERAPY 1/> REGIONS: CPT

## 2024-09-09 PROCEDURE — 97110 THERAPEUTIC EXERCISES: CPT

## 2024-09-11 ENCOUNTER — TELEPHONE (OUTPATIENT)
Dept: ORTHOPEDIC SURGERY | Age: 59
End: 2024-09-11

## 2024-09-11 ENCOUNTER — HOSPITAL ENCOUNTER (OUTPATIENT)
Dept: OCCUPATIONAL THERAPY | Facility: CLINIC | Age: 59
Setting detail: THERAPIES SERIES
Discharge: HOME | End: 2024-09-11
Payer: COMMERCIAL

## 2024-09-11 PROCEDURE — 97110 THERAPEUTIC EXERCISES: CPT

## 2024-09-11 PROCEDURE — 97140 MANUAL THERAPY 1/> REGIONS: CPT

## 2024-09-16 ENCOUNTER — HOSPITAL ENCOUNTER (OUTPATIENT)
Dept: OCCUPATIONAL THERAPY | Facility: CLINIC | Age: 59
Setting detail: THERAPIES SERIES
Discharge: HOME | End: 2024-09-16
Payer: COMMERCIAL

## 2024-09-16 PROCEDURE — 97140 MANUAL THERAPY 1/> REGIONS: CPT

## 2024-09-16 PROCEDURE — 97110 THERAPEUTIC EXERCISES: CPT

## 2024-09-18 ENCOUNTER — HOSPITAL ENCOUNTER (OUTPATIENT)
Dept: MAMMOGRAPHY | Age: 59
Discharge: HOME | End: 2024-09-20
Payer: COMMERCIAL

## 2024-09-18 VITALS — HEIGHT: 63 IN | BODY MASS INDEX: 32.07 KG/M2 | WEIGHT: 181 LBS

## 2024-09-18 DIAGNOSIS — Z12.31 VISIT FOR SCREENING MAMMOGRAM: ICD-10-CM

## 2024-09-18 PROCEDURE — 77063 BREAST TOMOSYNTHESIS BI: CPT

## 2024-09-19 ENCOUNTER — HOSPITAL ENCOUNTER (OUTPATIENT)
Dept: OCCUPATIONAL THERAPY | Facility: CLINIC | Age: 59
Setting detail: THERAPIES SERIES
Discharge: HOME | End: 2024-09-19
Payer: COMMERCIAL

## 2024-09-19 PROCEDURE — 97140 MANUAL THERAPY 1/> REGIONS: CPT

## 2024-09-19 PROCEDURE — 97110 THERAPEUTIC EXERCISES: CPT

## 2024-09-23 ENCOUNTER — HOSPITAL ENCOUNTER (OUTPATIENT)
Dept: OCCUPATIONAL THERAPY | Facility: CLINIC | Age: 59
Setting detail: THERAPIES SERIES
Discharge: HOME | End: 2024-09-23
Payer: COMMERCIAL

## 2024-09-23 PROCEDURE — 97110 THERAPEUTIC EXERCISES: CPT

## 2024-09-23 PROCEDURE — 97140 MANUAL THERAPY 1/> REGIONS: CPT

## 2024-09-25 ENCOUNTER — HOSPITAL ENCOUNTER (OUTPATIENT)
Dept: OCCUPATIONAL THERAPY | Facility: CLINIC | Age: 59
Setting detail: THERAPIES SERIES
Discharge: HOME | End: 2024-09-25
Payer: COMMERCIAL

## 2024-09-25 PROCEDURE — 97110 THERAPEUTIC EXERCISES: CPT

## 2024-09-25 PROCEDURE — 97140 MANUAL THERAPY 1/> REGIONS: CPT

## 2024-09-27 LAB
CHOLEST SERPL-MCNC: 183 MG/DL (ref 0–199)
CHOLESTEROL/HDL RATIO: 3
GLUCOSE SERPL-MCNC: 73 MG/DL (ref 74–99)
HDLC SERPL-MCNC: 67 MG/DL
LDLC SERPL CALC-MCNC: 101 MG/DL (ref 0–100)
PATIENT FASTING?: YES
TRIGL SERPL-MCNC: 75 MG/DL
VLDLC SERPL CALC-MCNC: 15 MG/DL

## 2024-09-30 ENCOUNTER — HOSPITAL ENCOUNTER (OUTPATIENT)
Dept: OCCUPATIONAL THERAPY | Facility: CLINIC | Age: 59
Setting detail: THERAPIES SERIES
Discharge: HOME | End: 2024-09-30
Payer: COMMERCIAL

## 2024-09-30 LAB
ESTIMATED AVERAGE GLUCOSE: 154 MG/DL
HBA1C MFR BLD: 7 %

## 2024-09-30 PROCEDURE — 97110 THERAPEUTIC EXERCISES: CPT

## 2024-09-30 PROCEDURE — 97140 MANUAL THERAPY 1/> REGIONS: CPT

## 2024-09-30 NOTE — FLOWSHEET NOTE
Pronation/supination  Both hands 2x10 1lbs Weight in the middle X   Tendon gliding  Both hands  Fist  claw 15Xea AROM HEP -    -  -    tennis ball   A-z  massage    1x L & R  Massage      -    FMC  foam transfer      1x  1x   right   left    -     FMC   Lg peg Right   1x    -   Power web    Pull  Side  2x10 yellow   Right  Right   Both    X  X  x   Digi-flex    isolate    2x10     red  yellow        X  X   Metal gripper 2x10 10lbs right -   Flexbar  Wrist pronation  Wrist supination 20x yellow    X  x   Rye  1/3 marbles     In hand ~2/3   Tennis ball (pac man) Right     Both  X      X   Resistive clips    Foam  6 each color  ~25 cubes Yellow-black     Tip red gr - 3 jaw  Lateral  -      X   Forearm/wrist  Twist w/hook & wt   3x  1/2 lb     added   x   Other: Access Code: [email or web address]  URL: [email or web address]  Date: 08/30/2024  Prepared by: Benito Harp    Treatment Charges: Mins Units (BWC) Time In/Out:   []  Modalities: heat      []  Ultrasound      [x]  Ther Exercise 50 3    [x]  Manual Therapy 10 1    []  Ther Activities      []  Orthotic fit/train      []  Orthotic recheck      []  Other      Total Billable time 60 mins         Assessment: [x] Progressing toward goals. Refer to progress note    [] No change.  [] Other     [x] Pt would benefit from skilled OT services to address decreased ROM  and c/o pn  in the B wrists for the completion of ADLs/IADLs and for the safe return to work.        STG/LTG  Short Term Goals: (  8    Treatments)  Decrease Pain in R wrist to 3/10 and L wrist to 2/10   MET   Increase AROM in R wrist   Flexion by 15 degrees   MET   Ext by 15 degrees   MET   Increase AROM in L wrist   Flexion by 10 degrees   NOT MET   Ext by 15 degrees    NOT MET   Increase  strength by 15lbs in B UE (set 9/30/24)   Increase pinch strength: by 3lbs in B UE (set 9/30/24)   Increase function:UE Functional Index Score to 50/80 or more points to promote increased functional

## 2024-09-30 NOTE — PROGRESS NOTES
Extension (60-70) 63 31 4/5 5/5   Radial Deviation (20-25) 20 7 4/5 5/5   Ulnar Deviation (30-40) 31 23 4/5 5/5    (0 is used for neutral, + is used for hyperextension, - is used for extensor lag per ASHT)     STRENGTH - 9/30/24   Right  Left Normative data R/L    position 1 12 18     position 2 11 22 45/43   Lateral pinch 5 12    2 point pinch 4 7    3 jaw pinch 5 8       Bilateral  strength is normally symmetrical or up to 10% stronger on the dominant extremity, depending on the individual's physically activity level.      Function: Current Functional Level:  33/80 (41%) functionally impaired as measured with the Upper Extremity Functional Index Survey.  0-80 scale, with 80 = no Deficits  (The UEFI model does not provide any specific cut off points that could classify the upper limb disability degree, however, a minimal detectable change of 9 points is provided.This means that for improvement or deterioration to be considered, between two subsequent evaluations, the scores must differ by at least 9 points.)       Assessment: Pt is progressing well thus far through OT. Pt's c/o pn has decreased in B wrist/hands. Pt has been able to return to work w/ minimal limitations and increased symptoms. Pt's AROM in B wrists has increased since initial eval (see table above for details). Pt's  and pinch strength is decreased as compared to normative data for her age and gender. Pt would cont to benefit from skilled ot services to address c/o pn, and decreased /pinch strength for the completion of ADLs/IADLs and work related tasks.       Short Term Goals: (  8    Treatments)  Decrease Pain in R wrist to 3/10 and L wrist to 2/10   MET   Increase AROM in R wrist   Flexion by 15 degrees   MET   Ext by 15 degrees   MET   Increase AROM in L wrist   Flexion by 10 degrees   NOT MET   Ext by 15 degrees    NOT MET   Increase  strength by 15lbs in B UE (set 9/30/24)   Increase pinch strength: by 3lbs in B UE

## 2024-10-04 ENCOUNTER — HOSPITAL ENCOUNTER (OUTPATIENT)
Dept: OCCUPATIONAL THERAPY | Facility: CLINIC | Age: 59
Setting detail: THERAPIES SERIES
Discharge: HOME | End: 2024-10-04
Payer: COMMERCIAL

## 2024-10-04 PROCEDURE — 97140 MANUAL THERAPY 1/> REGIONS: CPT

## 2024-10-04 PROCEDURE — 97110 THERAPEUTIC EXERCISES: CPT

## 2024-10-04 NOTE — FLOWSHEET NOTE
correct form without cues.  MET        Long Term Goals: (  16  Treatments)  Decrease Pain in R wrist to 1/10 and L wrist to 1/10  Increase AROM in R wrist   Flexion by 30 degrees  Ext by 20 degrees  Increase AROM in L wrist   Flexion by 20 degrees  Ext by 20 degrees  Increase  strength by 25lbs in B UE (set 9/30/24)   Increase pinch strength: by 5lbs in B UE (set 9/30/24)   Increase function:UE Functional Index Score to 70/80 or more points to promote increased functional abilities  Patient to be independent with home exercise program as demonstrated by performance with correct form without cues.     Patient Goals: \"have use of hand/arm w/out pn\"    Pt. Education:  [] Yes  [x] No  [] Reviewed Prior HEP/Ed  Method of Education: [] Verbal  [] Demo  [] Written  Re:    Comprehension of Education:  [] Verbalizes understanding.  [] Demonstrates understanding.  [] Needs review.  [] Demonstrates/verbalizes HEP/Ed previously given.      Plan: [x] Continue current frequency toward short and long term goals.  [x] Specific Instructions for subsequent treatments: Continue AROM, PROM & strengthening   [] Other:       Time In: 1000  Time Out: 1048       Electronically signed by:  Benito Harp OTR/DIONTE

## 2024-10-09 ENCOUNTER — OFFICE VISIT (OUTPATIENT)
Dept: ORTHOPEDIC SURGERY | Age: 59
End: 2024-10-09
Payer: COMMERCIAL

## 2024-10-09 ENCOUNTER — HOSPITAL ENCOUNTER (OUTPATIENT)
Dept: OCCUPATIONAL THERAPY | Facility: CLINIC | Age: 59
Setting detail: THERAPIES SERIES
Discharge: HOME | End: 2024-10-09
Payer: COMMERCIAL

## 2024-10-09 VITALS — WEIGHT: 181 LBS | RESPIRATION RATE: 18 BRPM | BODY MASS INDEX: 32.07 KG/M2 | HEIGHT: 63 IN

## 2024-10-09 DIAGNOSIS — S62.111A CLOSED CHIP FRACTURE OF TRIQUETRUM OF RIGHT WRIST, INITIAL ENCOUNTER: Primary | ICD-10-CM

## 2024-10-09 PROCEDURE — 97140 MANUAL THERAPY 1/> REGIONS: CPT

## 2024-10-09 PROCEDURE — 99212 OFFICE O/P EST SF 10 MIN: CPT | Performed by: ORTHOPAEDIC SURGERY

## 2024-10-09 PROCEDURE — 97110 THERAPEUTIC EXERCISES: CPT

## 2024-10-09 NOTE — FLOWSHEET NOTE
NOT MET   Ext by 15 degrees    NOT MET   Increase  strength by 15lbs in B UE (set 9/30/24)   Increase pinch strength: by 3lbs in B UE (set 9/30/24)   Increase function:UE Functional Index Score to 50/80 or more points to promote increased functional abilities  NOT MET   Patient to be independent with home exercise program as demonstrated by performance with correct form without cues.  MET        Long Term Goals: (  16  Treatments)  Decrease Pain in R wrist to 1/10 and L wrist to 1/10  Increase AROM in R wrist   Flexion by 30 degrees  Ext by 20 degrees  Increase AROM in L wrist   Flexion by 20 degrees  Ext by 20 degrees  Increase  strength by 25lbs in B UE (set 9/30/24)   Increase pinch strength: by 5lbs in B UE (set 9/30/24)   Increase function:UE Functional Index Score to 70/80 or more points to promote increased functional abilities  Patient to be independent with home exercise program as demonstrated by performance with correct form without cues.     Patient Goals: \"have use of hand/arm w/out pn\"    Pt. Education:  [] Yes  [x] No  [] Reviewed Prior HEP/Ed  Method of Education: [] Verbal  [] Demo  [] Written  Re:    Comprehension of Education:  [] Verbalizes understanding.  [] Demonstrates understanding.  [] Needs review.  [] Demonstrates/verbalizes HEP/Ed previously given.      Plan: [x] Continue current frequency toward short and long term goals.  [x] Specific Instructions for subsequent treatments: Continue AROM, PROM & strengthening   [] Other:       Time In: 1101  Time Out: 1147       Electronically signed by:  LILIA Faria/DIONTE

## 2024-10-09 NOTE — PROGRESS NOTES
HPI: Ms. Shaw is a 59-year-old lady here today 3 months out from a closed nondisplaced right wrist triquetrum fracture.  She has been managed conservatively thus far.  She has been attending occupational therapy since our last encounter.  She indicates that this is doing well.  She still reports having some residual pain primarily over the ulnar aspect of the wrist extending volarly.  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  She is using Motrin for this which takes the edge off.    On exam today she has intact skin at the wrist without any obvious warmth erythema or swelling.  She has good range of motion with flexion and extension.  No instability or tenderness at the DRUJ.  She is mildly tender to palpation over the ulnar border of the wrist at the tip of the ulnar styloid.  Nontender to palpation over the dorsum of the wrist.  No crepitation with motion.  2+ radial pulse and sensation is grossly intact light touch in all dermatomes.    Imaging studies: 3 x-ray views of the right wrist completed on 10/9/2024 reviewed independently demonstrating well-maintained joint spaces in addition to what appears to be a nondisplaced fracture involving the triquetrum.  No obvious dislocation or subluxation.    Impression and plan: Ms. Shaw is a 59-year-old lady approximately 3 months out from a closed nondisplaced right wrist triquetrum fracture.  She appears to be healing this appropriately.  She still having some persistent pain at this time primarily localized to the ulnar aspect of the wrist.  I had a discussion with the patient today about this.  I encouraged her to keep up with therapy.  She can wear her brace when active and out of work as she works at the [employer] and is unable to wear her brace there.  Should she fail to demonstrate improvement over the course of the next 6 weeks I recommended that she notify me

## 2024-10-11 ENCOUNTER — HOSPITAL ENCOUNTER (OUTPATIENT)
Dept: OCCUPATIONAL THERAPY | Facility: CLINIC | Age: 59
Setting detail: THERAPIES SERIES
Discharge: HOME | End: 2024-10-11
Payer: COMMERCIAL

## 2024-10-11 PROCEDURE — 97110 THERAPEUTIC EXERCISES: CPT

## 2024-10-11 PROCEDURE — 97140 MANUAL THERAPY 1/> REGIONS: CPT

## 2024-10-11 NOTE — FLOWSHEET NOTE
[] [facility]  Outpatient Rehabilitation &  Therapy  [address]  [phone]  F: [phone] [] [facility]  Outpatient Rehabilitation &  Therapy  [address]   [address]  P: [phone]  F: [phone] [x] [facility]  Outpatient Rehabilitation &  Therapy  [address]  P: [phone]  F: [phone] [] [facility]  Outpatient Rehabilitation &  Therapy  [address]   P: [phone]  F: [phone] [] [facility]   Outpatient Rehabilitation   & Therapy  [address]  P: [phone]   F: [phone]     Occupational Therapy Daily Treatment Note    Date:  10/11/2024  Patient Name:  Lynn Shaw    :  1965  MRN: [id number]  Referring Provider:   Milka Lovelace MD  Insurance: [facility] -  visits remaining (auth after 30 visits)  Medical Diagnosis: S62.111A (ICD-10-CM) - Closed chip fracture of triquetrum of right wrist, initial encounter, M25.532 (ICD-10-CM) - Left wrist pain, M79.642 (ICD-10-CM) - Left hand pain  Rehab Codes: pain in wrist M25.53,, stiffness in wrist M25.63,, effusion of hand M25.44,, muscle wasting of forearm M62.53,, or muscle wasting of hand M62.54,   Onset Date: 24                 Next  Appt: 6 week if needed.  Visit# / total visits: ; Progress note for Medicare patient completed at visit 8    Cancels/No Shows: 0/0      Subjective:    Pain:  [x] Yes  [] No Location:  Right wrist   Pain Ratin/10   Pain altered Tx:  [x] No  [] Yes  Action:  Pt Comments: Pt reports she is having some pn in the dorsum of the R hand.     Precautions: n/a  Surgery procedure and date: n/a  s/p nondisplaced triquetrum fracture on 24 and non-specific L hand injury secondary to a fall a few weeks ago.     Objective:  Today's Treatment:  Modalities:  Exercises:  Exercise Flow Sheet:  Exercise Reps/Time Weight/Level Comments Completed   Wrist AROM  B hands

## 2024-10-14 ENCOUNTER — HOSPITAL ENCOUNTER (OUTPATIENT)
Dept: OCCUPATIONAL THERAPY | Facility: CLINIC | Age: 59
Setting detail: THERAPIES SERIES
Discharge: HOME | End: 2024-10-14
Payer: COMMERCIAL

## 2024-10-14 PROCEDURE — 97110 THERAPEUTIC EXERCISES: CPT

## 2024-10-14 PROCEDURE — 97140 MANUAL THERAPY 1/> REGIONS: CPT

## 2024-10-14 NOTE — FLOWSHEET NOTE
[] [facility]  Outpatient Rehabilitation &  Therapy  [address]  [phone]  F: [phone] [] [facility]  Outpatient Rehabilitation &  Therapy  [address]   [address]  P: [phone]  F: [phone] [x] [facility]  Outpatient Rehabilitation &  Therapy  [address]  P: [phone]  F: [phone] [] [facility]  Outpatient Rehabilitation &  Therapy  [address]   P: [phone]  F: [phone] [] [facility]   Outpatient Rehabilitation   & Therapy  [address]  P: [phone]   F: [phone]     Occupational Therapy Daily Treatment Note    Date:  10/14/2024  Patient Name:  Lynn Shaw    :  1965  MRN: [id number]  Referring Provider:   Milka Lovelace MD  Insurance: [employer] -  visits remaining (auth after 30 visits)  Medical Diagnosis: S62.111A (ICD-10-CM) - Closed chip fracture of triquetrum of right wrist, initial encounter, M25.532 (ICD-10-CM) - Left wrist pain, M79.642 (ICD-10-CM) - Left hand pain  Rehab Codes: pain in wrist M25.53,, stiffness in wrist M25.63,, effusion of hand M25.44,, muscle wasting of forearm M62.53,, or muscle wasting of hand M62.54,   Onset Date: 24                 Next  Appt: 6 week if needed.  Visit# / total visits: ; Progress note for Medicare patient completed at visit 8    Cancels/No Shows: 0/0      Subjective:    Pain:  [x] Yes  [] No Location:  Right wrist   Pain Ratin/10   Pain altered Tx:  [x] No  [] Yes  Action:  Pt Comments: Pt reports work especially using a mouse up to 5/10.     Precautions: n/a  Surgery procedure and date: n/a  s/p nondisplaced triquetrum fracture on 7/19/24 and non-specific L hand injury secondary to a fall a few weeks ago.     Objective:  Today's Treatment:  Modalities:  Exercises:  Exercise Flow Sheet:  Exercise Reps/Time Weight/Level Comments Completed   Wrist AROM  B hands 3x10 AROM

## 2024-10-16 ENCOUNTER — HOSPITAL ENCOUNTER (OUTPATIENT)
Dept: OCCUPATIONAL THERAPY | Facility: CLINIC | Age: 59
Setting detail: THERAPIES SERIES
Discharge: HOME | End: 2024-10-16
Payer: COMMERCIAL

## 2024-10-16 PROCEDURE — 97110 THERAPEUTIC EXERCISES: CPT

## 2024-10-16 PROCEDURE — 97140 MANUAL THERAPY 1/> REGIONS: CPT

## 2024-10-16 NOTE — FLOWSHEET NOTE
[] [facility]  Outpatient Rehabilitation &  Therapy  [address]  [phone]  F: [phone] [] [facility]  Outpatient Rehabilitation &  Therapy  [address]   [address]  P: [phone]  F: [phone] [x] [facility]  Outpatient Rehabilitation &  Therapy  [address]  P: [phone]  F: [phone] [] [facility]  Outpatient Rehabilitation &  Therapy  [address]   P: [phone]  F: [phone] [] [facility]   Outpatient Rehabilitation   & Therapy  [address]  P: [phone]   F: [phone]     Occupational Therapy Daily Treatment Note    Date:  10/16/2024  Patient Name:  Lynn Shaw    :  1965  MRN: [id number]  Referring Provider:   Milka Lovelace MD  Insurance: [facility] -  visits remaining (auth after 30 visits)  Medical Diagnosis: S62.111A (ICD-10-CM) - Closed chip fracture of triquetrum of right wrist, initial encounter, M25.532 (ICD-10-CM) - Left wrist pain, M79.642 (ICD-10-CM) - Left hand pain  Rehab Codes: pain in wrist M25.53,, stiffness in wrist M25.63,, effusion of hand M25.44,, muscle wasting of forearm M62.53,, or muscle wasting of hand M62.54,   Onset Date: 24                 Next  Appt: 6 week if needed.  Visit# / total visits: ; Progress note for Medicare patient completed at visit 8    Cancels/No Shows: 0/0      Subjective:    Pain:  [x] Yes  [] No Location:  Right wrist   Pain Ratin/10   Pain altered Tx:  [x] No  [] Yes  Action:  Pt Comments: Pt reports being more painful today in the R wrist     Precautions: n/a  Surgery procedure and date: n/a  s/p nondisplaced triquetrum fracture on 24 and non-specific L hand injury secondary to a fall a few weeks ago.     Objective:  Today's Treatment:  Modalities:  Exercises:  Exercise Flow Sheet:  Exercise Reps/Time Weight/Level Comments Completed   Wrist AROM  B hands 3x10 AROM

## 2024-10-17 ENCOUNTER — CLINICAL DOCUMENTATION (OUTPATIENT)
Dept: PHARMACY | Facility: CLINIC | Age: 59
End: 2024-10-17

## 2024-10-17 NOTE — PROGRESS NOTES
[facility] Employee Diabetes Program - Be Well With Diabetes    Quarterly Check-in  Quarterly Reminder sent to patient for the DM Program - See Mycmichael message or Letter for more information  Sent Ronal Kemp [facility] Clinical   [facility] Clinical Pharmacy  Department, toll free: [phone], option 3    For Pharmacy Admin Tracking Only    Program: [employer]  CPA in place:  No  Gap Closed?: No   Time Spent (min): 5    =======================================================    Mychart/Letter for participant:    Thanks so much for taking the first step towards better health.    Please review the information below for requirements that need to be completed for the remainder of the year.    To ensure participants are taking steps to control their condition ongoing requirements need to be completed. To receive the Be Well With Diabetes Program benefit for 2025, the following actions must be taken:     Requirements to be completed by 12/31/24  Urine Microalbumin (once yearly)    *Documentation of any requirements completed or reviewed outside of the [facility] electronic medical record will need to be faxed or emailed (fax/email info below).*    If the missing requirements(s) are not met by the date listed above, you will be disqualified from the program and will not receive program benefits in 2025. If any patient is dis-enrolled from the program then they must wait a full calendar year to re-enroll in the program.   Please reach out to our team ASAP if there are any questions or concerns.    [facility]   Phone: [phone] Option #3  Email: [email or web address]  Fax Number: [phone]

## 2024-10-21 ENCOUNTER — HOSPITAL ENCOUNTER (OUTPATIENT)
Dept: OCCUPATIONAL THERAPY | Facility: CLINIC | Age: 59
Setting detail: THERAPIES SERIES
Discharge: HOME | End: 2024-10-21
Payer: COMMERCIAL

## 2024-10-21 PROCEDURE — 97110 THERAPEUTIC EXERCISES: CPT

## 2024-10-21 NOTE — FLOWSHEET NOTE
[] [facility]  Outpatient Rehabilitation &  Therapy  [address]  [phone]  F: [phone] [] [facility]  Outpatient Rehabilitation &  Therapy  [address]   [address]  P: [phone]  F: [phone] [x] [facility]  Outpatient Rehabilitation &  Therapy  [address]  P: [phone]  F: [phone] [] [facility]  Outpatient Rehabilitation &  Therapy  [address]   P: [phone]  F: [phone] [] [facility]   Outpatient Rehabilitation   & Therapy  [address]  P: [phone]   F: [phone]     Occupational Therapy Daily Treatment Note    Date:  10/21/2024  Patient Name:  Lynn Shaw    :  1965  MRN: [id number]  Referring Provider:   Milka Lovelace MD  Insurance: [facility] -  visits remaining (auth after 30 visits)  Medical Diagnosis: S62.111A (ICD-10-CM) - Closed chip fracture of triquetrum of right wrist, initial encounter, M25.532 (ICD-10-CM) - Left wrist pain, M79.642 (ICD-10-CM) - Left hand pain  Rehab Codes: pain in wrist M25.53,, stiffness in wrist M25.63,, effusion of hand M25.44,, muscle wasting of forearm M62.53,, or muscle wasting of hand M62.54,   Onset Date: 24                 Next  Appt: 6 week if needed.  Visit# / total visits: ; Progress note for Medicare patient completed at visit 8    Cancels/No Shows: 0/0      Subjective:    Pain:  [x] Yes  [] No Location:  Right wrist   Pain Ratin/10   Pain altered Tx:  [x] No  [] Yes  Action:  Pt Comments: Pt reports having a busy weekend with my daughters shower and used it a lot all weekend.    Precautions: n/a  Surgery procedure and date: n/a  s/p nondisplaced triquetrum fracture on 24 and non-specific L hand injury secondary to a fall a few weeks ago.     Objective:  Today's Treatment:  Modalities:  Exercises:  Exercise Flow Sheet:  Exercise Reps/Time Weight/Level Comments Completed

## 2024-10-30 ENCOUNTER — HOSPITAL ENCOUNTER (OUTPATIENT)
Dept: OCCUPATIONAL THERAPY | Facility: CLINIC | Age: 59
Setting detail: THERAPIES SERIES
Discharge: HOME | End: 2024-10-30
Payer: COMMERCIAL

## 2024-10-30 PROCEDURE — 97110 THERAPEUTIC EXERCISES: CPT

## 2024-10-30 NOTE — FLOWSHEET NOTE
[] [facility]  Outpatient Rehabilitation &  Therapy  [address]  [phone]  F: [phone] [] [facility]  Outpatient Rehabilitation &  Therapy  [address]   [address]  P: [phone]  F: [phone] [x] [facility]  Outpatient Rehabilitation &  Therapy  [address]  P: [phone]  F: [phone] [] [facility]  Outpatient Rehabilitation &  Therapy  [address]   P: [phone]  F: [phone] [] [facility]   Outpatient Rehabilitation   & Therapy  [address]  P: [phone]   F: [phone]     Occupational Therapy Daily Treatment Note    Date:  10/30/2024  Patient Name:  Lynn Shaw    :  1965  MRN: [id number]  Referring Provider:   Milka Lovelace MD  Insurance: [facility] -  visits remaining (auth after 30 visits)  Medical Diagnosis: S62.111A (ICD-10-CM) - Closed chip fracture of triquetrum of right wrist, initial encounter, M25.532 (ICD-10-CM) - Left wrist pain, M79.642 (ICD-10-CM) - Left hand pain  Rehab Codes: pain in wrist M25.53,, stiffness in wrist M25.63,, effusion of hand M25.44,, muscle wasting of forearm M62.53,, or muscle wasting of hand M62.54,   Onset Date: 24                 Next  Appt: 6 week if needed.  Visit# / total visits: 15; Progress note for Medicare patient completed at visit 8    Cancels/No Shows: 0/0      Subjective:    Pain:  [x] Yes  [] No Location:  Right wrist   Pain Ratin/10   Pain altered Tx:  [x] No  [] Yes  Action:  Pt Comments: Pt reports she is doing good. Still has soreness in the R wrist/hand    Precautions: n/a  Surgery procedure and date: n/a  s/p nondisplaced triquetrum fracture on 24 and non-specific L hand injury secondary to a fall a few weeks ago.     Objective:  Today's Treatment:  Modalities:  Exercises:  Exercise Flow Sheet:  Exercise Reps/Time Weight/Level Comments Completed   Wrist AROM  B

## 2024-11-06 ENCOUNTER — TELEPHONE (OUTPATIENT)
Dept: ORTHOPEDIC SURGERY | Age: 59
End: 2024-11-06

## 2024-11-06 ENCOUNTER — HOSPITAL ENCOUNTER (OUTPATIENT)
Dept: OCCUPATIONAL THERAPY | Facility: CLINIC | Age: 59
Setting detail: THERAPIES SERIES
Discharge: HOME | End: 2024-11-06
Payer: COMMERCIAL

## 2024-11-06 PROCEDURE — 97110 THERAPEUTIC EXERCISES: CPT

## 2024-11-06 NOTE — FLOWSHEET NOTE
[] [facility]  Outpatient Rehabilitation &  Therapy  [address]  [phone]  F: [phone] [] [facility]  Outpatient Rehabilitation &  Therapy  [address]   [address]  P: [phone]  F: [phone] [x] [facility]  Outpatient Rehabilitation &  Therapy  [address]  P: [phone]  F: [phone] [] [facility]  Outpatient Rehabilitation &  Therapy  [address]   P: [phone]  F: [phone] [] [facility]   Outpatient Rehabilitation   & Therapy  [address]  P: [phone]   F: [phone]     Occupational Therapy Daily Treatment Note    Date:  2024  Patient Name:  Lynn Shaw    :  1965  MRN: [id number]  Referring Provider:   Milka Lovelace MD  Insurance: [facility] -  visits remaining (auth after 30 visits)  Medical Diagnosis: S62.111A (ICD-10-CM) - Closed chip fracture of triquetrum of right wrist, initial encounter, M25.532 (ICD-10-CM) - Left wrist pain, M79.642 (ICD-10-CM) - Left hand pain  Rehab Codes: pain in wrist M25.53,, stiffness in wrist M25.63,, effusion of hand M25.44,, muscle wasting of forearm M62.53,, or muscle wasting of hand M62.54,   Onset Date: 24                 Next  Appt: 6 week if needed.  Visit# / total visits: ; Progress note for Medicare patient completed at visit 8    Cancels/No Shows: 0/0      Subjective:    Pain:  [x] Yes  [] No Location:  Right wrist   Pain Ratin/10   Pain altered Tx:  [x] No  [] Yes  Action:  Pt Comments: Pt reports ulnar side of wrist still sore especially with lifting heavier items, mouse use and scrubbing oven.      Precautions: n/a  Surgery procedure and date: n/a  s/p nondisplaced triquetrum fracture on 24 and non-specific L hand injury secondary to a fall a few weeks ago.     Objective:  Today's Treatment:  Modalities:  Exercises:  Exercise Flow Sheet:  Exercise Reps/Time

## 2024-11-06 NOTE — TELEPHONE ENCOUNTER
Received call from patient stating Dr. Lovelace was going to call in a script for her for the Voltaren pills. Stated pharmacy still has not received this script.    Please call into [facility].    Please advise.    Call back#: [phone]

## 2024-11-06 NOTE — PROGRESS NOTES
[] [facility]  Outpatient Rehabilitation &  Therapy  [address]  [phone]  F: [phone] [] [facility]  Outpatient Rehabilitation &  Therapy  [address]   [address]  P: [phone]  F: [phone] [x] [facility]  Outpatient Rehabilitation &  Therapy  [address]  P: [phone]  F: [phone] [] [facility]  Outpatient Rehabilitation &  Therapy  [address]   P: [phone]  F: [phone] [] [facility]   Outpatient Rehabilitation   & Therapy  [address]  P: [phone]   F: [phone]     Occupational Therapy progress Note    Date: 2024      Patient: Lynn Shaw  : 1965  MRN: [id number]    Referring Provider:   Milka Lovelace MD  Insurance: [facility] -  visits remaining (auth after 30 visits)  Medical Diagnosis: S62.111A (ICD-10-CM) - Closed chip fracture of triquetrum of right wrist, initial encounter, M25.532 (ICD-10-CM) - Left wrist pain, M79.642 (ICD-10-CM) - Left hand pain  Rehab Codes: pain in wrist M25.53,, stiffness in wrist M25.63,, effusion of hand M25.44,, muscle wasting of forearm M62.53,, or muscle wasting of hand M62.54,   Onset Date: 24                 Next Dr. Appt: 10/9/24  Total visits attended: 16  Cancels/No shows: 0/0  Date range of services: 24 to 24    Subjective:  Pain:  [x] Yes  [] No  Location: wrist achy  Pain Rating: (0-10 scale) 3/10 Pain altered Tx:  [x] No  [] Yes  Action:  Comments:  refer to progress   Objective:  Test Measurements:  UE ROM/MMT    Right Left MMT Right MMT Left   Elbow           Forearm Pronation (80-90) WFL WFL 4/5 5/5   Forearm Supination (80-90)  WFL WFL 4/5 5/5   Wrist         Flexion (70-80) 60 63 4/5 5/5   Extension (60-70) 73 31 4/5 5/5   Radial Deviation (20-25) 11 7 4/5 5/5   Ulnar Deviation (30-40) 25 23 4/5 5/5    (0 is used for neutral, + is used for hyperextension, - is used

## 2024-11-07 DIAGNOSIS — M25.532 LEFT WRIST PAIN: Primary | ICD-10-CM

## 2024-11-07 RX ORDER — DICLOFENAC SODIUM 75 MG/1
75 TABLET, DELAYED RELEASE ORAL 2 TIMES DAILY
Qty: 28 TABLET | Refills: 0 | Status: SHIPPED | OUTPATIENT
Start: 2024-11-07 | End: 2024-11-21

## 2024-11-07 NOTE — TELEPHONE ENCOUNTER
Lynn Shaw is requesting a refill on Volteren    right wrist injury  MRI  10/31/2024  OT 11/6/2024   Last visit: 10/09/24  Next Office visit:  None  Last prescribing provider:  N/A

## 2024-12-11 ENCOUNTER — PATIENT MESSAGE (OUTPATIENT)
Dept: PHARMACY | Facility: CLINIC | Age: 59
End: 2024-12-11

## 2024-12-11 ENCOUNTER — TELEPHONE (OUTPATIENT)
Dept: PHARMACY | Facility: CLINIC | Age: 59
End: 2024-12-11

## 2024-12-11 NOTE — TELEPHONE ENCOUNTER
[facility] Employee Diabetes Program - Be Well With Diabetes    Quarterly Check-in  Quarterly Reminder sent to patient for the DM Program - See Mychart message or Letter for more information  Called patient to discuss missing requirements  Left VM  Sent Mychart  [facility] [place] Garzon [facility] Clinical   [facility] Clinical Pharmacy  Department, toll free: [phone], option 3      For Pharmacy Admin Tracking Only    Program: [employer]  CPA in place:  No  Gap Closed?: No   Time Spent (min): 5       =======================================================    Mychart/Letter for participant:    Thanks so much for taking the first step towards better health.    Please review the information below for requirements that need to be completed for the remainder of the year.    To ensure participants are taking steps to control their condition ongoing requirements need to be completed. To receive the Be Well With Diabetes Program benefit for 2025, the following actions must be taken:     Requirements to be completed by 12/31/24  Urine Microalbumin (once yearly)      *Documentation of any requirements completed or reviewed outside of the [facility] electronic medical record will need to be faxed or emailed (fax/email info below).*    If the missing requirements(s) are not met by the date listed above, you will be disqualified from the program and will not receive program benefits in 2025. If any patient is dis-enrolled from the program then they must wait a full calendar year to re-enroll in the program.   Please reach out to our team ASAP if there are any questions or concerns.      [facility]   Phone: [phone] Option #3  Email: [email or web address]  Fax Number: [phone]

## 2024-12-13 ENCOUNTER — HOSPITAL ENCOUNTER (OUTPATIENT)
Age: 59
Discharge: HOME | End: 2024-12-13
Payer: COMMERCIAL

## 2024-12-13 LAB
ALBUMIN SERPL-MCNC: 3.9 G/DL (ref 3.5–5.2)
ALBUMIN/GLOB SERPL: 1.4 {RATIO} (ref 1–2.5)
ALP SERPL-CCNC: 78 U/L (ref 35–104)
ALT SERPL-CCNC: 10 U/L (ref 10–35)
ANION GAP SERPL CALCULATED.3IONS-SCNC: 8 MMOL/L (ref 9–16)
AST SERPL-CCNC: 13 U/L (ref 10–35)
B-OH-BUTYR SERPL-MCNC: 0.19 MMOL/L (ref 0.02–0.27)
BILIRUB SERPL-MCNC: 0.3 MG/DL (ref 0–1.2)
BUN SERPL-MCNC: 18 MG/DL (ref 6–20)
CALCIUM SERPL-MCNC: 9.5 MG/DL (ref 8.6–10.4)
CHLORIDE SERPL-SCNC: 102 MMOL/L (ref 98–107)
CO2 SERPL-SCNC: 30 MMOL/L (ref 20–31)
CREAT SERPL-MCNC: 0.7 MG/DL (ref 0.6–0.9)
CREAT UR-MCNC: 110 MG/DL (ref 28–217)
EST. AVERAGE GLUCOSE BLD GHB EST-MCNC: 154 MG/DL
GFR, ESTIMATED: >90 ML/MIN/1.73M2
GLUCOSE SERPL-MCNC: 107 MG/DL (ref 74–99)
HBA1C MFR BLD: 7 % (ref 4–6)
MICROALBUMIN UR-MCNC: 18 MG/L (ref 0–20)
MICROALBUMIN/CREAT UR-RTO: 16 MCG/MG CREAT (ref 0–25)
POTASSIUM SERPL-SCNC: 4.2 MMOL/L (ref 3.7–5.3)
PROT SERPL-MCNC: 6.6 G/DL (ref 6.6–8.7)
SODIUM SERPL-SCNC: 140 MMOL/L (ref 136–145)

## 2024-12-13 PROCEDURE — 80053 COMPREHEN METABOLIC PANEL: CPT

## 2024-12-13 PROCEDURE — 36415 COLL VENOUS BLD VENIPUNCTURE: CPT

## 2024-12-13 PROCEDURE — 83036 HEMOGLOBIN GLYCOSYLATED A1C: CPT

## 2024-12-13 PROCEDURE — 82010 KETONE BODYS QUAN: CPT

## 2024-12-13 PROCEDURE — 82043 UR ALBUMIN QUANTITATIVE: CPT

## 2024-12-13 PROCEDURE — 82570 ASSAY OF URINE CREATININE: CPT

## 2024-12-19 NOTE — TELEPHONE ENCOUNTER
Patient completed Microalbumin Urine test on 12/13/24.    Previous MyChart message not read by patient.  Information updated and letter mailed to patient in a letter.    No further patient outreach planned at this time.    Sweetie Kemp CphT   Population Health Clinical   [facility] Clinical Pharmacy  Department, toll free: [phone], option 3

## 2025-01-27 ENCOUNTER — TELEPHONE (OUTPATIENT)
Dept: PHARMACY | Facility: CLINIC | Age: 60
End: 2025-01-27

## 2025-01-27 NOTE — TELEPHONE ENCOUNTER
**Patient is [facility] **    Called patient to schedule 2025 yearly pharmacist appointment to discuss medications for Diabetes Management Program.     No answer. Left VM on TAD: Please call back at [phone] Option #3.    Nasreen Smith CPhT  Population Health    [facility] Clinical Pharmacy   [phone] option 3

## 2025-02-05 NOTE — TELEPHONE ENCOUNTER
Second attempt made to contact patient to schedule 2025 yearly pharmacist appointment to discuss medications for Diabetes Management Program.     No answer. Left VM on TAD: Please call back at [phone] Option #3.     [employer] message sent to patient.    No further patient outreach planned at this time.    Nasreen Smith [facility]  Population Health    [facility] Clinical Pharmacy   [phone] option 3    For Pharmacy Admin Tracking Only    Program: [employer]  CPA in place:  No  Gap Closed?: No   Time Spent (min): 10

## 2025-02-06 NOTE — TELEPHONE ENCOUNTER
Noted.    Sweetie Kemp [facility]   Population Health Clinical   [facility] Clinical Pharmacy  Department, toll free: [phone], option 3

## 2025-02-28 ENCOUNTER — TELEPHONE (OUTPATIENT)
Dept: PHARMACY | Facility: CLINIC | Age: 60
End: 2025-02-28

## 2025-02-28 NOTE — TELEPHONE ENCOUNTER
**2nd attempt to contact patient**    **Patient is [facility] **  Called patient to schedule 2025 yearly pharmacist appointment to discuss medications for Diabetes Management Program.    No answer. Left VM on home/mobile TAD: Please call back at [phone] Option #3.     [place] [facility]  Clinical Pharmacy   Department, toll free: [phone] Option #3

## 2025-03-10 NOTE — TELEPHONE ENCOUNTER
Pt returned call and made an appt on 03/21/25 at 2pm     For Pharmacy Admin Tracking Only    Program: [address]  CPA in place:  No  Recommendation Provided To: Patient/Caregiver: 1 via Telephone  Intervention Detail: Scheduled Appointment  Intervention Accepted By: Patient/Caregiver: 1  Gap Closed?: Yes   Time Spent (min): 5

## 2025-03-10 NOTE — TELEPHONE ENCOUNTER
Second attempt made to contact patient to schedule 2025 yearly pharmacist appointment to discuss medications for Diabetes Management Program.    No answer. Left VM on home/mobile TAD: Please call back at [phone] Option #3.     Previous [employer] message sent to patient was read.  [employer] Message sent mailed to patient    No further patient outreach planned at this time.    [place] [facility]  Clinical Pharmacy   Department, toll free: [phone] Option #3      For Pharmacy Admin Tracking Only    Program: [employer]  CPA in place:  No  Gap Closed?: No   Time Spent (min): 5

## 2025-03-10 NOTE — TELEPHONE ENCOUNTER
Noted.    Sweetie Kemp [facility]   Population Health Clinical   [facility] Clinical Pharmacy  Department, toll free: [phone], option 3

## 2025-03-21 ENCOUNTER — TELEPHONE (OUTPATIENT)
Dept: PHARMACY | Facility: CLINIC | Age: 60
End: 2025-03-21

## 2025-03-21 NOTE — TELEPHONE ENCOUNTER
POPULATION HEALTH CLINICAL PHARMACY REVIEW - Be Well with Diabetes ([facility])    Lynn Shaw is a 60 y.o. female enrolled in the [facility] Employee Diabetes Program. Patient provided writer with verbal consent to remain in the program for this year. Patient enrolled 2017.    Communication preferences (for >8% followup, urgent messages, etc)  Preferred methods: Phone and Mychart  Preferred days/time: anytime    Medications:  Current Outpatient Medications   Medication Instructions    ALPRAZolam (XANAX) 0.25 MG tablet take 1 tablet by mouth twice a day if needed for anxiety    Calcium Carb-Cholecalciferol (CALCIUM-VITAMIN D) 500-200 MG-UNIT per tablet 1 tablet, Oral, 2 TIMES DAILY WITH MEALS    diclofenac (VOLTAREN) 75 mg, Oral, 2 TIMES DAILY    escitalopram (LEXAPRO) 20 mg, Oral, DAILY    insulin glargine (LANTUS) 48 Units, SubCUTAneous, EVERY MORNING    insulin glargine (LANTUS) 18 Units, SubCUTAneous, NIGHTLY    insulin lispro (HUMALOG KWIKPEN) 200 UNIT/ML SOPN pen Administer as directed by physician with sliding scale which was provided up to 60 units daily    Insulin Pen Needle 32G X 4 MM MISC 1 each, Does not apply, 3 TIMES DAILY    lisinopril (PRINIVIL;ZESTRIL) 5 mg, Oral, DAILY    Multiple Vitamins-Minerals (THERAPEUTIC MULTIVITAMIN-MINERALS) tablet 1 tablet, Oral, DAILY    predniSONE (DELTASONE) 10 mg, Oral, DAILY    vitamin D3 (CHOLECALCIFEROL) 5,000 Units, Oral, DAILY     Pharmacy and Supplies Information  Current Pharmacy: [facility]  Current Testing supplies:[employer]     2025 Program Benefit  Health Equity Benefit Card  Phone: [phone]  Money to be added to HRA/HSA card as an employer contribution  Money should be available around the end of January for patients enrolled 1/1/2025 or prior  If pt is new enrollee, will receive money 4 to 6 weeks from enrollment date. Enrollment dates will be the first of each month  Amount: 1/1/25 and Prior- $450  There is only one

## 2025-04-30 ENCOUNTER — HOSPITAL ENCOUNTER (OUTPATIENT)
Age: 60
Discharge: HOME | End: 2025-04-30
Payer: COMMERCIAL

## 2025-04-30 LAB
ALBUMIN SERPL-MCNC: 4.1 G/DL (ref 3.5–5.2)
ALP SERPL-CCNC: 92 U/L (ref 35–104)
ALT SERPL-CCNC: 16 U/L (ref 10–35)
ANION GAP SERPL CALCULATED.3IONS-SCNC: 8 MMOL/L (ref 9–16)
AST SERPL-CCNC: 15 U/L (ref 10–35)
BILIRUB SERPL-MCNC: 0.2 MG/DL (ref 0–1.2)
BUN SERPL-MCNC: 16 MG/DL (ref 6–20)
CALCIUM SERPL-MCNC: 9.8 MG/DL (ref 8.6–10.4)
CHLORIDE SERPL-SCNC: 105 MMOL/L (ref 98–107)
CO2 SERPL-SCNC: 30 MMOL/L (ref 20–31)
CREAT SERPL-MCNC: 0.7 MG/DL (ref 0.7–1.2)
CREAT UR-MCNC: 91 MG/DL (ref 28–217)
EST. AVERAGE GLUCOSE BLD GHB EST-MCNC: 166 MG/DL
GFR, ESTIMATED: >90 ML/MIN/1.73M2
GLUCOSE SERPL-MCNC: 51 MG/DL (ref 74–99)
HBA1C MFR BLD: 7.4 % (ref 4–6)
MICROALBUMIN UR-MCNC: 62 MG/L (ref 0–20)
MICROALBUMIN/CREAT UR-RTO: 68 MCG/MG CREAT (ref 0–25)
POTASSIUM SERPL-SCNC: 5 MMOL/L (ref 3.7–5.3)
PROT SERPL-MCNC: 7.1 G/DL (ref 6.6–8.7)
SODIUM SERPL-SCNC: 143 MMOL/L (ref 136–145)

## 2025-04-30 PROCEDURE — 82043 UR ALBUMIN QUANTITATIVE: CPT

## 2025-04-30 PROCEDURE — 83036 HEMOGLOBIN GLYCOSYLATED A1C: CPT

## 2025-04-30 PROCEDURE — 80053 COMPREHEN METABOLIC PANEL: CPT

## 2025-04-30 PROCEDURE — 36415 COLL VENOUS BLD VENIPUNCTURE: CPT

## 2025-04-30 PROCEDURE — 82570 ASSAY OF URINE CREATININE: CPT

## 2025-05-01 LAB
EST. AVERAGE GLUCOSE BLD GHB EST-MCNC: 166 MG/DL
HBA1C MFR BLD: 7.4 % (ref 4–6)

## 2025-05-29 ENCOUNTER — HOSPITAL ENCOUNTER (OUTPATIENT)
Age: 60
Discharge: HOME | End: 2025-05-29
Payer: COMMERCIAL

## 2025-05-29 LAB
C3 SERPL-MCNC: 126 MG/DL (ref 90–180)
C4 SERPL-MCNC: 28 MG/DL (ref 10–40)
CK SERPL-CCNC: 52 U/L (ref 26–192)
CRP SERPL HS-MCNC: 8.7 MG/L (ref 0–5)
ERYTHROCYTE [SEDIMENTATION RATE] IN BLOOD BY PHOTOMETRIC METHOD: 3 MM/HR (ref 0–30)
HBV CORE IGM SERPL QL IA: NONREACTIVE
HBV SURFACE AG SERPL QL IA: NONREACTIVE
HCV AB SERPL QL IA: NONREACTIVE
RHEUMATOID FACT SER NEPH-ACNC: <10 IU/ML (ref 0–13)
URATE SERPL-MCNC: 1.8 MG/DL (ref 2.4–5.7)

## 2025-05-29 PROCEDURE — 86200 CCP ANTIBODY: CPT

## 2025-05-29 PROCEDURE — 86431 RHEUMATOID FACTOR QUANT: CPT

## 2025-05-29 PROCEDURE — 86803 HEPATITIS C AB TEST: CPT

## 2025-05-29 PROCEDURE — 36415 COLL VENOUS BLD VENIPUNCTURE: CPT

## 2025-05-29 PROCEDURE — 86705 HEP B CORE ANTIBODY IGM: CPT

## 2025-05-29 PROCEDURE — 85652 RBC SED RATE AUTOMATED: CPT

## 2025-05-29 PROCEDURE — 84550 ASSAY OF BLOOD/URIC ACID: CPT

## 2025-05-29 PROCEDURE — 82550 ASSAY OF CK (CPK): CPT

## 2025-05-29 PROCEDURE — 86225 DNA ANTIBODY NATIVE: CPT

## 2025-05-29 PROCEDURE — 86140 C-REACTIVE PROTEIN: CPT

## 2025-05-29 PROCEDURE — 87340 HEPATITIS B SURFACE AG IA: CPT

## 2025-05-29 PROCEDURE — 86160 COMPLEMENT ANTIGEN: CPT

## 2025-05-30 ENCOUNTER — TRANSCRIBE ORDERS (OUTPATIENT)
Dept: ADMINISTRATIVE | Age: 60
End: 2025-05-30

## 2025-05-30 DIAGNOSIS — M19.011 PRIMARY OSTEOARTHRITIS, RIGHT SHOULDER: Primary | ICD-10-CM

## 2025-05-30 LAB
CCP AB SER IA-ACNC: 1.2 U/ML (ref 0–7)
DSDNA IGG SER QL IA: 3.8 IU/ML

## 2025-06-02 ENCOUNTER — HOSPITAL ENCOUNTER (OUTPATIENT)
Dept: WOMENS IMAGING | Age: 60
Discharge: HOME | End: 2025-06-04
Attending: INTERNAL MEDICINE
Payer: COMMERCIAL

## 2025-06-02 DIAGNOSIS — M19.011 PRIMARY OSTEOARTHRITIS, RIGHT SHOULDER: ICD-10-CM

## 2025-06-02 PROCEDURE — 77080 DXA BONE DENSITY AXIAL: CPT

## 2025-07-07 LAB
CHOLEST SERPL-MCNC: 140 MG/DL (ref 0–199)
CHOLESTEROL/HDL RATIO: 2.2
EST. AVERAGE GLUCOSE BLD GHB EST-MCNC: 146 MG/DL
GLUCOSE SERPL-MCNC: 38 MG/DL (ref 74–99)
HBA1C MFR BLD: 6.7 % (ref 4–6)
HDLC SERPL-MCNC: 63 MG/DL
LDLC SERPL CALC-MCNC: 55 MG/DL (ref 0–100)
PATIENT FASTING?: YES
TRIGL SERPL-MCNC: 111 MG/DL (ref 0–149)

## 2025-08-01 ENCOUNTER — CLINICAL DOCUMENTATION (OUTPATIENT)
Dept: PHARMACY | Facility: CLINIC | Age: 60
End: 2025-08-01

## 2025-08-01 NOTE — PROGRESS NOTES
[facility] Employee Diabetes Program - Be Well With Diabetes    Quarterly Check-in  Quarterly Reminder sent to patient for the DM Program - See Ronal message or Letter for more information  Sent Ronal Garzon [facility]  Population Health Clinical   [facility] Clinical Pharmacy  Department, toll free: [phone], option 3    For Pharmacy Admin Tracking Only    Program: Built In  CPA in place:  No  Time Spent (min): 5     =======================================================    Mychart/Letter for participant:    Thanks so much for taking the first step towards better health.    To ensure participants are taking steps to control their condition, ongoing requirements need to be completed. To receive the Be Well With Diabetes Program 2026, the following actions must be taken:     Requirements to be completed by 12/31/25  Visit with your physician (Second yearly visit)  Flu vaccination (once yearly)  Taking a Statin, have a contraindication, or a Provider override  Taking an ACEi/ARB, have a contraindication, or a Provider override    Requirement due by 12/31/25 if A1C is greater than 8 percent:  Engage with a [facility] Associate Care Managers (ACM) or Clinical pharmacy specialists by phone at least 2 times, or complete some form of diabetes education through your provider, [facility], etc.    *Documentation of any requirements completed or reviewed outside of the [facility] electronic medical record will need to be faxed or emailed (fax/email info below).*    **Note: If you complete a 2025 [facility] Screening you can have an A1C drawn at that time at no cost to you - Advise the [facility] Team at your screening that you are enrolled in the Be Well With Diabetes Program and you would like to have an A1C drawn with your [facility] Labs**    If the missing requirements(s) are not met by the date listed

## (undated) DEVICE — DRAPE EQUIP STAND XL MAYO CVR

## (undated) DEVICE — PADDING CAST W6INXL4YD COT COHESIVE HND TEARABLE SPEC 100

## (undated) DEVICE — AIRWAY LARYN MASK AD SZ 4 OG TB 14FR 45ML CUF FOR 50-70KG

## (undated) DEVICE — PACK ARTHRO W PCH

## (undated) DEVICE — SURGICAL PROCEDURE PACK GWN W/ BTC A

## (undated) DEVICE — NO USE 18 MONTHS GOWN STD LG  1153D

## (undated) DEVICE — SOLUTION IV IRRIG LACTATED RINGERS 3000ML 2B7487

## (undated) DEVICE — SUTURE ETHLN SZ 3-0 L18IN NONABSORBABLE BLK FS-1 L24MM 3/8 663H

## (undated) DEVICE — [TOMCAT CUTTER, ARTHROSCOPIC SHAVER BLADE,  DO NOT RESTERILIZE,  DO NOT USE IF PACKAGE IS DAMAGED,  KEEP DRY,  KEEP AWAY FROM SUNLIGHT]: Brand: FORMULA

## (undated) DEVICE — DRESSING,GAUZE,XEROFORM,CURAD,1"X8",ST: Brand: CURAD